# Patient Record
Sex: FEMALE | Race: BLACK OR AFRICAN AMERICAN | Employment: UNEMPLOYED
[De-identification: names, ages, dates, MRNs, and addresses within clinical notes are randomized per-mention and may not be internally consistent; named-entity substitution may affect disease eponyms.]

---

## 2017-02-03 ENCOUNTER — OFFICE VISIT (OUTPATIENT)
Dept: OBGYN | Facility: CLINIC | Age: 32
End: 2017-02-03

## 2017-02-03 VITALS
HEART RATE: 85 BPM | HEIGHT: 71 IN | BODY MASS INDEX: 41.02 KG/M2 | RESPIRATION RATE: 20 BRPM | SYSTOLIC BLOOD PRESSURE: 159 MMHG | DIASTOLIC BLOOD PRESSURE: 88 MMHG | WEIGHT: 293 LBS | TEMPERATURE: 98 F

## 2017-02-03 DIAGNOSIS — R10.9 ABDOMINAL PAIN, UNSPECIFIED LOCATION: ICD-10-CM

## 2017-02-03 DIAGNOSIS — E66.01 MORBID OBESITY WITH BMI OF 70 AND OVER, ADULT (HCC): ICD-10-CM

## 2017-02-03 DIAGNOSIS — N89.8 VAGINAL DISCHARGE: ICD-10-CM

## 2017-02-03 DIAGNOSIS — Z86.19 HISTORY OF TRICHOMONAL VAGINITIS: ICD-10-CM

## 2017-02-03 DIAGNOSIS — Z01.419 WELL WOMAN EXAM WITH ROUTINE GYNECOLOGICAL EXAM: Primary | ICD-10-CM

## 2017-02-03 DIAGNOSIS — Z87.42 H/O ABNORMAL CERVICAL PAPANICOLAOU SMEAR: ICD-10-CM

## 2017-02-03 PROCEDURE — 99395 PREV VISIT EST AGE 18-39: CPT | Performed by: STUDENT IN AN ORGANIZED HEALTH CARE EDUCATION/TRAINING PROGRAM

## 2017-02-06 DIAGNOSIS — N76.0 BV (BACTERIAL VAGINOSIS): Primary | ICD-10-CM

## 2017-02-06 DIAGNOSIS — B96.89 BV (BACTERIAL VAGINOSIS): Primary | ICD-10-CM

## 2017-02-06 RX ORDER — METRONIDAZOLE 500 MG/1
500 TABLET ORAL 2 TIMES DAILY
Qty: 14 TABLET | Refills: 0 | Status: SHIPPED | OUTPATIENT
Start: 2017-02-06 | End: 2017-02-13

## 2017-02-14 PROBLEM — R87.610 ASCUS WITH POSITIVE HIGH RISK HPV CERVICAL: Status: ACTIVE | Noted: 2017-02-14

## 2017-02-14 PROBLEM — R87.810 ASCUS WITH POSITIVE HIGH RISK HPV CERVICAL: Status: ACTIVE | Noted: 2017-02-14

## 2017-02-15 DIAGNOSIS — R10.32 LEFT LOWER QUADRANT PAIN: Primary | ICD-10-CM

## 2017-02-22 ENCOUNTER — HOSPITAL ENCOUNTER (EMERGENCY)
Age: 32
Discharge: HOME | End: 2017-02-22
Attending: EMERGENCY MEDICINE
Payer: MEDICARE

## 2017-02-22 VITALS
WEIGHT: 293 LBS | HEART RATE: 69 BPM | RESPIRATION RATE: 18 BRPM | TEMPERATURE: 97.9 F | BODY MASS INDEX: 41.02 KG/M2 | SYSTOLIC BLOOD PRESSURE: 148 MMHG | HEIGHT: 71 IN | DIASTOLIC BLOOD PRESSURE: 86 MMHG | OXYGEN SATURATION: 99 %

## 2017-02-22 DIAGNOSIS — E66.01 MORBID OBESITY WITH BMI OF 70 AND OVER, ADULT (HCC): ICD-10-CM

## 2017-02-22 DIAGNOSIS — I15.9 SECONDARY HYPERTENSION: ICD-10-CM

## 2017-02-22 DIAGNOSIS — M54.50 ACUTE LEFT-SIDED LOW BACK PAIN WITHOUT SCIATICA: Primary | ICD-10-CM

## 2017-02-22 PROCEDURE — 99283 EMERGENCY DEPT VISIT LOW MDM: CPT

## 2017-02-22 RX ORDER — CYCLOBENZAPRINE HCL 10 MG
10 TABLET ORAL 2 TIMES DAILY PRN
Qty: 10 TABLET | Refills: 0 | Status: ON HOLD | OUTPATIENT
Start: 2017-02-22 | End: 2018-11-21 | Stop reason: HOSPADM

## 2017-02-22 RX ORDER — CYCLOBENZAPRINE HCL 10 MG
10 TABLET ORAL ONCE
Status: DISCONTINUED | OUTPATIENT
Start: 2017-02-22 | End: 2017-02-22 | Stop reason: HOSPADM

## 2017-02-22 ASSESSMENT — ENCOUNTER SYMPTOMS
VOMITING: 0
SHORTNESS OF BREATH: 0
RHINORRHEA: 0
DIARRHEA: 0
EYE DISCHARGE: 0
SORE THROAT: 0
CONSTIPATION: 0
NAUSEA: 0
BACK PAIN: 1
COUGH: 0
ABDOMINAL PAIN: 1

## 2017-02-22 ASSESSMENT — PAIN SCALES - GENERAL: PAINLEVEL_OUTOF10: 10

## 2017-03-07 ENCOUNTER — HOSPITAL ENCOUNTER (OUTPATIENT)
Age: 32
Setting detail: SPECIMEN
Discharge: HOME | End: 2017-03-07
Payer: MEDICARE

## 2017-03-07 ENCOUNTER — PROCEDURE VISIT (OUTPATIENT)
Dept: OBGYN | Facility: CLINIC | Age: 32
End: 2017-03-07

## 2017-03-07 VITALS
SYSTOLIC BLOOD PRESSURE: 139 MMHG | BODY MASS INDEX: 44.41 KG/M2 | HEIGHT: 68 IN | HEART RATE: 88 BPM | TEMPERATURE: 97.9 F | WEIGHT: 293 LBS | DIASTOLIC BLOOD PRESSURE: 87 MMHG

## 2017-03-07 DIAGNOSIS — Z87.42 H/O ABNORMAL CERVICAL PAPANICOLAOU SMEAR: ICD-10-CM

## 2017-03-07 DIAGNOSIS — R87.810 ASCUS WITH POSITIVE HIGH RISK HPV CERVICAL: Primary | ICD-10-CM

## 2017-03-07 DIAGNOSIS — R87.610 ASCUS WITH POSITIVE HIGH RISK HPV CERVICAL: Primary | ICD-10-CM

## 2017-03-07 LAB
CONTROL: NORMAL
PREGNANCY TEST URINE, POC: NEGATIVE

## 2017-03-07 PROCEDURE — 57454 BX/CURETT OF CERVIX W/SCOPE: CPT | Performed by: OBSTETRICS & GYNECOLOGY

## 2017-03-07 PROCEDURE — 81025 URINE PREGNANCY TEST: CPT | Performed by: OBSTETRICS & GYNECOLOGY

## 2017-03-09 LAB — SURGICAL PATHOLOGY REPORT: NORMAL

## 2017-03-30 ENCOUNTER — OFFICE VISIT (OUTPATIENT)
Dept: OBGYN | Age: 32
End: 2017-03-30
Payer: MEDICARE

## 2017-03-30 VITALS
DIASTOLIC BLOOD PRESSURE: 93 MMHG | WEIGHT: 293 LBS | SYSTOLIC BLOOD PRESSURE: 147 MMHG | HEART RATE: 73 BPM | HEIGHT: 71 IN | BODY MASS INDEX: 41.02 KG/M2

## 2017-03-30 DIAGNOSIS — Z09 FOLLOW UP: Primary | ICD-10-CM

## 2017-03-30 DIAGNOSIS — N87.0 DYSPLASIA OF CERVIX, LOW GRADE (CIN 1): ICD-10-CM

## 2017-03-30 PROCEDURE — 99213 OFFICE O/P EST LOW 20 MIN: CPT | Performed by: OBSTETRICS & GYNECOLOGY

## 2017-05-17 ENCOUNTER — HOSPITAL ENCOUNTER (EMERGENCY)
Age: 32
Discharge: HOME | End: 2017-05-17
Attending: EMERGENCY MEDICINE
Payer: MEDICARE

## 2017-05-17 ENCOUNTER — APPOINTMENT (OUTPATIENT)
Dept: GENERAL RADIOLOGY | Age: 32
End: 2017-05-17
Payer: MEDICARE

## 2017-05-17 VITALS
SYSTOLIC BLOOD PRESSURE: 149 MMHG | TEMPERATURE: 98.2 F | BODY MASS INDEX: 41.02 KG/M2 | WEIGHT: 293 LBS | OXYGEN SATURATION: 99 % | DIASTOLIC BLOOD PRESSURE: 89 MMHG | HEART RATE: 70 BPM | RESPIRATION RATE: 16 BRPM | HEIGHT: 71 IN

## 2017-05-17 DIAGNOSIS — M79.671 RIGHT FOOT PAIN: Primary | ICD-10-CM

## 2017-05-17 PROCEDURE — 73610 X-RAY EXAM OF ANKLE: CPT

## 2017-05-17 PROCEDURE — 99283 EMERGENCY DEPT VISIT LOW MDM: CPT

## 2017-05-17 PROCEDURE — 73630 X-RAY EXAM OF FOOT: CPT

## 2017-05-17 RX ORDER — IBUPROFEN 800 MG/1
800 TABLET ORAL EVERY 8 HOURS PRN
Qty: 30 TABLET | Refills: 0 | Status: SHIPPED | OUTPATIENT
Start: 2017-05-17 | End: 2018-10-25

## 2017-05-17 ASSESSMENT — PAIN DESCRIPTION - DESCRIPTORS: DESCRIPTORS: ACHING

## 2017-05-17 ASSESSMENT — PAIN DESCRIPTION - LOCATION: LOCATION: FOOT;ANKLE

## 2017-05-17 ASSESSMENT — PAIN SCALES - GENERAL: PAINLEVEL_OUTOF10: 6

## 2017-05-17 ASSESSMENT — PAIN DESCRIPTION - PAIN TYPE: TYPE: CHRONIC PAIN

## 2017-05-17 ASSESSMENT — PAIN DESCRIPTION - FREQUENCY: FREQUENCY: CONTINUOUS

## 2017-05-17 ASSESSMENT — PAIN DESCRIPTION - ORIENTATION: ORIENTATION: RIGHT

## 2017-07-13 ENCOUNTER — OFFICE VISIT (OUTPATIENT)
Dept: OBGYN | Age: 32
End: 2017-07-13
Payer: MEDICARE

## 2017-07-13 ENCOUNTER — HOSPITAL ENCOUNTER (OUTPATIENT)
Age: 32
Setting detail: SPECIMEN
Discharge: HOME | End: 2017-07-13
Payer: MEDICARE

## 2017-07-13 VITALS
SYSTOLIC BLOOD PRESSURE: 141 MMHG | RESPIRATION RATE: 20 BRPM | BODY MASS INDEX: 41.02 KG/M2 | HEART RATE: 83 BPM | TEMPERATURE: 97.6 F | DIASTOLIC BLOOD PRESSURE: 98 MMHG | WEIGHT: 293 LBS | HEIGHT: 71 IN

## 2017-07-13 DIAGNOSIS — T74.21XS SEXUAL ASSAULT OF ADULT, SEQUELA: ICD-10-CM

## 2017-07-13 DIAGNOSIS — N89.8 VAGINAL DISCHARGE: Primary | ICD-10-CM

## 2017-07-13 DIAGNOSIS — Z20.2 EXPOSURE TO STD: ICD-10-CM

## 2017-07-13 DIAGNOSIS — A59.9 TRICHOMONAS INFECTION: ICD-10-CM

## 2017-07-13 LAB
-: ABNORMAL
AMORPHOUS: ABNORMAL
BACTERIA: ABNORMAL
BILIRUBIN URINE: NEGATIVE
CASTS UA: ABNORMAL /LPF (ref 0–8)
COLOR: YELLOW
COMMENT UA: ABNORMAL
CONTROL: NORMAL
CRYSTALS, UA: ABNORMAL /HPF
DIRECT EXAM: ABNORMAL
EPITHELIAL CELLS UA: ABNORMAL /HPF (ref 0–5)
GLUCOSE URINE: NEGATIVE
HAV IGM SER IA-ACNC: NONREACTIVE
HEPATITIS B CORE IGM ANTIBODY: NONREACTIVE
HEPATITIS B SURFACE ANTIGEN: NONREACTIVE
HEPATITIS C ANTIBODY: NONREACTIVE
HIV AG/AB: NONREACTIVE
KETONES, URINE: NEGATIVE
LEUKOCYTE ESTERASE, URINE: ABNORMAL
Lab: ABNORMAL
MUCUS: ABNORMAL
NITRITE, URINE: NEGATIVE
OTHER OBSERVATIONS UA: ABNORMAL
PH UA: 7 (ref 5–8)
PREGNANCY TEST URINE, POC: NEGATIVE
PROTEIN UA: ABNORMAL
RBC UA: ABNORMAL /HPF (ref 0–4)
RENAL EPITHELIAL, UA: ABNORMAL /HPF
SPECIFIC GRAVITY UA: 1.02 (ref 1–1.03)
SPECIMEN DESCRIPTION: ABNORMAL
STATUS: ABNORMAL
T. PALLIDUM, IGG: NONREACTIVE
TRICHOMONAS: ABNORMAL
TURBIDITY: ABNORMAL
URINE HGB: ABNORMAL
UROBILINOGEN, URINE: NORMAL
WBC UA: ABNORMAL /HPF (ref 0–5)
YEAST: ABNORMAL

## 2017-07-13 PROCEDURE — 80074 ACUTE HEPATITIS PANEL: CPT

## 2017-07-13 PROCEDURE — 81003 URINALYSIS AUTO W/O SCOPE: CPT | Performed by: OBSTETRICS & GYNECOLOGY

## 2017-07-13 PROCEDURE — 87210 SMEAR WET MOUNT SALINE/INK: CPT | Performed by: OBSTETRICS & GYNECOLOGY

## 2017-07-13 PROCEDURE — 99214 OFFICE O/P EST MOD 30 MIN: CPT | Performed by: OBSTETRICS & GYNECOLOGY

## 2017-07-13 PROCEDURE — 87389 HIV-1 AG W/HIV-1&-2 AB AG IA: CPT

## 2017-07-13 PROCEDURE — 81025 URINE PREGNANCY TEST: CPT | Performed by: OBSTETRICS & GYNECOLOGY

## 2017-07-13 PROCEDURE — 36415 COLL VENOUS BLD VENIPUNCTURE: CPT

## 2017-07-13 PROCEDURE — 86780 TREPONEMA PALLIDUM: CPT

## 2017-07-13 RX ORDER — AZITHROMYCIN 500 MG/1
1000 TABLET, FILM COATED ORAL ONCE
Qty: 1 PACKET | Refills: 0 | Status: SHIPPED | OUTPATIENT
Start: 2017-07-13 | End: 2017-07-13

## 2017-07-13 RX ORDER — CEFTRIAXONE SODIUM 250 MG/1
250 INJECTION, POWDER, FOR SOLUTION INTRAMUSCULAR; INTRAVENOUS ONCE
Status: COMPLETED | OUTPATIENT
Start: 2017-07-13 | End: 2017-07-13

## 2017-07-13 RX ORDER — METRONIDAZOLE 500 MG/1
500 TABLET ORAL 2 TIMES DAILY
Qty: 14 TABLET | Refills: 0 | Status: SHIPPED | OUTPATIENT
Start: 2017-07-13 | End: 2017-07-20

## 2017-07-13 RX ADMIN — CEFTRIAXONE SODIUM 250 MG: 250 INJECTION, POWDER, FOR SOLUTION INTRAMUSCULAR; INTRAVENOUS at 11:31

## 2017-07-13 ASSESSMENT — ENCOUNTER SYMPTOMS
BACK PAIN: 1
ABDOMINAL PAIN: 1
RESPIRATORY NEGATIVE: 1

## 2017-07-14 LAB
C TRACH DNA GENITAL QL NAA+PROBE: NEGATIVE
CULTURE: ABNORMAL
CULTURE: ABNORMAL
Lab: ABNORMAL
N. GONORRHOEAE DNA: NEGATIVE
SPECIMEN DESCRIPTION: ABNORMAL
STATUS: ABNORMAL

## 2017-07-24 ENCOUNTER — HOSPITAL ENCOUNTER (EMERGENCY)
Age: 32
Discharge: HOME | End: 2017-07-24
Attending: EMERGENCY MEDICINE
Payer: MEDICARE

## 2017-07-24 VITALS
RESPIRATION RATE: 20 BRPM | BODY MASS INDEX: 41.02 KG/M2 | SYSTOLIC BLOOD PRESSURE: 142 MMHG | WEIGHT: 293 LBS | DIASTOLIC BLOOD PRESSURE: 76 MMHG | HEART RATE: 97 BPM | TEMPERATURE: 98.1 F | HEIGHT: 71 IN | OXYGEN SATURATION: 100 %

## 2017-07-24 DIAGNOSIS — R51.9 FACIAL PAIN: Primary | ICD-10-CM
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
## 2017-07-24 PROCEDURE — 99283 EMERGENCY DEPT VISIT LOW MDM: CPT

## 2017-07-24 PROCEDURE — 6370000000 HC RX 637 (ALT 250 FOR IP): Performed by: PHYSICIAN ASSISTANT

## 2017-07-24 RX ORDER — SOFT LENS RINSE,STORE SOLUTION
1 SOLUTION, NON-ORAL MISCELLANEOUS PRN
Status: DISCONTINUED | OUTPATIENT
Start: 2017-07-24 | End: 2017-07-24 | Stop reason: HOSPADM

## 2017-07-24 RX ADMIN — Medication 1 DROP: at 21:17

## 2017-07-24 ASSESSMENT — PAIN DESCRIPTION - PAIN TYPE: TYPE: ACUTE PAIN

## 2017-07-24 ASSESSMENT — PAIN DESCRIPTION - LOCATION: LOCATION: HEAD

## 2017-07-24 ASSESSMENT — PAIN SCALES - GENERAL: PAINLEVEL_OUTOF10: 10

## 2017-07-25 ASSESSMENT — ENCOUNTER SYMPTOMS
SHORTNESS OF BREATH: 0
COUGH: 0
BACK PAIN: 0
FACIAL SWELLING: 1
EYE ITCHING: 0
EYE PAIN: 1
ABDOMINAL PAIN: 0
EYE REDNESS: 0
EYE DISCHARGE: 0
NAUSEA: 0
PHOTOPHOBIA: 0

## 2018-06-06 ENCOUNTER — HOSPITAL ENCOUNTER (EMERGENCY)
Age: 33
Discharge: HOME | End: 2018-06-06
Attending: EMERGENCY MEDICINE
Payer: MEDICARE

## 2018-06-06 VITALS
OXYGEN SATURATION: 98 % | TEMPERATURE: 98.8 F | HEART RATE: 87 BPM | SYSTOLIC BLOOD PRESSURE: 135 MMHG | DIASTOLIC BLOOD PRESSURE: 78 MMHG | RESPIRATION RATE: 19 BRPM

## 2018-06-06 DIAGNOSIS — J02.9 ACUTE PHARYNGITIS, UNSPECIFIED ETIOLOGY: ICD-10-CM

## 2018-06-06 DIAGNOSIS — H65.93 BILATERAL NON-SUPPURATIVE OTITIS MEDIA: Primary | ICD-10-CM

## 2018-06-06 PROCEDURE — 99283 EMERGENCY DEPT VISIT LOW MDM: CPT

## 2018-06-06 PROCEDURE — 6370000000 HC RX 637 (ALT 250 FOR IP): Performed by: EMERGENCY MEDICINE

## 2018-06-06 RX ORDER — GUAIFENESIN, PSEUDOEPHEDRINE HYDROCHLORIDE 600; 60 MG/1; MG/1
1 TABLET, EXTENDED RELEASE ORAL EVERY 12 HOURS
Qty: 14 TABLET | Refills: 0 | Status: SHIPPED | OUTPATIENT
Start: 2018-06-06 | End: 2018-06-13

## 2018-06-06 RX ORDER — AMOXICILLIN 250 MG/1
500 CAPSULE ORAL ONCE
Status: COMPLETED | OUTPATIENT
Start: 2018-06-06 | End: 2018-06-06

## 2018-06-06 RX ORDER — AMOXICILLIN 500 MG/1
500 CAPSULE ORAL 3 TIMES DAILY
Qty: 30 CAPSULE | Refills: 0 | Status: SHIPPED | OUTPATIENT
Start: 2018-06-06 | End: 2018-06-16

## 2018-06-06 RX ORDER — AMOXICILLIN 500 MG/1
500 CAPSULE ORAL 3 TIMES DAILY
Qty: 30 CAPSULE | Refills: 0 | Status: SHIPPED | OUTPATIENT
Start: 2018-06-06 | End: 2018-06-06

## 2018-06-06 RX ADMIN — AMOXICILLIN 500 MG: 250 CAPSULE ORAL at 19:45

## 2018-06-06 ASSESSMENT — PAIN DESCRIPTION - PAIN TYPE: TYPE: ACUTE PAIN

## 2018-06-06 ASSESSMENT — PAIN DESCRIPTION - DESCRIPTORS: DESCRIPTORS: SORE

## 2018-06-06 ASSESSMENT — PAIN SCALES - GENERAL: PAINLEVEL_OUTOF10: 10

## 2018-06-06 ASSESSMENT — PAIN DESCRIPTION - ORIENTATION: ORIENTATION: MID

## 2018-06-06 ASSESSMENT — PAIN DESCRIPTION - LOCATION: LOCATION: THROAT

## 2018-10-25 ENCOUNTER — HOSPITAL ENCOUNTER (EMERGENCY)
Age: 33
Discharge: HOME | End: 2018-10-25
Attending: EMERGENCY MEDICINE
Payer: MEDICARE

## 2018-10-25 VITALS
HEART RATE: 90 BPM | TEMPERATURE: 98 F | DIASTOLIC BLOOD PRESSURE: 92 MMHG | WEIGHT: 293 LBS | SYSTOLIC BLOOD PRESSURE: 182 MMHG | HEIGHT: 71 IN | OXYGEN SATURATION: 100 % | RESPIRATION RATE: 20 BRPM | BODY MASS INDEX: 41.02 KG/M2

## 2018-10-25 DIAGNOSIS — J20.9 ACUTE BRONCHITIS, UNSPECIFIED ORGANISM: Primary | ICD-10-CM

## 2018-10-25 PROCEDURE — 84703 CHORIONIC GONADOTROPIN ASSAY: CPT

## 2018-10-25 PROCEDURE — 81003 URINALYSIS AUTO W/O SCOPE: CPT

## 2018-10-25 PROCEDURE — 99283 EMERGENCY DEPT VISIT LOW MDM: CPT

## 2018-10-25 RX ORDER — IBUPROFEN 800 MG/1
800 TABLET ORAL EVERY 8 HOURS PRN
Qty: 20 TABLET | Refills: 0 | Status: ON HOLD | OUTPATIENT
Start: 2018-10-25 | End: 2018-11-06 | Stop reason: HOSPADM

## 2018-10-25 RX ORDER — BENZONATATE 100 MG/1
100 CAPSULE ORAL EVERY 8 HOURS PRN
Qty: 20 CAPSULE | Refills: 0 | Status: SHIPPED | OUTPATIENT
Start: 2018-10-25

## 2018-10-25 RX ORDER — MELOXICAM 7.5 MG/1
7.5 TABLET ORAL DAILY
Status: ON HOLD | COMMUNITY
End: 2018-11-06 | Stop reason: HOSPADM

## 2018-10-25 RX ORDER — AZITHROMYCIN 250 MG/1
TABLET, FILM COATED ORAL
Qty: 1 PACKET | Refills: 0 | Status: ON HOLD | OUTPATIENT
Start: 2018-10-25 | End: 2018-11-06 | Stop reason: HOSPADM

## 2018-10-25 ASSESSMENT — ENCOUNTER SYMPTOMS
COLOR CHANGE: 0
SHORTNESS OF BREATH: 0
ABDOMINAL PAIN: 1
EYE REDNESS: 0
EYE DISCHARGE: 0
CONSTIPATION: 0
DIARRHEA: 0
FACIAL SWELLING: 0
COUGH: 1
VOMITING: 0

## 2018-10-25 ASSESSMENT — PAIN DESCRIPTION - DESCRIPTORS: DESCRIPTORS: PRESSURE;POUNDING

## 2018-10-25 ASSESSMENT — PAIN SCALES - GENERAL: PAINLEVEL_OUTOF10: 10

## 2018-10-25 ASSESSMENT — PAIN DESCRIPTION - ORIENTATION: ORIENTATION: LOWER;RIGHT;LEFT

## 2018-10-25 ASSESSMENT — PAIN DESCRIPTION - LOCATION: LOCATION: ABDOMEN;EAR;HEAD

## 2018-10-25 NOTE — ED PROVIDER NOTES
[address] [place] [address] ED  eMERGENCY dEPARTMENT eNCOUnter      Pt Name: Noel Dillon  MRN: [id number]  [place] 1985  Date of evaluation: 10/25/2018  Provider: Marcella Phillips MD    CHIEF COMPLAINT       Chief Complaint   Patient presents with    Abdominal Pain     low abd pain with urinary frequency past 3 days    Headache     with bilateral ear pain past 3 days         HISTORY OF PRESENT ILLNESS  (Location/Symptom, Timing/Onset, Context/Setting, Quality, Duration, Modifying Factors, Severity.)   Jean Claude Morales is a 29 y.o. female who presents to the emergency department For cough and chest and abdominal pain. She is a smoker but hasn't smoked recently because she can't because she is short of breath. No hemoptysis or fever. She felt a pulling sensation in her abdomen. No injury. She rated the pain as a 10. She states that she is not constipated. Nursing Notes were reviewed. ALLERGIES     Patient has no known allergies. CURRENT MEDICATIONS       Previous Medications    CYCLOBENZAPRINE (FLEXERIL) 10 MG TABLET    Take 1 tablet by mouth 2 times daily as needed for Muscle spasms    HYDROCHLOROTHIAZIDE (HYDRODIURIL) 25 MG TABLET    Take 25 mg by mouth daily    LORATADINE (CLARITIN) 10 MG TABLET    Take 10 mg by mouth daily    MELOXICAM (MOBIC) 7.5 MG TABLET    Take 7.5 mg by mouth daily    METOPROLOL (LOPRESSOR) 50 MG TABLET    Take 50 mg by mouth 2 times daily    NAPROXEN (NAPROSYN) 250 MG TABLET    Take 250 mg by mouth 2 times daily (with meals)    SUMATRIPTAN SUCCINATE (IMITREX PO)    Take by mouth    TOPIRAMATE (TOPAMAX) 50 MG TABLET    Take 100 mg by mouth 2 times daily     VITAMIN D (ERGOCALCIFEROL) 43844 UNITS CAPS CAPSULE    Take 50,000 Units by mouth once a week       PAST MEDICAL HISTORY         Diagnosis Date    Asthma     Breast discharge 4/9/2015    Chlamydia ?     Reported hx    History of trichomonal vaginitis 1/20106    tx'd    Hypertension     Morbid obesity with BMI of 70 Michelle Clarke  [address] [facility]    In 1 week      [facility] ED  [address] [facility]  [phone]    If symptoms worsen      DISCHARGE MEDICATIONS:     New Prescriptions    AZITHROMYCIN (ZITHROMAX) 250 MG TABLET    Take 2 tablets (500 mg) on Day 1, followed by 1 tablet (250 mg) once daily on Days 2 through 5.     BENZONATATE (TESSALON PERLES) 100 MG CAPSULE    Take 1 capsule by mouth every 8 hours as needed for Cough    IBUPROFEN (ADVIL;MOTRIN) 800 MG TABLET    Take 1 tablet by mouth every 8 hours as needed for Pain         (Please note that portions of this note were completed with a voice recognition program.  Efforts were made to edit the dictations but occasionally words are mis-transcribed.)    Lloyd Cadet MD  Attending Emergency Physician             Lloyd Cadet MD  10/25/18 3291

## 2018-10-28 ENCOUNTER — APPOINTMENT (OUTPATIENT)
Dept: GENERAL RADIOLOGY | Age: 33
End: 2018-10-28
Payer: MEDICARE

## 2018-10-28 ENCOUNTER — HOSPITAL ENCOUNTER (EMERGENCY)
Age: 33
Discharge: HOME | End: 2018-10-29
Attending: EMERGENCY MEDICINE
Payer: MEDICARE

## 2018-10-28 VITALS
SYSTOLIC BLOOD PRESSURE: 134 MMHG | HEIGHT: 72 IN | HEART RATE: 89 BPM | TEMPERATURE: 98.3 F | OXYGEN SATURATION: 96 % | BODY MASS INDEX: 39.68 KG/M2 | WEIGHT: 293 LBS | RESPIRATION RATE: 25 BRPM | DIASTOLIC BLOOD PRESSURE: 85 MMHG

## 2018-10-28 DIAGNOSIS — E66.01 MORBID OBESITY WITH BODY MASS INDEX OF 70 AND OVER IN ADULT (HCC): ICD-10-CM

## 2018-10-28 DIAGNOSIS — R07.9 CHEST PAIN, UNSPECIFIED TYPE: Primary | ICD-10-CM

## 2018-10-28 LAB
ABSOLUTE EOS #: 0.09 K/UL (ref 0–0.4)
ABSOLUTE IMMATURE GRANULOCYTE: ABNORMAL K/UL (ref 0–0.3)
ABSOLUTE LYMPH #: 2.35 K/UL (ref 1–4.8)
ABSOLUTE MONO #: 0.66 K/UL (ref 0.1–1.3)
ANION GAP SERPL CALCULATED.3IONS-SCNC: 10 MMOL/L (ref 9–17)
BASOPHILS # BLD: 1 % (ref 0–2)
BASOPHILS ABSOLUTE: 0.09 K/UL (ref 0–0.2)
BNP INTERPRETATION: ABNORMAL
BUN BLDV-MCNC: 13 MG/DL (ref 6–20)
BUN/CREAT BLD: ABNORMAL (ref 9–20)
CALCIUM SERPL-MCNC: 8.8 MG/DL (ref 8.6–10.4)
CHLORIDE BLD-SCNC: 100 MMOL/L (ref 98–107)
CO2: 27 MMOL/L (ref 20–31)
CREAT SERPL-MCNC: 0.81 MG/DL (ref 0.5–0.9)
DIFFERENTIAL TYPE: ABNORMAL
EKG ATRIAL RATE: 89 BPM
EKG P AXIS: 51 DEGREES
EKG P-R INTERVAL: 152 MS
EKG Q-T INTERVAL: 398 MS
EKG QRS DURATION: 106 MS
EKG QTC CALCULATION (BAZETT): 484 MS
EKG R AXIS: 53 DEGREES
EKG T AXIS: -4 DEGREES
EKG VENTRICULAR RATE: 89 BPM
EOSINOPHILS RELATIVE PERCENT: 1 % (ref 0–4)
GFR AFRICAN AMERICAN: >60 ML/MIN
GFR NON-AFRICAN AMERICAN: >60 ML/MIN
GFR SERPL CREATININE-BSD FRML MDRD: ABNORMAL ML/MIN/{1.73_M2}
GFR SERPL CREATININE-BSD FRML MDRD: ABNORMAL ML/MIN/{1.73_M2}
GLUCOSE BLD-MCNC: 201 MG/DL (ref 70–99)
HCT VFR BLD CALC: 41.6 % (ref 36–46)
HEMOGLOBIN: 12.6 G/DL (ref 12–16)
IMMATURE GRANULOCYTES: ABNORMAL %
LIPASE: 24 U/L (ref 13–60)
LYMPHOCYTES # BLD: 25 % (ref 24–44)
MCH RBC QN AUTO: 21.5 PG (ref 26–34)
MCHC RBC AUTO-ENTMCNC: 30.4 G/DL (ref 31–37)
MCV RBC AUTO: 70.7 FL (ref 80–100)
MONOCYTES # BLD: 7 % (ref 1–7)
MORPHOLOGY: ABNORMAL
NRBC AUTOMATED: ABNORMAL PER 100 WBC
PDW BLD-RTO: 18.2 % (ref 11.5–14.9)
PLATELET # BLD: 294 K/UL (ref 150–450)
PLATELET ESTIMATE: ABNORMAL
PMV BLD AUTO: 8.6 FL (ref 6–12)
POTASSIUM SERPL-SCNC: 3.9 MMOL/L (ref 3.7–5.3)
PRO-BNP: 2317 PG/ML
RBC # BLD: 5.88 M/UL (ref 4–5.2)
RBC # BLD: ABNORMAL 10*6/UL
SEG NEUTROPHILS: 66 % (ref 36–66)
SEGMENTED NEUTROPHILS ABSOLUTE COUNT: 6.21 K/UL (ref 1.3–9.1)
SODIUM BLD-SCNC: 137 MMOL/L (ref 135–144)
TROPONIN INTERP: NORMAL
TROPONIN INTERP: NORMAL
TROPONIN T: <0.03 NG/ML
TROPONIN T: <0.03 NG/ML
WBC # BLD: 9.4 K/UL (ref 3.5–11)
WBC # BLD: ABNORMAL 10*3/UL

## 2018-10-28 PROCEDURE — 80048 BASIC METABOLIC PNL TOTAL CA: CPT

## 2018-10-28 PROCEDURE — 99285 EMERGENCY DEPT VISIT HI MDM: CPT

## 2018-10-28 PROCEDURE — 6370000000 HC RX 637 (ALT 250 FOR IP): Performed by: STUDENT IN AN ORGANIZED HEALTH CARE EDUCATION/TRAINING PROGRAM

## 2018-10-28 PROCEDURE — 83880 ASSAY OF NATRIURETIC PEPTIDE: CPT

## 2018-10-28 PROCEDURE — 96374 THER/PROPH/DIAG INJ IV PUSH: CPT

## 2018-10-28 PROCEDURE — 71045 X-RAY EXAM CHEST 1 VIEW: CPT

## 2018-10-28 PROCEDURE — 2500000003 HC RX 250 WO HCPCS: Performed by: STUDENT IN AN ORGANIZED HEALTH CARE EDUCATION/TRAINING PROGRAM

## 2018-10-28 PROCEDURE — 85025 COMPLETE CBC W/AUTO DIFF WBC: CPT

## 2018-10-28 PROCEDURE — 93005 ELECTROCARDIOGRAM TRACING: CPT

## 2018-10-28 PROCEDURE — 84484 ASSAY OF TROPONIN QUANT: CPT

## 2018-10-28 PROCEDURE — 36415 COLL VENOUS BLD VENIPUNCTURE: CPT

## 2018-10-28 PROCEDURE — 83690 ASSAY OF LIPASE: CPT

## 2018-10-28 PROCEDURE — S0028 INJECTION, FAMOTIDINE, 20 MG: HCPCS | Performed by: STUDENT IN AN ORGANIZED HEALTH CARE EDUCATION/TRAINING PROGRAM

## 2018-10-28 RX ORDER — FAMOTIDINE 20 MG/1
20 TABLET, FILM COATED ORAL 2 TIMES DAILY
Qty: 60 TABLET | Refills: 0 | Status: SHIPPED | OUTPATIENT
Start: 2018-10-28 | End: 2020-12-14

## 2018-10-28 RX ORDER — MAGNESIUM HYDROXIDE/ALUMINUM HYDROXICE/SIMETHICONE 120; 1200; 1200 MG/30ML; MG/30ML; MG/30ML
30 SUSPENSION ORAL
Status: COMPLETED | OUTPATIENT
Start: 2018-10-28 | End: 2018-10-28

## 2018-10-28 RX ORDER — ALUMINA, MAGNESIA, AND SIMETHICONE 2400; 2400; 240 MG/30ML; MG/30ML; MG/30ML
15 SUSPENSION ORAL EVERY 6 HOURS PRN
Qty: 1 BOTTLE | Refills: 0 | Status: SHIPPED | OUTPATIENT
Start: 2018-10-28 | End: 2020-01-10 | Stop reason: ALTCHOICE

## 2018-10-28 RX ORDER — ASPIRIN 81 MG/1
324 TABLET, CHEWABLE ORAL ONCE
Status: COMPLETED | OUTPATIENT
Start: 2018-10-28 | End: 2018-10-28

## 2018-10-28 RX ADMIN — ALUMINUM HYDROXIDE, MAGNESIUM HYDROXIDE, AND SIMETHICONE 30 ML: 200; 200; 20 SUSPENSION ORAL at 22:06

## 2018-10-28 RX ADMIN — FAMOTIDINE 20 MG: 10 INJECTION, SOLUTION INTRAVENOUS at 22:07

## 2018-10-28 RX ADMIN — ASPIRIN 324 MG: 81 TABLET, CHEWABLE ORAL at 21:39

## 2018-10-28 ASSESSMENT — ENCOUNTER SYMPTOMS
VOMITING: 0
BACK PAIN: 0
ABDOMINAL PAIN: 0
PHOTOPHOBIA: 0
TROUBLE SWALLOWING: 0
SHORTNESS OF BREATH: 1
COUGH: 1
SORE THROAT: 0
CHEST TIGHTNESS: 0
WHEEZING: 0
NAUSEA: 0

## 2018-10-28 ASSESSMENT — PAIN DESCRIPTION - DURATION: DURATION_2: CONTINUOUS

## 2018-10-28 ASSESSMENT — PAIN DESCRIPTION - INTENSITY: RATING_2: 6

## 2018-10-28 ASSESSMENT — PAIN DESCRIPTION - DESCRIPTORS
DESCRIPTORS_2: STABBING
DESCRIPTORS: SHARP

## 2018-10-28 ASSESSMENT — PAIN DESCRIPTION - ORIENTATION
ORIENTATION: MID
ORIENTATION_2: LEFT;RIGHT

## 2018-10-28 ASSESSMENT — PAIN DESCRIPTION - LOCATION
LOCATION: CHEST;ABDOMEN
LOCATION_2: EAR

## 2018-10-28 ASSESSMENT — PAIN SCALES - GENERAL: PAINLEVEL_OUTOF10: 7

## 2018-10-28 ASSESSMENT — PAIN DESCRIPTION - FREQUENCY: FREQUENCY: CONTINUOUS

## 2018-10-28 ASSESSMENT — PAIN DESCRIPTION - PAIN TYPE: TYPE: ACUTE PAIN

## 2018-10-29 LAB — HCG, PREGNANCY URINE (POC): NEGATIVE

## 2018-10-29 NOTE — ED PROVIDER NOTES
the patient in conjunction with the APC and agree with the assessment, treatment plan, and disposition of the patient as recorded by the APC. Additional findings are as noted.     Salvador Mckinley MD  Attending Emergency  Physician                Khushi Joseph MD  10/28/18 8507
sounds, JVD, tender chest wall, wheezing      DIAGNOSTIC RESULTS / EMERGENCY DEPARTMENT COURSE / MDM     LABS:  Results for orders placed or performed during the hospital encounter of 33/84/04   Basic Metabolic Panel   Result Value Ref Range    Glucose 201 (H) 70 - 99 mg/dL    BUN 13 6 - 20 mg/dL    CREATININE 0.81 0.50 - 0.90 mg/dL    Bun/Cre Ratio NOT REPORTED 9 - 20    Calcium 8.8 8.6 - 10.4 mg/dL    Sodium 137 135 - 144 mmol/L    Potassium 3.9 3.7 - 5.3 mmol/L    Chloride 100 98 - 107 mmol/L    CO2 27 20 - 31 mmol/L    Anion Gap 10 9 - 17 mmol/L    GFR Non-African American >60 >60 mL/min    GFR African American >60 >60 mL/min    GFR Comment          GFR Staging NOT REPORTED    Brain Natriuretic Peptide   Result Value Ref Range    Pro-BNP 2,317 (H) <300 pg/mL    BNP Interpretation         CBC Auto Differential   Result Value Ref Range    WBC 9.4 3.5 - 11.0 k/uL    RBC 5.88 (H) 4.0 - 5.2 m/uL    Hemoglobin 12.6 12.0 - 16.0 g/dL    Hematocrit 41.6 36 - 46 %    MCV 70.7 (L) 80 - 100 fL    MCH 21.5 (L) 26 - 34 pg    MCHC 30.4 (L) 31 - 37 g/dL    RDW 18.2 (H) 11.5 - 14.9 %    Platelets 430 634 - 126 k/uL    MPV 8.6 6.0 - 12.0 fL    NRBC Automated NOT REPORTED per 100 WBC    Differential Type NOT REPORTED     Immature Granulocytes NOT REPORTED 0 %    Absolute Immature Granulocyte NOT REPORTED 0.00 - 0.30 k/uL    WBC Morphology NOT REPORTED     RBC Morphology NOT REPORTED     Platelet Estimate NOT REPORTED     Seg Neutrophils 66 36 - 66 %    Lymphocytes 25 24 - 44 %    Monocytes 7 1 - 7 %    Eosinophils % 1 0 - 4 %    Basophils 1 0 - 2 %    Segs Absolute 6.21 1.3 - 9.1 k/uL    Absolute Lymph # 2.35 1.0 - 4.8 k/uL    Absolute Mono # 0.66 0.1 - 1.3 k/uL    Absolute Eos # 0.09 0.0 - 0.4 k/uL    Basophils # 0.09 0.0 - 0.2 k/uL    Morphology ANISOCYTOSIS PRESENT    Troponin   Result Value Ref Range    Troponin T <0.03 <0.03 ng/mL    Troponin Interp         Lipase   Result Value Ref Range    Lipase 24 13 - 60 U/L   EKG 12

## 2018-11-03 ENCOUNTER — HOSPITAL ENCOUNTER (INPATIENT)
Age: 33
LOS: 3 days | Discharge: HOME | DRG: 203 | End: 2018-11-06
Attending: EMERGENCY MEDICINE | Admitting: FAMILY MEDICINE
Payer: MEDICARE

## 2018-11-03 ENCOUNTER — APPOINTMENT (OUTPATIENT)
Dept: GENERAL RADIOLOGY | Age: 33
DRG: 203 | End: 2018-11-03
Payer: MEDICARE

## 2018-11-03 DIAGNOSIS — E66.2 OBESITY HYPOVENTILATION SYNDROME (HCC): ICD-10-CM

## 2018-11-03 DIAGNOSIS — E66.01 MORBID OBESITY WITH BMI OF 70 AND OVER, ADULT (HCC): ICD-10-CM

## 2018-11-03 DIAGNOSIS — R03.0 ELEVATED BLOOD PRESSURE READING: Primary | ICD-10-CM

## 2018-11-03 DIAGNOSIS — R79.89 ELEVATED BRAIN NATRIURETIC PEPTIDE (BNP) LEVEL: ICD-10-CM

## 2018-11-03 DIAGNOSIS — R06.02 SHORTNESS OF BREATH: ICD-10-CM

## 2018-11-03 DIAGNOSIS — I51.7 CARDIOMEGALY: ICD-10-CM

## 2018-11-03 DIAGNOSIS — G47.33 OSA (OBSTRUCTIVE SLEEP APNEA): ICD-10-CM

## 2018-11-03 DIAGNOSIS — R06.01 ORTHOPNEA: ICD-10-CM

## 2018-11-03 PROBLEM — R06.00 DYSPNEA: Status: ACTIVE | Noted: 2018-11-03

## 2018-11-03 LAB
ABSOLUTE EOS #: 0 K/UL (ref 0–0.4)
ABSOLUTE IMMATURE GRANULOCYTE: 0 K/UL (ref 0–0.3)
ABSOLUTE LYMPH #: 2.72 K/UL (ref 1–4.8)
ABSOLUTE MONO #: 0.49 K/UL (ref 0.1–0.8)
ALBUMIN SERPL-MCNC: 3 G/DL (ref 3.5–5.2)
ALBUMIN/GLOBULIN RATIO: 0.8 (ref 1–2.5)
ALP BLD-CCNC: 57 U/L (ref 35–104)
ALT SERPL-CCNC: 47 U/L (ref 5–33)
ANION GAP SERPL CALCULATED.3IONS-SCNC: 8 MMOL/L (ref 9–17)
AST SERPL-CCNC: 28 U/L
ATYPICAL LYMPHOCYTE ABSOLUTE COUNT: 0.29 K/UL
ATYPICAL LYMPHOCYTES: 3 %
BASOPHILS # BLD: 0 % (ref 0–2)
BASOPHILS ABSOLUTE: 0 K/UL (ref 0–0.2)
BILIRUB SERPL-MCNC: 0.57 MG/DL (ref 0.3–1.2)
BILIRUBIN DIRECT: 0.19 MG/DL
BILIRUBIN, INDIRECT: 0.38 MG/DL (ref 0–1)
BNP INTERPRETATION: ABNORMAL
BUN BLDV-MCNC: 11 MG/DL (ref 6–20)
BUN/CREAT BLD: ABNORMAL (ref 9–20)
CALCIUM SERPL-MCNC: 8.6 MG/DL (ref 8.6–10.4)
CHLORIDE BLD-SCNC: 104 MMOL/L (ref 98–107)
CO2: 28 MMOL/L (ref 20–31)
CREAT SERPL-MCNC: 0.66 MG/DL (ref 0.5–0.9)
D-DIMER QUANTITATIVE: 0.75 MG/L FEU
DIFFERENTIAL TYPE: ABNORMAL
EOSINOPHILS RELATIVE PERCENT: 0 % (ref 1–4)
GFR AFRICAN AMERICAN: >60 ML/MIN
GFR NON-AFRICAN AMERICAN: >60 ML/MIN
GFR SERPL CREATININE-BSD FRML MDRD: ABNORMAL ML/MIN/{1.73_M2}
GFR SERPL CREATININE-BSD FRML MDRD: ABNORMAL ML/MIN/{1.73_M2}
GLOBULIN: ABNORMAL G/DL (ref 1.5–3.8)
GLUCOSE BLD-MCNC: 136 MG/DL (ref 70–99)
HCG QUALITATIVE: NEGATIVE
HCT VFR BLD CALC: 44.7 % (ref 36.3–47.1)
HEMOGLOBIN: 12.1 G/DL (ref 11.9–15.1)
IMMATURE GRANULOCYTES: 0 %
INR BLD: 1
LIPASE: 17 U/L (ref 13–60)
LYMPHOCYTES # BLD: 28 % (ref 24–44)
MCH RBC QN AUTO: 20.3 PG (ref 25.2–33.5)
MCHC RBC AUTO-ENTMCNC: 27.1 G/DL (ref 28.4–34.8)
MCV RBC AUTO: 75.1 FL (ref 82.6–102.9)
MONOCYTES # BLD: 5 % (ref 1–7)
MORPHOLOGY: ABNORMAL
NRBC AUTOMATED: 0.4 PER 100 WBC
PARTIAL THROMBOPLASTIN TIME: 20.2 SEC (ref 20.5–30.5)
PDW BLD-RTO: 18.8 % (ref 11.8–14.4)
PLATELET # BLD: 294 K/UL (ref 138–453)
PLATELET ESTIMATE: ABNORMAL
PMV BLD AUTO: 10.3 FL (ref 8.1–13.5)
POC TROPONIN I: 0.02 NG/ML (ref 0–0.1)
POC TROPONIN I: 0.09 NG/ML (ref 0–0.1)
POC TROPONIN INTERP: NORMAL
POC TROPONIN INTERP: NORMAL
POTASSIUM SERPL-SCNC: 3.8 MMOL/L (ref 3.7–5.3)
PRO-BNP: 1954 PG/ML
PROTHROMBIN TIME: 10.9 SEC (ref 9–12)
RBC # BLD: 5.95 M/UL (ref 3.95–5.11)
RBC # BLD: ABNORMAL 10*6/UL
SEG NEUTROPHILS: 64 % (ref 36–66)
SEGMENTED NEUTROPHILS ABSOLUTE COUNT: 6.2 K/UL (ref 1.8–7.7)
SODIUM BLD-SCNC: 140 MMOL/L (ref 135–144)
T4 TOTAL: 7.1 UG/DL (ref 4.5–12)
TOTAL PROTEIN: 6.7 G/DL (ref 6.4–8.3)
TROPONIN INTERP: NORMAL
TROPONIN T: <0.03 NG/ML
TSH SERPL DL<=0.05 MIU/L-ACNC: 3.7 MIU/L (ref 0.3–5)
WBC # BLD: 9.7 K/UL (ref 3.5–11.3)
WBC # BLD: ABNORMAL 10*3/UL

## 2018-11-03 PROCEDURE — 83690 ASSAY OF LIPASE: CPT

## 2018-11-03 PROCEDURE — 85730 THROMBOPLASTIN TIME PARTIAL: CPT

## 2018-11-03 PROCEDURE — 84436 ASSAY OF TOTAL THYROXINE: CPT

## 2018-11-03 PROCEDURE — 84484 ASSAY OF TROPONIN QUANT: CPT

## 2018-11-03 PROCEDURE — 85610 PROTHROMBIN TIME: CPT

## 2018-11-03 PROCEDURE — 6370000000 HC RX 637 (ALT 250 FOR IP): Performed by: PHYSICIAN ASSISTANT

## 2018-11-03 PROCEDURE — 80048 BASIC METABOLIC PNL TOTAL CA: CPT

## 2018-11-03 PROCEDURE — 6360000002 HC RX W HCPCS: Performed by: PHYSICIAN ASSISTANT

## 2018-11-03 PROCEDURE — 83036 HEMOGLOBIN GLYCOSYLATED A1C: CPT

## 2018-11-03 PROCEDURE — 85379 FIBRIN DEGRADATION QUANT: CPT

## 2018-11-03 PROCEDURE — 71045 X-RAY EXAM CHEST 1 VIEW: CPT

## 2018-11-03 PROCEDURE — 83880 ASSAY OF NATRIURETIC PEPTIDE: CPT

## 2018-11-03 PROCEDURE — 84443 ASSAY THYROID STIM HORMONE: CPT

## 2018-11-03 PROCEDURE — 80076 HEPATIC FUNCTION PANEL: CPT

## 2018-11-03 PROCEDURE — 99285 EMERGENCY DEPT VISIT HI MDM: CPT

## 2018-11-03 PROCEDURE — 84703 CHORIONIC GONADOTROPIN ASSAY: CPT

## 2018-11-03 PROCEDURE — 93005 ELECTROCARDIOGRAM TRACING: CPT

## 2018-11-03 PROCEDURE — 2060000000 HC ICU INTERMEDIATE R&B

## 2018-11-03 PROCEDURE — 85025 COMPLETE CBC W/AUTO DIFF WBC: CPT

## 2018-11-03 RX ORDER — HEPARIN SODIUM 1000 [USP'U]/ML
10000 INJECTION, SOLUTION INTRAVENOUS; SUBCUTANEOUS ONCE
Status: COMPLETED | OUTPATIENT
Start: 2018-11-03 | End: 2018-11-03

## 2018-11-03 RX ORDER — NICOTINE 21 MG/24HR
1 PATCH, TRANSDERMAL 24 HOURS TRANSDERMAL DAILY PRN
Status: DISCONTINUED | OUTPATIENT
Start: 2018-11-03 | End: 2018-11-06 | Stop reason: HOSPADM

## 2018-11-03 RX ORDER — ALBUTEROL SULFATE 2.5 MG/3ML
2.5 SOLUTION RESPIRATORY (INHALATION)
Status: DISCONTINUED | OUTPATIENT
Start: 2018-11-03 | End: 2018-11-04

## 2018-11-03 RX ORDER — DOCUSATE SODIUM 100 MG/1
100 CAPSULE, LIQUID FILLED ORAL 2 TIMES DAILY
Status: DISCONTINUED | OUTPATIENT
Start: 2018-11-03 | End: 2018-11-06 | Stop reason: HOSPADM

## 2018-11-03 RX ORDER — LISINOPRIL 5 MG/1
5 TABLET ORAL DAILY
Status: DISCONTINUED | OUTPATIENT
Start: 2018-11-04 | End: 2018-11-04

## 2018-11-03 RX ORDER — HYDRALAZINE HYDROCHLORIDE 10 MG/1
10 TABLET, FILM COATED ORAL ONCE
Status: COMPLETED | OUTPATIENT
Start: 2018-11-03 | End: 2018-11-03

## 2018-11-03 RX ORDER — METOPROLOL TARTRATE 50 MG/1
50 TABLET, FILM COATED ORAL 2 TIMES DAILY
Status: DISCONTINUED | OUTPATIENT
Start: 2018-11-03 | End: 2018-11-06 | Stop reason: HOSPADM

## 2018-11-03 RX ORDER — BISACODYL 10 MG
10 SUPPOSITORY, RECTAL RECTAL DAILY PRN
Status: DISCONTINUED | OUTPATIENT
Start: 2018-11-03 | End: 2018-11-06 | Stop reason: HOSPADM

## 2018-11-03 RX ORDER — BENZONATATE 100 MG/1
100 CAPSULE ORAL EVERY 8 HOURS PRN
Status: DISCONTINUED | OUTPATIENT
Start: 2018-11-03 | End: 2018-11-06 | Stop reason: HOSPADM

## 2018-11-03 RX ORDER — HEPARIN SODIUM 1000 [USP'U]/ML
10000 INJECTION, SOLUTION INTRAVENOUS; SUBCUTANEOUS PRN
Status: DISCONTINUED | OUTPATIENT
Start: 2018-11-03 | End: 2018-11-06

## 2018-11-03 RX ORDER — DEXTROSE MONOHYDRATE 50 MG/ML
100 INJECTION, SOLUTION INTRAVENOUS PRN
Status: DISCONTINUED | OUTPATIENT
Start: 2018-11-03 | End: 2018-11-06 | Stop reason: HOSPADM

## 2018-11-03 RX ORDER — ASPIRIN 81 MG/1
324 TABLET, CHEWABLE ORAL ONCE
Status: COMPLETED | OUTPATIENT
Start: 2018-11-03 | End: 2018-11-03

## 2018-11-03 RX ORDER — ONDANSETRON 2 MG/ML
4 INJECTION INTRAMUSCULAR; INTRAVENOUS EVERY 6 HOURS PRN
Status: DISCONTINUED | OUTPATIENT
Start: 2018-11-03 | End: 2018-11-06 | Stop reason: HOSPADM

## 2018-11-03 RX ORDER — HEPARIN SODIUM 1000 [USP'U]/ML
5000 INJECTION, SOLUTION INTRAVENOUS; SUBCUTANEOUS PRN
Status: DISCONTINUED | OUTPATIENT
Start: 2018-11-03 | End: 2018-11-05 | Stop reason: CLARIF

## 2018-11-03 RX ORDER — ACETAMINOPHEN 325 MG/1
650 TABLET ORAL EVERY 4 HOURS PRN
Status: DISCONTINUED | OUTPATIENT
Start: 2018-11-03 | End: 2018-11-06 | Stop reason: HOSPADM

## 2018-11-03 RX ORDER — HYDROCHLOROTHIAZIDE 25 MG/1
25 TABLET ORAL DAILY
Status: DISCONTINUED | OUTPATIENT
Start: 2018-11-04 | End: 2018-11-06 | Stop reason: HOSPADM

## 2018-11-03 RX ORDER — FAMOTIDINE 20 MG/1
20 TABLET, FILM COATED ORAL 2 TIMES DAILY
Status: DISCONTINUED | OUTPATIENT
Start: 2018-11-03 | End: 2018-11-06 | Stop reason: HOSPADM

## 2018-11-03 RX ORDER — SODIUM CHLORIDE 0.9 % (FLUSH) 0.9 %
10 SYRINGE (ML) INJECTION PRN
Status: DISCONTINUED | OUTPATIENT
Start: 2018-11-03 | End: 2018-11-06 | Stop reason: HOSPADM

## 2018-11-03 RX ORDER — SODIUM CHLORIDE 0.9 % (FLUSH) 0.9 %
10 SYRINGE (ML) INJECTION EVERY 12 HOURS SCHEDULED
Status: DISCONTINUED | OUTPATIENT
Start: 2018-11-03 | End: 2018-11-06 | Stop reason: HOSPADM

## 2018-11-03 RX ORDER — HEPARIN SODIUM 10000 [USP'U]/100ML
2100 INJECTION, SOLUTION INTRAVENOUS CONTINUOUS
Status: DISCONTINUED | OUTPATIENT
Start: 2018-11-03 | End: 2018-11-05 | Stop reason: CLARIF

## 2018-11-03 RX ORDER — FUROSEMIDE 10 MG/ML
40 INJECTION INTRAMUSCULAR; INTRAVENOUS 2 TIMES DAILY
Status: DISCONTINUED | OUTPATIENT
Start: 2018-11-03 | End: 2018-11-04

## 2018-11-03 RX ORDER — DEXTROSE MONOHYDRATE 25 G/50ML
12.5 INJECTION, SOLUTION INTRAVENOUS PRN
Status: DISCONTINUED | OUTPATIENT
Start: 2018-11-03 | End: 2018-11-06 | Stop reason: HOSPADM

## 2018-11-03 RX ORDER — NICOTINE POLACRILEX 4 MG
15 LOZENGE BUCCAL PRN
Status: DISCONTINUED | OUTPATIENT
Start: 2018-11-03 | End: 2018-11-06 | Stop reason: HOSPADM

## 2018-11-03 RX ADMIN — HYDRALAZINE HYDROCHLORIDE 10 MG: 10 TABLET, FILM COATED ORAL at 21:15

## 2018-11-03 RX ADMIN — HEPARIN SODIUM 10000 UNITS: 1000 INJECTION, SOLUTION INTRAVENOUS; SUBCUTANEOUS at 19:50

## 2018-11-03 RX ADMIN — HEPARIN SODIUM AND DEXTROSE 2100 UNITS/HR: 10000; 5 INJECTION INTRAVENOUS at 19:51

## 2018-11-03 RX ADMIN — ASPIRIN 324 MG: 81 TABLET, CHEWABLE ORAL at 21:14

## 2018-11-03 ASSESSMENT — PAIN SCALES - GENERAL
PAINLEVEL_OUTOF10: 4
PAINLEVEL_OUTOF10: 9

## 2018-11-03 ASSESSMENT — PAIN DESCRIPTION - ORIENTATION
ORIENTATION: RIGHT;LEFT
ORIENTATION: RIGHT;LEFT

## 2018-11-03 ASSESSMENT — ENCOUNTER SYMPTOMS
CHEST TIGHTNESS: 1
BACK PAIN: 0
EYE DISCHARGE: 1
WHEEZING: 0
SHORTNESS OF BREATH: 1
RHINORRHEA: 1
NAUSEA: 1
EYE PAIN: 0
COLOR CHANGE: 0
COUGH: 1
SORE THROAT: 0
ABDOMINAL PAIN: 1
VOMITING: 0
EYE ITCHING: 0

## 2018-11-03 ASSESSMENT — PAIN DESCRIPTION - ONSET
ONSET: ON-GOING
ONSET: ON-GOING

## 2018-11-03 ASSESSMENT — PAIN DESCRIPTION - DESCRIPTORS
DESCRIPTORS: BURNING
DESCRIPTORS: BURNING;ACHING;CONSTANT

## 2018-11-03 ASSESSMENT — PAIN DESCRIPTION - PAIN TYPE: TYPE: ACUTE PAIN

## 2018-11-03 ASSESSMENT — PAIN DESCRIPTION - PROGRESSION: CLINICAL_PROGRESSION: GRADUALLY IMPROVING

## 2018-11-03 ASSESSMENT — PAIN DESCRIPTION - FREQUENCY
FREQUENCY: CONTINUOUS
FREQUENCY: CONTINUOUS

## 2018-11-03 ASSESSMENT — PAIN DESCRIPTION - LOCATION
LOCATION: EAR;EYE
LOCATION: EYE;EAR

## 2018-11-03 NOTE — ED PROVIDER NOTES
[facility]     Emergency Department     Faculty Attestation    I performed a history and physical examination of the patient and discussed management with the resident. I reviewed the residents note and agree with the documented findings and plan of care. Any areas of disagreement are noted on the chart. I was personally present for the key portions of any procedures. I have documented in the chart those procedures where I was not present during the key portions. I have reviewed the emergency nurses triage note. I agree with the chief complaint, past medical history, past surgical history, allergies, medications, social and family history as documented unless otherwise noted below. For Physician Assistant/ Nurse Practitioner cases/documentation I have personally evaluated this patient and have completed at least one if not all key elements of the E/M (history, physical exam, and MDM). Additional findings are as noted. I have personally seen and evaluated the patient. I find the patient's history and physical exam are consistent with the NP/PA documentation. I agree with the care provided, treatment rendered, disposition and follow-up plan. Patient is extremely morbidly obese describing 2 weeks of progressive dyspnea without a history of same physician never been like this before      EKG Interpretation    Interpreted by emergency department physician    Rhythm: normal sinus   Rate: normal  Axis: normal  Conduction: normal  ST Segments: no acute change  T Waves: no acute change  Q Waves: no acute change    Clinical Impression:  nonspecific EKG.       Currently the patient is minimally symptomatic lungs are diminished difficult to assess due to body habitus  Although the patient not tachycardic or hypoxic and does have a positive d-dimer we will do our best to evaluate however the patient cannot obtain a CT scan she's actually doesn't          Critical Care     Catherne Comes,
of Nov 03 2054   Sat Nov 03, 2018 1820 6:20 PM  Patient with an elevated d-dimer. I spoke to CT technologist.  Our CT scanner has a weight limit 500 pounds. She believes that the bariatric scanner at [facility]. [facility] has a 650 pound limit. She is unsure if it can be run at all times or if it would have to be a planned CT scan  [KEITH]   1847 6:47 PM  Nurse spoke to [employer]. Weight limit is 400lbs unless patient can stand for 45-60 minutes. Patient is unable to stand for this length of time  [KEITH]   1903 7:03 PM  Medicine is capped, intermed paged  [KEITH]   1956 7:56 PM  Patient was weighed and is actually 694lbs (vs patient reports 602 lbs)  [KEITH]   2036 8:36 PM  Patient with a delta troponin. Second troponin was 0.09, first troponin was 0.02. We'll send a formal troponin, get repeat EKG  [KEITH]      ED Course User Index  [KEITH] Rody Partida PA-C     EKG Interpretation    Interpreted by me    Rhythm: normal sinus   Rate: normal  Axis: normal  Ectopy: none  Conduction: normal  ST Segments: no acute change  T Waves: Flipped T waves  Q Waves: none    Clinical Impression: no acute changes     8:55 PM  EKG Interpretation    Interpreted by me    Rhythm: normal sinus   Rate: normal  Axis: normal  Ectopy: none  Conduction: normal  ST Segments: no acute change  T Waves: flipped T-wave   Q Waves: none    Clinical Impression: no acute changes and similar in appearance to EKG from 1725    RADIOLOGY:   I directly visualized (with the attending physician) the following  imagesand reviewed the radiologist interpretations:  Xr Chest Portable    Result Date: 10/28/2018  EXAMINATION: SINGLE XRAY VIEW OF THE CHEST 10/28/2018 10:05 pm COMPARISON: 09/28/2012 HISTORY: ORDERING SYSTEM PROVIDED HISTORY: chest pain TECHNOLOGIST PROVIDED HISTORY: chest pain Ordering Physician Provided Reason for Exam: PT CO CP and abdominal pain X 3 days.  Best films per pt body habitus Acuity: Acute Type of Exam: Initial FINDINGS: The cardiac silhouette is

## 2018-11-04 PROBLEM — R07.9 CHEST PAIN: Status: ACTIVE | Noted: 2018-11-04

## 2018-11-04 PROBLEM — E66.2 OBESITY HYPOVENTILATION SYNDROME (HCC): Status: ACTIVE | Noted: 2018-11-04

## 2018-11-04 PROBLEM — E11.9 TYPE 2 DIABETES MELLITUS WITHOUT COMPLICATION, WITHOUT LONG-TERM CURRENT USE OF INSULIN (HCC): Status: ACTIVE | Noted: 2018-11-04

## 2018-11-04 PROBLEM — Z72.0 TOBACCO ABUSE: Status: ACTIVE | Noted: 2018-11-04

## 2018-11-04 LAB
AMPHETAMINE SCREEN URINE: NEGATIVE
ANION GAP SERPL CALCULATED.3IONS-SCNC: 7 MMOL/L (ref 9–17)
BARBITURATE SCREEN URINE: NEGATIVE
BENZODIAZEPINE SCREEN, URINE: NEGATIVE
BNP INTERPRETATION: ABNORMAL
BUN BLDV-MCNC: 9 MG/DL (ref 6–20)
BUN/CREAT BLD: ABNORMAL (ref 9–20)
BUPRENORPHINE URINE: NORMAL
CALCIUM SERPL-MCNC: 8.5 MG/DL (ref 8.6–10.4)
CANNABINOID SCREEN URINE: NEGATIVE
CHLORIDE BLD-SCNC: 103 MMOL/L (ref 98–107)
CHOLESTEROL/HDL RATIO: 3.4
CHOLESTEROL: 78 MG/DL
CO2: 30 MMOL/L (ref 20–31)
COCAINE METABOLITE, URINE: NEGATIVE
CREAT SERPL-MCNC: 0.69 MG/DL (ref 0.5–0.9)
ESTIMATED AVERAGE GLUCOSE: 146 MG/DL
GFR AFRICAN AMERICAN: >60 ML/MIN
GFR NON-AFRICAN AMERICAN: >60 ML/MIN
GFR SERPL CREATININE-BSD FRML MDRD: ABNORMAL ML/MIN/{1.73_M2}
GFR SERPL CREATININE-BSD FRML MDRD: ABNORMAL ML/MIN/{1.73_M2}
GLUCOSE BLD-MCNC: 100 MG/DL (ref 65–105)
GLUCOSE BLD-MCNC: 108 MG/DL (ref 65–105)
GLUCOSE BLD-MCNC: 133 MG/DL (ref 70–99)
GLUCOSE BLD-MCNC: 162 MG/DL (ref 65–105)
HBA1C MFR BLD: 6.7 % (ref 4–6)
HCT VFR BLD CALC: 43.7 % (ref 36.3–47.1)
HDLC SERPL-MCNC: 23 MG/DL
HEMOGLOBIN: 11.9 G/DL (ref 11.9–15.1)
LDL CHOLESTEROL: 30 MG/DL (ref 0–130)
MAGNESIUM: 2 MG/DL (ref 1.6–2.6)
MCH RBC QN AUTO: 20.4 PG (ref 25.2–33.5)
MCHC RBC AUTO-ENTMCNC: 27.2 G/DL (ref 28.4–34.8)
MCV RBC AUTO: 74.8 FL (ref 82.6–102.9)
MDMA URINE: NORMAL
METHADONE SCREEN, URINE: NEGATIVE
METHAMPHETAMINE, URINE: NORMAL
NRBC AUTOMATED: 0.2 PER 100 WBC
OPIATES, URINE: NEGATIVE
OXYCODONE SCREEN URINE: NEGATIVE
PARTIAL THROMBOPLASTIN TIME: 22.9 SEC (ref 20.5–30.5)
PARTIAL THROMBOPLASTIN TIME: 26.5 SEC (ref 20.5–30.5)
PARTIAL THROMBOPLASTIN TIME: 31.1 SEC (ref 20.5–30.5)
PARTIAL THROMBOPLASTIN TIME: 31.5 SEC (ref 20.5–30.5)
PDW BLD-RTO: 18.9 % (ref 11.8–14.4)
PHENCYCLIDINE, URINE: NEGATIVE
PLATELET # BLD: 278 K/UL (ref 138–453)
PMV BLD AUTO: 9.9 FL (ref 8.1–13.5)
POTASSIUM SERPL-SCNC: 3.8 MMOL/L (ref 3.7–5.3)
PRO-BNP: 1235 PG/ML
PROPOXYPHENE, URINE: NORMAL
RBC # BLD: 5.84 M/UL (ref 3.95–5.11)
SODIUM BLD-SCNC: 140 MMOL/L (ref 135–144)
TEST INFORMATION: NORMAL
TRICYCLIC ANTIDEPRESSANTS, UR: NORMAL
TRIGL SERPL-MCNC: 123 MG/DL
TROPONIN INTERP: NORMAL
TROPONIN T: <0.03 NG/ML
TSH SERPL DL<=0.05 MIU/L-ACNC: 4.01 MIU/L (ref 0.3–5)
VLDLC SERPL CALC-MCNC: ABNORMAL MG/DL (ref 1–30)
WBC # BLD: 12 K/UL (ref 3.5–11.3)

## 2018-11-04 PROCEDURE — 2060000000 HC ICU INTERMEDIATE R&B

## 2018-11-04 PROCEDURE — 85027 COMPLETE CBC AUTOMATED: CPT

## 2018-11-04 PROCEDURE — 99223 1ST HOSP IP/OBS HIGH 75: CPT | Performed by: FAMILY MEDICINE

## 2018-11-04 PROCEDURE — 6370000000 HC RX 637 (ALT 250 FOR IP): Performed by: NURSE PRACTITIONER

## 2018-11-04 PROCEDURE — 94762 N-INVAS EAR/PLS OXIMTRY CONT: CPT

## 2018-11-04 PROCEDURE — 82947 ASSAY GLUCOSE BLOOD QUANT: CPT

## 2018-11-04 PROCEDURE — 2580000003 HC RX 258: Performed by: NURSE PRACTITIONER

## 2018-11-04 PROCEDURE — 94660 CPAP INITIATION&MGMT: CPT

## 2018-11-04 PROCEDURE — 80048 BASIC METABOLIC PNL TOTAL CA: CPT

## 2018-11-04 PROCEDURE — 6360000002 HC RX W HCPCS: Performed by: NURSE PRACTITIONER

## 2018-11-04 PROCEDURE — 84484 ASSAY OF TROPONIN QUANT: CPT

## 2018-11-04 PROCEDURE — 83880 ASSAY OF NATRIURETIC PEPTIDE: CPT

## 2018-11-04 PROCEDURE — 6370000000 HC RX 637 (ALT 250 FOR IP): Performed by: FAMILY MEDICINE

## 2018-11-04 PROCEDURE — 6360000002 HC RX W HCPCS: Performed by: PHYSICIAN ASSISTANT

## 2018-11-04 PROCEDURE — 84443 ASSAY THYROID STIM HORMONE: CPT

## 2018-11-04 PROCEDURE — 36415 COLL VENOUS BLD VENIPUNCTURE: CPT

## 2018-11-04 PROCEDURE — 85730 THROMBOPLASTIN TIME PARTIAL: CPT

## 2018-11-04 PROCEDURE — 80061 LIPID PANEL: CPT

## 2018-11-04 PROCEDURE — 83735 ASSAY OF MAGNESIUM: CPT

## 2018-11-04 PROCEDURE — 80307 DRUG TEST PRSMV CHEM ANLYZR: CPT

## 2018-11-04 RX ORDER — ALBUTEROL SULFATE 2.5 MG/3ML
2.5 SOLUTION RESPIRATORY (INHALATION) EVERY 6 HOURS PRN
Status: DISCONTINUED | OUTPATIENT
Start: 2018-11-04 | End: 2018-11-06 | Stop reason: HOSPADM

## 2018-11-04 RX ORDER — LISINOPRIL 20 MG/1
20 TABLET ORAL DAILY
Status: DISCONTINUED | OUTPATIENT
Start: 2018-11-04 | End: 2018-11-04

## 2018-11-04 RX ORDER — LISINOPRIL 20 MG/1
20 TABLET ORAL DAILY
Status: DISCONTINUED | OUTPATIENT
Start: 2018-11-04 | End: 2018-11-06 | Stop reason: HOSPADM

## 2018-11-04 RX ADMIN — METOPROLOL TARTRATE 50 MG: 50 TABLET, FILM COATED ORAL at 08:44

## 2018-11-04 RX ADMIN — DOCUSATE SODIUM 100 MG: 100 CAPSULE, LIQUID FILLED ORAL at 00:04

## 2018-11-04 RX ADMIN — DOCUSATE SODIUM 100 MG: 100 CAPSULE, LIQUID FILLED ORAL at 08:45

## 2018-11-04 RX ADMIN — FUROSEMIDE 40 MG: 10 INJECTION, SOLUTION INTRAVENOUS at 00:00

## 2018-11-04 RX ADMIN — METOPROLOL TARTRATE 50 MG: 50 TABLET, FILM COATED ORAL at 00:00

## 2018-11-04 RX ADMIN — Medication 10 ML: at 08:45

## 2018-11-04 RX ADMIN — FAMOTIDINE 20 MG: 20 TABLET, FILM COATED ORAL at 00:07

## 2018-11-04 RX ADMIN — LISINOPRIL 20 MG: 20 TABLET ORAL at 15:07

## 2018-11-04 RX ADMIN — METOPROLOL TARTRATE 50 MG: 50 TABLET, FILM COATED ORAL at 20:11

## 2018-11-04 RX ADMIN — DOCUSATE SODIUM 100 MG: 100 CAPSULE, LIQUID FILLED ORAL at 20:11

## 2018-11-04 RX ADMIN — FAMOTIDINE 20 MG: 20 TABLET, FILM COATED ORAL at 20:11

## 2018-11-04 RX ADMIN — HEPARIN SODIUM 10000 UNITS: 1000 INJECTION, SOLUTION INTRAVENOUS; SUBCUTANEOUS at 02:35

## 2018-11-04 RX ADMIN — HEPARIN SODIUM AND DEXTROSE 2700 UNITS/HR: 10000; 5 INJECTION INTRAVENOUS at 06:51

## 2018-11-04 RX ADMIN — FAMOTIDINE 20 MG: 20 TABLET, FILM COATED ORAL at 08:45

## 2018-11-04 RX ADMIN — HEPARIN SODIUM 5000 UNITS: 1000 INJECTION, SOLUTION INTRAVENOUS; SUBCUTANEOUS at 06:51

## 2018-11-04 RX ADMIN — HYDROCHLOROTHIAZIDE 25 MG: 25 TABLET ORAL at 08:45

## 2018-11-04 ASSESSMENT — PAIN SCALES - GENERAL: PAINLEVEL_OUTOF10: 0

## 2018-11-04 NOTE — H&P
rhonchi, normal effort  Cardiovascular: normal rate, regular rhythm, no murmur, gallop, rub. Abdomen: Soft, nontender, nondistended, normal bowel sounds, no hepatomegaly or splenomegaly  Neurologic: There are no new focal motor or sensory deficits, normal muscle tone and bulk, no abnormal sensation, normal speech, cranial nerves II through XII grossly intact  Skin: No gross lesions, rashes, bruising or bleeding on exposed skin area  Extremities:  peripheral pulses palpable, trace edema bilateral.  Positive calf pain with palpation on left lower extremity   Psych: normal affect    Investigations:      Laboratory Testing:  Recent Results (from the past 24 hour(s))   POCT troponin    Collection Time: 11/03/18  5:35 PM   Result Value Ref Range    POC Troponin I 0.02 0.00 - 0.10 ng/mL    POC Troponin Interp       The Troponin-I (POC) results cannot be compared to the Troponin-T results.    CBC Auto Differential    Collection Time: 11/03/18  5:43 PM   Result Value Ref Range    WBC 9.7 3.5 - 11.3 k/uL    RBC 5.95 (H) 3.95 - 5.11 m/uL    Hemoglobin 12.1 11.9 - 15.1 g/dL    Hematocrit 44.7 36.3 - 47.1 %    MCV 75.1 (L) 82.6 - 102.9 fL    MCH 20.3 (L) 25.2 - 33.5 pg    MCHC 27.1 (L) 28.4 - 34.8 g/dL    RDW 18.8 (H) 11.8 - 14.4 %    Platelets 296 202 - 531 k/uL    MPV 10.3 8.1 - 13.5 fL    NRBC Automated 0.4 (H) 0.0 per 100 WBC    Differential Type NOT REPORTED     WBC Morphology NOT REPORTED     RBC Morphology NOT REPORTED     Platelet Estimate NOT REPORTED     Immature Granulocytes 0 0 %    Seg Neutrophils 64 36 - 66 %    Lymphocytes 28 24 - 44 %    Atypical Lymphocytes 3 %    Monocytes 5 1 - 7 %    Eosinophils % 0 (L) 1 - 4 %    Basophils 0 0 - 2 %    Absolute Immature Granulocyte 0.00 0.00 - 0.30 k/uL    Segs Absolute 6.20 1.8 - 7.7 k/uL    Absolute Lymph # 2.72 1.0 - 4.8 k/uL    Atypical Lymphocytes Absolute 0.29 k/uL    Absolute Mono # 0.49 0.1 - 0.8 k/uL    Absolute Eos # 0.00 0.0 - 0.4 k/uL    Basophils # 0.00 0.0 -

## 2018-11-04 NOTE — PROGRESS NOTES
10 mg by mouth daily    Historical Provider, MD   .  Recent Surgical History: None = 0     Assessment URI with cough. No wheezing    RR 17  Breath Sounds: very diminished.  Morbidly obese      · Bronchodilator assessment at level  1  · Hyperinflation assessment at level   · Secretion Management assessment at level    ·   · []    Bronchodilator Assessment  BRONCHODILATOR ASSESSMENT SCORE  Score 0 1 2 3 4 5   Breath Sounds   []  Patient Baseline [x]  No Wheeze good aeration []  Faint, scattered wheezing, good aeration []  Expiratory Wheezing and or moderately diminished []  Insp/Exp wheeze and/or very diminished []  Insp/Exp and/ or marked distress   Respiratory Rate   []  Patient Baseline [x]  Less than 20 []  Less than 20 []  20-25 []  Greater than 25 []  Greater than 25   Peak flow % of Pred or PB [x]  NA   []  Greater than 90%  []  81-90% []  71-80% []  Less than or equal to 70%  or unable to perform []  Unable due to Respiratory Distress   Dyspnea re [x]  Patient Baseline []  No SOB []  No SOB []  SOB on exertion []  SOB min activity []  At rest/acute   e FEV% Predicted       [x]  NA []  Above 69%  []  Unable []  Above 60-69%  []  Unable []  Above 50-59%  []  Unable []  Above 35-49%  []  Unable []  Less than 35%  []  Unable                 []  Hyperinflation Assessment  Score 1 2 3   CXR and Breath Sounds   []  Clear []  No atelectasis  Basilar aeration []  Atelectasis or absent basilar breath sounds   Incentive Spirometry Volume  (Per IBW)   []  Greater than or equal to 15ml/Kg []  less than 15ml/Kg []  less than 15ml/Kg   Surgery within last 2 weeks []  None or general   []  Abdominal or thoracic surgery  []  Abdominal or thoracic   Chronic Pulmonary Historyre []  No []  Yes []  Yes     []  Secretion Management Assessment  Score 1 2 3   Bilateral Breath Sounds   []  Occasional Rhonchi []  Scattered Rhonchi []  Course Rhonchi and/or poor aeration   Sputum    []  Small amount of thin secretions []  Moderate amount

## 2018-11-04 NOTE — CONSULTS
Attestation signed by      Attending Physician Statement:    I have discussed the care of  Yadi Morales , including pertinent history and exam findings, with the Cardiology fellow/resident. I have seen and examined the patient and the key elements of all parts of the encounter have been performed by me. I agree with the assessment, plan and orders as documented by the fellow/resident, after I modified exam findings and plan of treatments, and the final version is my approved version of the assessment. Additional Comments: ATYPICAL CHEST PAIN (CONSTANT, POSITIONAL), NO SIGNIFICANT ECG CHANGES. WILL REVIEW ECHO, FURTHER RECOMMENDATIONS TO FOLLOW. Aron Aviles MD               [facility]    Consult / H&P               Today's Date: 11/4/2018  Patient Name: Pam Gold  Date of admission: 11/3/2018  5:01 PM  Patient's age: 29 y.o., 1985  Admission Dx: Dyspnea [R06.00]    Reason for Admission / Consult:  Chest pain     Requesting Physician: Tank Silver MD    CHIEF COMPLAINT:  Chest pain and SOB     History Obtained From:  patient, electronic medical record    HISTORY OF PRESENT ILLNESS:      The patient is a 29 y.o.  female past medical history of morbid obesity, essential hypertension came to the hospital for chest pain and shortness of breath for last 2 weeks. Per patient, she was having substernal chest pain worse on walking and relieved by rest or sleep, intermittent in nature, non-radiating. Patient denies any worsening of shortness of breath on laying flat. Patient has a history of asthma and shortness of breath was getting better with inhalers initially. Patient denies any bilateral lower extremity swelling. In the emergency room, Blood pressure 189/103 on admission with HR in 100. POC troponin 0.09. Patient was started on heparin drip due to elevated d-dimer 0.75. CT chest with contrast could not be done due to weight.   Patient was started 5 % solution, 100 mL/hr, Intravenous, PRN  albuterol (PROVENTIL) nebulizer solution 2.5 mg, 2.5 mg, Nebulization, As Directed RT PRN  lisinopril (PRINIVIL;ZESTRIL) tablet 5 mg, 5 mg, Oral, Daily  furosemide (LASIX) injection 40 mg, 40 mg, Intravenous, BID  insulin lispro (HUMALOG) injection vial 0-12 Units, 0-12 Units, Subcutaneous, TID WC  insulin lispro (HUMALOG) injection vial 0-6 Units, 0-6 Units, Subcutaneous, Nightly    Allergies:  Patient has no known allergies. Social History:   reports that she quit smoking about 2 years ago. Her smoking use included Cigarettes. She started smoking about 4 years ago. She smoked 0.10 packs per day. She has never used smokeless tobacco. She reports that she drinks alcohol. She reports that she uses drugs, including  and Marijuana. Family History: family history includes Breast Cancer in her maternal aunt; Cancer in her father, maternal aunt, and paternal aunt; Diabetes in her maternal aunt; Heart Disease in her mother; High Blood Pressure in her mother; Lung Cancer in her father and paternal aunt; Thyroid Disease in her sister. No h/o sudden cardiac death. Yes for premature CAD    REVIEW OF SYSTEMS:    · Constitutional: there has been no unanticipated weight loss. There's been No change in energy level, No change in activity level. · Eyes: No visual changes or diplopia. No scleral icterus. · ENT: No Headaches  · Cardiovascular: Intermittent typical chest pain. · Respiratory: Shortness of breath  · Gastrointestinal: No abdominal pain. No change in bowel or bladder habits. · Genitourinary: No dysuria, trouble voiding, or hematuria. · Musculoskeletal:  No gait disturbance, No weakness or joint complaints. · Integumentary: No rash or pruritis. · Neurological: No headache, diplopia, change in muscle strength, numbness or tingling. No change in gait, balance, coordination, mood, affect, memory, mentation, behavior.   · Psychiatric: No anxiety, or

## 2018-11-05 LAB
D-DIMER QUANTITATIVE: 0.44 MG/L FEU
GLUCOSE BLD-MCNC: 102 MG/DL (ref 65–105)
GLUCOSE BLD-MCNC: 111 MG/DL (ref 65–105)
GLUCOSE BLD-MCNC: 135 MG/DL (ref 65–105)
GLUCOSE BLD-MCNC: 138 MG/DL (ref 65–105)
HCT VFR BLD CALC: 43.1 % (ref 36.3–47.1)
HEMOGLOBIN: 11.4 G/DL (ref 11.9–15.1)
LV EF: 53 %
LVEF MODALITY: NORMAL
MCH RBC QN AUTO: 20.2 PG (ref 25.2–33.5)
MCHC RBC AUTO-ENTMCNC: 26.5 G/DL (ref 28.4–34.8)
MCV RBC AUTO: 76.6 FL (ref 82.6–102.9)
NRBC AUTOMATED: 0 PER 100 WBC
PARTIAL THROMBOPLASTIN TIME: 26.9 SEC (ref 20.5–30.5)
PARTIAL THROMBOPLASTIN TIME: 30.1 SEC (ref 20.5–30.5)
PARTIAL THROMBOPLASTIN TIME: 37.6 SEC (ref 20.5–30.5)
PARTIAL THROMBOPLASTIN TIME: >120 SEC (ref 20.5–30.5)
PDW BLD-RTO: 18.6 % (ref 11.8–14.4)
PLATELET # BLD: 279 K/UL (ref 138–453)
PMV BLD AUTO: 10.3 FL (ref 8.1–13.5)
RBC # BLD: 5.63 M/UL (ref 3.95–5.11)
WBC # BLD: 10.1 K/UL (ref 3.5–11.3)

## 2018-11-05 PROCEDURE — 93970 EXTREMITY STUDY: CPT

## 2018-11-05 PROCEDURE — 6360000002 HC RX W HCPCS: Performed by: PHYSICIAN ASSISTANT

## 2018-11-05 PROCEDURE — C8929 TTE W OR WO FOL WCON,DOPPLER: HCPCS

## 2018-11-05 PROCEDURE — 82947 ASSAY GLUCOSE BLOOD QUANT: CPT

## 2018-11-05 PROCEDURE — 97535 SELF CARE MNGMENT TRAINING: CPT

## 2018-11-05 PROCEDURE — G8978 MOBILITY CURRENT STATUS: HCPCS

## 2018-11-05 PROCEDURE — 99254 IP/OBS CNSLTJ NEW/EST MOD 60: CPT | Performed by: INTERNAL MEDICINE

## 2018-11-05 PROCEDURE — 85027 COMPLETE CBC AUTOMATED: CPT

## 2018-11-05 PROCEDURE — 97162 PT EVAL MOD COMPLEX 30 MIN: CPT

## 2018-11-05 PROCEDURE — 85730 THROMBOPLASTIN TIME PARTIAL: CPT

## 2018-11-05 PROCEDURE — 6370000000 HC RX 637 (ALT 250 FOR IP): Performed by: FAMILY MEDICINE

## 2018-11-05 PROCEDURE — 97530 THERAPEUTIC ACTIVITIES: CPT

## 2018-11-05 PROCEDURE — 94660 CPAP INITIATION&MGMT: CPT

## 2018-11-05 PROCEDURE — 97166 OT EVAL MOD COMPLEX 45 MIN: CPT

## 2018-11-05 PROCEDURE — 85379 FIBRIN DEGRADATION QUANT: CPT

## 2018-11-05 PROCEDURE — G8988 SELF CARE GOAL STATUS: HCPCS

## 2018-11-05 PROCEDURE — G8987 SELF CARE CURRENT STATUS: HCPCS

## 2018-11-05 PROCEDURE — 36415 COLL VENOUS BLD VENIPUNCTURE: CPT

## 2018-11-05 PROCEDURE — 6360000002 HC RX W HCPCS: Performed by: INTERNAL MEDICINE

## 2018-11-05 PROCEDURE — 6370000000 HC RX 637 (ALT 250 FOR IP): Performed by: NURSE PRACTITIONER

## 2018-11-05 PROCEDURE — G8979 MOBILITY GOAL STATUS: HCPCS

## 2018-11-05 PROCEDURE — 2060000000 HC ICU INTERMEDIATE R&B

## 2018-11-05 PROCEDURE — 2580000003 HC RX 258: Performed by: NURSE PRACTITIONER

## 2018-11-05 PROCEDURE — 99232 SBSQ HOSP IP/OBS MODERATE 35: CPT | Performed by: INTERNAL MEDICINE

## 2018-11-05 RX ORDER — HEPARIN SODIUM 1000 [USP'U]/ML
5000 INJECTION, SOLUTION INTRAVENOUS; SUBCUTANEOUS PRN
Status: DISCONTINUED | OUTPATIENT
Start: 2018-11-05 | End: 2018-11-06

## 2018-11-05 RX ORDER — HEPARIN SODIUM 10000 [USP'U]/100ML
11.2 INJECTION, SOLUTION INTRAVENOUS CONTINUOUS
Status: DISCONTINUED | OUTPATIENT
Start: 2018-11-05 | End: 2018-11-05

## 2018-11-05 RX ADMIN — METOPROLOL TARTRATE 50 MG: 50 TABLET, FILM COATED ORAL at 20:31

## 2018-11-05 RX ADMIN — HEPARIN SODIUM 10000 UNITS: 1000 INJECTION, SOLUTION INTRAVENOUS; SUBCUTANEOUS at 01:17

## 2018-11-05 RX ADMIN — HEPARIN SODIUM AND DEXTROSE 11.2 UNITS/KG/HR: 10000; 5 INJECTION INTRAVENOUS at 13:49

## 2018-11-05 RX ADMIN — Medication 10 ML: at 20:31

## 2018-11-05 RX ADMIN — ENOXAPARIN SODIUM 30 MG: 30 INJECTION SUBCUTANEOUS at 20:31

## 2018-11-05 RX ADMIN — LISINOPRIL 20 MG: 20 TABLET ORAL at 10:21

## 2018-11-05 RX ADMIN — HEPARIN SODIUM AND DEXTROSE 11.2 UNITS/KG/HR: 10000; 5 INJECTION INTRAVENOUS at 15:13

## 2018-11-05 RX ADMIN — HEPARIN SODIUM 5000 UNITS: 1000 INJECTION, SOLUTION INTRAVENOUS; SUBCUTANEOUS at 10:34

## 2018-11-05 RX ADMIN — FAMOTIDINE 20 MG: 20 TABLET, FILM COATED ORAL at 20:31

## 2018-11-05 RX ADMIN — DOCUSATE SODIUM 100 MG: 100 CAPSULE, LIQUID FILLED ORAL at 10:21

## 2018-11-05 RX ADMIN — HYDROCHLOROTHIAZIDE 25 MG: 25 TABLET ORAL at 10:21

## 2018-11-05 RX ADMIN — FAMOTIDINE 20 MG: 20 TABLET, FILM COATED ORAL at 10:21

## 2018-11-05 RX ADMIN — METOPROLOL TARTRATE 50 MG: 50 TABLET, FILM COATED ORAL at 10:21

## 2018-11-05 RX ADMIN — DOCUSATE SODIUM 100 MG: 100 CAPSULE, LIQUID FILLED ORAL at 20:31

## 2018-11-05 RX ADMIN — HEPARIN SODIUM AND DEXTROSE 15.2 UNITS/KG/HR: 10000; 5 INJECTION INTRAVENOUS at 10:34

## 2018-11-05 RX ADMIN — HEPARIN SODIUM AND DEXTROSE 13.2 UNITS/KG/HR: 10000; 5 INJECTION INTRAVENOUS at 03:18

## 2018-11-05 ASSESSMENT — ENCOUNTER SYMPTOMS
NAUSEA: 0
VOMITING: 0
SHORTNESS OF BREATH: 1
ABDOMINAL PAIN: 0
CHEST TIGHTNESS: 0
WHEEZING: 0
COUGH: 1

## 2018-11-05 ASSESSMENT — PAIN DESCRIPTION - PROGRESSION: CLINICAL_PROGRESSION: GRADUALLY IMPROVING

## 2018-11-05 NOTE — PROGRESS NOTES
ptt 26.9, 10,000 unit bolus given, gtt increased to 13.2unit/kg/hr, next ptt at 0900 am 11/5,continue to monitor

## 2018-11-05 NOTE — PROGRESS NOTES
Neurological:   Somnolent  Moving extremities ×4   Skin: Skin is warm and dry. She is not diaphoretic. Data:     I/O (24Hr): Intake/Output Summary (Last 24 hours) at 11/05/18 1317  Last data filed at 11/05/18 0850   Gross per 24 hour   Intake              808 ml   Output              200 ml   Net              608 ml       Labs:    Hematology:  Recent Labs      11/03/18 1743 11/04/18   0553   WBC  9.7  12.0*   HGB  12.1  11.9   HCT  44.7  43.7   PLT  294  278   INR  1.0   --      Chemistry:  Recent Labs      11/03/18 1743 11/04/18   0553   NA  140  140   K  3.8  3.8   CL  104  103   CO2  28  30   GLUCOSE  136*  133*   BUN  11  9   CREATININE  0.66  0.69   MG   --   2.0   CALCIUM  8.6  8.5*     Recent Labs      11/03/18 1735 11/03/18 1743 11/03/18 2009 11/04/18   0553   PROT   --    --   6.7   --    --    LABALBU   --    --   3.0*   --    --    LABA1C   --    --   6.7*   --    --    F3GSXDH   --    --   7.1   --    --    TSH   --    < >  3.70   --   4.01   AST   --    --   28   --    --    ALT   --    --   47*   --    --    ALKPHOS   --    --   57   --    --    BILITOT   --    --   0.57   --    --    BILIDIR   --    --   0.19   --    --    LIPASE   --    --   17   --    --    CHOL   --    --    --    --   78   TRIG   --    --    --    --   123   HDL   --    --    --    --   23*   TROPONINI  0.02   --    --   0.09   --     < > = values in this interval not displayed.        Lab Results   Component Value Date/Time    SPECIAL NOT REPORTED 07/13/2017 08:46 PM     Lab Results   Component Value Date/Time    CULTURE (A) 07/13/2017 08:46 PM     STREPTOCOCCI, BETA HEMOLYTIC GROUP B 50 to 100,000 CFU/ML    CULTURE  07/13/2017 08:46 PM     [address] [place] [address] [phone]       No results found for: POCPH, PHART, PH, POCPCO2, ODB3EHE, PCO2, POCPO2, PO2ART, PO2, POCHCO3, DIT4QWO, HCO3, NBEA, PBEA, BEART, BE, THGBART, THB, XHP7YAU, UDCK4ZZW, Q4ISYAZK, O2SAT,

## 2018-11-05 NOTE — PROGRESS NOTES
[facility]  Progress Note                   Date:   11/5/2018  Patient name: William Morales  Date of admission:  11/3/2018  5:01 PM  MRN:   [id number]  YOB: 1985  PCP: Tiana Colbert    Reason for Admission: Dyspnea [R06.00]    Subjective:       Clinical Changes /Abnormalities: Patient seen & examined sitting at side of bed with RN at bedside. Denies CP or SOB currently but states when she takes a deep breath \"it still hurts in my chest.\" Tele reviewed - SR. Preparing to go get venous dopplers completed now. Echo still pending. Review of Systems    Medications:   Scheduled Meds:   influenza virus vaccine  0.5 mL Intramuscular Once    lisinopril  20 mg Oral Daily    famotidine  20 mg Oral BID    hydrochlorothiazide  25 mg Oral Daily    metoprolol tartrate  50 mg Oral BID    sodium chloride flush  10 mL Intravenous 2 times per day    docusate sodium  100 mg Oral BID    insulin lispro  0-12 Units Subcutaneous TID WC    insulin lispro  0-6 Units Subcutaneous Nightly     Continuous Infusions:   heparin (porcine) 13.2 Units/kg/hr (11/05/18 0318)    dextrose       CBC:   Recent Labs      11/03/18 1743 11/04/18 0553   WBC  9.7  12.0*   HGB  12.1  11.9   PLT  294  278     BMP:    Recent Labs      11/03/18 1743 11/04/18 0553   NA  140  140   K  3.8  3.8   CL  104  103   CO2  28  30   BUN  11  9   CREATININE  0.66  0.69   GLUCOSE  136*  133*     Hepatic:  Recent Labs      11/03/18 1743   AST  28   ALT  47*   BILITOT  0.57   ALKPHOS  57     Troponin:   Recent Labs      11/03/18 1735 11/03/18 2009   TROPONINI  0.02  0.09     BNP: No results for input(s): BNP in the last 72 hours.   Lipids:   Recent Labs      11/04/18 0553   CHOL  78   HDL  23*     INR:   Recent Labs      11/03/18 1743   INR  1.0       Objective:   Vitals: /85   Pulse 98   Temp 98 °F (36.7 °C) (Oral)   Resp 26   Ht 5' 11\" (1.803 m)   Wt (!) 694 lb 14.2 oz (315.2 kg)   LMP 2018   SpO2 98%   BMI 96.92 kg/m²   General appearance: alert and cooperative with exam  HEENT: Head: Normocephalic, no lesions, without obvious abnormality. Neck:no JVD, trachea midline, no adenopathy  Lungs: Clear to auscultation, diminshed throughout d/t body habitus  Heart: Regular rate and rhythm, s1/s2 auscultated, no murmurs Tele SR  Abdomen: soft, non-tender, bowel sounds active. Morbidly obese  Extremities: no edema  Neurologic: not done        Assessment / Acute Cardiac Problems:   1. Morbid obesity  2. Atypical chest pain reproducible  3. Mild vascular congestion on CXR with elevated BNP - echo pending  4. Hypertension  5. Asthma  6. Tobacco abuse    Patient Active Problem List:     Ectopic pregnancy - Right     Morbid obesity with BMI of 70 and over, adult ([facility] [address]     Hypertension     Asthma     History of trichomonal vaginitis     H/O abnormal cervical Papanicolaou smear     ASCUS with positive high risk HPV cervical     Dysplasia of cervix, low grade ([facility])     Dyspnea     Chest pain     Obesity hypoventilation syndrome (HCC)     Tobacco abuse     Type 2 diabetes mellitus without complication, without long-term current use of insulin ([facility] [address]      Plan of Treatment:   1. Await echo. If no significant valvular disease or WMA with normal LVEF will sign off. Discussed importance of med compliance for hypertension and weight loss. Questions and concerns addressed. States her father  of lung cancer so she is very worried about her risks for this. Support provided  2. Unable to complete CT d/t size. Venous dopplers pending. IV Heparin gtt. Tx per attending.      Electronically signed by JUANY Jimenes CNP on 2018 at 10:29 AM  [address]  [phone]

## 2018-11-05 NOTE — PROGRESS NOTES
Occupational Therapy   Occupational Therapy Initial Assessment  Date: 2018   Patient Name: Veena Redman  MRN: [id number]     : 1985    Date of Service: 2018    Discharge Recommendations:  Home with assist PRN, Home with Home health OT     OT Equipment Recommendations  Equipment Needed: Yes  Mobility Devices: ADL Assistive Devices  ADL Assistive Devices: Transfer Tub Bench;Grab Bars - tub;Grab Bars - shower      Patient Diagnosis(es): The primary encounter diagnosis was Elevated blood pressure reading. Diagnoses of Elevated brain natriuretic peptide (BNP) level, Orthopnea, Cardiomegaly, and Shortness of breath were also pertinent to this visit. has a past medical history of ASCUS with positive high risk HPV cervical; Asthma; Breast discharge; Chlamydia; History of trichomonal vaginitis; Hypertension; Morbid obesity with BMI of 70 and over, [facility]); MRSA (methicillin resistant Staphylococcus aureus); Obesity; Ovarian ectopic pregnancy; Pain; Pelvic adhesive disease; Type 2 diabetes mellitus without complication, without long-term current use of insulin ([facility] [address] and Unspecified sleep apnea. has a past surgical history that includes  section (); Cholecystectomy, laparoscopic; Salpingo-oophorectomy (15); Leg Surgery; and Tonsillectomy. Restrictions  Restrictions/Precautions  Restrictions/Precautions: Fall Risk  Required Braces or Orthoses?: No  Position Activity Restriction  Other position/activity restrictions: Up with assistance. Pt with 2L O2 in place via nasal canula. Subjective   General  Chart Reviewed: No  Patient assessed for rehabilitation services?: Yes  Family / Caregiver Present: No  General Comment  Comments: RN ok'd for therapy this PM. Pt agreeable to participate in session and pleasant/cooperative throughout.   Pain Assessment  Patient Currently in Pain: Denies  Pain Level: 0    Oxygen Therapy  SpO2: 100 %  Pulse Oximeter Device Mode: Continuous  Pulse techniques during ADLs and functional transfers/functional mobility       Therapy Time   Individual Concurrent Group Co-treatment   Time In 1305         Time Out 1329         Minutes 24            Discharge recommendations discussed with patient during initial evaluation.     Paul Singer OTR/L

## 2018-11-06 VITALS
RESPIRATION RATE: 21 BRPM | OXYGEN SATURATION: 100 % | HEART RATE: 90 BPM | SYSTOLIC BLOOD PRESSURE: 136 MMHG | WEIGHT: 293 LBS | HEIGHT: 71 IN | TEMPERATURE: 98.1 F | BODY MASS INDEX: 41.02 KG/M2 | DIASTOLIC BLOOD PRESSURE: 75 MMHG

## 2018-11-06 PROBLEM — E88.09 HYPOALBUMINEMIA DUE TO PROTEIN-CALORIE MALNUTRITION (HCC): Status: ACTIVE | Noted: 2018-11-06

## 2018-11-06 PROBLEM — D64.9 NORMOCYTIC NORMOCHROMIC ANEMIA: Status: ACTIVE | Noted: 2018-11-06

## 2018-11-06 PROBLEM — E46 HYPOALBUMINEMIA DUE TO PROTEIN-CALORIE MALNUTRITION (HCC): Status: ACTIVE | Noted: 2018-11-06

## 2018-11-06 PROBLEM — J96.21 ACUTE AND CHRONIC RESPIRATORY FAILURE WITH HYPOXIA (HCC): Status: ACTIVE | Noted: 2018-11-06

## 2018-11-06 LAB
BNP INTERPRETATION: ABNORMAL
GLUCOSE BLD-MCNC: 112 MG/DL (ref 65–105)
GLUCOSE BLD-MCNC: 121 MG/DL (ref 65–105)
GLUCOSE BLD-MCNC: 124 MG/DL (ref 65–105)
PRO-BNP: 956 PG/ML

## 2018-11-06 PROCEDURE — 99239 HOSP IP/OBS DSCHRG MGMT >30: CPT | Performed by: INTERNAL MEDICINE

## 2018-11-06 PROCEDURE — 6370000000 HC RX 637 (ALT 250 FOR IP): Performed by: NURSE PRACTITIONER

## 2018-11-06 PROCEDURE — 82947 ASSAY GLUCOSE BLOOD QUANT: CPT

## 2018-11-06 PROCEDURE — 94761 N-INVAS EAR/PLS OXIMETRY MLT: CPT

## 2018-11-06 PROCEDURE — 83880 ASSAY OF NATRIURETIC PEPTIDE: CPT

## 2018-11-06 PROCEDURE — 36415 COLL VENOUS BLD VENIPUNCTURE: CPT

## 2018-11-06 PROCEDURE — 6370000000 HC RX 637 (ALT 250 FOR IP): Performed by: FAMILY MEDICINE

## 2018-11-06 PROCEDURE — G0108 DIAB MANAGE TRN  PER INDIV: HCPCS

## 2018-11-06 PROCEDURE — 94660 CPAP INITIATION&MGMT: CPT

## 2018-11-06 PROCEDURE — 6360000002 HC RX W HCPCS: Performed by: INTERNAL MEDICINE

## 2018-11-06 PROCEDURE — 2700000000 HC OXYGEN THERAPY PER DAY

## 2018-11-06 PROCEDURE — 2580000003 HC RX 258: Performed by: NURSE PRACTITIONER

## 2018-11-06 RX ORDER — CETIRIZINE HYDROCHLORIDE 10 MG/1
10 TABLET ORAL DAILY
Status: CANCELLED | OUTPATIENT
Start: 2018-11-06

## 2018-11-06 RX ORDER — TOPIRAMATE 50 MG/1
100 TABLET, FILM COATED ORAL 2 TIMES DAILY
Status: CANCELLED | OUTPATIENT
Start: 2018-11-06

## 2018-11-06 RX ORDER — ERGOCALCIFEROL 1.25 MG/1
50000 CAPSULE ORAL WEEKLY
Status: CANCELLED | OUTPATIENT
Start: 2018-11-06

## 2018-11-06 RX ORDER — LISINOPRIL 20 MG/1
20 TABLET ORAL DAILY
Qty: 30 TABLET | Refills: 3 | Status: SHIPPED | OUTPATIENT
Start: 2018-11-07

## 2018-11-06 RX ORDER — CYCLOBENZAPRINE HCL 10 MG
10 TABLET ORAL 2 TIMES DAILY PRN
Status: CANCELLED | OUTPATIENT
Start: 2018-11-06

## 2018-11-06 RX ADMIN — Medication 10 ML: at 09:16

## 2018-11-06 RX ADMIN — ENOXAPARIN SODIUM 30 MG: 30 INJECTION SUBCUTANEOUS at 09:15

## 2018-11-06 RX ADMIN — DOCUSATE SODIUM 100 MG: 100 CAPSULE, LIQUID FILLED ORAL at 09:15

## 2018-11-06 RX ADMIN — LISINOPRIL 20 MG: 20 TABLET ORAL at 09:15

## 2018-11-06 RX ADMIN — HYDROCHLOROTHIAZIDE 25 MG: 25 TABLET ORAL at 09:15

## 2018-11-06 RX ADMIN — FAMOTIDINE 20 MG: 20 TABLET, FILM COATED ORAL at 09:15

## 2018-11-06 RX ADMIN — METOPROLOL TARTRATE 50 MG: 50 TABLET, FILM COATED ORAL at 09:15

## 2018-11-06 ASSESSMENT — ENCOUNTER SYMPTOMS
SHORTNESS OF BREATH: 1
NAUSEA: 0
COUGH: 1
ABDOMINAL PAIN: 0
WHEEZING: 0
CHEST TIGHTNESS: 0
VOMITING: 0
SINUS PAIN: 0

## 2018-11-06 NOTE — PROGRESS NOTES
use of insulin (HCC)    Elevated blood pressure reading    Pulmonary hypertension (HCC)    Elevated brain natriuretic peptide (BNP) level    BRYSON (obstructive sleep apnea)    Normocytic normochromic anemia    Hypoalbuminemia due to protein-calorie malnutrition (HCC)       Assessment:    Atypical Cheat pain   BRYSON  [facility]  Marijuana Use  HTN       Plan:    Patients history and physical examination correlates with BRYSON, she wakes up in the middle oif the night , SOB , snores, has sweating at night, excessive daytime sleepiness and headaches  Use BIPAP at night and when sleeping during daytime   ECHO seen and reviewed - signs of [place]   Home oxygen eval  Outpatient follow up    Willem Berry, PGY-3, Internal Medicine Residency Resident.   [address]

## 2018-11-06 NOTE — PLAN OF CARE
Face to face encounter today indicate the requirement of DME order of  Oxygen for the diagnosis of the  Hypoxia     The patient has been set up for a home shower chair due to:   Morbid obesity  Impaired mobility    The patient has been provided with a glucometer due to:  New onset diabetes type 2  Initiation of treatment     Indication and side effects of treatment had been addressed with pt , pt agreeable to the current plan of using the DME

## 2018-11-06 NOTE — PROGRESS NOTES
Pt discharged with DME orders oxygen and order for home sleep study. Pt IV removed. Discharge instructions given and questions answered.  Pt discharged with belongings

## 2018-11-07 LAB
EKG ATRIAL RATE: 95 BPM
EKG ATRIAL RATE: 98 BPM
EKG P AXIS: 60 DEGREES
EKG P AXIS: 65 DEGREES
EKG P-R INTERVAL: 174 MS
EKG P-R INTERVAL: 184 MS
EKG Q-T INTERVAL: 348 MS
EKG Q-T INTERVAL: 384 MS
EKG QRS DURATION: 100 MS
EKG QRS DURATION: 102 MS
EKG QTC CALCULATION (BAZETT): 444 MS
EKG QTC CALCULATION (BAZETT): 482 MS
EKG R AXIS: 41 DEGREES
EKG R AXIS: 47 DEGREES
EKG T AXIS: -4 DEGREES
EKG T AXIS: 20 DEGREES
EKG VENTRICULAR RATE: 95 BPM
EKG VENTRICULAR RATE: 98 BPM

## 2018-11-07 NOTE — DISCHARGE SUMMARY
[facility]    Discharge Summary     Patient ID: Guilherme Kramer  :  1985   MRN: [id number]     ACCOUNT:  [de-identified]   Patient's PCP: Nery Harris Date: 11/3/2018   Discharge Date: 2018    Discharge Physician: Antolin Mobley DO     The patient was seen and examined on day of discharge and this discharge summary is in conjunction with any daily progress note from day of discharge. Active Discharge Diagnoses:     Primary Problem  Obesity hypoventilation syndrome [facility])      [place] Problems    Diagnosis Date Noted    Morbid obesity with BMI of 70 and over, adult ([facility] [address] [E66.01, Z68.45]      Priority: High    Hypertension [I10]      Priority: Medium    Normocytic normochromic anemia [D64.9] 2018    Hypoalbuminemia due to protein-calorie malnutrition ([address] [E46] 2018    Acute and chronic respiratory failure with hypoxia ([facility] [address] [J96.21] 2018    Pulmonary hypertension (HCC) [I27.20]     Elevated brain natriuretic peptide (BNP) level [R79.89]     BRYSON (obstructive sleep apnea) [G47.33]     Elevated blood pressure reading [R03.0]     Chest pain [R07.9] 2018    Obesity hypoventilation syndrome ([address] [E66.2] 2018    Tobacco abuse [Z72.0] 2018    Type 2 diabetes mellitus without complication, without long-term current use of insulin ([facility] [address] [E11.9] 2018    Dyspnea [R06.00] 2018         Hospital Course:     Brief History:  As documented in the medical record: \"The patient is a 29 y. o. female who presents with URI (eyes itching and burning for 2 weeks, ears hurting x2 week, sinus drainage and cough x2 weeks ) and Chest Pain (hurts to take deep breath fr 2 weeks)   and she is admitted to the hospital for the management of  Chest pain or shortness of breath  Patient with severe morbid obesity presented to the ER with progressively worsening chest pain and shortness of breath that has been ongoing over the last 3 weeks, then discharged with URI symptoms [dry cough and ear pain and sore throat] and was taking Z-Monty for but her symptoms were getting  worse, yesterday she began to have dyspnea at rest with significant chest pain which prompted her to come to the ER  In ER she was found to have elevated blood pressure [189/103], elevated BNP and EKG and troponin were unremarkable, also d-dimer was elevated and given the patient doesn't fit for CT chest she was empirically started on heparin drip and admitted for further management  Patient has known history of smoking, severe morbid obesity, hypertension, hyperlipidemia obstructive sleep apnea noncompliant with CPAP recently, patient also is diabetic with A1c of 6.7, patient is not aware that she is diabetic\"      The patient was admitted to the hospital  Heparin drip continued  Blood pressure was monitored and controlled  VQ scan pending  Dopplers pending  EKG and enzymes ordered  Cardiology consulted  Blood sugar was monitored and controlled  She was found to have new onset DM 2  Diabetic Ed provided      Cardiology was consulted  Dopplers revealed:   Right:   No evidence of deep or superficial venous thrombosis.    Left:   No evidence of deep or superficial venous thrombosis.     Echo revealed:  Summary  Technically difficult study with poor visualization. Left ventricle is probably moderate dilated. Increased left ventricular wall  thickness suggesting mild to moderate LVH. Overall systolic function appears preserved, all wall segments are not well  visualized due to poor acoustic windows. Estimated EF 50-55%. No evidence of aortic insufficiency or stenosis. No evidence of mitral regurgitation. Trace to mild tricuspid regurgitation.   Estimated right ventricular systolic pressure is 38 mmHg.     Pulmonary was consulted  Outpatient sleep study was ordered   The patient was transitioned to oral therapy  She !None      ! +------------------------------------+----------+---------------+----------+ ! Mid Femoral                         !No        !               !          ! +------------------------------------+----------+---------------+----------+ ! Dist Femoral                        !No        !               !          ! +------------------------------------+----------+---------------+----------+ ! Popliteal                           !Yes       ! Yes            ! None      ! +------------------------------------+----------+---------------+----------+ ! Sapheno Femoral Junction            ! Yes       ! Yes            ! None      ! +------------------------------------+----------+---------------+----------+ ! PTV                                 ! Partial   !Yes            ! None      ! +------------------------------------+----------+---------------+----------+ ! Peroneal                            !No        !               !          ! +------------------------------------+----------+---------------+----------+ ! Gastroc                             ! Yes       ! Yes            ! None      ! +------------------------------------+----------+---------------+----------+ ! GSV Thigh                           ! Yes       ! Yes            ! None      ! +------------------------------------+----------+---------------+----------+ ! GSV Knee                            ! Yes       ! Yes            ! None      ! +------------------------------------+----------+---------------+----------+ ! GSV Ankle                           ! Yes       ! Yes            ! None      ! +------------------------------------+----------+---------------+----------+ ! SSV                                 ! Partial   !Yes            ! None      ! +------------------------------------+----------+---------------+----------+ Right Doppler Measurements +---------------------------+------+------+--------------------------------+ ! Location                   ! Signal!Reflux! Reflux [address]  [phone]    Schedule an appointment as soon as possible for a visit in 1 week      Melinda Coy, [address] [facility]  [address] [place]  [address]  [phone]    Go on 11/16/2018  Post hospital follow up.   APPOINTMENT TIME: 10:00am    [address] [address] [phone]  Schedule an appointment as soon as possible for a visit       Diet:   ADA    Activity:   As tolerated    Discharge Medications:      Medication List      START taking these medications    lisinopril 20 MG tablet  Commonly known as:  PRINIVIL;ZESTRIL  Take 1 tablet by mouth daily  Start taking on:  11/7/2018     metFORMIN 500 MG tablet  Commonly known as:  GLUCOPHAGE  Take 1 tablet by mouth 2 times daily (with meals)        CONTINUE taking these medications    aluminum & magnesium hydroxide-simethicone 400-400-40 MG/5ML Susp  Commonly known as:  MAALOX MAX  Take 15 mLs by mouth every 6 hours as needed (indigestion)     benzonatate 100 MG capsule  Commonly known as:  TESSALON PERLES  Take 1 capsule by mouth every 8 hours as needed for Cough     cyclobenzaprine 10 MG tablet  Commonly known as:  FLEXERIL  Take 1 tablet by mouth 2 times daily as needed for Muscle spasms     famotidine 20 MG tablet  Commonly known as:  PEPCID  Take 1 tablet by mouth 2 times daily     hydrochlorothiazide 25 MG tablet  Commonly known as:  HYDRODIURIL     IMITREX PO     loratadine 10 MG tablet  Commonly known as:  CLARITIN     metoprolol tartrate 50 MG tablet  Commonly known as:  LOPRESSOR     POTASSIUM CHLORIDE PO     topiramate 50 MG tablet  Commonly known as:  TOPAMAX     vitamin D 56390 units Caps capsule  Commonly known as:  ERGOCALCIFEROL        STOP taking these medications    azithromycin 250 MG tablet  Commonly known as:  ZITHROMAX     ibuprofen 800 MG tablet  Commonly known as:  ADVIL;MOTRIN     meloxicam 7.5 MG tablet  Commonly known as:  MOBIC     naproxen 250 MG tablet  Commonly known as:

## 2018-11-16 ENCOUNTER — OFFICE VISIT (OUTPATIENT)
Dept: PULMONOLOGY | Age: 33
End: 2018-11-16
Payer: MEDICARE

## 2018-11-16 VITALS
OXYGEN SATURATION: 90 % | RESPIRATION RATE: 18 BRPM | SYSTOLIC BLOOD PRESSURE: 113 MMHG | WEIGHT: 293 LBS | BODY MASS INDEX: 39.68 KG/M2 | HEIGHT: 72 IN | HEART RATE: 90 BPM | DIASTOLIC BLOOD PRESSURE: 76 MMHG

## 2018-11-16 VITALS — OXYGEN SATURATION: 90 % | HEART RATE: 90 BPM | BODY MASS INDEX: 41.02 KG/M2 | HEIGHT: 71 IN | WEIGHT: 293 LBS

## 2018-11-16 DIAGNOSIS — J96.10 CHRONIC RESPIRATORY FAILURE, UNSPECIFIED WHETHER WITH HYPOXIA OR HYPERCAPNIA (HCC): ICD-10-CM

## 2018-11-16 DIAGNOSIS — E66.2 OBESITY HYPOVENTILATION SYNDROME (HCC): ICD-10-CM

## 2018-11-16 DIAGNOSIS — G47.33 OBSTRUCTIVE SLEEP APNEA: Primary | ICD-10-CM

## 2018-11-16 DIAGNOSIS — G47.33 OSA (OBSTRUCTIVE SLEEP APNEA): Primary | ICD-10-CM

## 2018-11-16 DIAGNOSIS — E11.9 TYPE 2 DIABETES MELLITUS WITHOUT COMPLICATION, WITHOUT LONG-TERM CURRENT USE OF INSULIN (HCC): ICD-10-CM

## 2018-11-16 DIAGNOSIS — E88.09 HYPOALBUMINEMIA DUE TO PROTEIN-CALORIE MALNUTRITION (HCC): ICD-10-CM

## 2018-11-16 DIAGNOSIS — E88.09 HYPOALBUMINEMIA DUE TO PROTEIN-CALORIE MALNUTRITION (HCC): Primary | ICD-10-CM

## 2018-11-16 DIAGNOSIS — G47.33 OSA (OBSTRUCTIVE SLEEP APNEA): ICD-10-CM

## 2018-11-16 DIAGNOSIS — I10 ESSENTIAL HYPERTENSION: ICD-10-CM

## 2018-11-16 DIAGNOSIS — E46 HYPOALBUMINEMIA DUE TO PROTEIN-CALORIE MALNUTRITION (HCC): Primary | ICD-10-CM

## 2018-11-16 DIAGNOSIS — I27.81 CHRONIC COR PULMONALE (HCC): ICD-10-CM

## 2018-11-16 DIAGNOSIS — J96.21 ACUTE AND CHRONIC RESPIRATORY FAILURE WITH HYPOXIA (HCC): ICD-10-CM

## 2018-11-16 DIAGNOSIS — D64.9 NORMOCYTIC NORMOCHROMIC ANEMIA: ICD-10-CM

## 2018-11-16 DIAGNOSIS — E66.01 MORBID OBESITY WITH BMI OF 70 AND OVER, ADULT (HCC): ICD-10-CM

## 2018-11-16 DIAGNOSIS — E46 HYPOALBUMINEMIA DUE TO PROTEIN-CALORIE MALNUTRITION (HCC): ICD-10-CM

## 2018-11-16 PROCEDURE — 94375 RESPIRATORY FLOW VOLUME LOOP: CPT | Performed by: INTERNAL MEDICINE

## 2018-11-16 PROCEDURE — G8484 FLU IMMUNIZE NO ADMIN: HCPCS | Performed by: INTERNAL MEDICINE

## 2018-11-16 PROCEDURE — 94729 DIFFUSING CAPACITY: CPT | Performed by: INTERNAL MEDICINE

## 2018-11-16 PROCEDURE — G8427 DOCREV CUR MEDS BY ELIG CLIN: HCPCS | Performed by: INTERNAL MEDICINE

## 2018-11-16 PROCEDURE — 94726 PLETHYSMOGRAPHY LUNG VOLUMES: CPT | Performed by: INTERNAL MEDICINE

## 2018-11-16 PROCEDURE — 1111F DSCHRG MED/CURRENT MED MERGE: CPT | Performed by: INTERNAL MEDICINE

## 2018-11-16 PROCEDURE — 3044F HG A1C LEVEL LT 7.0%: CPT | Performed by: INTERNAL MEDICINE

## 2018-11-16 PROCEDURE — G8419 CALC BMI OUT NRM PARAM NOF/U: HCPCS | Performed by: INTERNAL MEDICINE

## 2018-11-16 PROCEDURE — 99214 OFFICE O/P EST MOD 30 MIN: CPT | Performed by: INTERNAL MEDICINE

## 2018-11-16 PROCEDURE — 1036F TOBACCO NON-USER: CPT | Performed by: INTERNAL MEDICINE

## 2018-11-16 PROCEDURE — 2022F DILAT RTA XM EVC RTNOPTHY: CPT | Performed by: INTERNAL MEDICINE

## 2018-11-18 ENCOUNTER — APPOINTMENT (OUTPATIENT)
Dept: GENERAL RADIOLOGY | Age: 33
DRG: 143 | End: 2018-11-18
Payer: MEDICARE

## 2018-11-18 ENCOUNTER — HOSPITAL ENCOUNTER (INPATIENT)
Age: 33
LOS: 3 days | Discharge: HOME | DRG: 143 | End: 2018-11-21
Attending: EMERGENCY MEDICINE | Admitting: INTERNAL MEDICINE
Payer: MEDICARE

## 2018-11-18 DIAGNOSIS — R07.9 CHEST PAIN, UNSPECIFIED TYPE: ICD-10-CM

## 2018-11-18 DIAGNOSIS — R79.89 ELEVATED D-DIMER: Primary | ICD-10-CM

## 2018-11-18 LAB
ABSOLUTE EOS #: 0.13 K/UL (ref 0–0.4)
ABSOLUTE IMMATURE GRANULOCYTE: 0 K/UL (ref 0–0.3)
ABSOLUTE LYMPH #: 3.33 K/UL (ref 1–4.8)
ABSOLUTE MONO #: 0.93 K/UL (ref 0.1–0.8)
ALBUMIN SERPL-MCNC: 3.6 G/DL (ref 3.5–5.2)
ALBUMIN/GLOBULIN RATIO: 1 (ref 1–2.5)
ALP BLD-CCNC: 56 U/L (ref 35–104)
ALT SERPL-CCNC: 19 U/L (ref 5–33)
ANION GAP SERPL CALCULATED.3IONS-SCNC: 8 MMOL/L (ref 9–17)
AST SERPL-CCNC: 16 U/L
BASOPHILS # BLD: 1 % (ref 0–2)
BASOPHILS ABSOLUTE: 0.13 K/UL (ref 0–0.2)
BILIRUB SERPL-MCNC: 0.57 MG/DL (ref 0.3–1.2)
BNP INTERPRETATION: ABNORMAL
BUN BLDV-MCNC: 13 MG/DL (ref 6–20)
BUN/CREAT BLD: ABNORMAL (ref 9–20)
CALCIUM SERPL-MCNC: 9.1 MG/DL (ref 8.6–10.4)
CHLORIDE BLD-SCNC: 98 MMOL/L (ref 98–107)
CO2: 30 MMOL/L (ref 20–31)
CREAT SERPL-MCNC: 0.66 MG/DL (ref 0.5–0.9)
D-DIMER QUANTITATIVE: 0.81 MG/L FEU
DIFFERENTIAL TYPE: ABNORMAL
EOSINOPHILS RELATIVE PERCENT: 1 % (ref 1–4)
GFR AFRICAN AMERICAN: >60 ML/MIN
GFR NON-AFRICAN AMERICAN: >60 ML/MIN
GFR SERPL CREATININE-BSD FRML MDRD: ABNORMAL ML/MIN/{1.73_M2}
GFR SERPL CREATININE-BSD FRML MDRD: ABNORMAL ML/MIN/{1.73_M2}
GLUCOSE BLD-MCNC: 111 MG/DL (ref 65–105)
GLUCOSE BLD-MCNC: 118 MG/DL (ref 70–99)
HCT VFR BLD CALC: 49.4 % (ref 36.3–47.1)
HCT VFR BLD CALC: 49.5 % (ref 36.3–47.1)
HEMOGLOBIN: 13 G/DL (ref 11.9–15.1)
HEMOGLOBIN: 13.2 G/DL (ref 11.9–15.1)
IMMATURE GRANULOCYTES: 0 %
INR BLD: 0.9
LYMPHOCYTES # BLD: 25 % (ref 24–44)
MCH RBC QN AUTO: 20.1 PG (ref 25.2–33.5)
MCH RBC QN AUTO: 20.1 PG (ref 25.2–33.5)
MCHC RBC AUTO-ENTMCNC: 26.3 G/DL (ref 28.4–34.8)
MCHC RBC AUTO-ENTMCNC: 26.7 G/DL (ref 28.4–34.8)
MCV RBC AUTO: 75.2 FL (ref 82.6–102.9)
MCV RBC AUTO: 76.4 FL (ref 82.6–102.9)
MONOCYTES # BLD: 7 % (ref 1–7)
MORPHOLOGY: ABNORMAL
NRBC AUTOMATED: 0 PER 100 WBC
NRBC AUTOMATED: 0 PER 100 WBC
PARTIAL THROMBOPLASTIN TIME: 21.6 SEC (ref 20.5–30.5)
PARTIAL THROMBOPLASTIN TIME: 42 SEC (ref 20.5–30.5)
PDW BLD-RTO: 19.6 % (ref 11.8–14.4)
PDW BLD-RTO: 19.6 % (ref 11.8–14.4)
PLATELET # BLD: 378 K/UL (ref 138–453)
PLATELET # BLD: 412 K/UL (ref 138–453)
PLATELET ESTIMATE: ABNORMAL
PMV BLD AUTO: 9.7 FL (ref 8.1–13.5)
PMV BLD AUTO: 9.8 FL (ref 8.1–13.5)
POC TROPONIN I: 0.01 NG/ML (ref 0–0.1)
POC TROPONIN I: 0.01 NG/ML (ref 0–0.1)
POC TROPONIN INTERP: NORMAL
POC TROPONIN INTERP: NORMAL
POTASSIUM SERPL-SCNC: 4.5 MMOL/L (ref 3.7–5.3)
PRO-BNP: 798 PG/ML
PROTHROMBIN TIME: 9.7 SEC (ref 9–12)
RBC # BLD: 6.47 M/UL (ref 3.95–5.11)
RBC # BLD: 6.58 M/UL (ref 3.95–5.11)
RBC # BLD: ABNORMAL 10*6/UL
SEG NEUTROPHILS: 66 % (ref 36–66)
SEGMENTED NEUTROPHILS ABSOLUTE COUNT: 8.78 K/UL (ref 1.8–7.7)
SODIUM BLD-SCNC: 136 MMOL/L (ref 135–144)
TOTAL PROTEIN: 7.3 G/DL (ref 6.4–8.3)
WBC # BLD: 12.6 K/UL (ref 3.5–11.3)
WBC # BLD: 13.3 K/UL (ref 3.5–11.3)
WBC # BLD: ABNORMAL 10*3/UL

## 2018-11-18 PROCEDURE — 6360000002 HC RX W HCPCS: Performed by: INTERNAL MEDICINE

## 2018-11-18 PROCEDURE — 85025 COMPLETE CBC W/AUTO DIFF WBC: CPT

## 2018-11-18 PROCEDURE — 82947 ASSAY GLUCOSE BLOOD QUANT: CPT

## 2018-11-18 PROCEDURE — 2700000000 HC OXYGEN THERAPY PER DAY

## 2018-11-18 PROCEDURE — 93005 ELECTROCARDIOGRAM TRACING: CPT

## 2018-11-18 PROCEDURE — 85379 FIBRIN DEGRADATION QUANT: CPT

## 2018-11-18 PROCEDURE — 85610 PROTHROMBIN TIME: CPT

## 2018-11-18 PROCEDURE — 6360000002 HC RX W HCPCS: Performed by: STUDENT IN AN ORGANIZED HEALTH CARE EDUCATION/TRAINING PROGRAM

## 2018-11-18 PROCEDURE — 85730 THROMBOPLASTIN TIME PARTIAL: CPT

## 2018-11-18 PROCEDURE — 83880 ASSAY OF NATRIURETIC PEPTIDE: CPT

## 2018-11-18 PROCEDURE — 94761 N-INVAS EAR/PLS OXIMETRY MLT: CPT

## 2018-11-18 PROCEDURE — 6370000000 HC RX 637 (ALT 250 FOR IP): Performed by: STUDENT IN AN ORGANIZED HEALTH CARE EDUCATION/TRAINING PROGRAM

## 2018-11-18 PROCEDURE — 99223 1ST HOSP IP/OBS HIGH 75: CPT | Performed by: INTERNAL MEDICINE

## 2018-11-18 PROCEDURE — 71045 X-RAY EXAM CHEST 1 VIEW: CPT

## 2018-11-18 PROCEDURE — 80053 COMPREHEN METABOLIC PANEL: CPT

## 2018-11-18 PROCEDURE — 96365 THER/PROPH/DIAG IV INF INIT: CPT

## 2018-11-18 PROCEDURE — 2060000000 HC ICU INTERMEDIATE R&B

## 2018-11-18 PROCEDURE — 85027 COMPLETE CBC AUTOMATED: CPT

## 2018-11-18 PROCEDURE — 84484 ASSAY OF TROPONIN QUANT: CPT

## 2018-11-18 PROCEDURE — 36415 COLL VENOUS BLD VENIPUNCTURE: CPT

## 2018-11-18 PROCEDURE — 94660 CPAP INITIATION&MGMT: CPT

## 2018-11-18 PROCEDURE — G0383 LEV 4 HOSP TYPE B ED VISIT: HCPCS

## 2018-11-18 PROCEDURE — 96375 TX/PRO/DX INJ NEW DRUG ADDON: CPT

## 2018-11-18 RX ORDER — ASPIRIN 81 MG/1
81 TABLET, CHEWABLE ORAL DAILY
Status: DISCONTINUED | OUTPATIENT
Start: 2018-11-19 | End: 2018-11-21 | Stop reason: HOSPADM

## 2018-11-18 RX ORDER — SODIUM CHLORIDE 0.9 % (FLUSH) 0.9 %
10 SYRINGE (ML) INJECTION EVERY 12 HOURS SCHEDULED
Status: DISCONTINUED | OUTPATIENT
Start: 2018-11-18 | End: 2018-11-21 | Stop reason: HOSPADM

## 2018-11-18 RX ORDER — POTASSIUM CHLORIDE 1.5 G/1.77G
20 POWDER, FOR SOLUTION ORAL DAILY
Status: DISCONTINUED | OUTPATIENT
Start: 2018-11-19 | End: 2018-11-21 | Stop reason: HOSPADM

## 2018-11-18 RX ORDER — TOPIRAMATE 50 MG/1
100 TABLET, FILM COATED ORAL 2 TIMES DAILY
Status: DISCONTINUED | OUTPATIENT
Start: 2018-11-18 | End: 2018-11-21 | Stop reason: HOSPADM

## 2018-11-18 RX ORDER — HEPARIN SODIUM 1000 [USP'U]/ML
60 INJECTION, SOLUTION INTRAVENOUS; SUBCUTANEOUS PRN
Status: DISCONTINUED | OUTPATIENT
Start: 2018-11-18 | End: 2018-11-18

## 2018-11-18 RX ORDER — POTASSIUM CHLORIDE 20 MEQ/1
40 TABLET, EXTENDED RELEASE ORAL PRN
Status: DISCONTINUED | OUTPATIENT
Start: 2018-11-18 | End: 2018-11-21 | Stop reason: HOSPADM

## 2018-11-18 RX ORDER — HYDROCHLOROTHIAZIDE 25 MG/1
25 TABLET ORAL DAILY
Status: DISCONTINUED | OUTPATIENT
Start: 2018-11-19 | End: 2018-11-21 | Stop reason: HOSPADM

## 2018-11-18 RX ORDER — BENZONATATE 100 MG/1
100 CAPSULE ORAL EVERY 8 HOURS PRN
Status: DISCONTINUED | OUTPATIENT
Start: 2018-11-18 | End: 2018-11-21 | Stop reason: HOSPADM

## 2018-11-18 RX ORDER — HEPARIN SODIUM 1000 [USP'U]/ML
10000 INJECTION, SOLUTION INTRAVENOUS; SUBCUTANEOUS ONCE
Status: COMPLETED | OUTPATIENT
Start: 2018-11-18 | End: 2018-11-18

## 2018-11-18 RX ORDER — HEPARIN SODIUM 1000 [USP'U]/ML
5000 INJECTION, SOLUTION INTRAVENOUS; SUBCUTANEOUS PRN
Status: DISCONTINUED | OUTPATIENT
Start: 2018-11-18 | End: 2018-11-21

## 2018-11-18 RX ORDER — POTASSIUM CHLORIDE 20MEQ/15ML
40 LIQUID (ML) ORAL PRN
Status: DISCONTINUED | OUTPATIENT
Start: 2018-11-18 | End: 2018-11-21 | Stop reason: HOSPADM

## 2018-11-18 RX ORDER — SIMETHICONE 80 MG
80 TABLET,CHEWABLE ORAL EVERY 6 HOURS PRN
Status: DISCONTINUED | OUTPATIENT
Start: 2018-11-18 | End: 2018-11-21 | Stop reason: HOSPADM

## 2018-11-18 RX ORDER — MORPHINE SULFATE 2 MG/ML
2 INJECTION, SOLUTION INTRAMUSCULAR; INTRAVENOUS
Status: DISCONTINUED | OUTPATIENT
Start: 2018-11-18 | End: 2018-11-20

## 2018-11-18 RX ORDER — ACETAMINOPHEN 325 MG/1
650 TABLET ORAL EVERY 4 HOURS PRN
Status: DISCONTINUED | OUTPATIENT
Start: 2018-11-18 | End: 2018-11-21 | Stop reason: HOSPADM

## 2018-11-18 RX ORDER — MAGNESIUM SULFATE 1 G/100ML
1 INJECTION INTRAVENOUS PRN
Status: DISCONTINUED | OUTPATIENT
Start: 2018-11-18 | End: 2018-11-21 | Stop reason: HOSPADM

## 2018-11-18 RX ORDER — POTASSIUM CHLORIDE 7.45 MG/ML
10 INJECTION INTRAVENOUS PRN
Status: DISCONTINUED | OUTPATIENT
Start: 2018-11-18 | End: 2018-11-21 | Stop reason: HOSPADM

## 2018-11-18 RX ORDER — LISINOPRIL 20 MG/1
20 TABLET ORAL DAILY
Status: DISCONTINUED | OUTPATIENT
Start: 2018-11-19 | End: 2018-11-21 | Stop reason: HOSPADM

## 2018-11-18 RX ORDER — FAMOTIDINE 20 MG/1
20 TABLET, FILM COATED ORAL 2 TIMES DAILY
Status: DISCONTINUED | OUTPATIENT
Start: 2018-11-18 | End: 2018-11-21 | Stop reason: HOSPADM

## 2018-11-18 RX ORDER — MORPHINE SULFATE 2 MG/ML
4 INJECTION, SOLUTION INTRAMUSCULAR; INTRAVENOUS
Status: DISCONTINUED | OUTPATIENT
Start: 2018-11-18 | End: 2018-11-20

## 2018-11-18 RX ORDER — METOPROLOL TARTRATE 50 MG/1
50 TABLET, FILM COATED ORAL 2 TIMES DAILY
Status: DISCONTINUED | OUTPATIENT
Start: 2018-11-18 | End: 2018-11-21 | Stop reason: HOSPADM

## 2018-11-18 RX ORDER — FAMOTIDINE 20 MG/1
20 TABLET, FILM COATED ORAL 2 TIMES DAILY
Status: DISCONTINUED | OUTPATIENT
Start: 2018-11-18 | End: 2018-11-18 | Stop reason: SDUPTHER

## 2018-11-18 RX ORDER — NITROGLYCERIN 0.4 MG/1
0.4 TABLET SUBLINGUAL EVERY 5 MIN PRN
Status: DISCONTINUED | OUTPATIENT
Start: 2018-11-18 | End: 2018-11-21 | Stop reason: HOSPADM

## 2018-11-18 RX ORDER — ATORVASTATIN CALCIUM 40 MG/1
40 TABLET, FILM COATED ORAL NIGHTLY
Status: DISCONTINUED | OUTPATIENT
Start: 2018-11-18 | End: 2018-11-21 | Stop reason: HOSPADM

## 2018-11-18 RX ORDER — CETIRIZINE HYDROCHLORIDE 10 MG/1
10 TABLET ORAL DAILY
Status: DISCONTINUED | OUTPATIENT
Start: 2018-11-19 | End: 2018-11-21 | Stop reason: HOSPADM

## 2018-11-18 RX ORDER — HEPARIN SODIUM 1000 [USP'U]/ML
10000 INJECTION, SOLUTION INTRAVENOUS; SUBCUTANEOUS PRN
Status: DISCONTINUED | OUTPATIENT
Start: 2018-11-18 | End: 2018-11-21

## 2018-11-18 RX ORDER — ERGOCALCIFEROL 1.25 MG/1
50000 CAPSULE ORAL WEEKLY
Status: DISCONTINUED | OUTPATIENT
Start: 2018-11-18 | End: 2018-11-21 | Stop reason: HOSPADM

## 2018-11-18 RX ORDER — MORPHINE SULFATE 4 MG/ML
4 INJECTION, SOLUTION INTRAMUSCULAR; INTRAVENOUS ONCE
Status: COMPLETED | OUTPATIENT
Start: 2018-11-18 | End: 2018-11-18

## 2018-11-18 RX ORDER — SODIUM CHLORIDE 0.9 % (FLUSH) 0.9 %
10 SYRINGE (ML) INJECTION PRN
Status: DISCONTINUED | OUTPATIENT
Start: 2018-11-18 | End: 2018-11-21 | Stop reason: HOSPADM

## 2018-11-18 RX ORDER — ONDANSETRON 2 MG/ML
4 INJECTION INTRAMUSCULAR; INTRAVENOUS EVERY 6 HOURS PRN
Status: DISCONTINUED | OUTPATIENT
Start: 2018-11-18 | End: 2018-11-21 | Stop reason: HOSPADM

## 2018-11-18 RX ORDER — CYCLOBENZAPRINE HCL 10 MG
10 TABLET ORAL 2 TIMES DAILY PRN
Status: DISCONTINUED | OUTPATIENT
Start: 2018-11-18 | End: 2018-11-20

## 2018-11-18 RX ORDER — HEPARIN SODIUM 10000 [USP'U]/100ML
12 INJECTION, SOLUTION INTRAVENOUS CONTINUOUS
Status: DISCONTINUED | OUTPATIENT
Start: 2018-11-18 | End: 2018-11-18

## 2018-11-18 RX ORDER — DOCUSATE SODIUM 100 MG/1
100 CAPSULE, LIQUID FILLED ORAL 2 TIMES DAILY
Status: DISCONTINUED | OUTPATIENT
Start: 2018-11-18 | End: 2018-11-21 | Stop reason: HOSPADM

## 2018-11-18 RX ORDER — ONDANSETRON 2 MG/ML
4 INJECTION INTRAMUSCULAR; INTRAVENOUS EVERY 8 HOURS PRN
Status: DISCONTINUED | OUTPATIENT
Start: 2018-11-18 | End: 2018-11-18 | Stop reason: SDUPTHER

## 2018-11-18 RX ORDER — ONDANSETRON 2 MG/ML
4 INJECTION INTRAMUSCULAR; INTRAVENOUS ONCE
Status: COMPLETED | OUTPATIENT
Start: 2018-11-18 | End: 2018-11-18

## 2018-11-18 RX ORDER — HEPARIN SODIUM 1000 [USP'U]/ML
60 INJECTION, SOLUTION INTRAVENOUS; SUBCUTANEOUS ONCE
Status: DISCONTINUED | OUTPATIENT
Start: 2018-11-18 | End: 2018-11-18

## 2018-11-18 RX ORDER — HEPARIN SODIUM 1000 [USP'U]/ML
30 INJECTION, SOLUTION INTRAVENOUS; SUBCUTANEOUS PRN
Status: DISCONTINUED | OUTPATIENT
Start: 2018-11-18 | End: 2018-11-18

## 2018-11-18 RX ORDER — BISACODYL 10 MG
10 SUPPOSITORY, RECTAL RECTAL DAILY PRN
Status: DISCONTINUED | OUTPATIENT
Start: 2018-11-18 | End: 2018-11-21 | Stop reason: HOSPADM

## 2018-11-18 RX ORDER — HEPARIN SODIUM 10000 [USP'U]/100ML
2100 INJECTION, SOLUTION INTRAVENOUS CONTINUOUS
Status: DISCONTINUED | OUTPATIENT
Start: 2018-11-18 | End: 2018-11-19

## 2018-11-18 RX ORDER — MAGNESIUM HYDROXIDE/ALUMINUM HYDROXICE/SIMETHICONE 120; 1200; 1200 MG/30ML; MG/30ML; MG/30ML
15 SUSPENSION ORAL EVERY 6 HOURS PRN
Status: DISCONTINUED | OUTPATIENT
Start: 2018-11-18 | End: 2018-11-21 | Stop reason: HOSPADM

## 2018-11-18 RX ADMIN — MORPHINE SULFATE 4 MG: 2 INJECTION, SOLUTION INTRAMUSCULAR; INTRAVENOUS at 22:40

## 2018-11-18 RX ADMIN — Medication 2100 UNITS/HR: at 14:30

## 2018-11-18 RX ADMIN — HEPARIN SODIUM 10000 UNITS: 1000 INJECTION, SOLUTION INTRAVENOUS; SUBCUTANEOUS at 21:26

## 2018-11-18 RX ADMIN — ONDANSETRON 4 MG: 2 INJECTION INTRAMUSCULAR; INTRAVENOUS at 22:08

## 2018-11-18 RX ADMIN — SIMETHICONE 80 MG: 80 TABLET, CHEWABLE ORAL at 18:35

## 2018-11-18 RX ADMIN — HEPARIN SODIUM 10000 UNITS: 1000 INJECTION, SOLUTION INTRAVENOUS; SUBCUTANEOUS at 14:29

## 2018-11-18 RX ADMIN — ONDANSETRON 4 MG: 2 INJECTION INTRAMUSCULAR; INTRAVENOUS at 14:36

## 2018-11-18 RX ADMIN — MORPHINE SULFATE 4 MG: 4 INJECTION INTRAVENOUS at 14:56

## 2018-11-18 RX ADMIN — Medication 2300 UNITS/HR: at 21:26

## 2018-11-18 ASSESSMENT — PAIN DESCRIPTION - PAIN TYPE: TYPE: ACUTE PAIN

## 2018-11-18 ASSESSMENT — PAIN SCALES - GENERAL
PAINLEVEL_OUTOF10: 8
PAINLEVEL_OUTOF10: 5
PAINLEVEL_OUTOF10: 8
PAINLEVEL_OUTOF10: 9

## 2018-11-18 ASSESSMENT — HEART SCORE: ECG: 0

## 2018-11-18 NOTE — H&P
[facility]    HISTORY AND PHYSICAL EXAMINATION            Date:   11/18/2018  Patient name:  Manuel Morales  Date of admission:  11/18/2018 12:45 PM  MRN:   [id number]  Account:  [de-identified]  YOB: 1985  PCP:    Bernarda Darling  Room:   34/34  Code Status:    Prior    Chief Complaint:     Chief Complaint   Patient presents with    Chest Pain     0srtarted last night    Shortness of Breath    Abdominal Pain       History Obtained From:     patient, electronic medical record    History of Present Illness:     Admitted through ER with following history    Lc Patterson is a 29 y.o. female who presented to the emergency department with Recurrent midsternal chest pain described as pressure since last night. The patient reports that she also has some shortness of breath associated with it. The patient reports that she was seen 2 weeks ago for similar complaints and at that time there was concern that she may have had a PE.  D-dimer at that time was negative and she was admitted for further evaluation. The patient reports that she was unable to complete the VQ scan at that time she was unable to stand. Her body habitus his made it impossible to get a CT scan. While in ER patient is desaturating when trying to sleep for which BiPAP is being started for her  Patient states she was recently told to be having obstructive sleep apnea but she is yet to do sleep study  She has quit smoking 1 month back  Past Medical History:     Past Medical History:   Diagnosis Date    Acute and chronic respiratory failure with hypoxia ([address] 11/6/2018    ASCUS with positive high risk HPV cervical     Asthma     Breast discharge 4/9/2015    Chest pain 11/4/2018    Chlamydia ? Reported hx    Ectopic pregnancy - Right 2/9/2014    Pt was initiallly dx with a left ovarian ectopic.   After Laproscopic eval she was noted to have a Right Tubal 1.0 - 4.8 k/uL    Absolute Mono # 0.93 (H) 0.1 - 0.8 k/uL    Absolute Eos # 0.13 0.0 - 0.4 k/uL    Basophils # 0.13 0.0 - 0.2 k/uL    Morphology MICROCYTOSIS PRESENT    Protime-INR    Collection Time: 11/18/18  1:12 PM   Result Value Ref Range    Protime 9.7 9.0 - 12.0 sec    INR 0.9    APTT    Collection Time: 11/18/18  1:12 PM   Result Value Ref Range    PTT 21.6 20.5 - 30.5 sec   Comprehensive Metabolic Panel    Collection Time: 11/18/18  1:12 PM   Result Value Ref Range    Glucose 118 (H) 70 - 99 mg/dL    BUN 13 6 - 20 mg/dL    CREATININE 0.66 0.50 - 0.90 mg/dL    Bun/Cre Ratio NOT REPORTED 9 - 20    Calcium 9.1 8.6 - 10.4 mg/dL    Sodium 136 135 - 144 mmol/L    Potassium 4.5 3.7 - 5.3 mmol/L    Chloride 98 98 - 107 mmol/L    CO2 30 20 - 31 mmol/L    Anion Gap 8 (L) 9 - 17 mmol/L    Alkaline Phosphatase 56 35 - 104 U/L    ALT 19 5 - 33 U/L    AST 16 <32 U/L    Total Bilirubin 0.57 0.3 - 1.2 mg/dL    Total Protein 7.3 6.4 - 8.3 g/dL    Alb 3.6 3.5 - 5.2 g/dL    Albumin/Globulin Ratio 1.0 1.0 - 2.5    GFR Non-African American >60 >60 mL/min    GFR African American >60 >60 mL/min    GFR Comment          GFR Staging NOT REPORTED    D-Dimer, Quantitative    Collection Time: 11/18/18  1:12 PM   Result Value Ref Range    D-Dimer, Quant 0.81 mg/L FEU   POCT troponin    Collection Time: 11/18/18  3:39 PM   Result Value Ref Range    POC Troponin I 0.01 0.00 - 0.10 ng/mL    POC Troponin Interp       The Troponin-I (POC) results cannot be compared to the Troponin-T results. Imaging/Diagnostics:  CXR  Cardiomegaly without definite acute pulmonary process.  The left lung base is   not well evaluated.         V/Q scan:   Yet to be done      Assessment :      Primary Problem  Chest pain  Possible pulmonary embolism  Active Hospital Problems    Diagnosis Date Noted    D-dimer, elevated [R79.89] 11/18/2018     Priority: High    Acute and chronic respiratory failure with hypoxia [facility]) [J96.21] 11/06/2018     Priority: High

## 2018-11-18 NOTE — ED PROVIDER NOTES
[facility]     Emergency Department     Faculty Attestation    I performed a history and physical examination of the patient and discussed management with the resident. I reviewed the residents note and agree with the documented findings and plan of care. Any areas of disagreement are noted on the chart. I was personally present for the key portions of any procedures. I have documented in the chart those procedures where I was not present during the key portions. I have reviewed the emergency nurses triage note. I agree with the chief complaint, past medical history, past surgical history, allergies, medications, social and family history as documented unless otherwise noted below. Documentation of the HPI, Physical Exam and Medical Decision Making performed by medical students or scribes is based on my personal performance of the HPI, PE and MDM. For Physician Assistant/ Nurse Practitioner cases/documentation I have personally evaluated this patient and have completed at least one if not all key elements of the E/M (history, physical exam, and MDM). Additional findings are as noted.     Vital signs:   Vitals:    11/18/18 1255   BP: 134/77   Pulse: 86   Resp: 21   Temp:    SpO2:       EKG Interpretation    Interpreted by emergency department physician    Rhythm: normal sinus   Rate: normal  Axis: normal  Ectopy: none  Conduction: normal  ST Segments: nonspecific changes  T Waves: non specific changes  Q Waves: none    Clinical Impression: non-specific EKG    Charito Quan M.D,  Attending Emergency  Physician            Joanna Munroe MD  11/18/18 3423

## 2018-11-18 NOTE — ED PROVIDER NOTES
[facility]  ED  Emergency Department Encounter  Emergency Medicine Resident     Pt Name: Jared Dudley  MRN: [id number]  Lynettegfashlee 1985  Date of evaluation: 11/18/18  PCP:  Сергей Sanders    Chief Complaint     Chief Complaint   Patient presents with    Chest Pain     0srtarted last night    Shortness of Breath    Abdominal Pain     History of present illness (HPI)  (Location/Symptom, Timing/Onset, Context/Setting, Quality, Duration, Modifying Factors, Severity.)      Wilfredo Morales is a 29 y.o. female who presented to the emergency department with Recurrent midsternal chest pain described as pressure since last night. The patient reports that she also has some shortness of breath associated with it. The patient reports that she was seen 2 weeks ago for similar complaints and at that time there was concern that she may have had a PE.  D-dimer at that time was negative and she was admitted for further evaluation. The patient reports that she was unable to complete the VQ scan at that time she was unable to stand. Her body habitus his made it impossible to get a CT scan. Past medical / surgical/ social/ family history      Past Medical Hx:    has a past medical history of Acute and chronic respiratory failure with hypoxia ([address] ASCUS with positive high risk HPV cervical; Asthma; Breast discharge; Chest pain; Chlamydia; Ectopic pregnancy - Right; Elevated blood pressure reading; Elevated brain natriuretic peptide (BNP) level; History of trichomonal vaginitis; Hypertension; Hypoalbuminemia due to protein-calorie malnutrition ([address] Morbid obesity with BMI of 70 and over, adult [facility]); MRSA (methicillin resistant Staphylococcus aureus); Normocytic normochromic anemia; Obesity; Obesity hypoventilation syndrome ([address] BRYSON (obstructive sleep apnea); Ovarian ectopic pregnancy; Pain; Pelvic adhesive disease; Pulmonary hypertension ([address]  Tobacco abuse; Type 2 diabetes mellitus without hydroxide-simethicone (MAALOX MAX) 813-829-53 MG/5ML SUSP Take 15 mLs by mouth every 6 hours as needed (indigestion) 10/28/18  Yes Starla Ramos MD   famotidine (PEPCID) 20 MG tablet Take 1 tablet by mouth 2 times daily 10/28/18  Yes Starla Ramos MD   SUMAtriptan Succinate (IMITREX PO) Take by mouth   Yes Historical Provider, MD   benzonatate (TESSALON PERLES) 100 MG capsule Take 1 capsule by mouth every 8 hours as needed for Cough 10/25/18  Yes Zamzam Dotson MD   cyclobenzaprine (FLEXERIL) 10 MG tablet Take 1 tablet by mouth 2 times daily as needed for Muscle spasms 2/22/17  Yes Eva Estes MD   hydrochlorothiazide (HYDRODIURIL) 25 MG tablet Take 25 mg by mouth daily   Yes Historical Provider, MD   vitamin D (ERGOCALCIFEROL) 20847 UNITS CAPS capsule Take 50,000 Units by mouth once a week   Yes Historical Provider, MD   topiramate (TOPAMAX) 50 MG tablet Take 100 mg by mouth 2 times daily  1/29/16  Yes Historical Provider, MD   metoprolol (LOPRESSOR) 50 MG tablet Take 50 mg by mouth 2 times daily 8/10/15  Yes Historical Provider, MD   loratadine (CLARITIN) 10 MG tablet Take 10 mg by mouth daily   Yes Historical Provider, MD     Review of systems  (2-9 systems for level 4, 10 or more for level 5)      CONSTITUTIONAL: Denies recent fever, chills  EYES: No visual changes. NECK: No midline neck pain  RESPIRATORY: + shortness of breath. Denies dyspnea. CARDIAC: +  chest pain. The pain does not radiate. GI: Denies abdominal pain Denies  nausea, Denies  vomiting. Denies Blood in the stool or black tarry stools. : Denies dysuria  MUSCULOSKELETAL: Denies focal weakness. NEUROLOGICAL: denies headache or focal weakness. SKIN:  Denies any rash.       Physical exam  (up to 7 for level 4, 8 or more for level 5)      /77   Pulse 86   Temp 98.9 °F (37.2 °C)   Resp 21   Ht 5' 11\" (1.803 m)   Wt (!) 650 lb (294.8 kg)   LMP 09/27/2018   SpO2 97%   BMI 90.66 kg/m²     GENERAL APPEARANCE:

## 2018-11-19 ENCOUNTER — APPOINTMENT (OUTPATIENT)
Dept: NUCLEAR MEDICINE | Age: 33
DRG: 143 | End: 2018-11-19
Payer: MEDICARE

## 2018-11-19 LAB
ALBUMIN SERPL-MCNC: 3.7 G/DL (ref 3.5–5.2)
ALBUMIN/GLOBULIN RATIO: 1 (ref 1–2.5)
ALP BLD-CCNC: 56 U/L (ref 35–104)
ALT SERPL-CCNC: 18 U/L (ref 5–33)
ANION GAP SERPL CALCULATED.3IONS-SCNC: 10 MMOL/L (ref 9–17)
AST SERPL-CCNC: 16 U/L
BILIRUB SERPL-MCNC: 0.43 MG/DL (ref 0.3–1.2)
BUN BLDV-MCNC: 16 MG/DL (ref 6–20)
BUN/CREAT BLD: ABNORMAL (ref 9–20)
CALCIUM SERPL-MCNC: 9 MG/DL (ref 8.6–10.4)
CHLORIDE BLD-SCNC: 97 MMOL/L (ref 98–107)
CHOLESTEROL/HDL RATIO: 3.8
CHOLESTEROL: 125 MG/DL
CO2: 28 MMOL/L (ref 20–31)
CREAT SERPL-MCNC: 0.78 MG/DL (ref 0.5–0.9)
GFR AFRICAN AMERICAN: >60 ML/MIN
GFR NON-AFRICAN AMERICAN: >60 ML/MIN
GFR SERPL CREATININE-BSD FRML MDRD: ABNORMAL ML/MIN/{1.73_M2}
GFR SERPL CREATININE-BSD FRML MDRD: ABNORMAL ML/MIN/{1.73_M2}
GLUCOSE BLD-MCNC: 169 MG/DL (ref 70–99)
HCT VFR BLD CALC: 47.7 % (ref 36.3–47.1)
HDLC SERPL-MCNC: 33 MG/DL
HEMOGLOBIN: 12.9 G/DL (ref 11.9–15.1)
LDL CHOLESTEROL: 71 MG/DL (ref 0–130)
MAGNESIUM: 2.2 MG/DL (ref 1.6–2.6)
MCH RBC QN AUTO: 20.7 PG (ref 25.2–33.5)
MCHC RBC AUTO-ENTMCNC: 27 G/DL (ref 28.4–34.8)
MCV RBC AUTO: 76.7 FL (ref 82.6–102.9)
NRBC AUTOMATED: 0 PER 100 WBC
PARTIAL THROMBOPLASTIN TIME: 29.1 SEC (ref 20.5–30.5)
PARTIAL THROMBOPLASTIN TIME: 44.4 SEC (ref 20.5–30.5)
PDW BLD-RTO: 19.2 % (ref 11.8–14.4)
PLATELET # BLD: 449 K/UL (ref 138–453)
PMV BLD AUTO: 9.8 FL (ref 8.1–13.5)
POTASSIUM SERPL-SCNC: 4.7 MMOL/L (ref 3.7–5.3)
RBC # BLD: 6.22 M/UL (ref 3.95–5.11)
SODIUM BLD-SCNC: 135 MMOL/L (ref 135–144)
TOTAL PROTEIN: 7.3 G/DL (ref 6.4–8.3)
TRIGL SERPL-MCNC: 103 MG/DL
TROPONIN INTERP: NORMAL
TROPONIN INTERP: NORMAL
TROPONIN T: <0.03 NG/ML
TROPONIN T: <0.03 NG/ML
VLDLC SERPL CALC-MCNC: ABNORMAL MG/DL (ref 1–30)
WBC # BLD: 17.2 K/UL (ref 3.5–11.3)

## 2018-11-19 PROCEDURE — 2580000003 HC RX 258: Performed by: STUDENT IN AN ORGANIZED HEALTH CARE EDUCATION/TRAINING PROGRAM

## 2018-11-19 PROCEDURE — 6360000002 HC RX W HCPCS: Performed by: STUDENT IN AN ORGANIZED HEALTH CARE EDUCATION/TRAINING PROGRAM

## 2018-11-19 PROCEDURE — 78582 LUNG VENTILAT&PERFUS IMAGING: CPT

## 2018-11-19 PROCEDURE — 6360000002 HC RX W HCPCS: Performed by: INTERNAL MEDICINE

## 2018-11-19 PROCEDURE — 83735 ASSAY OF MAGNESIUM: CPT

## 2018-11-19 PROCEDURE — 94660 CPAP INITIATION&MGMT: CPT

## 2018-11-19 PROCEDURE — A9538 TC99M PYROPHOSPHATE: HCPCS | Performed by: STUDENT IN AN ORGANIZED HEALTH CARE EDUCATION/TRAINING PROGRAM

## 2018-11-19 PROCEDURE — 2580000003 HC RX 258: Performed by: INTERNAL MEDICINE

## 2018-11-19 PROCEDURE — 2060000000 HC ICU INTERMEDIATE R&B

## 2018-11-19 PROCEDURE — A9540 TC99M MAA: HCPCS | Performed by: STUDENT IN AN ORGANIZED HEALTH CARE EDUCATION/TRAINING PROGRAM

## 2018-11-19 PROCEDURE — 84484 ASSAY OF TROPONIN QUANT: CPT

## 2018-11-19 PROCEDURE — 3430000000 HC RX DIAGNOSTIC RADIOPHARMACEUTICAL: Performed by: STUDENT IN AN ORGANIZED HEALTH CARE EDUCATION/TRAINING PROGRAM

## 2018-11-19 PROCEDURE — 80053 COMPREHEN METABOLIC PANEL: CPT

## 2018-11-19 PROCEDURE — 80061 LIPID PANEL: CPT

## 2018-11-19 PROCEDURE — 6370000000 HC RX 637 (ALT 250 FOR IP): Performed by: INTERNAL MEDICINE

## 2018-11-19 PROCEDURE — 99232 SBSQ HOSP IP/OBS MODERATE 35: CPT | Performed by: INTERNAL MEDICINE

## 2018-11-19 PROCEDURE — 76937 US GUIDE VASCULAR ACCESS: CPT

## 2018-11-19 PROCEDURE — 85027 COMPLETE CBC AUTOMATED: CPT

## 2018-11-19 PROCEDURE — 36415 COLL VENOUS BLD VENIPUNCTURE: CPT

## 2018-11-19 PROCEDURE — 85730 THROMBOPLASTIN TIME PARTIAL: CPT

## 2018-11-19 RX ORDER — SODIUM CHLORIDE 0.9 % (FLUSH) 0.9 %
10 SYRINGE (ML) INJECTION 2 TIMES DAILY
Status: DISCONTINUED | OUTPATIENT
Start: 2018-11-19 | End: 2018-11-21 | Stop reason: HOSPADM

## 2018-11-19 RX ORDER — HEPARIN SODIUM 10000 [USP'U]/100ML
INJECTION, SOLUTION INTRAVENOUS
Status: DISPENSED
Start: 2018-11-19 | End: 2018-11-20

## 2018-11-19 RX ORDER — DEXTROSE MONOHYDRATE 50 MG/ML
100 INJECTION, SOLUTION INTRAVENOUS PRN
Status: DISCONTINUED | OUTPATIENT
Start: 2018-11-19 | End: 2018-11-21 | Stop reason: HOSPADM

## 2018-11-19 RX ORDER — NICOTINE POLACRILEX 4 MG
15 LOZENGE BUCCAL PRN
Status: DISCONTINUED | OUTPATIENT
Start: 2018-11-19 | End: 2018-11-21 | Stop reason: HOSPADM

## 2018-11-19 RX ORDER — DEXTROSE MONOHYDRATE 25 G/50ML
12.5 INJECTION, SOLUTION INTRAVENOUS PRN
Status: DISCONTINUED | OUTPATIENT
Start: 2018-11-19 | End: 2018-11-21 | Stop reason: HOSPADM

## 2018-11-19 RX ADMIN — Medication 10 ML: at 21:36

## 2018-11-19 RX ADMIN — HEPARIN SODIUM 5000 UNITS: 1000 INJECTION, SOLUTION INTRAVENOUS; SUBCUTANEOUS at 13:17

## 2018-11-19 RX ADMIN — ENOXAPARIN SODIUM 40 MG: 40 INJECTION SUBCUTANEOUS at 21:36

## 2018-11-19 RX ADMIN — Medication 10 ML: at 08:54

## 2018-11-19 RX ADMIN — Medication 2300 UNITS/HR: at 02:33

## 2018-11-19 RX ADMIN — TOPIRAMATE 100 MG: 50 TABLET, FILM COATED ORAL at 21:36

## 2018-11-19 RX ADMIN — Medication 10 ML: at 12:15

## 2018-11-19 RX ADMIN — Medication 3 MILLICURIE: at 12:15

## 2018-11-19 RX ADMIN — TOPIRAMATE 100 MG: 50 TABLET, FILM COATED ORAL at 00:31

## 2018-11-19 RX ADMIN — ONDANSETRON 4 MG: 2 INJECTION INTRAMUSCULAR; INTRAVENOUS at 08:54

## 2018-11-19 RX ADMIN — Medication 29.8 ML/HR: at 11:00

## 2018-11-19 RX ADMIN — FAMOTIDINE 20 MG: 20 TABLET, FILM COATED ORAL at 21:36

## 2018-11-19 RX ADMIN — FAMOTIDINE 20 MG: 20 TABLET, FILM COATED ORAL at 00:31

## 2018-11-19 RX ADMIN — METOPROLOL TARTRATE 50 MG: 50 TABLET, FILM COATED ORAL at 00:31

## 2018-11-19 RX ADMIN — Medication 39 MILLICURIE: at 11:30

## 2018-11-19 RX ADMIN — ONDANSETRON 4 MG: 2 INJECTION INTRAMUSCULAR; INTRAVENOUS at 21:45

## 2018-11-19 RX ADMIN — DESMOPRESSIN ACETATE 40 MG: 0.2 TABLET ORAL at 00:31

## 2018-11-19 RX ADMIN — ERGOCALCIFEROL 50000 UNITS: 1.25 CAPSULE ORAL at 00:31

## 2018-11-19 RX ADMIN — DOCUSATE SODIUM 100 MG: 100 CAPSULE, LIQUID FILLED ORAL at 21:36

## 2018-11-19 RX ADMIN — HEPARIN SODIUM 5000 UNITS: 1000 INJECTION, SOLUTION INTRAVENOUS; SUBCUTANEOUS at 04:37

## 2018-11-19 RX ADMIN — MORPHINE SULFATE 4 MG: 2 INJECTION, SOLUTION INTRAMUSCULAR; INTRAVENOUS at 13:17

## 2018-11-19 RX ADMIN — DESMOPRESSIN ACETATE 40 MG: 0.2 TABLET ORAL at 21:36

## 2018-11-19 RX ADMIN — DOCUSATE SODIUM 100 MG: 100 CAPSULE, LIQUID FILLED ORAL at 00:31

## 2018-11-19 RX ADMIN — METOPROLOL TARTRATE 50 MG: 50 TABLET, FILM COATED ORAL at 21:36

## 2018-11-19 RX ADMIN — MORPHINE SULFATE 4 MG: 2 INJECTION, SOLUTION INTRAMUSCULAR; INTRAVENOUS at 11:00

## 2018-11-19 RX ADMIN — MORPHINE SULFATE 4 MG: 2 INJECTION, SOLUTION INTRAMUSCULAR; INTRAVENOUS at 08:54

## 2018-11-19 ASSESSMENT — PAIN DESCRIPTION - PAIN TYPE: TYPE: ACUTE PAIN

## 2018-11-19 ASSESSMENT — PAIN SCALES - GENERAL
PAINLEVEL_OUTOF10: 8

## 2018-11-19 ASSESSMENT — ENCOUNTER SYMPTOMS
SHORTNESS OF BREATH: 1
WHEEZING: 0
VOMITING: 0
COUGH: 0
ABDOMINAL PAIN: 0
APNEA: 1
NAUSEA: 0

## 2018-11-19 ASSESSMENT — PAIN DESCRIPTION - PROGRESSION: CLINICAL_PROGRESSION: NOT CHANGED

## 2018-11-19 ASSESSMENT — PAIN DESCRIPTION - LOCATION: LOCATION: CHEST

## 2018-11-19 ASSESSMENT — PAIN DESCRIPTION - DESCRIPTORS: DESCRIPTORS: CONSTANT;DISCOMFORT

## 2018-11-19 ASSESSMENT — PAIN DESCRIPTION - FREQUENCY: FREQUENCY: CONTINUOUS

## 2018-11-19 ASSESSMENT — PAIN DESCRIPTION - ONSET: ONSET: ON-GOING

## 2018-11-19 NOTE — ED PROVIDER NOTES
Gender                  Female   Age           29 year(s)  Race                    Black   Room Number   0404        Height:                 71 inch, 180.34 cm   Corporate ID  [id number]  Weight:                 694 pounds, 314.8 kg  #   Patient Acct  [de-identified]   BSA:        3.58 m^2    BMI:       96.79 kg/m^2  #   MR #          [id number]     Timo Win   Accession #   [id number]   Interpreting Physician  [id number]   Referring                 Referring Physician     Ryan Murillo MD  Nurse  Practitioner  Procedure Type of Study:   Veins: Lower Extremities DVT Study, Venous Scan Lower Bilateral.  Indications for Study:Shortness of breath. Patient Status: In Patient. Technical Quality:Limited visualization. Limitation reason:Patient body habitus. Conclusions   Summary   Simultaneous real time imaging utilizing B-Mode, color doppler and  spectral waveform analysis was performed on the bilateral lower  extremities for venous examination of the deep and superficial systems. Right:  No evidence of deep or superficial venous thrombosis. Left:  No evidence of deep or superficial venous thrombosis. Signature   ----------------------------------------------------------------  Electronically signed by Abhishek Saldaña) on  11/05/2018 11:44 AM  ----------------------------------------------------------------   ----------------------------------------------------------------  Electronically signed by Joao Pena(Interpreting physician)  on 11/05/2018 12:04 PM  ----------------------------------------------------------------  Findings:   Right Impression:                    Left Impression:  The common femoral, prox femoral and The common femoral, prox femoral and  popliteal veins demonstrate normal   popliteal veins demonstrate normal  compressibility and augmentation. compressibility and augmentation.    Normal compressibility of the great  Normal compressibility of the great saphenous vein. saphenous vein. Normal compressibility of the small  Normal compressibility of the small  saphenous vein. saphenous vein. Velocities are measured in cm/s ; Diameters are measured in cm Right Lower Extremities DVT Study Measurements Right 2D Measurements +------------------------------------+----------+---------------+----------+ ! Location                            ! Visualized! Compressibility! Thrombosis! +------------------------------------+----------+---------------+----------+ ! Common Femoral                      !Yes       ! Yes            ! None      ! +------------------------------------+----------+---------------+----------+ ! Prox Femoral                        !Yes       ! Yes            ! None      ! +------------------------------------+----------+---------------+----------+ ! Mid Femoral                         !No        !               !          ! +------------------------------------+----------+---------------+----------+ ! Dist Femoral                        !No        !               !          ! +------------------------------------+----------+---------------+----------+ ! Popliteal                           !Yes       ! Yes            ! None      ! +------------------------------------+----------+---------------+----------+ ! Sapheno Femoral Junction            ! Yes       ! Yes            ! None      ! +------------------------------------+----------+---------------+----------+ ! PTV                                 ! Partial   !Yes            ! None      ! +------------------------------------+----------+---------------+----------+ ! Peroneal                            !No        !               !          ! +------------------------------------+----------+---------------+----------+ ! Gastroc                             ! Yes       ! Yes            ! None      ! +------------------------------------+----------+---------------+----------+ ! GSV Thigh !Yes       !Yes            ! None      ! +------------------------------------+----------+---------------+----------+ ! GSV Knee                            ! Yes       ! Yes            ! None      ! +------------------------------------+----------+---------------+----------+ ! GSV Ankle                           ! Yes       ! Yes            ! None      ! +------------------------------------+----------+---------------+----------+ ! SSV                                 ! Partial   !Yes            ! None      ! +------------------------------------+----------+---------------+----------+ Right Doppler Measurements +---------------------------+------+------+--------------------------------+ ! Location                   ! Signal!Reflux! Reflux (msec)                   ! +---------------------------+------+------+--------------------------------+ ! Common Femoral             !Phasic!      !                                ! +---------------------------+------+------+--------------------------------+ ! Prox Femoral               !Phasic!      !                                ! +---------------------------+------+------+--------------------------------+ ! Popliteal                  !Phasic!      !                                ! +---------------------------+------+------+--------------------------------+ Left Lower Extremities DVT Study Measurements Left 2D Measurements +------------------------------------+----------+---------------+----------+ ! Location                            ! Visualized! Compressibility! Thrombosis! +------------------------------------+----------+---------------+----------+ ! Common Femoral                      !Yes       ! Yes            ! None      ! +------------------------------------+----------+---------------+----------+ ! Prox Femoral                        !Yes       ! Yes            ! None      ! +------------------------------------+----------+---------------+----------+ ! Mid Femoral !No        !               !          ! +------------------------------------+----------+---------------+----------+ ! Dist Femoral                        !No        !               !          ! +------------------------------------+----------+---------------+----------+ ! Popliteal                           !Yes       ! Yes            ! None      ! +------------------------------------+----------+---------------+----------+ ! Sapheno Femoral Junction            ! Yes       ! Yes            ! None      ! +------------------------------------+----------+---------------+----------+ ! PTV                                 ! No        !               !          ! +------------------------------------+----------+---------------+----------+ ! Peroneal                            !No        !               !          ! +------------------------------------+----------+---------------+----------+ ! Gastroc                             ! Yes       ! Yes            ! None      ! +------------------------------------+----------+---------------+----------+ ! GSV Thigh                           ! Yes       ! Yes            ! None      ! +------------------------------------+----------+---------------+----------+ ! GSV Knee                            ! Yes       ! Yes            ! None      ! +------------------------------------+----------+---------------+----------+ ! GSV Ankle                           ! Yes       ! Yes            ! None      ! +------------------------------------+----------+---------------+----------+ ! SSV                                 ! Partial   !Yes            ! None      ! +------------------------------------+----------+---------------+----------+ Left Doppler Measurements +---------------------------+------+------+--------------------------------+ ! Location                   ! Signal!Reflux! Reflux (msec)                   ! +---------------------------+------+------+--------------------------------+ ! Common Femoral

## 2018-11-19 NOTE — PROGRESS NOTES
BIPAP set up at bedside, pt states she does not want to wear until later tonight when her family leaves. English

## 2018-11-19 NOTE — ED NOTES
Waiting on APTT results. Patient updated that room is in cleaning.       Stephen Atkins RN  11/18/18 [id number]

## 2018-11-19 NOTE — PROGRESS NOTES
[facility]    Progress Note    11/19/2018    5:51 PM    Name:   Kenneth Hunt  MRN:     [id number]     Acct:      [de-identified]   Room:   [facility] Day:  1  Admit Date:  11/18/2018 12:45 PM    PCP:   Polo Cottrell  Code Status:  Full Code    Subjective:     C/C:   Chief Complaint   Patient presents with    Chest Pain     0srtarted last night    Shortness of Breath    Abdominal Pain     Interval History Status:    Comfortable  Somnolent  No distress  Remains on CPAP    Brief History:     As documented in the medical record:  \"Admitted through ER with following Elio Morales is a 29 y. o. female who presented to the emergency department with Recurrent midsternal chest pain described as pressure since last night.  The patient reports that she also has some shortness of breath associated with it.  The patient reports that she was seen 2 weeks ago for similar complaints and at that time there was concern that she may have had a PE.  D-dimer at that time was negative and she was admitted for further evaluation.  The patient reports that she was unable to complete the VQ scan at that time she was unable to stand.  Her body habitus his made it impossible to get a CT scan.   While in ER patient is desaturating when trying to sleep for which BiPAP is being started for her  Patient states she was recently told to be having obstructive sleep apnea but she is yet to do sleep study  She has quit smoking 1 month back\"     81-year-old female readmitted through the emergency room with chest pain and shortness of breath  The patient had been admitted on 11/3 and discharged on 11/6 with URI symptoms  It was felt that she also had obesity hypoventilation syndrome secondary to her morbid obesity with BMI greater than 70  The patient was seen by Pulmonary Medicine and consultation  Outpatient follow-up was scheduled  Sleep study was being arranged

## 2018-11-19 NOTE — ED NOTES
[address], adjusted gtt per protocol. Next aptt is at 0330 hours.       Santiago Crockett RN  11/18/18 [id number]

## 2018-11-20 LAB
ALLEN TEST: POSITIVE
ANION GAP: 0 MMOL/L (ref 7–16)
FIO2: 35
GFR NON-AFRICAN AMERICAN: >60 ML/MIN
GFR SERPL CREATININE-BSD FRML MDRD: >60 ML/MIN
GFR SERPL CREATININE-BSD FRML MDRD: NORMAL ML/MIN/{1.73_M2}
GLUCOSE BLD-MCNC: 118 MG/DL (ref 65–105)
GLUCOSE BLD-MCNC: 147 MG/DL (ref 74–100)
MODE: ABNORMAL
NEGATIVE BASE EXCESS, ART: ABNORMAL (ref 0–2)
O2 DEVICE/FLOW/%: ABNORMAL
PATIENT TEMP: ABNORMAL
PLATELET # BLD: 331 K/UL (ref 138–453)
POC CHLORIDE: 99 MMOL/L (ref 98–107)
POC CREATININE: 0.84 MG/DL (ref 0.51–1.19)
POC HCO3: 34.1 MMOL/L (ref 21–28)
POC HEMATOCRIT: 47 % (ref 36–46)
POC HEMOGLOBIN: 15.9 G/DL (ref 12–16)
POC IONIZED CALCIUM: 1.14 MMOL/L (ref 1.15–1.33)
POC LACTIC ACID: 1.54 MMOL/L (ref 0.56–1.39)
POC O2 SATURATION: 99 % (ref 94–98)
POC PCO2 TEMP: ABNORMAL MM HG
POC PCO2: 53.7 MM HG (ref 35–48)
POC PH TEMP: ABNORMAL
POC PH: 7.41 (ref 7.35–7.45)
POC PO2 TEMP: ABNORMAL MM HG
POC PO2: 155.8 MM HG (ref 83–108)
POC POTASSIUM: 4.9 MMOL/L (ref 3.5–4.5)
POC SODIUM: 133 MMOL/L (ref 138–146)
POSITIVE BASE EXCESS, ART: 7 (ref 0–3)
SAMPLE SITE: ABNORMAL
TCO2 (CALC), ART: 36 MMOL/L (ref 22–29)

## 2018-11-20 PROCEDURE — 85014 HEMATOCRIT: CPT

## 2018-11-20 PROCEDURE — 2060000000 HC ICU INTERMEDIATE R&B

## 2018-11-20 PROCEDURE — 6360000002 HC RX W HCPCS: Performed by: INTERNAL MEDICINE

## 2018-11-20 PROCEDURE — 2580000003 HC RX 258: Performed by: INTERNAL MEDICINE

## 2018-11-20 PROCEDURE — 94660 CPAP INITIATION&MGMT: CPT

## 2018-11-20 PROCEDURE — 6360000002 HC RX W HCPCS: Performed by: STUDENT IN AN ORGANIZED HEALTH CARE EDUCATION/TRAINING PROGRAM

## 2018-11-20 PROCEDURE — 82435 ASSAY OF BLOOD CHLORIDE: CPT

## 2018-11-20 PROCEDURE — 2700000000 HC OXYGEN THERAPY PER DAY

## 2018-11-20 PROCEDURE — 36415 COLL VENOUS BLD VENIPUNCTURE: CPT

## 2018-11-20 PROCEDURE — 82330 ASSAY OF CALCIUM: CPT

## 2018-11-20 PROCEDURE — 84295 ASSAY OF SERUM SODIUM: CPT

## 2018-11-20 PROCEDURE — 99253 IP/OBS CNSLTJ NEW/EST LOW 45: CPT | Performed by: INTERNAL MEDICINE

## 2018-11-20 PROCEDURE — 6370000000 HC RX 637 (ALT 250 FOR IP): Performed by: INTERNAL MEDICINE

## 2018-11-20 PROCEDURE — 84132 ASSAY OF SERUM POTASSIUM: CPT

## 2018-11-20 PROCEDURE — 82947 ASSAY GLUCOSE BLOOD QUANT: CPT

## 2018-11-20 PROCEDURE — 99232 SBSQ HOSP IP/OBS MODERATE 35: CPT | Performed by: INTERNAL MEDICINE

## 2018-11-20 PROCEDURE — 83605 ASSAY OF LACTIC ACID: CPT

## 2018-11-20 PROCEDURE — 85049 AUTOMATED PLATELET COUNT: CPT

## 2018-11-20 PROCEDURE — 82803 BLOOD GASES ANY COMBINATION: CPT

## 2018-11-20 PROCEDURE — 82565 ASSAY OF CREATININE: CPT

## 2018-11-20 PROCEDURE — 6370000000 HC RX 637 (ALT 250 FOR IP): Performed by: STUDENT IN AN ORGANIZED HEALTH CARE EDUCATION/TRAINING PROGRAM

## 2018-11-20 RX ORDER — BUTALBITAL, ACETAMINOPHEN AND CAFFEINE 50; 325; 40 MG/1; MG/1; MG/1
1 TABLET ORAL EVERY 4 HOURS PRN
Status: DISCONTINUED | OUTPATIENT
Start: 2018-11-20 | End: 2018-11-21 | Stop reason: HOSPADM

## 2018-11-20 RX ORDER — PROMETHAZINE HYDROCHLORIDE 25 MG/ML
25 INJECTION, SOLUTION INTRAMUSCULAR; INTRAVENOUS EVERY 6 HOURS PRN
Status: DISCONTINUED | OUTPATIENT
Start: 2018-11-20 | End: 2018-11-21 | Stop reason: HOSPADM

## 2018-11-20 RX ORDER — LIDOCAINE 4 G/G
1 PATCH TOPICAL DAILY
Status: DISCONTINUED | OUTPATIENT
Start: 2018-11-20 | End: 2018-11-21 | Stop reason: HOSPADM

## 2018-11-20 RX ADMIN — LISINOPRIL 20 MG: 20 TABLET ORAL at 12:22

## 2018-11-20 RX ADMIN — METFORMIN HYDROCHLORIDE 500 MG: 500 TABLET ORAL at 12:22

## 2018-11-20 RX ADMIN — TOPIRAMATE 100 MG: 50 TABLET, FILM COATED ORAL at 12:21

## 2018-11-20 RX ADMIN — HYDROCHLOROTHIAZIDE 25 MG: 25 TABLET ORAL at 12:22

## 2018-11-20 RX ADMIN — Medication 10 ML: at 21:01

## 2018-11-20 RX ADMIN — FAMOTIDINE 20 MG: 20 TABLET, FILM COATED ORAL at 12:23

## 2018-11-20 RX ADMIN — ENOXAPARIN SODIUM 40 MG: 40 INJECTION SUBCUTANEOUS at 12:23

## 2018-11-20 RX ADMIN — MORPHINE SULFATE 4 MG: 2 INJECTION, SOLUTION INTRAMUSCULAR; INTRAVENOUS at 01:39

## 2018-11-20 RX ADMIN — CETIRIZINE HYDROCHLORIDE 10 MG: 10 TABLET ORAL at 12:22

## 2018-11-20 RX ADMIN — BUTALBITAL, ACETAMINOPHEN, AND CAFFEINE 1 TABLET: 50; 325; 40 TABLET ORAL at 21:01

## 2018-11-20 RX ADMIN — TOPIRAMATE 100 MG: 50 TABLET, FILM COATED ORAL at 21:01

## 2018-11-20 RX ADMIN — FAMOTIDINE 20 MG: 20 TABLET, FILM COATED ORAL at 21:01

## 2018-11-20 RX ADMIN — METOPROLOL TARTRATE 50 MG: 50 TABLET, FILM COATED ORAL at 12:22

## 2018-11-20 RX ADMIN — ONDANSETRON 4 MG: 2 INJECTION INTRAMUSCULAR; INTRAVENOUS at 12:23

## 2018-11-20 RX ADMIN — DESMOPRESSIN ACETATE 40 MG: 0.2 TABLET ORAL at 21:01

## 2018-11-20 RX ADMIN — DOCUSATE SODIUM 100 MG: 100 CAPSULE, LIQUID FILLED ORAL at 12:22

## 2018-11-20 RX ADMIN — ACETAMINOPHEN 650 MG: 325 TABLET ORAL at 22:48

## 2018-11-20 RX ADMIN — MORPHINE SULFATE 2 MG: 2 INJECTION, SOLUTION INTRAMUSCULAR; INTRAVENOUS at 10:43

## 2018-11-20 RX ADMIN — PROMETHAZINE HYDROCHLORIDE 25 MG: 25 INJECTION INTRAMUSCULAR; INTRAVENOUS at 20:05

## 2018-11-20 RX ADMIN — METOPROLOL TARTRATE 50 MG: 50 TABLET, FILM COATED ORAL at 21:01

## 2018-11-20 RX ADMIN — ENOXAPARIN SODIUM 40 MG: 40 INJECTION SUBCUTANEOUS at 21:01

## 2018-11-20 RX ADMIN — ASPIRIN 81 MG: 81 TABLET, CHEWABLE ORAL at 12:22

## 2018-11-20 RX ADMIN — MAGNESIUM HYDROXIDE 30 ML: 400 SUSPENSION ORAL at 22:48

## 2018-11-20 RX ADMIN — ONDANSETRON 4 MG: 2 INJECTION INTRAMUSCULAR; INTRAVENOUS at 05:23

## 2018-11-20 RX ADMIN — Medication 10 ML: at 10:43

## 2018-11-20 RX ADMIN — MORPHINE SULFATE 4 MG: 2 INJECTION, SOLUTION INTRAMUSCULAR; INTRAVENOUS at 16:52

## 2018-11-20 RX ADMIN — MORPHINE SULFATE 2 MG: 2 INJECTION, SOLUTION INTRAMUSCULAR; INTRAVENOUS at 06:17

## 2018-11-20 RX ADMIN — DOCUSATE SODIUM 100 MG: 100 CAPSULE, LIQUID FILLED ORAL at 21:01

## 2018-11-20 ASSESSMENT — ENCOUNTER SYMPTOMS
NAUSEA: 1
WHEEZING: 0
SHORTNESS OF BREATH: 1
VOMITING: 0
APNEA: 1
COUGH: 0
CONSTIPATION: 1
ABDOMINAL PAIN: 0

## 2018-11-20 ASSESSMENT — PAIN SCALES - GENERAL
PAINLEVEL_OUTOF10: 8
PAINLEVEL_OUTOF10: 10
PAINLEVEL_OUTOF10: 8
PAINLEVEL_OUTOF10: 10
PAINLEVEL_OUTOF10: 8
PAINLEVEL_OUTOF10: 7

## 2018-11-21 VITALS
OXYGEN SATURATION: 96 % | HEART RATE: 79 BPM | HEIGHT: 71 IN | TEMPERATURE: 98.2 F | BODY MASS INDEX: 41.02 KG/M2 | SYSTOLIC BLOOD PRESSURE: 131 MMHG | DIASTOLIC BLOOD PRESSURE: 74 MMHG | WEIGHT: 293 LBS | RESPIRATION RATE: 17 BRPM

## 2018-11-21 PROBLEM — E83.51 HYPOCALCEMIA: Status: ACTIVE | Noted: 2018-11-21

## 2018-11-21 PROBLEM — J96.22 ACUTE ON CHRONIC RESPIRATORY FAILURE WITH HYPOXIA AND HYPERCAPNIA (HCC): Status: ACTIVE | Noted: 2018-11-06

## 2018-11-21 LAB
ANION GAP SERPL CALCULATED.3IONS-SCNC: 9 MMOL/L (ref 9–17)
BUN BLDV-MCNC: 11 MG/DL (ref 6–20)
BUN/CREAT BLD: ABNORMAL (ref 9–20)
CALCIUM SERPL-MCNC: 9.4 MG/DL (ref 8.6–10.4)
CHLORIDE BLD-SCNC: 95 MMOL/L (ref 98–107)
CO2: 33 MMOL/L (ref 20–31)
CREAT SERPL-MCNC: 0.56 MG/DL (ref 0.5–0.9)
GFR AFRICAN AMERICAN: >60 ML/MIN
GFR NON-AFRICAN AMERICAN: >60 ML/MIN
GFR SERPL CREATININE-BSD FRML MDRD: ABNORMAL ML/MIN/{1.73_M2}
GFR SERPL CREATININE-BSD FRML MDRD: ABNORMAL ML/MIN/{1.73_M2}
GLUCOSE BLD-MCNC: 116 MG/DL (ref 70–99)
GLUCOSE BLD-MCNC: 128 MG/DL (ref 65–105)
GLUCOSE BLD-MCNC: 129 MG/DL (ref 65–105)
HCT VFR BLD CALC: 47.1 % (ref 36.3–47.1)
HEMOGLOBIN: 12.4 G/DL (ref 11.9–15.1)
MCH RBC QN AUTO: 20.1 PG (ref 25.2–33.5)
MCHC RBC AUTO-ENTMCNC: 26.3 G/DL (ref 28.4–34.8)
MCV RBC AUTO: 76.2 FL (ref 82.6–102.9)
NRBC AUTOMATED: 0 PER 100 WBC
PDW BLD-RTO: 19.2 % (ref 11.8–14.4)
PLATELET # BLD: 297 K/UL (ref 138–453)
PMV BLD AUTO: 10.2 FL (ref 8.1–13.5)
POTASSIUM SERPL-SCNC: 4.3 MMOL/L (ref 3.7–5.3)
RBC # BLD: 6.18 M/UL (ref 3.95–5.11)
SODIUM BLD-SCNC: 137 MMOL/L (ref 135–144)
WBC # BLD: 10 K/UL (ref 3.5–11.3)

## 2018-11-21 PROCEDURE — 6370000000 HC RX 637 (ALT 250 FOR IP): Performed by: NURSE PRACTITIONER

## 2018-11-21 PROCEDURE — G8979 MOBILITY GOAL STATUS: HCPCS

## 2018-11-21 PROCEDURE — 36415 COLL VENOUS BLD VENIPUNCTURE: CPT

## 2018-11-21 PROCEDURE — 6360000002 HC RX W HCPCS: Performed by: INTERNAL MEDICINE

## 2018-11-21 PROCEDURE — 82947 ASSAY GLUCOSE BLOOD QUANT: CPT

## 2018-11-21 PROCEDURE — 94660 CPAP INITIATION&MGMT: CPT

## 2018-11-21 PROCEDURE — 6370000000 HC RX 637 (ALT 250 FOR IP): Performed by: INTERNAL MEDICINE

## 2018-11-21 PROCEDURE — 76937 US GUIDE VASCULAR ACCESS: CPT

## 2018-11-21 PROCEDURE — G8987 SELF CARE CURRENT STATUS: HCPCS

## 2018-11-21 PROCEDURE — G8978 MOBILITY CURRENT STATUS: HCPCS

## 2018-11-21 PROCEDURE — 85027 COMPLETE CBC AUTOMATED: CPT

## 2018-11-21 PROCEDURE — 99232 SBSQ HOSP IP/OBS MODERATE 35: CPT | Performed by: INTERNAL MEDICINE

## 2018-11-21 PROCEDURE — G8980 MOBILITY D/C STATUS: HCPCS

## 2018-11-21 PROCEDURE — 2580000003 HC RX 258: Performed by: INTERNAL MEDICINE

## 2018-11-21 PROCEDURE — 97166 OT EVAL MOD COMPLEX 45 MIN: CPT

## 2018-11-21 PROCEDURE — 97535 SELF CARE MNGMENT TRAINING: CPT

## 2018-11-21 PROCEDURE — 80048 BASIC METABOLIC PNL TOTAL CA: CPT

## 2018-11-21 PROCEDURE — G8988 SELF CARE GOAL STATUS: HCPCS

## 2018-11-21 PROCEDURE — 2580000003 HC RX 258: Performed by: STUDENT IN AN ORGANIZED HEALTH CARE EDUCATION/TRAINING PROGRAM

## 2018-11-21 RX ORDER — TRAMADOL HYDROCHLORIDE 50 MG/1
50 TABLET ORAL EVERY 6 HOURS PRN
Status: DISCONTINUED | OUTPATIENT
Start: 2018-11-21 | End: 2018-11-21 | Stop reason: HOSPADM

## 2018-11-21 RX ORDER — LIDOCAINE 4 G/G
1 PATCH TOPICAL DAILY
Qty: 10 PATCH | Refills: 0 | Status: SHIPPED | OUTPATIENT
Start: 2018-11-22 | End: 2020-03-06

## 2018-11-21 RX ORDER — ALBUTEROL SULFATE 90 UG/1
2 AEROSOL, METERED RESPIRATORY (INHALATION) 4 TIMES DAILY
Qty: 1 INHALER | Refills: 3 | Status: SHIPPED | OUTPATIENT
Start: 2018-11-21

## 2018-11-21 RX ORDER — ASPIRIN 81 MG/1
81 TABLET, CHEWABLE ORAL DAILY
Qty: 30 TABLET | Refills: 3 | Status: SHIPPED | OUTPATIENT
Start: 2018-11-22

## 2018-11-21 RX ORDER — BUTALBITAL, ACETAMINOPHEN AND CAFFEINE 50; 325; 40 MG/1; MG/1; MG/1
1 TABLET ORAL EVERY 4 HOURS PRN
Qty: 20 TABLET | Refills: 0 | Status: SHIPPED | OUTPATIENT
Start: 2018-11-21

## 2018-11-21 RX ORDER — TRAMADOL HYDROCHLORIDE 50 MG/1
100 TABLET ORAL EVERY 6 HOURS PRN
Status: DISCONTINUED | OUTPATIENT
Start: 2018-11-21 | End: 2018-11-21 | Stop reason: HOSPADM

## 2018-11-21 RX ORDER — ALBUTEROL SULFATE 90 UG/1
2 AEROSOL, METERED RESPIRATORY (INHALATION) 4 TIMES DAILY
Status: DISCONTINUED | OUTPATIENT
Start: 2018-11-21 | End: 2018-11-21 | Stop reason: HOSPADM

## 2018-11-21 RX ORDER — CALCIUM CARBONATE 200(500)MG
1000 TABLET,CHEWABLE ORAL ONCE
Status: COMPLETED | OUTPATIENT
Start: 2018-11-21 | End: 2018-11-21

## 2018-11-21 RX ADMIN — TOPIRAMATE 100 MG: 50 TABLET, FILM COATED ORAL at 08:23

## 2018-11-21 RX ADMIN — ASPIRIN 81 MG: 81 TABLET, CHEWABLE ORAL at 08:22

## 2018-11-21 RX ADMIN — ENOXAPARIN SODIUM 40 MG: 40 INJECTION SUBCUTANEOUS at 08:19

## 2018-11-21 RX ADMIN — METFORMIN HYDROCHLORIDE 500 MG: 500 TABLET ORAL at 17:09

## 2018-11-21 RX ADMIN — ANTACID TABLETS 1000 MG: 500 TABLET, CHEWABLE ORAL at 17:12

## 2018-11-21 RX ADMIN — Medication 10 ML: at 08:24

## 2018-11-21 RX ADMIN — METFORMIN HYDROCHLORIDE 500 MG: 500 TABLET ORAL at 08:20

## 2018-11-21 RX ADMIN — FAMOTIDINE 20 MG: 20 TABLET, FILM COATED ORAL at 08:20

## 2018-11-21 RX ADMIN — TRAMADOL HYDROCHLORIDE 50 MG: 50 TABLET, FILM COATED ORAL at 06:17

## 2018-11-21 RX ADMIN — BUTALBITAL, ACETAMINOPHEN, AND CAFFEINE 1 TABLET: 50; 325; 40 TABLET ORAL at 15:00

## 2018-11-21 RX ADMIN — DOCUSATE SODIUM 100 MG: 100 CAPSULE, LIQUID FILLED ORAL at 08:23

## 2018-11-21 RX ADMIN — METOPROLOL TARTRATE 50 MG: 50 TABLET, FILM COATED ORAL at 08:23

## 2018-11-21 RX ADMIN — PROMETHAZINE HYDROCHLORIDE 25 MG: 25 INJECTION INTRAMUSCULAR; INTRAVENOUS at 05:46

## 2018-11-21 RX ADMIN — POTASSIUM CHLORIDE 20 MEQ: 1.5 POWDER, FOR SOLUTION ORAL at 08:20

## 2018-11-21 RX ADMIN — HYDROCHLOROTHIAZIDE 25 MG: 25 TABLET ORAL at 08:21

## 2018-11-21 RX ADMIN — LISINOPRIL 20 MG: 20 TABLET ORAL at 08:21

## 2018-11-21 RX ADMIN — CETIRIZINE HYDROCHLORIDE 10 MG: 10 TABLET ORAL at 08:19

## 2018-11-21 RX ADMIN — Medication 10 ML: at 10:08

## 2018-11-21 RX ADMIN — BUTALBITAL, ACETAMINOPHEN, AND CAFFEINE 1 TABLET: 50; 325; 40 TABLET ORAL at 08:20

## 2018-11-21 ASSESSMENT — PAIN DESCRIPTION - PAIN TYPE
TYPE: ACUTE PAIN

## 2018-11-21 ASSESSMENT — PAIN DESCRIPTION - DESCRIPTORS
DESCRIPTORS: HEADACHE
DESCRIPTORS: ACHING
DESCRIPTORS: DISCOMFORT

## 2018-11-21 ASSESSMENT — PAIN DESCRIPTION - FREQUENCY
FREQUENCY: CONTINUOUS
FREQUENCY: CONTINUOUS

## 2018-11-21 ASSESSMENT — ENCOUNTER SYMPTOMS
VOMITING: 0
ABDOMINAL PAIN: 0
CONSTIPATION: 1
APNEA: 1
COUGH: 0
STRIDOR: 0
WHEEZING: 0
SHORTNESS OF BREATH: 1

## 2018-11-21 ASSESSMENT — PAIN SCALES - GENERAL
PAINLEVEL_OUTOF10: 10

## 2018-11-21 ASSESSMENT — PAIN DESCRIPTION - LOCATION
LOCATION: GENERALIZED
LOCATION: HEAD
LOCATION: HEAD

## 2018-11-21 ASSESSMENT — PAIN DESCRIPTION - ONSET: ONSET: ON-GOING

## 2018-11-21 ASSESSMENT — PAIN DESCRIPTION - PROGRESSION: CLINICAL_PROGRESSION: NOT CHANGED

## 2018-11-21 NOTE — PROGRESS NOTES
Occupational Therapy   Occupational Therapy Initial Assessment  Date: 2018   Patient Name: Modesto Bueno  MRN: [id number]     : 1985    Date of Service: 2018    Discharge Recommendations:  Home with assist PRN (NEEDS BARIATRIC SHOWER CHAIR AND TOILET RISER)  OT Equipment Recommendations  Equipment Needed: Yes  Mobility Devices: ADL Assistive Devices  ADL Assistive Devices: Toileting - Raised Toilet Seat with arms;Transfer Tub Bench (Bariatric versions as pt nearly 600 lbs)    Patient Diagnosis(es): The primary encounter diagnosis was Elevated d-dimer. A diagnosis of Chest pain, unspecified type was also pertinent to this visit. has a past medical history of Acute and chronic respiratory failure with hypoxia ([address] ASCUS with positive high risk HPV cervical; Asthma; Breast discharge; Chest pain; Chlamydia; Ectopic pregnancy - Right; Elevated blood pressure reading; Elevated brain natriuretic peptide (BNP) level; History of trichomonal vaginitis; Hypertension; Hypoalbuminemia due to protein-calorie malnutrition ([address] Morbid obesity with BMI of 70 and over, adult [facility]); MRSA (methicillin resistant Staphylococcus aureus); Normocytic normochromic anemia; Obesity; Obesity hypoventilation syndrome ([address] BRYSON (obstructive sleep apnea); Ovarian ectopic pregnancy; Pain; Pelvic adhesive disease; Pulmonary hypertension ([address] Tobacco abuse; Type 2 diabetes mellitus without complication, without long-term current use of insulin ([address] and Unspecified sleep apnea. has a past surgical history that includes  section (); Cholecystectomy, laparoscopic; Salpingo-oophorectomy (15); Leg Surgery; and Tonsillectomy.        Restrictions  Restrictions/Precautions  Restrictions/Precautions: Up as Tolerated, Contact Precautions, General Precautions  Required Braces or Orthoses?: No  Position Activity Restriction  Other position/activity restrictions: High BMI    Subjective   General  Patient assessed BMI, pt would have difficulty bending down for objects  Toilet Transfers  Toilet - Technique: Ambulating  Equipment Used: Grab bars  Toilet Transfer: Modified independent  ADL  Feeding: Modified independent ;Setup; Increased time to complete  Grooming: Modified independent ; Increased time to complete  UE Bathing: Stand by assistance; Increased time to complete  LE Bathing: Contact guard assistance; Increased time to complete  UE Dressing: Contact guard assistance; Increased time to complete  LE Dressing: Minimal assistance; Increased time to complete  Toileting: Modified independent ; Increased time to complete  Tone RUE  RUE Tone: Normotonic  Tone LUE  LUE Tone: Normotonic  Coordination  Movements Are Fluid And Coordinated: Yes     Bed mobility  Supine to Sit: Modified independent  Sit to Supine: Modified independent  Scooting: Modified independent  Transfers  Sit to stand: Modified independent  Stand to sit: Modified independent     Cognition  Overall Cognitive Status: [facility]  Cognition Comment: Agitated d/t headache     Sensation  Overall Sensation Status: WFL      LUE AROM (degrees)  LUE AROM : WFL  RUE AROM (degrees)  RUE AROM : WFL  LUE Strength  Gross LUE Strength: WFL  L Hand Grasp: 5/5  L Hand Release: 5/5  RUE Strength  Gross RUE Strength: WFL  R Hand Grasp: 5/5  R Hand Release: 5/5   Assessment   Performance deficits / Impairments: Decreased functional mobility ; Decreased ADL status; Decreased high-level IADLs;Decreased balance;Decreased endurance;Decreased safe awareness  Prognosis: Good  Decision Making: Medium Complexity  Patient Education: Safety awareness, OTPOC, discharge rec-good return  REQUIRES OT FOLLOW UP: Yes  Activity Tolerance  Activity Tolerance: Patient Tolerated treatment well;Patient limited by fatigue;Patient limited by pain  Safety Devices  Safety Devices in place: Yes  Type of devices: All fall risk precautions in place; Left in bed;Call light within reach;Nurse notified  Restraints  Initially

## 2018-11-21 NOTE — PROGRESS NOTES
WFL  Strength LLE  Strength LLE: WFL  Strength RUE  Comment: See OT assessment-PT/OT coeval  Strength LUE  Comment: See OT assessment- PT/ OT coeval   Strength Other  Other: Did not formally assess strength- active movement against gravity noted in BLE, no LOB noted during functional mobility      Sensation  Overall Sensation Status: WFL  Bed mobility  Rolling to Right: Independent  Supine to Sit: Independent  Sit to Supine: Independent  Transfers  Sit to Stand: Independent  Stand to sit: Independent  Ambulation  Ambulation?: Yes  Ambulation 1  Surface: level tile  Device: No Device  Other Apparatus: O2 (3L)  Assistance: Independent  Quality of Gait: Waddling gait, short step length, BLE ER  Distance: 40ft  Stairs/Curb  Stairs?: No     Balance  Posture: Good  Sitting - Static: Good  Sitting - Dynamic: Good  Standing - Static: Good  Standing - Dynamic: Good        Assessment   Assessment: Pt amb 40ft w/ no AD independently. Pt verbalizes no concerns regarding functional mobility at this time, D/C PT at this time; Recommeding pt return home w/ home health upon discharge.   Prognosis: Good  Decision Making: Medium Complexity  Patient Education: purpose of PT eval; discharge recommendations   REQUIRES PT FOLLOW UP: Yes  Activity Tolerance  Activity Tolerance: Patient Tolerated treatment well;Patient limited by pain         Plan   Safety Devices  Type of devices: Left in bed, Nurse notified, Call light within reach, Gait belt  Restraints  Initially in place: No    G-Code  PT G-Codes  Functional Limitation: Mobility: Walking and moving around  Mobility: Walking and Moving Around Current Status (): 0 percent impaired, limited or restricted  Mobility: Walking and Moving Around Goal Status (): 0 percent impaired, limited or restricted  Mobility: Walking and Moving Around Discharge Status (): 0 percent impaired, limited or restricted  OutComes Score                                           AM-PAC Score

## 2018-11-21 NOTE — PROGRESS NOTES
had obesity hypoventilation syndrome secondary to her morbid obesity with BMI greater than 70  The patient was seen by Pulmonary Medicine and consultation  Outpatient follow-up was scheduled  Sleep study was being arranged - been canceled and rescheduled for 11/29  She had been seen by cardiology - her chest pain was thought to be atypical, no further w/u planned   The patient was set up for home oxygen  New onset diabetes type 2 was diagnosed last admission  Diabetic education was initiated  She was provided with a glucose meter    Database has included:    VQ scan:  Impression:   Low probability for pulmonary embolus. Echo 11/5/18:  Summary  Technically difficult study with poor visualization. Left ventricle is probably moderate dilated. Increased left ventricular wall  thickness suggesting mild to moderate LVH. Overall systolic function appears preserved, all wall segments are not well  visualized due to poor acoustic windows. Estimated EF 50-55%. No evidence of aortic insufficiency or stenosis. No evidence of mitral regurgitation. Trace to mild tricuspid regurgitation. Estimated right ventricular systolic pressure is 38 mmHg. Venous scan 11/5/18:   Simultaneous real time imaging utilizing B-Mode, color doppler and    spectral waveform analysis was performed on the bilateral lower    extremities for venous examination of the deep and superficial systems. Pro- (H)    D-Dimer, Quant 0.81    ABGs 11/20:  POC pH 7.410   POC pCO2 53.7 (H) mm Hg   POC PO2 155.8 (H)  FiO2 35%    Past Medical History:   has a past medical history of Acute and chronic respiratory failure with hypoxia ([address] ASCUS with positive high risk HPV cervical; Asthma; Breast discharge; Chest pain; Chlamydia; Ectopic pregnancy - Right; Elevated blood pressure reading; Elevated brain natriuretic peptide (BNP) level; History of trichomonal vaginitis; Hypertension;  Hypoalbuminemia due to protein-calorie malnutrition ([address] Morbid obesity with BMI of 70 and over, adult [facility]); MRSA (methicillin resistant Staphylococcus aureus); Normocytic normochromic anemia; Obesity; Obesity hypoventilation syndrome ([address] BRYSON (obstructive sleep apnea); Ovarian ectopic pregnancy; Pain; Pelvic adhesive disease; Pulmonary hypertension [address] Tobacco abuse; Type 2 diabetes mellitus without complication, without long-term current use of insulin ([facility] and Unspecified sleep apnea. Social History:   reports that she quit smoking about 2 years ago. Her smoking use included Cigarettes. She started smoking about 4 years ago. She smoked 0.10 packs per day. She has never used smokeless tobacco. She reports that she drinks alcohol. She reports that she uses drugs, including  and Marijuana. Family History:   Family History   Problem Relation Age of Onset    High Blood Pressure Mother     Heart Disease Mother     Cancer Father         lung    Lung Cancer Father     Cancer Maternal Aunt         breast    Diabetes Maternal Aunt     Breast Cancer Maternal Aunt     Cancer Paternal Aunt         lung    Lung Cancer Paternal Aunt     Thyroid Disease Sister     Colon Cancer Neg Hx     Eclampsia Neg Hx     Hypertension Neg Hx     Ovarian Cancer Neg Hx      Labor Neg Hx     Spont Abortions Neg Hx     Stroke Neg Hx        Medications:      Allergies:  No Known Allergies    Current Meds:   Scheduled Meds:   calcium carbonate  1,000 mg Oral Once    albuterol sulfate HFA  2 puff Inhalation 4x daily    And    ipratropium  2 puff Inhalation 4x daily    enoxaparin  60 mg Subcutaneous BID    lidocaine  1 patch Transdermal Daily    insulin lispro  0-6 Units Subcutaneous TID WC    insulin lispro  0-3 Units Subcutaneous Nightly    sodium chloride flush  10 mL Intravenous BID    sodium chloride flush  10 mL Intravenous 2 times per day    cetirizine  10 mg Oral Daily    metoprolol tartrate  50 mg Oral BID    topiramate  100 mg Oral BID   

## 2018-11-22 NOTE — DISCHARGE SUMMARY
!Yes       !Yes            ! None      ! +------------------------------------+----------+---------------+----------+ ! Sapheno Femoral Junction            ! Yes       ! Yes            ! None      ! +------------------------------------+----------+---------------+----------+ ! PTV                                 ! No        !               !          ! +------------------------------------+----------+---------------+----------+ ! Peroneal                            !No        !               !          ! +------------------------------------+----------+---------------+----------+ ! Gastroc                             ! Yes       ! Yes            ! None      ! +------------------------------------+----------+---------------+----------+ ! GSV Thigh                           ! Yes       ! Yes            ! None      ! +------------------------------------+----------+---------------+----------+ ! GSV Knee                            ! Yes       ! Yes            ! None      ! +------------------------------------+----------+---------------+----------+ ! GSV Ankle                           ! Yes       ! Yes            ! None      ! +------------------------------------+----------+---------------+----------+ ! SSV                                 ! Partial   !Yes            ! None      ! +------------------------------------+----------+---------------+----------+ Left Doppler Measurements +---------------------------+------+------+--------------------------------+ ! Location                   ! Signal!Reflux! Reflux (msec)                   ! +---------------------------+------+------+--------------------------------+ ! Common Femoral             !Phasic!      !                                ! +---------------------------+------+------+--------------------------------+ ! Prox Femoral               !Phasic!      !                                ! +---------------------------+------+------+--------------------------------+ ! Popliteal

## 2018-11-26 LAB
EKG ATRIAL RATE: 90 BPM
EKG P AXIS: 58 DEGREES
EKG P-R INTERVAL: 180 MS
EKG Q-T INTERVAL: 380 MS
EKG QRS DURATION: 106 MS
EKG QTC CALCULATION (BAZETT): 464 MS
EKG R AXIS: 25 DEGREES
EKG T AXIS: 19 DEGREES
EKG VENTRICULAR RATE: 90 BPM

## 2018-11-29 ENCOUNTER — HOSPITAL ENCOUNTER (OUTPATIENT)
Dept: SLEEP CENTER | Age: 33
Discharge: HOME | End: 2018-12-01
Payer: MEDICARE

## 2018-11-29 DIAGNOSIS — E66.2 OBESITY HYPOVENTILATION SYNDROME (HCC): ICD-10-CM

## 2018-11-29 DIAGNOSIS — G47.33 OSA (OBSTRUCTIVE SLEEP APNEA): ICD-10-CM

## 2018-11-29 DIAGNOSIS — E66.01 MORBID OBESITY WITH BMI OF 70 AND OVER, ADULT (HCC): ICD-10-CM

## 2018-11-29 PROCEDURE — 95810 POLYSOM 6/> YRS 4/> PARAM: CPT

## 2018-11-30 VITALS — WEIGHT: 293 LBS | HEART RATE: 84 BPM | BODY MASS INDEX: 41.02 KG/M2 | RESPIRATION RATE: 26 BRPM | HEIGHT: 71 IN

## 2018-12-02 ENCOUNTER — HOSPITAL ENCOUNTER (OUTPATIENT)
Dept: SLEEP CENTER | Facility: CLINIC | Age: 33
Discharge: HOME | End: 2018-12-04
Payer: MEDICARE

## 2018-12-02 DIAGNOSIS — E66.01 MORBID OBESITY WITH BMI OF 70 AND OVER, ADULT (HCC): ICD-10-CM

## 2018-12-02 DIAGNOSIS — G47.33 OSA (OBSTRUCTIVE SLEEP APNEA): ICD-10-CM

## 2018-12-02 DIAGNOSIS — E66.2 OBESITY HYPOVENTILATION SYNDROME (HCC): ICD-10-CM

## 2018-12-02 DIAGNOSIS — I10 ESSENTIAL HYPERTENSION: ICD-10-CM

## 2018-12-02 PROCEDURE — 95811 POLYSOM 6/>YRS CPAP 4/> PARM: CPT

## 2018-12-03 VITALS — HEART RATE: 89 BPM | HEIGHT: 72 IN | BODY MASS INDEX: 39.68 KG/M2 | RESPIRATION RATE: 20 BRPM | WEIGHT: 293 LBS

## 2018-12-14 LAB — STATUS: NORMAL

## 2018-12-17 ENCOUNTER — OFFICE VISIT (OUTPATIENT)
Dept: PULMONOLOGY | Age: 33
End: 2018-12-17
Payer: MEDICARE

## 2018-12-17 VITALS
HEIGHT: 71 IN | OXYGEN SATURATION: 96 % | BODY MASS INDEX: 82.71 KG/M2 | DIASTOLIC BLOOD PRESSURE: 101 MMHG | SYSTOLIC BLOOD PRESSURE: 144 MMHG | TEMPERATURE: 96.9 F | HEART RATE: 79 BPM | RESPIRATION RATE: 15 BRPM

## 2018-12-17 DIAGNOSIS — J96.21 ACUTE ON CHRONIC RESPIRATORY FAILURE WITH HYPOXIA AND HYPERCAPNIA (HCC): ICD-10-CM

## 2018-12-17 DIAGNOSIS — Z99.89 OSA ON CPAP: Primary | ICD-10-CM

## 2018-12-17 DIAGNOSIS — I10 ESSENTIAL HYPERTENSION: ICD-10-CM

## 2018-12-17 DIAGNOSIS — D64.9 NORMOCYTIC NORMOCHROMIC ANEMIA: ICD-10-CM

## 2018-12-17 DIAGNOSIS — J96.22 ACUTE ON CHRONIC RESPIRATORY FAILURE WITH HYPOXIA AND HYPERCAPNIA (HCC): ICD-10-CM

## 2018-12-17 DIAGNOSIS — E11.9 TYPE 2 DIABETES MELLITUS WITHOUT COMPLICATION, WITHOUT LONG-TERM CURRENT USE OF INSULIN (HCC): ICD-10-CM

## 2018-12-17 DIAGNOSIS — E66.01 MORBID OBESITY WITH BMI OF 70 AND OVER, ADULT (HCC): ICD-10-CM

## 2018-12-17 DIAGNOSIS — I27.81 CHRONIC COR PULMONALE (HCC): ICD-10-CM

## 2018-12-17 DIAGNOSIS — E66.2 OBESITY HYPOVENTILATION SYNDROME (HCC): ICD-10-CM

## 2018-12-17 DIAGNOSIS — Z72.0 TOBACCO ABUSE: ICD-10-CM

## 2018-12-17 DIAGNOSIS — G47.33 OSA ON CPAP: Primary | ICD-10-CM

## 2018-12-17 PROCEDURE — 3044F HG A1C LEVEL LT 7.0%: CPT | Performed by: INTERNAL MEDICINE

## 2018-12-17 PROCEDURE — 1036F TOBACCO NON-USER: CPT | Performed by: INTERNAL MEDICINE

## 2018-12-17 PROCEDURE — G8417 CALC BMI ABV UP PARAM F/U: HCPCS | Performed by: INTERNAL MEDICINE

## 2018-12-17 PROCEDURE — G8427 DOCREV CUR MEDS BY ELIG CLIN: HCPCS | Performed by: INTERNAL MEDICINE

## 2018-12-17 PROCEDURE — 2022F DILAT RTA XM EVC RTNOPTHY: CPT | Performed by: INTERNAL MEDICINE

## 2018-12-17 PROCEDURE — G8484 FLU IMMUNIZE NO ADMIN: HCPCS | Performed by: INTERNAL MEDICINE

## 2018-12-17 PROCEDURE — 99214 OFFICE O/P EST MOD 30 MIN: CPT | Performed by: INTERNAL MEDICINE

## 2018-12-17 PROCEDURE — 1111F DSCHRG MED/CURRENT MED MERGE: CPT | Performed by: INTERNAL MEDICINE

## 2018-12-17 ASSESSMENT — SLEEP AND FATIGUE QUESTIONNAIRES
HOW LIKELY ARE YOU TO NOD OFF OR FALL ASLEEP WHILE LYING DOWN TO REST IN THE AFTERNOON WHEN CIRCUMSTANCES PERMIT: 3
HOW LIKELY ARE YOU TO NOD OFF OR FALL ASLEEP WHILE SITTING AND READING: 3
HOW LIKELY ARE YOU TO NOD OFF OR FALL ASLEEP WHILE SITTING INACTIVE IN A PUBLIC PLACE: 0
HOW LIKELY ARE YOU TO NOD OFF OR FALL ASLEEP WHILE SITTING AND TALKING TO SOMEONE: 0
HOW LIKELY ARE YOU TO NOD OFF OR FALL ASLEEP WHILE WATCHING TV: 3
ESS TOTAL SCORE: 12
HOW LIKELY ARE YOU TO NOD OFF OR FALL ASLEEP WHILE SITTING QUIETLY AFTER LUNCH WITHOUT ALCOHOL: 0
HOW LIKELY ARE YOU TO NOD OFF OR FALL ASLEEP IN A CAR, WHILE STOPPED FOR A FEW MINUTES IN TRAFFIC: 0
HOW LIKELY ARE YOU TO NOD OFF OR FALL ASLEEP WHEN YOU ARE A PASSENGER IN A CAR FOR AN HOUR WITHOUT A BREAK: 3

## 2018-12-17 NOTE — PROGRESS NOTES
Medication Sig Start Date End Date Taking?  Authorizing Provider   aspirin 81 MG chewable tablet Take 1 tablet by mouth daily 11/22/18  Yes Estelle Pryor DO   butalbital-acetaminophen-caffeine (FIORICET, ESGIC) -21 MG per tablet Take 1 tablet by mouth every 4 hours as needed for Headaches 11/21/18  Yes Estelle Pryor DO   albuterol sulfate  (90 Base) MCG/ACT inhaler Inhale 2 puffs into the lungs 4 times daily 11/21/18  Yes Estelle Pryor DO   ipratropium (ATROVENT HFA) 17 MCG/ACT inhaler Inhale 2 puffs into the lungs 4 times daily 11/21/18  Yes Estelle Pryor DO   [facility] 1 patch onto the skin daily 11/22/18  Yes Estelle Pryor DO   lisinopril (PRINIVIL;ZESTRIL) 20 MG tablet Take 1 tablet by mouth daily 11/7/18  Yes Estelle Pryor DO   metFORMIN (GLUCOPHAGE) 500 MG tablet Take 1 tablet by mouth 2 times daily (with meals) 11/6/18  Yes Estelle Pryor DO   POTASSIUM CHLORIDE PO Take 25 mg by mouth daily   Yes Historical Provider, MD   aluminum & magnesium hydroxide-simethicone (MAALOX MAX) 400-400-40 MG/5ML SUSP Take 15 mLs by mouth every 6 hours as needed (indigestion) 10/28/18  Yes Vladislav Parekh MD   famotidine (PEPCID) 20 MG tablet Take 1 tablet by mouth 2 times daily 10/28/18  Yes Vladislav Parekh MD   SUMAtriptan Succinate (IMITREX PO) Take by mouth   Yes Historical Provider, MD   benzonatate (TESSALON PERLES) 100 MG capsule Take 1 capsule by mouth every 8 hours as needed for Cough 10/25/18  Yes Gloria Boss MD   hydrochlorothiazide (HYDRODIURIL) 25 MG tablet Take 25 mg by mouth daily   Yes Historical Provider, MD   vitamin D (ERGOCALCIFEROL) 40734 UNITS CAPS capsule Take 50,000 Units by mouth once a week   Yes Historical Provider, MD   topiramate (TOPAMAX) 50 MG tablet Take 100 mg by mouth 2 times daily  1/29/16  Yes Historical Provider, MD   metoprolol (LOPRESSOR) 50 MG tablet Take 50 mg by mouth 2 times daily 8/10/15  Yes Historical Provider, MD

## 2019-01-09 LAB — STATUS: NORMAL

## 2019-04-19 PROBLEM — M54.12 CERVICAL RADICULOPATHY: Status: ACTIVE | Noted: 2019-04-19

## 2019-04-19 PROBLEM — M54.2 NECK PAIN: Status: ACTIVE | Noted: 2019-04-19

## 2019-04-19 PROBLEM — N93.9 ABNORMAL VAGINAL BLEEDING: Status: ACTIVE | Noted: 2019-04-19

## 2019-04-19 PROBLEM — E55.9 VITAMIN D DEFICIENCY: Status: ACTIVE | Noted: 2019-04-19

## 2019-04-19 PROBLEM — G43.909 MIGRAINE: Status: ACTIVE | Noted: 2019-04-19

## 2019-04-19 PROBLEM — L03.90 CELLULITIS: Status: ACTIVE | Noted: 2019-04-19

## 2019-04-19 PROBLEM — M17.9 OSTEOARTHRITIS OF KNEE: Status: ACTIVE | Noted: 2019-04-19

## 2019-04-19 PROBLEM — S93.409A SPRAIN OF ANKLE: Status: ACTIVE | Noted: 2019-04-19

## 2019-04-19 PROBLEM — M20.20 ACQUIRED HALLUX RIGIDUS: Status: ACTIVE | Noted: 2019-04-19

## 2019-04-19 PROBLEM — K21.9 GASTROESOPHAGEAL REFLUX DISEASE: Status: ACTIVE | Noted: 2019-04-19

## 2019-04-19 PROBLEM — F31.9 BIPOLAR DISORDER (HCC): Status: ACTIVE | Noted: 2019-04-19

## 2019-04-26 ENCOUNTER — OFFICE VISIT (OUTPATIENT)
Dept: PULMONOLOGY | Age: 34
End: 2019-04-26
Payer: MEDICARE

## 2019-04-26 VITALS
DIASTOLIC BLOOD PRESSURE: 89 MMHG | HEART RATE: 83 BPM | HEIGHT: 71 IN | RESPIRATION RATE: 18 BRPM | WEIGHT: 293 LBS | OXYGEN SATURATION: 95 % | BODY MASS INDEX: 41.02 KG/M2 | SYSTOLIC BLOOD PRESSURE: 133 MMHG

## 2019-04-26 DIAGNOSIS — R06.02 SHORTNESS OF BREATH: ICD-10-CM

## 2019-04-26 DIAGNOSIS — I27.20 PULMONARY HYPERTENSION (HCC): ICD-10-CM

## 2019-04-26 DIAGNOSIS — G47.33 OSA ON CPAP: ICD-10-CM

## 2019-04-26 DIAGNOSIS — E66.01 CLASS 3 SEVERE OBESITY DUE TO EXCESS CALORIES WITHOUT SERIOUS COMORBIDITY IN ADULT, UNSPECIFIED BMI (HCC): Primary | ICD-10-CM

## 2019-04-26 DIAGNOSIS — J96.10 CHRONIC RESPIRATORY FAILURE, UNSPECIFIED WHETHER WITH HYPOXIA OR HYPERCAPNIA (HCC): ICD-10-CM

## 2019-04-26 DIAGNOSIS — E66.2 OBESITY HYPOVENTILATION SYNDROME (HCC): ICD-10-CM

## 2019-04-26 DIAGNOSIS — Z99.89 OSA ON CPAP: ICD-10-CM

## 2019-04-26 DIAGNOSIS — Z72.0 TOBACCO ABUSE: ICD-10-CM

## 2019-04-26 DIAGNOSIS — F31.0 BIPOLAR AFFECTIVE DISORDER, CURRENT EPISODE HYPOMANIC (HCC): ICD-10-CM

## 2019-04-26 PROCEDURE — G8417 CALC BMI ABV UP PARAM F/U: HCPCS | Performed by: INTERNAL MEDICINE

## 2019-04-26 PROCEDURE — 99214 OFFICE O/P EST MOD 30 MIN: CPT | Performed by: INTERNAL MEDICINE

## 2019-04-26 PROCEDURE — 1036F TOBACCO NON-USER: CPT | Performed by: INTERNAL MEDICINE

## 2019-04-26 PROCEDURE — G8427 DOCREV CUR MEDS BY ELIG CLIN: HCPCS | Performed by: INTERNAL MEDICINE

## 2019-04-26 ASSESSMENT — SLEEP AND FATIGUE QUESTIONNAIRES
ESS TOTAL SCORE: 9
HOW LIKELY ARE YOU TO NOD OFF OR FALL ASLEEP WHILE SITTING AND READING: 2
HOW LIKELY ARE YOU TO NOD OFF OR FALL ASLEEP WHILE SITTING AND TALKING TO SOMEONE: 0
HOW LIKELY ARE YOU TO NOD OFF OR FALL ASLEEP WHILE WATCHING TV: 2
HOW LIKELY ARE YOU TO NOD OFF OR FALL ASLEEP WHILE SITTING QUIETLY AFTER LUNCH WITHOUT ALCOHOL: 0
HOW LIKELY ARE YOU TO NOD OFF OR FALL ASLEEP WHILE SITTING INACTIVE IN A PUBLIC PLACE: 2
HOW LIKELY ARE YOU TO NOD OFF OR FALL ASLEEP WHEN YOU ARE A PASSENGER IN A CAR FOR AN HOUR WITHOUT A BREAK: 0
HOW LIKELY ARE YOU TO NOD OFF OR FALL ASLEEP IN A CAR, WHILE STOPPED FOR A FEW MINUTES IN TRAFFIC: 0
HOW LIKELY ARE YOU TO NOD OFF OR FALL ASLEEP WHILE LYING DOWN TO REST IN THE AFTERNOON WHEN CIRCUMSTANCES PERMIT: 3

## 2019-04-26 NOTE — PROGRESS NOTES
Mandy AUGUST Hair  4/26/2019    Chief Complaint   Patient presents with    Follow-up    BRYSON. Obesity hypoventilation syndrome. Chronic respiratory failure. Home oxygen  [de-identified] Saundra Maldonado accompanied for follow-up of obstructive sleep apnea syndrome, which is being treated with CPAP. She using the CPAP regularly every night. She is morbidly obese. However, she lost 5 pounds by her own afterwards by Coumadin and controlling her eating habits. Patient does have some difficulty using the CPAP machine because she used to sleep on her abdomen. That makes it uncomfortable to use the machine. I have been convincing her to sleep on her side and on the back. He also asked her to raise the head and of the bed by about 20°. This will make the use of the machine more comfortable and more effective. She has a history of CO2 retainer. She is also on home oxygen. She uses a home oxygen every night. .  She used to use tobacco but she has given up that. She feels out of breath. Primarily because of obesity. She had pulmonary hypertension causing chronic cor pulmonale due to obesity hypoventilation syndrome and hypoxemia and CO2 retention. He also history of bipolar disorder, which is better and better controlled now. An [place] Sleepiness Scale given to the patient in the office revealed a score of 9, indicating lack of daytime sleepiness. Sleep Medicine 4/26/2019 12/17/2018 11/29/2018   Sitting and reading 2 3 -   Watching TV 2 3 -   Sitting, inactive in a public place (e.g. a theatre or a meeting) 2 0 -   As a passenger in a car for an hour without a break 0 3 -   Lying down to rest in the afternoon when circumstances permit 3 3 -   Sitting and talking to someone 0 0 -   Sitting quietly after a lunch without alcohol 0 0 -   In a car, while stopped for a few minutes in traffic 0 0 -   Total score 9 12 -   Neck circumference - - 53   . He has not noted any. Edema no thromboembolic process.   Denies any chest pain. Denies any wheezing. Review of Systems -stable conductor for all other system including upper and lower extremities. She is a morbidly obese. No other abnormality was detected. General ROS: negative for - chills, fatigue, fever or weight loss  ENT ROS: negative for - headaches, oral lesions or sore throat  Cardiovascular ROS: no chest pain , orthopnea or pnd   Gastrointestinal ROS: no abdominal pain, change in bowel habits, or black or bloody stools  Skin - no rash   Neuro - no blurry vision , no loc . No focal weakness   msk - no jt tenderness or swelling    Vascular - no claudication , rest completed and negative   Lymphatic - complete and negative   Hematology - oncology - complete and negative   Allergy immunology - complete and negative    no burning or hematuria       LUNG CANCER SCREENING     1. CRITERIA MET    []     CT ORDERED  []      2. CRITERIA NOT MET   [x]      3. REFUSED                    []        REASON CRITERIA NOT MET     1. SMOKING LESS THAN 30 PY  []      2. AGE LESS THAN 55 or GREATER 77 YEARS  [x]      3. QUIT SMOKING 15 YEARS OR GREATER   []      4. RECENT CT WITH IN 11 MONTHS    []      5. LIFE EXPECTANCY < 5 YEARS   []      6. SIGNS  AND SYMPTOMS OF LUNG CANCER   []           Immunization   There is no immunization history for the selected administration types on file for this patient.      Pneumococcal Vaccine     [] Up to date    [] Indicated   [x] Refused  [] Contraindicated       Influenza Vaccine   [] Up to date    [] Indicated   [x] Refused  [] Contraindicated       PAST MEDICAL HISTORY:         Diagnosis Date    Abnormal vaginal bleeding 4/19/2019    Acquired hallux rigidus 4/19/2019    Acute and chronic respiratory failure with hypoxia ([facility] 11/6/2018    Acute on chronic respiratory failure with hypoxia and hypercapnia (HCC) 11/6/2018    ASCUS with positive high risk HPV cervical     Asthma     Bipolar disorder ([facility] 4/19/2019    Breast discharge 4/9/2015    Cellulitis 4/19/2019    Cervical radiculopathy 4/19/2019    Chest pain 11/4/2018    Chlamydia ? Reported hx    Chronic cor pulmonale (HCC)     D-dimer, elevated 11/18/2018    Dysfunctional uterine bleeding 1/27/2014    Dysplasia of cervix, low grade (ISAIAS 1) 3/30/2017    Noted on colposcopy 3/7/17    Dyspnea 11/3/2018    Ectopic pregnancy - Right 2/9/2014    Pt was initiallly dx with a left ovarian ectopic. After Laproscopic eval she was noted to have a Right Tubal ectopic. She underwent a RSO on 2/9/14.  Elevated blood pressure reading     Elevated brain natriuretic peptide (BNP) level     Gastroesophageal reflux disease 4/19/2019    H/O abnormal cervical Papanicolaou smear 2/3/2017    LSIL - 2/2015 Pap collected 2/3/2017. Patient is very difficult SSE as she is morbidly obese. Required ringed forceps and snowman speculum.       History of trichomonal vaginitis 1/20106    tx'd    Hypertension     Hypoalbuminemia due to protein-calorie malnutrition ([address] 11/6/2018    Hypocalcemia 11/21/2018    Migraine 4/19/2019    Morbid obesity with BMI of 70 and over, adult ([address]     BMI 74    MRSA (methicillin resistant Staphylococcus aureus) 7/2010    Left leg    Neck pain 4/19/2019    Normocytic normochromic anemia 11/6/2018    Obesity     Obesity hypoventilation syndrome ([address] 11/4/2018    BRYSON (obstructive sleep apnea)     BRYSON on CPAP    Ovarian ectopic pregnancy 2/9/2014    Right tubal ectopic tx'd with MTX AND LS RSO(initially dx as Left ovarian By USN 2/8/14)    Pain     Pelvic adhesive disease 2/9/14    C/S to bladder, as well as reactive to ectopic; also hx PID    Pulmonary hypertension ([address]     Shoulder joint pain 8/19/2013    Sprain of ankle 4/19/2019    Tobacco abuse 11/4/2018    Type 2 diabetes mellitus without complication, without long-term current use of insulin ([address] 11/4/2018    Unspecified sleep apnea     Vitamin D deficiency 4/19/2019 Family History:       Problem Relation Age of Onset    High Blood Pressure Mother     Heart Disease Mother     Cancer Father         lung    Lung Cancer Father     Cancer Maternal Aunt         breast    Diabetes Maternal Aunt     Breast Cancer Maternal Aunt     Cancer Paternal Aunt         lung    Lung Cancer Paternal Aunt     Thyroid Disease Sister     Colon Cancer Neg Hx     Eclampsia Neg Hx     Hypertension Neg Hx     Ovarian Cancer Neg Hx      Labor Neg Hx     Spont Abortions Neg Hx     Stroke Neg Hx        SURGICAL HISTORY:   Past Surgical History:   Procedure Laterality Date     SECTION      CHOLECYSTECTOMY, LAPAROSCOPIC      LEG SURGERY      right leg age 7 for growth plate    SALPINGO-OOPHORECTOMY  15    Right; ectopic pregnancy    TONSILLECTOMY      age 12              Not in a hospital admission. No Known Allergies  Social History     Tobacco Use   Smoking Status Former Smoker    Packs/day: 0.10    Types: Cigarettes    Start date: 9/3/2014   [facility] Last attempt to quit: 2016    Years since quitting: 3.2   Smokeless Tobacco Never Used     Prior to Admission medications    Medication Sig Start Date End Date Taking?  Authorizing Provider   aspirin 81 MG chewable tablet Take 1 tablet by mouth daily 18  Yes Tameka Rojas,    butalbital-acetaminophen-caffeine (FIORICET, ESGIC) -90 MG per tablet Take 1 tablet by mouth every 4 hours as needed for Headaches 18  Yes Tameka Rojas, DO   albuterol sulfate  (90 Base) MCG/ACT inhaler Inhale 2 puffs into the lungs 4 times daily 18  Yes Tameka Rojas DO   ipratropium (ATROVENT HFA) 17 MCG/ACT inhaler Inhale 2 puffs into the lungs 4 times daily 18  Yes Tameka Rojas DO   lidocaine [address] [facility] 1 patch onto the skin daily 18  Yes Tameka Rojas DO   lisinopril (PRINIVIL;ZESTRIL) 20 MG tablet Take 1 tablet by mouth daily 18  Yes Tameka Rojas, DO metFORMIN (GLUCOPHAGE) 500 MG tablet Take 1 tablet by mouth 2 times daily (with meals)  Patient taking differently: Take 1,000 mg by mouth 2 times daily (with meals)  11/6/18  Yes Ritesh Ochoa,    POTASSIUM CHLORIDE PO Take 25 mg by mouth daily   Yes Historical Provider, MD   aluminum & magnesium hydroxide-simethicone (MAALOX MAX) 400-400-40 MG/5ML SUSP Take 15 mLs by mouth every 6 hours as needed (indigestion) 10/28/18  Yes Cherrie Sheriff MD   famotidine (PEPCID) 20 MG tablet Take 1 tablet by mouth 2 times daily 10/28/18  Yes Cherrie Sheriff MD   SUMAtriptan Succinate (IMITREX PO) Take by mouth   Yes Historical Provider, MD   benzonatate (TESSALON PERLES) 100 MG capsule Take 1 capsule by mouth every 8 hours as needed for Cough 10/25/18  Yes Rebecca Darling MD   hydrochlorothiazide (HYDRODIURIL) 25 MG tablet Take 25 mg by mouth daily   Yes Historical Provider, MD   vitamin D (ERGOCALCIFEROL) 77286 UNITS CAPS capsule Take 50,000 Units by mouth once a week   Yes Historical Provider, MD   topiramate (TOPAMAX) 50 MG tablet Take 100 mg by mouth 2 times daily  1/29/16  Yes Historical Provider, MD   metoprolol (LOPRESSOR) 50 MG tablet Take 50 mg by mouth 2 times daily 8/10/15  Yes Historical Provider, MD   loratadine (CLARITIN) 10 MG tablet Take 10 mg by mouth daily   Yes Historical Provider, MD         Physical Exam  General Appearance:    Alert, cooperative, no distress, appears stated age, morbid obesity, no distress, mild pedal edema. Not using any accessory muscles    Head:    Normocephalic, without obvious abnormality, atraumatic   Eye examination reveal no jaundice. No Claudia syndrome. Neck is short and fat. Nose revealed no polyps. No sinus tenderness noted. Throat examination is unremarkable, oropharyngeal orifice is compromised. Nose examination revealed no polyps. No sinus tenderness noted.     Ear examination normal.            No horners        :    Neck:   Supple, symmetrical, trachea midline, no adenopathy;     thyroid:  no enlargement/tenderness/nodules; no carotid    bruit or JVD. Neck is short and fat    Back:     Symmetric, no curvature, ROM normal, no CVA tenderness   Lungs:       Poor air entry on both sides due to obesity. Percussion note is impaired because of obesity and all sites. Breathing vesicular. No rales, rhonchi are audible. No pleural friction rub is audible. Chest Wall:    No tenderness or deformity      Heart:    Regular rate and rhythm, S1 and S2 normal, no murmur, rub        or gallop no rvh                           Abdomen:                                                 Pulses:                                            Lymph nodes:                    Neurologic:                  Soft, non-tender, bowel sounds active all four quadrants,     no masses, no organomegaly         2+ and symmetric all extremities            Cervical, supraclavicular not enlarged or matted or tender      CNII-XII intact, normal strength 5/5 . Sensation grossly normal  and reflexes normal 2+  throughout     Clubbing No  Lower ext edema Yes1+   [x] , 2 +  [] , 3+   []  Upper ext edema No       Musculoskeletal - no joint swelling or tenderness or synovitis               /89   Pulse 83   Resp 18   Ht 5' 11\" (1.803 m)   Wt (!) 597 lb (270.8 kg)   SpO2 95% Comment: room air at rest  BMI 83.26 kg/m²     CXR  No recent chest x-ray      CT Scans  No recent CT scan    Echo  No recent echo        Assessment   Diagnosis Orders   1. Class 3 severe obesity due to excess calories without serious comorbidity in adult, unspecified BMI ([address]     2. BRYSON on CPAP     3. Chronic respiratory failure, unspecified whether with hypoxia or hypercapnia ([address]     4. Tobacco abuse     5. Shortness of breath     6. Pulmonary hypertension ([address]     7. Obesity hypoventilation syndrome ([address]     8.  Bipolar affective disorder, current episode hypomanic [facility])         Plan:  Patient has a

## 2019-10-04 ENCOUNTER — OFFICE VISIT (OUTPATIENT)
Dept: PULMONOLOGY | Age: 34
End: 2019-10-04
Payer: MEDICARE

## 2019-10-04 VITALS
RESPIRATION RATE: 12 BRPM | SYSTOLIC BLOOD PRESSURE: 155 MMHG | HEIGHT: 72 IN | BODY MASS INDEX: 82.1 KG/M2 | DIASTOLIC BLOOD PRESSURE: 105 MMHG | HEART RATE: 77 BPM | OXYGEN SATURATION: 98 %

## 2019-10-04 DIAGNOSIS — J96.12 CHRONIC RESPIRATORY FAILURE WITH HYPOXIA AND HYPERCAPNIA (HCC): ICD-10-CM

## 2019-10-04 DIAGNOSIS — I50.30 DIASTOLIC HEART FAILURE, UNSPECIFIED HF CHRONICITY (HCC): ICD-10-CM

## 2019-10-04 DIAGNOSIS — G47.33 OBSTRUCTIVE SLEEP APNEA: ICD-10-CM

## 2019-10-04 DIAGNOSIS — Z99.89 OSA ON CPAP: ICD-10-CM

## 2019-10-04 DIAGNOSIS — I10 ESSENTIAL HYPERTENSION: Primary | ICD-10-CM

## 2019-10-04 DIAGNOSIS — E66.01 MORBID OBESITY WITH BMI OF 70 AND OVER, ADULT (HCC): ICD-10-CM

## 2019-10-04 DIAGNOSIS — E66.2 OBESITY HYPOVENTILATION SYNDROME (HCC): ICD-10-CM

## 2019-10-04 DIAGNOSIS — J41.0 SIMPLE CHRONIC BRONCHITIS (HCC): ICD-10-CM

## 2019-10-04 DIAGNOSIS — G47.33 OSA ON CPAP: ICD-10-CM

## 2019-10-04 DIAGNOSIS — J96.11 CHRONIC RESPIRATORY FAILURE WITH HYPOXIA AND HYPERCAPNIA (HCC): ICD-10-CM

## 2019-10-04 PROCEDURE — 99214 OFFICE O/P EST MOD 30 MIN: CPT | Performed by: INTERNAL MEDICINE

## 2019-10-04 PROCEDURE — G8484 FLU IMMUNIZE NO ADMIN: HCPCS | Performed by: INTERNAL MEDICINE

## 2019-10-04 PROCEDURE — G8427 DOCREV CUR MEDS BY ELIG CLIN: HCPCS | Performed by: INTERNAL MEDICINE

## 2019-10-04 PROCEDURE — 1036F TOBACCO NON-USER: CPT | Performed by: INTERNAL MEDICINE

## 2019-10-04 PROCEDURE — 3023F SPIROM DOC REV: CPT | Performed by: INTERNAL MEDICINE

## 2019-10-04 PROCEDURE — G8417 CALC BMI ABV UP PARAM F/U: HCPCS | Performed by: INTERNAL MEDICINE

## 2019-10-04 PROCEDURE — G8926 SPIRO NO PERF OR DOC: HCPCS | Performed by: INTERNAL MEDICINE

## 2019-10-04 RX ORDER — DEXTROMETHORPHAN HYDROBROMIDE AND PROMETHAZINE HYDROCHLORIDE 15; 6.25 MG/5ML; MG/5ML
5 SOLUTION ORAL 4 TIMES DAILY
Refills: 1 | COMMUNITY
Start: 2019-09-12 | End: 2022-02-07 | Stop reason: ALTCHOICE

## 2019-10-04 ASSESSMENT — SLEEP AND FATIGUE QUESTIONNAIRES
HOW LIKELY ARE YOU TO NOD OFF OR FALL ASLEEP WHEN YOU ARE A PASSENGER IN A CAR FOR AN HOUR WITHOUT A BREAK: 0
HOW LIKELY ARE YOU TO NOD OFF OR FALL ASLEEP WHILE SITTING INACTIVE IN A PUBLIC PLACE: 3
HOW LIKELY ARE YOU TO NOD OFF OR FALL ASLEEP WHILE SITTING AND READING: 0
HOW LIKELY ARE YOU TO NOD OFF OR FALL ASLEEP IN A CAR, WHILE STOPPED FOR A FEW MINUTES IN TRAFFIC: 0
HOW LIKELY ARE YOU TO NOD OFF OR FALL ASLEEP WHILE SITTING AND TALKING TO SOMEONE: 1
HOW LIKELY ARE YOU TO NOD OFF OR FALL ASLEEP WHILE SITTING QUIETLY AFTER LUNCH WITHOUT ALCOHOL: 0
HOW LIKELY ARE YOU TO NOD OFF OR FALL ASLEEP WHILE WATCHING TV: 1
ESS TOTAL SCORE: 8
HOW LIKELY ARE YOU TO NOD OFF OR FALL ASLEEP WHILE LYING DOWN TO REST IN THE AFTERNOON WHEN CIRCUMSTANCES PERMIT: 3

## 2019-11-14 ENCOUNTER — HOSPITAL ENCOUNTER (EMERGENCY)
Age: 34
Discharge: HOME | End: 2019-11-14
Attending: EMERGENCY MEDICINE
Payer: MEDICARE

## 2019-11-14 VITALS
HEART RATE: 88 BPM | WEIGHT: 293 LBS | DIASTOLIC BLOOD PRESSURE: 101 MMHG | TEMPERATURE: 97.3 F | OXYGEN SATURATION: 94 % | SYSTOLIC BLOOD PRESSURE: 166 MMHG | BODY MASS INDEX: 41.02 KG/M2 | HEIGHT: 71 IN | RESPIRATION RATE: 18 BRPM

## 2019-11-14 DIAGNOSIS — J02.9 ACUTE PHARYNGITIS, UNSPECIFIED ETIOLOGY: Primary | ICD-10-CM

## 2019-11-14 DIAGNOSIS — R11.2 NAUSEA AND VOMITING, INTRACTABILITY OF VOMITING NOT SPECIFIED, UNSPECIFIED VOMITING TYPE: ICD-10-CM

## 2019-11-14 LAB — HCG(URINE) PREGNANCY TEST: NEGATIVE

## 2019-11-14 PROCEDURE — 2580000003 HC RX 258: Performed by: STUDENT IN AN ORGANIZED HEALTH CARE EDUCATION/TRAINING PROGRAM

## 2019-11-14 PROCEDURE — 6370000000 HC RX 637 (ALT 250 FOR IP): Performed by: STUDENT IN AN ORGANIZED HEALTH CARE EDUCATION/TRAINING PROGRAM

## 2019-11-14 PROCEDURE — 81025 URINE PREGNANCY TEST: CPT

## 2019-11-14 PROCEDURE — 99283 EMERGENCY DEPT VISIT LOW MDM: CPT

## 2019-11-14 RX ORDER — IBUPROFEN 400 MG/1
400 TABLET ORAL ONCE
Status: COMPLETED | OUTPATIENT
Start: 2019-11-14 | End: 2019-11-14

## 2019-11-14 RX ORDER — ONDANSETRON 4 MG/1
4 TABLET, ORALLY DISINTEGRATING ORAL EVERY 8 HOURS PRN
Qty: 5 TABLET | Refills: 0 | Status: SHIPPED | OUTPATIENT
Start: 2019-11-14 | End: 2022-02-07 | Stop reason: ALTCHOICE

## 2019-11-14 RX ORDER — ACETAMINOPHEN 325 MG/1
650 TABLET ORAL ONCE
Status: COMPLETED | OUTPATIENT
Start: 2019-11-14 | End: 2019-11-14

## 2019-11-14 RX ORDER — 0.9 % SODIUM CHLORIDE 0.9 %
1000 INTRAVENOUS SOLUTION INTRAVENOUS ONCE
Status: COMPLETED | OUTPATIENT
Start: 2019-11-14 | End: 2019-11-14

## 2019-11-14 RX ORDER — ONDANSETRON 4 MG/1
4 TABLET, ORALLY DISINTEGRATING ORAL ONCE
Status: COMPLETED | OUTPATIENT
Start: 2019-11-14 | End: 2019-11-14

## 2019-11-14 RX ADMIN — BENZOCAINE AND MENTHOL 1 LOZENGE: 15; 3.6 LOZENGE ORAL at 09:07

## 2019-11-14 RX ADMIN — ACETAMINOPHEN 650 MG: 325 TABLET ORAL at 09:07

## 2019-11-14 RX ADMIN — IBUPROFEN 400 MG: 400 TABLET, FILM COATED ORAL at 09:07

## 2019-11-14 RX ADMIN — ONDANSETRON 4 MG: 4 TABLET, ORALLY DISINTEGRATING ORAL at 09:03

## 2019-11-14 RX ADMIN — SODIUM CHLORIDE 1000 ML: 9 INJECTION, SOLUTION INTRAVENOUS at 09:08

## 2019-11-14 ASSESSMENT — PAIN DESCRIPTION - LOCATION: LOCATION: THROAT;HEAD

## 2019-11-14 ASSESSMENT — PAIN DESCRIPTION - PROGRESSION: CLINICAL_PROGRESSION: GRADUALLY WORSENING

## 2019-11-14 ASSESSMENT — PAIN DESCRIPTION - DESCRIPTORS: DESCRIPTORS: THROBBING

## 2019-11-14 ASSESSMENT — PAIN DESCRIPTION - FREQUENCY: FREQUENCY: CONTINUOUS

## 2019-11-14 ASSESSMENT — PAIN DESCRIPTION - PAIN TYPE: TYPE: ACUTE PAIN

## 2019-11-14 ASSESSMENT — PAIN DESCRIPTION - ONSET: ONSET: PROGRESSIVE

## 2019-11-14 ASSESSMENT — PAIN - FUNCTIONAL ASSESSMENT: PAIN_FUNCTIONAL_ASSESSMENT: ACTIVITIES ARE NOT PREVENTED

## 2019-11-14 ASSESSMENT — PAIN SCALES - GENERAL: PAINLEVEL_OUTOF10: 10

## 2020-01-10 ENCOUNTER — OFFICE VISIT (OUTPATIENT)
Dept: PULMONOLOGY | Age: 35
End: 2020-01-10
Payer: COMMERCIAL

## 2020-01-10 VITALS
DIASTOLIC BLOOD PRESSURE: 85 MMHG | WEIGHT: 293 LBS | HEART RATE: 77 BPM | RESPIRATION RATE: 20 BRPM | HEIGHT: 71 IN | SYSTOLIC BLOOD PRESSURE: 121 MMHG | BODY MASS INDEX: 41.02 KG/M2 | OXYGEN SATURATION: 99 %

## 2020-01-10 PROCEDURE — G8484 FLU IMMUNIZE NO ADMIN: HCPCS | Performed by: INTERNAL MEDICINE

## 2020-01-10 PROCEDURE — G8427 DOCREV CUR MEDS BY ELIG CLIN: HCPCS | Performed by: INTERNAL MEDICINE

## 2020-01-10 PROCEDURE — 1036F TOBACCO NON-USER: CPT | Performed by: INTERNAL MEDICINE

## 2020-01-10 PROCEDURE — G8417 CALC BMI ABV UP PARAM F/U: HCPCS | Performed by: INTERNAL MEDICINE

## 2020-01-10 PROCEDURE — 2022F DILAT RTA XM EVC RTNOPTHY: CPT | Performed by: INTERNAL MEDICINE

## 2020-01-10 PROCEDURE — 3046F HEMOGLOBIN A1C LEVEL >9.0%: CPT | Performed by: INTERNAL MEDICINE

## 2020-01-10 PROCEDURE — 99214 OFFICE O/P EST MOD 30 MIN: CPT | Performed by: INTERNAL MEDICINE

## 2020-01-10 ASSESSMENT — SLEEP AND FATIGUE QUESTIONNAIRES
HOW LIKELY ARE YOU TO NOD OFF OR FALL ASLEEP WHILE SITTING INACTIVE IN A PUBLIC PLACE: 0
HOW LIKELY ARE YOU TO NOD OFF OR FALL ASLEEP WHILE SITTING QUIETLY AFTER LUNCH WITHOUT ALCOHOL: 0
ESS TOTAL SCORE: 2
HOW LIKELY ARE YOU TO NOD OFF OR FALL ASLEEP IN A CAR, WHILE STOPPED FOR A FEW MINUTES IN TRAFFIC: 0
HOW LIKELY ARE YOU TO NOD OFF OR FALL ASLEEP WHILE SITTING AND TALKING TO SOMEONE: 0
HOW LIKELY ARE YOU TO NOD OFF OR FALL ASLEEP WHILE LYING DOWN TO REST IN THE AFTERNOON WHEN CIRCUMSTANCES PERMIT: 0
HOW LIKELY ARE YOU TO NOD OFF OR FALL ASLEEP WHILE SITTING AND READING: 1
HOW LIKELY ARE YOU TO NOD OFF OR FALL ASLEEP WHEN YOU ARE A PASSENGER IN A CAR FOR AN HOUR WITHOUT A BREAK: 0
HOW LIKELY ARE YOU TO NOD OFF OR FALL ASLEEP WHILE WATCHING TV: 1

## 2020-01-10 NOTE — PROGRESS NOTES
Acquired hallux rigidus 4/19/2019    Acute and chronic respiratory failure with hypoxia ([address] 11/6/2018    Acute on chronic respiratory failure with hypoxia and hypercapnia ([address] 11/6/2018    ASCUS with positive high risk HPV cervical     Asthma     Bipolar disorder ([address] 4/19/2019    Breast discharge 4/9/2015    Cellulitis 4/19/2019    Cervical radiculopathy 4/19/2019    Chest pain 11/4/2018    Chlamydia ? Reported hx    Chronic cor pulmonale (HCC)     D-dimer, elevated 11/18/2018    Dysfunctional uterine bleeding 1/27/2014    Dysplasia of cervix, low grade (ISAIAS 1) 3/30/2017    Noted on colposcopy 3/7/17    Dyspnea 11/3/2018    Ectopic pregnancy - Right 2/9/2014    Pt was initiallly dx with a left ovarian ectopic. After Laproscopic eval she was noted to have a Right Tubal ectopic. She underwent a RSO on 2/9/14.  Elevated blood pressure reading     Elevated brain natriuretic peptide (BNP) level     Gastroesophageal reflux disease 4/19/2019    H/O abnormal cervical Papanicolaou smear 2/3/2017    LSIL - 2/2015 Pap collected 2/3/2017. Patient is very difficult SSE as she is morbidly obese. Required ringed forceps and snowman speculum.       History of trichomonal vaginitis 1/20106    tx'd    Hypertension     Hypoalbuminemia due to protein-calorie malnutrition ([address] 11/6/2018    Hypocalcemia 11/21/2018    Migraine 4/19/2019    Morbid obesity with BMI of 70 and over, adult ([address]     BMI 74    MRSA (methicillin resistant Staphylococcus aureus) 7/2010    Left leg    Neck pain 4/19/2019    Normocytic normochromic anemia 11/6/2018    Obesity     Obesity hypoventilation syndrome ([address] 11/4/2018    BRYSON (obstructive sleep apnea)     BRYSON on CPAP    Ovarian ectopic pregnancy 2/9/2014    Right tubal ectopic tx'd with MTX AND LS RSO(initially dx as Left ovarian By USN 2/8/14)    Pain     Pelvic adhesive disease 2/9/14    C/S to bladder, as well as reactive to ectopic; also hx PID    ESGIC) -40 MG per tablet Take 1 tablet by mouth every 4 hours as needed for Headaches 11/21/18  Yes Norah Yoo DO   albuterol sulfate  (90 Base) MCG/ACT inhaler Inhale 2 puffs into the lungs 4 times daily 11/21/18  Yes Norah Yoo, DO   ipratropium (ATROVENT HFA) 17 MCG/ACT inhaler Inhale 2 puffs into the lungs 4 times daily 11/21/18  Yes Norah Yoo DO   lidocaine [facility] 1 patch onto the skin daily 11/22/18  Yes Norah Yoo DO   lisinopril (PRINIVIL;ZESTRIL) 20 MG tablet Take 1 tablet by mouth daily 11/7/18  Yes Norah Yoo DO   metFORMIN (GLUCOPHAGE) 500 MG tablet Take 1 tablet by mouth 2 times daily (with meals)  Patient taking differently: Take 1,000 mg by mouth daily (with breakfast)  11/6/18  Yes Norah Yoo DO   POTASSIUM CHLORIDE PO Take 25 mg by mouth daily   Yes Historical Provider, MD   famotidine (PEPCID) 20 MG tablet Take 1 tablet by mouth 2 times daily 10/28/18  Yes Ronnie Sharpe MD   SUMAtriptan Succinate (IMITREX PO) Take by mouth   Yes Historical Provider, MD   benzonatate (TESSALON PERLES) 100 MG capsule Take 1 capsule by mouth every 8 hours as needed for Cough 10/25/18  Yes Danny Khan MD   hydrochlorothiazide (HYDRODIURIL) 25 MG tablet Take 25 mg by mouth daily   Yes Historical Provider, MD   vitamin D (ERGOCALCIFEROL) 46067 UNITS CAPS capsule Take 50,000 Units by mouth once a week   Yes Historical Provider, MD   topiramate (TOPAMAX) 50 MG tablet Take 100 mg by mouth 2 times daily  1/29/16  Yes Historical Provider, MD   metoprolol (LOPRESSOR) 50 MG tablet Take 50 mg by mouth 2 times daily 8/10/15  Yes Historical Provider, MD   loratadine (CLARITIN) 10 MG tablet Take 10 mg by mouth daily   Yes Historical Provider, MD         Physical Exam  General Appearance:    Alert, cooperative, no distress, appears stated age, morbid obesity, no distress, mild pedal edema.   Not using any accessory muscles no respiratory distress   Head: Normocephalic, without obvious abnormality, atraumatic   Eye examination reveal no jaundice. No Claudia syndrome. Neck is short and fat. Nose revealed no polyps. No sinus tenderness noted. Throat examination is unremarkable, oropharyngeal orifice is compromised. Nose examination revealed no polyps. No sinus tenderness noted. Ear examination normal.                   :    Neck:   Supple, symmetrical, trachea midline, no adenopathy;     thyroid:  no enlargement/tenderness/nodules; no carotid    bruit or JVD. Neck is short and fat    Back:     Symmetric, no curvature, ROM normal, no CVA tenderness   Lungs:       Poor air entry on both sides due to obesity. Percussion note is impaired because of obesity and all sites. Breathing vesicular. No rales, rhonchi are audible. No pleural friction rub is audible. Chest Wall:    No tenderness or deformity      Heart:    Regular rate and rhythm, S1 and S2 normal, no murmur, rub        or gallop no rvh                           Abdomen:                                                 Pulses:                                            Lymph nodes:                    Neurologic:                  Soft, non-tender, bowel sounds active all four quadrants,     no masses, no organomegaly         2+ and symmetric all extremities            Cervical, supraclavicular not enlarged or matted or tender      CNII-XII intact, normal strength 5/5 .   Sensation grossly normal  and reflexes normal 2+  throughout     Clubbing No  Lower ext edema Yes1+   [x] , 2 +  [] , 3+   []  Upper ext edema No       Musculoskeletal - no joint swelling or tenderness or synovitis               /85 (Site: Right Lower Arm, Position: Sitting, Cuff Size: Large Adult)   Pulse 77   Resp 20   Ht 5' 11\" (1.803 m)   Wt (!) 599 lb (271.7 kg)   SpO2 99% Comment: Room air at rest  BMI 83.54 kg/m²     CXR  No recent chest x-ray      CT Scans  No recent CT scan    Echo  No recent echo        Assessment   Diagnosis Orders   1. Class 2 severe obesity due to excess calories with serious comorbidity in adult, unspecified BMI ([address]     2. BRYSON on CPAP     3. Essential hypertension     4. Dyspnea, unspecified type     5. Obesity hypoventilation syndrome ([address]     6. Type 2 diabetes mellitus without complication, without long-term current use of insulin (HCC)     7. Chronic respiratory failure, unspecified whether with hypoxia or hypercapnia ([address]     8. Mild persistent asthma without complication     9. Smoking         Plan:  Patient has morbid obesity. She has lost some weight however still she has tremendous degree of obesity which is causing a lot of her symptoms. The dyspnea is very likely related to obesity. We will try to do a 6-minute walk test in the office but she could not walk more than a minute and oxygen saturation remained around 97% she started feeling short of breath even walking a minute. She is new when she does have home oxygen which is use at night. Advised her to continue the CPAP at night while sleeping. Encourage her to continue her efforts to lose weight    I advised her to give up smoking altogether    Advised her not to smoke marijuana or or consume in any shape or form. Blood sugars are stable we should check her A1c levels. She has respiratory failure that is stable. Blood pressure is also stable. She is not a candidate for lung cancer screening. She will continue bronchodilators for the dyspnea. She does have hypoxia and obesity hypoventilation syndrome with hypercapnia and that is stable and will be treated with the continuous use of CPAP.     This was clearly defined for the patient and she was counseled regarding the importance of

## 2020-01-10 NOTE — PROGRESS NOTES
Attempted walk test with patient per Dr. Tee Del Rio. Patient could only walk 1 min 58 sec and stated she could not go any further. Patients SpO2 did not fall below 95% during that time.

## 2020-01-30 ENCOUNTER — OFFICE VISIT (OUTPATIENT)
Dept: BARIATRICS/WEIGHT MGMT | Age: 35
End: 2020-01-30
Payer: COMMERCIAL

## 2020-01-30 VITALS
HEART RATE: 88 BPM | BODY MASS INDEX: 41.02 KG/M2 | HEIGHT: 71 IN | WEIGHT: 293 LBS | SYSTOLIC BLOOD PRESSURE: 128 MMHG | RESPIRATION RATE: 20 BRPM | DIASTOLIC BLOOD PRESSURE: 84 MMHG

## 2020-01-30 PROCEDURE — 3046F HEMOGLOBIN A1C LEVEL >9.0%: CPT | Performed by: SURGERY

## 2020-01-30 PROCEDURE — 2022F DILAT RTA XM EVC RTNOPTHY: CPT | Performed by: SURGERY

## 2020-01-30 PROCEDURE — G8417 CALC BMI ABV UP PARAM F/U: HCPCS | Performed by: SURGERY

## 2020-01-30 PROCEDURE — 1036F TOBACCO NON-USER: CPT | Performed by: SURGERY

## 2020-01-30 PROCEDURE — 99204 OFFICE O/P NEW MOD 45 MIN: CPT | Performed by: SURGERY

## 2020-01-30 PROCEDURE — G8484 FLU IMMUNIZE NO ADMIN: HCPCS | Performed by: SURGERY

## 2020-01-30 PROCEDURE — G8427 DOCREV CUR MEDS BY ELIG CLIN: HCPCS | Performed by: SURGERY

## 2020-01-30 NOTE — PATIENT INSTRUCTIONS
Smoking before and after Bariatric Surgery    What we need to know. .. As you are aware smoking can increase everyone's risk of : heart attack, stroke, lung cancer, infections, increase blood pressure, COPD and other breathing problems. But did you know when it is combined with bariatric surgery , cigarette smoking complications increase 7-1HG more than those who do not smoke. Cigarette use can cause ulcer formation and increase the risk of ulcer complications such as bleeding, stomach obstruction ( blockage) and perforation ( holes in the pouch) and can require life threatening emergency surgery to fix it. Cigarette smoking is also a leading cause of ulcer medication treatment not working. Cigarette smoking can also cause strictures ( narrowing ) of the entrance of your new stomach (pouch ) causing sever pain, vomiting and nausea. Here is why. .. Tobacco smoke causes the blood vessels to constrict ( shrink) which decreases blood flow to your new stomach (pouch) . Carbon monoxide ( a substance produced when smoking) reduces the ability of your blood red cells to carry oxygen. Both of the factors can cause slow healing after bariatric surgery and can continue to be a concern years after surgery. Smoking also has been shown to increase the risks of blood clots in people having any type of surgery. Blood clots can be deadly causing strokes and heart attacks. We have seen patients years after surgery start smoking again and they have some of the complications listed above and have resulted in further surgery and even death. Prior and after surgery we stress that you can not smoke. We ask that you abstain from all nicotine use including, vaping, e-cigarette, nicotine patches and nicotine gum. Please be aware of all the risks.

## 2020-02-07 ENCOUNTER — NURSE ONLY (OUTPATIENT)
Dept: BARIATRICS/WEIGHT MGMT | Age: 35
End: 2020-02-07

## 2020-02-07 VITALS — BODY MASS INDEX: 83.3 KG/M2 | WEIGHT: 293 LBS

## 2020-02-07 NOTE — PROGRESS NOTES
low carb  Patient  participated in weight loss medications. Nutrition History  Have you ever been diagnosed with an eating disorder? Have you ever had problems tolerating a multivitamin or mineral supplement?no  Have you ever been diagnosed with a vitamin or mineral deficiency? Yes, iron   P Patient  have Hoahaoism/cultural food concerns. Patient dines out to a sit down restaurant 4 times per month. Patient dines out to a fast food restaurant 0 times per . Patient does not have grazing. Patient does have night eating. Patient does have a history of emotional eating. Patient does not have a history of  eating out of boredom. Drinks throughout the day: water pop muscle milk   Do you have regular meal times      24 hour recall/food frequency: has been scanned into chart unless completed below. Surgery  Patient's greatest concern about having surgery is: Kirt Pang How will you know when you've been successful? Patient has attended an information session where the long term changes of MBS were presented. Rating on a scale of 0-10 with 0 being none and 10 being the highest you have ever felt rate each of the three areas:    ability How confident are you that you can change now?    willingness How important is it for you to change at this time? Readiness How ready are you to change at this time? Assessment:  Nutritional Needs:  Men: 1500kcal daily minimum     Women 1200kcal daily minimum. 60-80gm of protein daily  PES Statement:  Obesity related to a complex combination of decreased energy needs, disordered eating patterns, physical inactivity, and increased psychological/life stress as evidenced by BMI Body mass index is 83.3 kg/m². and inability to maintain a significant amount of weight loss through conventional weight loss interventions. Start weight and date:             Goals    All goals were planned with and agreed on by the patient.     appt # NA G What is your

## 2020-02-26 ENCOUNTER — NURSE ONLY (OUTPATIENT)
Dept: BARIATRICS/WEIGHT MGMT | Age: 35
End: 2020-02-26

## 2020-02-26 NOTE — PROGRESS NOTES
Patient attends Introductory Support Group. Support Group schedule reviewed and attendance encouraged. Teaching done about bariatric multivitamins, calcium and protein supplements. [facility] product order form reviewed. Encouraged patient to formulate a medication plan prior to surgery. Diabetic guidelines offered to the entire group and asked that people abide by them if applicable. Offered registation to the fitness facility. Allowed patients the opportunity to ask questions.

## 2020-03-06 ENCOUNTER — OFFICE VISIT (OUTPATIENT)
Dept: BARIATRICS/WEIGHT MGMT | Age: 35
End: 2020-03-06
Payer: COMMERCIAL

## 2020-03-06 VITALS
WEIGHT: 293 LBS | HEART RATE: 72 BPM | RESPIRATION RATE: 20 BRPM | HEIGHT: 70 IN | DIASTOLIC BLOOD PRESSURE: 84 MMHG | SYSTOLIC BLOOD PRESSURE: 128 MMHG | BODY MASS INDEX: 41.95 KG/M2

## 2020-03-06 PROBLEM — J44.9 COPD (CHRONIC OBSTRUCTIVE PULMONARY DISEASE) (HCC): Status: ACTIVE | Noted: 2020-03-06

## 2020-03-06 PROBLEM — R79.89 D-DIMER, ELEVATED: Status: RESOLVED | Noted: 2018-11-18 | Resolved: 2020-03-06

## 2020-03-06 PROBLEM — F32.A ANXIETY AND DEPRESSION: Status: ACTIVE | Noted: 2020-03-06

## 2020-03-06 PROBLEM — F41.9 ANXIETY AND DEPRESSION: Status: ACTIVE | Noted: 2020-03-06

## 2020-03-06 PROBLEM — L03.90 CELLULITIS: Status: RESOLVED | Noted: 2019-04-19 | Resolved: 2020-03-06

## 2020-03-06 PROBLEM — S93.409A SPRAIN OF ANKLE: Status: RESOLVED | Noted: 2019-04-19 | Resolved: 2020-03-06

## 2020-03-06 PROBLEM — Z72.0 TOBACCO ABUSE: Status: RESOLVED | Noted: 2018-11-04 | Resolved: 2020-03-06

## 2020-03-06 PROBLEM — J96.22 ACUTE ON CHRONIC RESPIRATORY FAILURE WITH HYPOXIA AND HYPERCAPNIA (HCC): Status: RESOLVED | Noted: 2018-11-06 | Resolved: 2020-03-06

## 2020-03-06 PROBLEM — E83.51 HYPOCALCEMIA: Status: RESOLVED | Noted: 2018-11-21 | Resolved: 2020-03-06

## 2020-03-06 PROBLEM — E03.9 HYPOTHYROIDISM: Status: ACTIVE | Noted: 2020-03-06

## 2020-03-06 PROBLEM — F12.91 HISTORY OF MARIJUANA USE: Status: ACTIVE | Noted: 2020-03-06

## 2020-03-06 PROBLEM — E88.09 HYPOALBUMINEMIA DUE TO PROTEIN-CALORIE MALNUTRITION (HCC): Status: RESOLVED | Noted: 2018-11-06 | Resolved: 2020-03-06

## 2020-03-06 PROBLEM — J96.21 ACUTE ON CHRONIC RESPIRATORY FAILURE WITH HYPOXIA AND HYPERCAPNIA (HCC): Status: RESOLVED | Noted: 2018-11-06 | Resolved: 2020-03-06

## 2020-03-06 PROBLEM — E46 HYPOALBUMINEMIA DUE TO PROTEIN-CALORIE MALNUTRITION (HCC): Status: RESOLVED | Noted: 2018-11-06 | Resolved: 2020-03-06

## 2020-03-06 PROBLEM — R06.00 DYSPNEA: Status: RESOLVED | Noted: 2018-11-03 | Resolved: 2020-03-06

## 2020-03-06 PROBLEM — R07.9 CHEST PAIN: Status: RESOLVED | Noted: 2018-11-04 | Resolved: 2020-03-06

## 2020-03-06 PROBLEM — Z86.73 HISTORY OF TIA (TRANSIENT ISCHEMIC ATTACK): Status: ACTIVE | Noted: 2020-03-06

## 2020-03-06 PROBLEM — E78.5 HLD (HYPERLIPIDEMIA): Status: ACTIVE | Noted: 2020-03-06

## 2020-03-06 PROCEDURE — G8427 DOCREV CUR MEDS BY ELIG CLIN: HCPCS | Performed by: NURSE PRACTITIONER

## 2020-03-06 PROCEDURE — G8926 SPIRO NO PERF OR DOC: HCPCS | Performed by: NURSE PRACTITIONER

## 2020-03-06 PROCEDURE — 99213 OFFICE O/P EST LOW 20 MIN: CPT | Performed by: NURSE PRACTITIONER

## 2020-03-06 PROCEDURE — G8417 CALC BMI ABV UP PARAM F/U: HCPCS | Performed by: NURSE PRACTITIONER

## 2020-03-06 PROCEDURE — 2022F DILAT RTA XM EVC RTNOPTHY: CPT | Performed by: NURSE PRACTITIONER

## 2020-03-06 PROCEDURE — G8484 FLU IMMUNIZE NO ADMIN: HCPCS | Performed by: NURSE PRACTITIONER

## 2020-03-06 PROCEDURE — 3023F SPIROM DOC REV: CPT | Performed by: NURSE PRACTITIONER

## 2020-03-06 PROCEDURE — 3046F HEMOGLOBIN A1C LEVEL >9.0%: CPT | Performed by: NURSE PRACTITIONER

## 2020-03-06 PROCEDURE — 1036F TOBACCO NON-USER: CPT | Performed by: NURSE PRACTITIONER

## 2020-03-06 NOTE — PROGRESS NOTES
smear 2/3/2017    LSIL - 2015 Pap collected 2/3/2017. Patient is very difficult SSE as she is morbidly obese. Required ringed forceps and snowman speculum.  Heartburn     History of trichomonal vaginitis     tx'd    Hypertension     Hypoalbuminemia due to protein-calorie malnutrition ([address] 2018    Hypocalcemia 2018    Migraine 2019    Morbid obesity with BMI of 70 and over, adult ([address]     BMI 74    MRSA (methicillin resistant Staphylococcus aureus) 2010    Left leg    Muscle weakness     Neck pain 2019    Normocytic normochromic anemia 2018    Obesity     Obesity hypoventilation syndrome ([address] 2018    BRYSON (obstructive sleep apnea)     BRYSON on CPAP    Ovarian ectopic pregnancy 2014    Right tubal ectopic tx'd with MTX AND LS RSO(initially dx as Left ovarian By USN 14)    Pain     Pelvic adhesive disease 14    C/S to bladder, as well as reactive to ectopic; also hx PID    Pulmonary hypertension ([address]     Shoulder joint pain 2013    Sprain of ankle 2019    Tobacco abuse 2018    Type 2 diabetes mellitus without complication, without long-term current use of insulin ([address] 2018    Unspecified sleep apnea     Vitamin D deficiency 2019   .     Past Surgical History:  Past Surgical History:   Procedure Laterality Date     SECTION      CHOLECYSTECTOMY, LAPAROSCOPIC      LEG SURGERY      right leg age 7 for growth plate    SALPINGO-OOPHORECTOMY  15    Right; ectopic pregnancy    TONSILLECTOMY      age 12       Family History:  Family History   Problem Relation Age of Onset    High Blood Pressure Mother     Heart Disease Mother     Cancer Father         lung    Lung Cancer Father     Cancer Maternal Aunt         breast    Diabetes Maternal Aunt     Breast Cancer Maternal Aunt     Cancer Paternal Aunt         lung    Lung Cancer Paternal Aunt     Thyroid Disease Sister     Colon Cancer Neg Hx hours as needed for Nausea 5 tablet 0    Promethazine-DM (PROMETHAZINE-DEXTROMETHORPHAN) 6.25-15 MG/5ML SOLN solution Take 5 mLs by mouth 4 times daily  1    aspirin 81 MG chewable tablet Take 1 tablet by mouth daily 30 tablet 3    butalbital-acetaminophen-caffeine (FIORICET, ESGIC) -40 MG per tablet Take 1 tablet by mouth every 4 hours as needed for Headaches 20 tablet 0    albuterol sulfate  (90 Base) MCG/ACT inhaler Inhale 2 puffs into the lungs 4 times daily 1 Inhaler 3    ipratropium (ATROVENT HFA) 17 MCG/ACT inhaler Inhale 2 puffs into the lungs 4 times daily 1 Inhaler 3    lisinopril (PRINIVIL;ZESTRIL) 20 MG tablet Take 1 tablet by mouth daily 30 tablet 3    metFORMIN (GLUCOPHAGE) 500 MG tablet Take 1 tablet by mouth 2 times daily (with meals) (Patient taking differently: Take 1,000 mg by mouth daily (with breakfast) ) 60 tablet 3    POTASSIUM CHLORIDE PO Take 25 mg by mouth daily      famotidine (PEPCID) 20 MG tablet Take 1 tablet by mouth 2 times daily 60 tablet 0    SUMAtriptan Succinate (IMITREX PO) Take by mouth      benzonatate (TESSALON PERLES) 100 MG capsule Take 1 capsule by mouth every 8 hours as needed for Cough 20 capsule 0    hydrochlorothiazide (HYDRODIURIL) 25 MG tablet Take 25 mg by mouth daily      vitamin D (ERGOCALCIFEROL) 39406 UNITS CAPS capsule Take 50,000 Units by mouth once a week      topiramate (TOPAMAX) 50 MG tablet Take 100 mg by mouth 2 times daily       metoprolol (LOPRESSOR) 50 MG tablet Take 50 mg by mouth 2 times daily      loratadine (CLARITIN) 10 MG tablet Take 10 mg by mouth daily       No current facility-administered medications for this visit. Vital Signs:  /84 (Site: Right Lower Arm, Position: Sitting, Cuff Size: Large Adult)   Pulse 72   Resp 20   Ht 5' 10\" (1.778 m)   Wt (!) 598 lb (271.3 kg)   LMP 03/05/2020 (Exact Date)   BMI 85.80 kg/m²     BMI/Height/Weight:  Body mass index is 85.8 kg/m².       Review of Systems - A review of systems was performed. All was negative unless otherwise documented in HPI. Constitutional: Negative for fever, chills and diaphoresis. HENT: Negative for hearing loss and trouble swallowing. Eyes: Negative for photophobia and visual disturbance. Respiratory: Negative for cough, shortness of breath and wheezing. Cardiovascular: Negative for chest pain and palpitations. Gastrointestinal: Negative for nausea, vomiting, abdominal pain, diarrhea, constipation, blood in stool and abdominal distention. Endocrine: Negative for polydipsia, polyphagia and polyuria. Genitourinary: Negative for dysuria, frequency, hematuria and difficulty urinating. Musculoskeletal: Negative for myalgias, joint swelling. Skin: Negative for pallor and rash. Neurological: Negative for dizziness, tremors, light-headedness and headaches. Psychiatric/Behavioral: Negative for sleep disturbance and dysphoric mood. Objective:      Physical Exam   Vital signs reviewed. General: Well-developed and well-nourished. No acute distress. Skin: Warm, dry and intact. HEENT: Normocephalic. EOMs intact. Conjunctivae normal. Neck supple. Cardiovascular: Normal rate, regular rhythm. Pulmonary/Chest: Normal effort. Lungs clear to auscultation. No rales, rhonchi or wheezing. Abdominal: Positive bowel sounds. Soft, nontender. Nondistended. Musculoskeletal: Movement x4. Bilateral lower extremity edema. Neurological: Gait normal. Alert and oriented to person, place, and time. Psychiatric: Flat affect. Speech and behavior normal. Judgment and thought content normal. Cognition and memory intact. Assessment:       Diagnosis Orders   1. Type 2 diabetes mellitus without complication, without long-term current use of insulin ([facility]     2. Essential hypertension     3. Morbid obesity with BMI of 70 and over, adult ([facility]     4. Uncomplicated asthma, unspecified asthma severity, unspecified whether persistent     5. Obesity hypoventilation syndrome ([address]     6. Pulmonary hypertension ([address]     7. BRYSON (obstructive sleep apnea)     8. Normocytic normochromic anemia     9. Gastroesophageal reflux disease, esophagitis presence not specified     10. Anxiety and depression     11. Hyperlipidemia, unspecified hyperlipidemia type     12. Osteoarthritis of right knee, unspecified osteoarthritis type     13. Chronic migraine     14. Chronic obstructive pulmonary disease, unspecified COPD type ([address]     15. Chronic bipolar disorder ([facility]     16. History of marijuana use         Plan:    Dietitian visit today. Patient was encouraged to journal all food intake. Keep calorie level at approximately 5525-5063. Protein intake is to be a minimum of 60-80 grams per day. Water drinking was encouraged with a goal of 64oz-128oz daily. Beverages to be calorie free except for milk. Every other beverage should be water. Avoid soda. Continue to increase level of physical activity. Encouraged use of exercise log. Follow-up  Return in about 1 month (around 4/6/2020). Orders this encounter:  No orders of the defined types were placed in this encounter. Prescriptions this encounter:  No orders of the defined types were placed in this encounter.       Electronically signed by:  Brooke De La Cruz CNP

## 2020-03-19 ENCOUNTER — TELEPHONE (OUTPATIENT)
Dept: BARIATRICS/WEIGHT MGMT | Age: 35
End: 2020-03-19

## 2020-03-19 LAB
6-ACETYLMORPHINE, UR: NORMAL
ALBUMIN SERPL-MCNC: 3.8 G/DL
ALP BLD-CCNC: 45 U/L
ALT SERPL-CCNC: 28 U/L
AMPHETAMINE SCREEN, URINE: NORMAL
ANION GAP SERPL CALCULATED.3IONS-SCNC: NORMAL MMOL/L
AST SERPL-CCNC: 16 U/L
AVERAGE GLUCOSE: 120
BARBITURATE SCREEN, URINE: NORMAL
BASOPHILS ABSOLUTE: 0 /ΜL
BASOPHILS RELATIVE PERCENT: 0.6 %
BENZODIAZEPINE SCREEN, URINE: NORMAL
BILIRUB SERPL-MCNC: 0.5 MG/DL (ref 0.1–1.4)
BUN BLDV-MCNC: 16 MG/DL
CALCIUM SERPL-MCNC: 9.5 MG/DL
CANNABINOID SCREEN URINE: NORMAL
CHLORIDE BLD-SCNC: 104 MMOL/L
CHOLESTEROL, TOTAL: 134 MG/DL
CHOLESTEROL/HDL RATIO: 4.1
CO2: 28 MMOL/L
COCAINE METABOLITE, URINE: NORMAL
CREAT SERPL-MCNC: 0.8 MG/DL
CREATININE URINE: NORMAL
EDDP, URINE: NORMAL
EOSINOPHILS ABSOLUTE: 0.2 /ΜL
EOSINOPHILS RELATIVE PERCENT: 2.3 %
ETHANOL URINE: NORMAL
FERRITIN: 14 NG/ML (ref 9–150)
FOLATE: 21.75
GFR CALCULATED: NORMAL
GLUCOSE BLD-MCNC: 99 MG/DL
HBA1C MFR BLD: 5.8 %
HCT VFR BLD CALC: 47.1 % (ref 36–46)
HDLC SERPL-MCNC: 33 MG/DL (ref 35–70)
HEMOGLOBIN: 14.5 G/DL (ref 12–16)
IRON: 54
LDL CHOLESTEROL CALCULATED: 75 MG/DL (ref 0–160)
LYMPHOCYTES ABSOLUTE: 2.3 /ΜL
LYMPHOCYTES RELATIVE PERCENT: 32.1 %
MAGNESIUM: 2 MG/DL
MCH RBC QN AUTO: 26.8 PG
MCHC RBC AUTO-ENTMCNC: 30.8 G/DL
MCV RBC AUTO: 86.9 FL
MDMA URINE: NORMAL
METHADONE SCREEN, URINE: NORMAL
METHAMPHETAMINE, URINE: NORMAL
MONOCYTES ABSOLUTE: 0.4 /ΜL
MONOCYTES RELATIVE PERCENT: 5.8 %
NEUTROPHILS ABSOLUTE: 4.1 /ΜL
NEUTROPHILS RELATIVE PERCENT: 58.8 %
OPIATES, URINE: NORMAL
OXYCODONE: NORMAL
PCP: NORMAL
PDW BLD-RTO: 15.4 %
PH, URINE: NORMAL
PHENCYCLIDINE, URINE: NORMAL
PLATELET # BLD: 275 K/ΜL
PMV BLD AUTO: ABNORMAL FL
POTASSIUM SERPL-SCNC: 3.8 MMOL/L
PROPOXYPHENE, URINE: NORMAL
PTH INTACT: 55
RBC # BLD: 5.42 10^6/ΜL
SODIUM BLD-SCNC: 138 MMOL/L
T4 FREE: 0.96
TOTAL IRON BINDING CAPACITY: 372
TOTAL PROTEIN: 7.3
TRICYCLIC ANTIDEPRESSANTS, UR: NORMAL
TRIGL SERPL-MCNC: 132 MG/DL
TSH SERPL DL<=0.05 MIU/L-ACNC: 1.39 UIU/ML
VITAMIN B-12: 653
VITAMIN D 25-HYDROXY: 43.6
VITAMIN D2, 25 HYDROXY: NORMAL
VITAMIN D3,25 HYDROXY: NORMAL
VLDLC SERPL CALC-MCNC: 26 MG/DL
WBC # BLD: 7.03 10^3/ML

## 2020-04-08 VITALS — BODY MASS INDEX: 41.95 KG/M2 | WEIGHT: 293 LBS | HEIGHT: 70 IN

## 2020-04-09 ENCOUNTER — TELEMEDICINE (OUTPATIENT)
Dept: BARIATRICS/WEIGHT MGMT | Age: 35
End: 2020-04-09
Payer: COMMERCIAL

## 2020-04-09 PROCEDURE — G8427 DOCREV CUR MEDS BY ELIG CLIN: HCPCS | Performed by: NURSE PRACTITIONER

## 2020-04-09 PROCEDURE — 2022F DILAT RTA XM EVC RTNOPTHY: CPT | Performed by: NURSE PRACTITIONER

## 2020-04-09 PROCEDURE — 99213 OFFICE O/P EST LOW 20 MIN: CPT | Performed by: NURSE PRACTITIONER

## 2020-04-09 PROCEDURE — 3044F HG A1C LEVEL LT 7.0%: CPT | Performed by: NURSE PRACTITIONER

## 2020-04-09 NOTE — PROGRESS NOTES
Medical Nutrition Therapy   Metabolic and Bariatric Surgery         Supervised diet and exercise preparation  Visit 2 out of 6  Pt reports: nutrition goals to remain the same d/t COVID-19. Pt currently following structured meal plan 8pro/3veg/2fr/6 starch/3fat from education binder diet for weight management. Reviewed with pt. Vitals: Wt Readings from Last 3 Encounters:   04/08/20 (!) 598 lb (271.3 kg)   03/06/20 (!) 598 lb (271.3 kg)   02/07/20 (!) 597 lb (270.8 kg)     stable / unchanged      Nutrition Assessment:   PES: Knowledge deficit related to healthy behaviors that support weight management post weight loss surgery as evidenced by Body mass index is 85.8 kg/m². Nutrition Assessment of Goal Attainment:  TREATMENT GOALS:    1. Pt  Completed 3 out of 3 goals. 2.TREATMENT GOALS FOR UPCOMING WEEK: continue all previous goals and add: # 0    All goals were planned with and agreed on by the patient. Goals    All goals were planned with and agreed on by the patient. appt # NA G What is your next step? C 1 2 3 4 5 6 7 8 9     0  one I will read the education binder provided to me x 100             0  2 I will make my pschological evaluation appoinment. x 100             2  3 I will bring this goal card to every appointment. 100 100             x 4 I will eliminate all tobacco/nicotine. x 5 I will limit alcoholic beverages to 9-8BT per week. x 6 I will limit dining out to 3 times per week or less. x 7 I will eliminate sugary beverages. x 8 I will eliminate carbonated beverages. x 9 I will eliminate drinking with a straw. x 10 I will limit caffeinated beverages to 16oz daily. 11 I will limit cold cereals prepared with milk. 12 I will do a 5 minute reflection. 13 I will food journal daily. 2  14 I will log my exercise daily.   100 100

## 2020-04-09 NOTE — PROGRESS NOTES
breast    Diabetes Maternal Aunt     Breast Cancer Maternal Aunt     Cancer Paternal Aunt         lung    Lung Cancer Paternal Aunt     Thyroid Disease Sister     Colon Cancer Neg Hx     Eclampsia Neg Hx     Hypertension Neg Hx     Ovarian Cancer Neg Hx      Labor Neg Hx     Spont Abortions Neg Hx     Stroke Neg Hx        Social History:  Social History     Socioeconomic History    Marital status: Single     Spouse name: Not on file    Number of children: Not on file    Years of education: Not on file    Highest education level: Not on file   Occupational History    Not on file   Social Needs    Financial resource strain: Not on file    Food insecurity     Worry: Not on file     Inability: Not on file    Transportation needs     Medical: Not on file     Non-medical: Not on file   Tobacco Use    Smoking status: Former Smoker     Packs/day: 0.10     Types: Cigarettes     Start date: 9/3/2014     Last attempt to quit: 2016     Years since quittin.1    Smokeless tobacco: Never Used   Substance and Sexual Activity    Alcohol use:  Yes     Alcohol/week: 0.0 standard drinks     Comment: occassionally     Drug use: Not Currently     Types: Marijuana     Comment: quit 2020    Sexual activity: Not Currently   Lifestyle    Physical activity     Days per week: Not on file     Minutes per session: Not on file    Stress: Not on file   Relationships    Social connections     Talks on phone: Not on file     Gets together: Not on file     Attends Tenriism service: Not on file     Active member of club or organization: Not on file     Attends meetings of clubs or organizations: Not on file     Relationship status: Not on file    Intimate partner violence     Fear of current or ex partner: Not on file     Emotionally abused: Not on file     Physically abused: Not on file     Forced sexual activity: Not on file   Other Topics Concern    Not on file   Social History Narrative    Not on file       Current Medications:  Current Outpatient Medications   Medication Sig Dispense Refill    ondansetron (ZOFRAN ODT) 4 MG disintegrating tablet Take 1 tablet by mouth every 8 hours as needed for Nausea 5 tablet 0    Promethazine-DM (PROMETHAZINE-DEXTROMETHORPHAN) 6.25-15 MG/5ML SOLN solution Take 5 mLs by mouth 4 times daily  1    aspirin 81 MG chewable tablet Take 1 tablet by mouth daily 30 tablet 3    butalbital-acetaminophen-caffeine (FIORICET, ESGIC) -40 MG per tablet Take 1 tablet by mouth every 4 hours as needed for Headaches 20 tablet 0    albuterol sulfate  (90 Base) MCG/ACT inhaler Inhale 2 puffs into the lungs 4 times daily 1 Inhaler 3    ipratropium (ATROVENT HFA) 17 MCG/ACT inhaler Inhale 2 puffs into the lungs 4 times daily 1 Inhaler 3    lisinopril (PRINIVIL;ZESTRIL) 20 MG tablet Take 1 tablet by mouth daily 30 tablet 3    metFORMIN (GLUCOPHAGE) 500 MG tablet Take 1 tablet by mouth 2 times daily (with meals) (Patient taking differently: Take 1,000 mg by mouth daily (with breakfast) ) 60 tablet 3    POTASSIUM CHLORIDE PO Take 25 mg by mouth daily      famotidine (PEPCID) 20 MG tablet Take 1 tablet by mouth 2 times daily 60 tablet 0    SUMAtriptan Succinate (IMITREX PO) Take by mouth      benzonatate (TESSALON PERLES) 100 MG capsule Take 1 capsule by mouth every 8 hours as needed for Cough 20 capsule 0    hydrochlorothiazide (HYDRODIURIL) 25 MG tablet Take 25 mg by mouth daily      topiramate (TOPAMAX) 50 MG tablet Take 100 mg by mouth 2 times daily       metoprolol (LOPRESSOR) 50 MG tablet Take 50 mg by mouth 2 times daily      loratadine (CLARITIN) 10 MG tablet Take 10 mg by mouth daily      vitamin D (ERGOCALCIFEROL) 89726 UNITS CAPS capsule Take 50,000 Units by mouth once a week       No current facility-administered medications for this visit.         Vital Signs:  Ht 5' 10\" (1.778 m)   Wt (!) 598 lb (271.3 kg) Comment: per pt  BMI 85.80 kg/m²       Review

## 2020-05-11 VITALS — WEIGHT: 293 LBS | HEIGHT: 70 IN | BODY MASS INDEX: 41.95 KG/M2

## 2020-05-12 ENCOUNTER — TELEMEDICINE (OUTPATIENT)
Dept: BARIATRICS/WEIGHT MGMT | Age: 35
End: 2020-05-12
Payer: COMMERCIAL

## 2020-05-12 PROCEDURE — 3044F HG A1C LEVEL LT 7.0%: CPT | Performed by: NURSE PRACTITIONER

## 2020-05-12 PROCEDURE — G8427 DOCREV CUR MEDS BY ELIG CLIN: HCPCS | Performed by: NURSE PRACTITIONER

## 2020-05-12 PROCEDURE — 2022F DILAT RTA XM EVC RTNOPTHY: CPT | Performed by: NURSE PRACTITIONER

## 2020-05-12 PROCEDURE — 99213 OFFICE O/P EST LOW 20 MIN: CPT | Performed by: NURSE PRACTITIONER

## 2020-05-12 NOTE — PROGRESS NOTES
Medical Weight Management Progress Note    TELEHEALTH EVALUATION -- Audio/Visual (During [facility] public health emergency)    Subjective     Patient being seen for medically supervised weight loss for the chronic conditions of DM Type 2, Asthma, Pulmonary HTN, BRYSON, Obesity Hypoventilation Syndrome, Anemia, GERD, Anxiety/Depression, Bipolar, HLD, Chronic Migraine, Arthritis, COPD. She is working on the behavior changes discussed at the initial appointment. Patient continues on diet plan. Physical activity includes stretching. Weight loss since last visit is 0 lbs. Denies nicotine use. Currently using marijuana \"to cope\". Plans to quit after her [birth year] on [birth year]. Psych eval completed and has recommendations. Needs pulmonary and cardiac clearances. Needs to schedule EGD. No current issues. Working toward bariatric surgery:    [x] Sleeve Gastrectomy                                                           [] Ragini-en-Y Gastric Bypass    Allergies:  No Known Allergies    Past Medical History:     Past Medical History:   Diagnosis Date    Abnormal vaginal bleeding 4/19/2019    Acquired hallux rigidus 4/19/2019    Acute and chronic respiratory failure with hypoxia ([address] 11/6/2018    Acute on chronic respiratory failure with hypoxia and hypercapnia ([address] 11/6/2018    ASCUS with positive high risk HPV cervical     Asthma     Bipolar disorder ([address] 4/19/2019    Breast discharge 4/9/2015    Cellulitis 4/19/2019    Cervical radiculopathy 4/19/2019    Chest pain 11/4/2018    Chlamydia ? Reported hx    Chronic cor pulmonale (HCC)     COPD (chronic obstructive pulmonary disease) (HCC)     D-dimer, elevated 11/18/2018    Dysfunctional uterine bleeding 1/27/2014    Dysplasia of cervix, low grade ([facility]) 3/30/2017    Noted on colposcopy 3/7/17    Dyspnea 11/3/2018    Ectopic pregnancy - Right 2/9/2014    Pt was initiallly dx with a left ovarian ectopic.   After Laproscopic eval she was disease, unspecified COPD type ([facility]     11. History of marijuana use     12. Pulmonary hypertension (HCC)     13. Cervical radiculopathy     14. Morbid obesity with BMI of 70 and over, adult [facility])         Plan:    Marijuana cessation discussed. She is aware that she must be marijuana free in order to pursue bariatric surgery and must be marijuana free for life. She is agreeable to stop. Advised that she contact the psychologist for assistance with alternative coping mechanisms. Repeat nicotine and UDS ordered to be completed after 2 months of marijuana cessation. Dietitian visit follow up call. Follow-up  Return in about 1 month (around 6/12/2020). Orders this encounter:  No orders of the defined types were placed in this encounter. Prescriptions this encounter:  No orders of the defined types were placed in this encounter. Electronically signed by:  JOE Boland    Pursuant to the emergency declaration under the [facility] waiver authority and the Coronavirus Preparedness and [employer] Act, this Virtual  Visit was conducted, with patient's consent, to reduce the patient's risk of exposure to COVID-19 and provide continuity of care for an established patient. Services were provided through a video synchronous discussion virtually to substitute for in-person clinic visit. Patient was in her home and provider was in the weight management clinic.

## 2020-06-08 ENCOUNTER — OFFICE VISIT (OUTPATIENT)
Dept: BARIATRICS/WEIGHT MGMT | Age: 35
End: 2020-06-08
Payer: COMMERCIAL

## 2020-06-08 VITALS
SYSTOLIC BLOOD PRESSURE: 122 MMHG | HEIGHT: 70 IN | HEART RATE: 74 BPM | RESPIRATION RATE: 18 BRPM | DIASTOLIC BLOOD PRESSURE: 72 MMHG | BODY MASS INDEX: 41.95 KG/M2 | WEIGHT: 293 LBS

## 2020-06-08 PROCEDURE — 3023F SPIROM DOC REV: CPT | Performed by: NURSE PRACTITIONER

## 2020-06-08 PROCEDURE — 99213 OFFICE O/P EST LOW 20 MIN: CPT | Performed by: NURSE PRACTITIONER

## 2020-06-08 PROCEDURE — 3044F HG A1C LEVEL LT 7.0%: CPT | Performed by: NURSE PRACTITIONER

## 2020-06-08 PROCEDURE — G8417 CALC BMI ABV UP PARAM F/U: HCPCS | Performed by: NURSE PRACTITIONER

## 2020-06-08 PROCEDURE — 1036F TOBACCO NON-USER: CPT | Performed by: NURSE PRACTITIONER

## 2020-06-08 PROCEDURE — 2022F DILAT RTA XM EVC RTNOPTHY: CPT | Performed by: NURSE PRACTITIONER

## 2020-06-08 PROCEDURE — G8926 SPIRO NO PERF OR DOC: HCPCS | Performed by: NURSE PRACTITIONER

## 2020-06-08 PROCEDURE — G8427 DOCREV CUR MEDS BY ELIG CLIN: HCPCS | Performed by: NURSE PRACTITIONER

## 2020-06-08 NOTE — PROGRESS NOTES
Medical Nutrition Therapy   Metabolic and Bariatric Surgery         Supervised diet and exercise preparation  Visit 4 out of 6  Pt reports: does not have materials with her today. Barriers this past month including stressful life events and using food for comfort. Pt currently following structured meal plan 8pro/3veg/2fr/6 starch/3fat from education binder diet for weight management. Reviewed with pt. Vitals: Wt Readings from Last 3 Encounters:   06/08/20 (!) 602 lb (273.1 kg)   05/11/20 (!) 598 lb (271.3 kg)   05/07/20 (!) 598 lb (271.3 kg)     gained 4 lbs over 1 month. Nutrition Assessment:   PES: Knowledge deficit related to healthy behaviors that support weight management post weight loss surgery as evidenced by Body mass index is 86.38 kg/m². Nutrition Assessment of Goal Attainment:  TREATMENT GOALS:    1. Pt  Completed 0 out of 3 goals. 2.TREATMENT GOALS FOR UPCOMING WEEK: continue all previous goals and add: # 0    All goals were planned with and agreed on by the patient. Goals    All goals were planned with and agreed on by the patient. appt # NA G What is your next step? C 1 2 3 4 5 6 7 8 9     0  one I will read the education binder provided to me x 100             0  2 I will make my pschological evaluation appoinment. x 100             4  3 I will bring this goal card to every appointment. 100 100  0           x 4 I will eliminate all tobacco/nicotine. x 5 I will limit alcoholic beverages to 1-4UX per week. x 6 I will limit dining out to 3 times per week or less. x 7 I will eliminate sugary beverages. x 8 I will eliminate carbonated beverages. x 9 I will eliminate drinking with a straw. x 10 I will limit caffeinated beverages to 16oz daily. 11 I will limit cold cereals prepared with milk. 12 I will do a 5 minute reflection.                  13 I

## 2020-06-08 NOTE — PROGRESS NOTES
lung    Lung Cancer Paternal Aunt     Thyroid Disease Sister     Colon Cancer Neg Hx     Eclampsia Neg Hx     Hypertension Neg Hx     Ovarian Cancer Neg Hx      Labor Neg Hx     Spont Abortions Neg Hx     Stroke Neg Hx        Social History:  Social History     Socioeconomic History    Marital status: Single     Spouse name: Not on file    Number of children: Not on file    Years of education: Not on file    Highest education level: Not on file   Occupational History    Not on file   Social Needs    Financial resource strain: Not on file    Food insecurity     Worry: Not on file     Inability: Not on file    Transportation needs     Medical: Not on file     Non-medical: Not on file   Tobacco Use    Smoking status: Former Smoker     Packs/day: 0.10     Types: Cigarettes     Start date: 9/3/2014     Last attempt to quit: 2016     Years since quittin.3    Smokeless tobacco: Never Used   Substance and Sexual Activity    Alcohol use:  Yes     Alcohol/week: 0.0 standard drinks     Comment: occassionally     Drug use: Not Currently     Types: Marijuana     Comment: quit 2020    Sexual activity: Not Currently   Lifestyle    Physical activity     Days per week: Not on file     Minutes per session: Not on file    Stress: Not on file   Relationships    Social connections     Talks on phone: Not on file     Gets together: Not on file     Attends Yarsanism service: Not on file     Active member of club or organization: Not on file     Attends meetings of clubs or organizations: Not on file     Relationship status: Not on file    Intimate partner violence     Fear of current or ex partner: Not on file     Emotionally abused: Not on file     Physically abused: Not on file     Forced sexual activity: Not on file   Other Topics Concern    Not on file   Social History Narrative    Not on file       Current Medications:  Current Outpatient Medications   Medication Sig Dispense Refill Upper Arm, Position: Sitting, Cuff Size: Large Adult)   Pulse 74   Resp 18   Ht 5' 10\" (1.778 m)   Wt (!) 602 lb (273.1 kg)   BMI 86.38 kg/m²     BMI/Height/Weight:  Body mass index is 86.38 kg/m². Review of Systems - A review of systems was performed. All was negative unless otherwise documented in HPI. Constitutional: Negative for fever, chills and diaphoresis. HENT: Negative for hearing loss and trouble swallowing. Eyes: Negative for photophobia and visual disturbance. Respiratory: Negative for cough, shortness of breath and wheezing. Cardiovascular: Negative for chest pain and palpitations. Gastrointestinal: Negative for nausea, vomiting, abdominal pain, diarrhea, constipation, blood in stool and abdominal distention. Endocrine: Negative for polydipsia, polyphagia and polyuria. Genitourinary: Negative for dysuria, frequency, hematuria and difficulty urinating. Musculoskeletal: Negative for myalgias, joint swelling. Skin: Negative for pallor and rash. Neurological: Negative for dizziness, tremors, light-headedness and headaches. Psychiatric/Behavioral: Negative for sleep disturbance and dysphoric mood. Objective:      Physical Exam   Vital signs reviewed. General: Well-developed and well-nourished. No acute distress. Skin: Warm, dry and intact. HEENT: Normocephalic. EOMs intact. Conjunctivae normal. Neck supple. Cardiovascular: Normal rate, regular rhythm. Pulmonary/Chest: Normal effort. Lungs clear to auscultation. No rales, rhonchi or wheezing. Abdominal: Positive bowel sounds. Soft, nontender. Nondistended. Musculoskeletal: Movement x4. Bilateral lower extremity edema. Neurological: Gait normal. Alert and oriented to person, place, and time. Psychiatric: Flat affect. Speech and behavior normal. Judgment and thought content normal. Cognition and memory intact. Assessment:       Diagnosis Orders   1.  Type 2 diabetes mellitus without complication, without long-term current use of insulin ([facility]     2. Obesity hypoventilation syndrome ([facility]     3. Essential hypertension     4. Chronic bipolar disorder ([facility]     5. BRYSON (obstructive sleep apnea)     6. Morbid obesity with BMI of 70 and over, adult ([facility]     7. Gastroesophageal reflux disease, esophagitis presence not specified     8. Anxiety and depression     9. Hyperlipidemia, unspecified hyperlipidemia type     10. Pulmonary hypertension ([facility]     11. Cervical radiculopathy     12. Chronic migraine     13. Chronic obstructive pulmonary disease, unspecified COPD type ([facility]         Plan:    Dietitian visit today. Patient was encouraged to journal all food intake. Keep calorie level at approximately 4385-0343. Protein intake is to be a minimum of 60-80 grams per day. Water drinking was encouraged with a goal of 64oz-128oz daily. Beverages to be calorie free except for milk. Every other beverage should be water. Avoid soda. Continue to increase level of physical activity. Encouraged use of exercise log. Follow-up  Return in about 1 month (around 7/8/2020). Orders this encounter:  No orders of the defined types were placed in this encounter. Prescriptions this encounter:  No orders of the defined types were placed in this encounter.       Electronically signed by:  Bárbara Andrew CNP

## 2020-06-12 ENCOUNTER — TELEPHONE (OUTPATIENT)
Dept: PULMONOLOGY | Age: 35
End: 2020-06-12

## 2020-06-12 ENCOUNTER — TELEMEDICINE (OUTPATIENT)
Dept: PULMONOLOGY | Age: 35
End: 2020-06-12
Payer: COMMERCIAL

## 2020-06-12 PROCEDURE — G8427 DOCREV CUR MEDS BY ELIG CLIN: HCPCS | Performed by: INTERNAL MEDICINE

## 2020-06-12 PROCEDURE — G8417 CALC BMI ABV UP PARAM F/U: HCPCS | Performed by: INTERNAL MEDICINE

## 2020-06-12 PROCEDURE — 99214 OFFICE O/P EST MOD 30 MIN: CPT | Performed by: INTERNAL MEDICINE

## 2020-06-12 PROCEDURE — 1036F TOBACCO NON-USER: CPT | Performed by: INTERNAL MEDICINE

## 2020-06-12 PROCEDURE — 2022F DILAT RTA XM EVC RTNOPTHY: CPT | Performed by: INTERNAL MEDICINE

## 2020-06-12 PROCEDURE — 3044F HG A1C LEVEL LT 7.0%: CPT | Performed by: INTERNAL MEDICINE

## 2020-06-12 NOTE — PROGRESS NOTES
2020    TELEHEALTH EVALUATION -- Audio/Visual (During LRMAK-86 public health emergency)    Patient and physician are located in their individual locations. This is visit is completed via [employer] application [x]/ Doxy. me[] / Telephone []     HPI:    Bertrand Suarez (:  1985) has requested an audio/video evaluation for the following concern(s):  She is known to have chronic obstructive lung disease due to chronic bronchitis and emphysema she does have history of smoking. Has given up smoking however. She is responding well to the bronchodilator therapy. She is also on home oxygen that she uses along with her CPAP machine. Patient is morbidly obese and also has sleep apnea syndrome which is being treated with CPAP he uses CPAP fairly regularly and has regular bedtime now. She used hardly her bedtime used to be rather irregular causing problems. She does have no pedal edema. She does complain of arthritis of the knees. Patient is morbidly obese and has not been able to lose weight. She is thinking seriously of getting a bariatric surgery. Patient also has evidence of obesity hypoventilation syndrome with CO2 retention. She has diabetes which is under good control as is her hypertension. She does complain of effort dyspnea which is primarily related to lung disease and morbid obesity            Review of Systems    Prior to Visit Medications    Medication Sig Taking? Authorizing Provider   tiotropium (SPIRIVA HANDIHALER) 18 MCG inhalation capsule Spiriva Respimat 2.5 mcg/actuation solution for inhalation   Inhale 2 puffs every day by inhalation route.  Yes Historical Provider, MD   ondansetron (ZOFRAN ODT) 4 MG disintegrating tablet Take 1 tablet by mouth every 8 hours as needed for Nausea Yes Shena Patel MD   Promethazine-DM (PROMETHAZINE-DEXTROMETHORPHAN) 6.25-15 MG/5ML SOLN solution Take 5 mLs by mouth 4 times daily Yes Historical Provider, MD   aspirin 81 MG chewable tablet Take 1 tablet by mouth daily Yes Everet Cost, DO   butalbital-acetaminophen-caffeine (FIORICET, ESGIC) -40 MG per tablet Take 1 tablet by mouth every 4 hours as needed for Headaches Yes Everet Cost, DO   albuterol sulfate  (90 Base) MCG/ACT inhaler Inhale 2 puffs into the lungs 4 times daily Yes Everet Cost, DO   ipratropium (ATROVENT HFA) 17 MCG/ACT inhaler Inhale 2 puffs into the lungs 4 times daily Yes Everet Cost, DO   lisinopril (PRINIVIL;ZESTRIL) 20 MG tablet Take 1 tablet by mouth daily Yes Everet Cost, DO   metFORMIN (GLUCOPHAGE) 500 MG tablet Take 1 tablet by mouth 2 times daily (with meals)  Patient taking differently: Take 1,000 mg by mouth daily (with breakfast)  Yes Everet Cost, DO   POTASSIUM CHLORIDE PO Take 25 mg by mouth daily Yes Historical Provider, MD   famotidine (PEPCID) 20 MG tablet Take 1 tablet by mouth 2 times daily Yes Marcy Suazo MD   SUMAtriptan Succinate (IMITREX PO) Take by mouth Yes Historical Provider, MD   benzonatate (TESSALON PERLES) 100 MG capsule Take 1 capsule by mouth every 8 hours as needed for Cough Yes Rose Goldman MD   hydrochlorothiazide (HYDRODIURIL) 25 MG tablet Take 25 mg by mouth daily Yes Historical Provider, MD   vitamin D (ERGOCALCIFEROL) 15566 UNITS CAPS capsule Take 50,000 Units by mouth once a week Yes Historical Provider, MD   topiramate (TOPAMAX) 50 MG tablet Take 100 mg by mouth 2 times daily  Yes Historical Provider, MD   metoprolol (LOPRESSOR) 50 MG tablet Take 50 mg by mouth 2 times daily Yes Historical Provider, MD   loratadine (CLARITIN) 10 MG tablet Take 10 mg by mouth daily Yes Historical Provider, MD       Social History     Tobacco Use    Smoking status: Former Smoker     Packs/day: 0.10     Types: Cigarettes     Start date: 9/3/2014     Last attempt to quit: 2020     Years since quittin.0    Smokeless tobacco: Never Used    Tobacco comment: quit in 2016 started back up    Substance Use Topics    Alcohol use: Yes     Alcohol/week: 0.0 standard drinks     Comment: occassionally     Drug use: Not Currently     Types: Marijuana     Comment: quit 1-1-2020            RECORD REVIEW: Previous medical records were reviewed at today's visit. Wt Readings from Last 3 Encounters:   06/08/20 (!) 602 lb (273.1 kg)   05/11/20 (!) 598 lb (271.3 kg)   05/07/20 (!) 598 lb (271.3 kg)       Results for orders placed or performed in visit on 03/23/20   Hemoglobin A1C   Result Value Ref Range    Hemoglobin A1C 5.8 %    AVERAGE GLUCOSE 120        No results found. PHYSICAL EXAMINATION:  Due to this being a TeleHealth encounter, evaluation of the following organ systems is limited: Vitals/Constitutional/EENT/Resp/CV/GI//MS/Neuro/Skin/Heme-Lymph-Imm. Constitutional: [x] Appears well-developed and well-nourished. [x] Abnormal morbid obesity no respiratory distress mental status  [x] Alert and awake  [x] Oriented to person/place/time [x]Able to follow commands    [x] No apparent distress      Eyes:  EOM    [x]  Normal  [] Abnormal-  Sclera  [x]  Normal  [] Abnormal -         Discharge [x]  None visible  [] Abnormal -    HENT:   [x] Normocephalic, atraumatic. [] Abnormal shaped head   [x] Mouth/Throat: Mucous membranes are moist.     Ears [x] Normal  [] Abnormal-    Neck: [x] Normal range of motion [x] Supple [x] No visualized mass. Pulmonary/Chest: [x] Respiratory effort normal.  [x] No visualized signs of difficulty breathing or respiratory distress        [] Abnormal      Musculoskeletal:   [x] Normal range of motion. [x] Normal gait with no signs of ataxia. [x]  No signs of cyanosis of the peripheral portions of extremities.          [] Abnormal       Neurological:        [x] Normal cranial nerve (limited exam to video visit) [x] No focal weakness observed       [] Abnormal          Speech       [x] Normal   [] Abnormal     Skin:        [x] No rash on visible skin  [x] Normal  [] Abnormal

## 2020-06-15 ENCOUNTER — TELEPHONE (OUTPATIENT)
Dept: BARIATRICS/WEIGHT MGMT | Age: 35
End: 2020-06-15

## 2020-06-15 NOTE — TELEPHONE ENCOUNTER
Patient called would like to schedule EGD . Advised patient she would receive a call by end of day Friday 6/19/2020 with options for scheduling.

## 2020-07-01 RX ORDER — DICLOFENAC POTASSIUM 50 MG/1
50 TABLET, FILM COATED ORAL 3 TIMES DAILY
COMMUNITY
End: 2021-03-24 | Stop reason: ALTCHOICE

## 2020-07-01 RX ORDER — GABAPENTIN 300 MG/1
600 CAPSULE ORAL 3 TIMES DAILY
COMMUNITY
End: 2021-03-24

## 2020-07-02 ENCOUNTER — ANESTHESIA EVENT (OUTPATIENT)
Dept: OPERATING ROOM | Age: 35
End: 2020-07-02
Payer: COMMERCIAL

## 2020-07-06 ENCOUNTER — OFFICE VISIT (OUTPATIENT)
Dept: BARIATRICS/WEIGHT MGMT | Age: 35
End: 2020-07-06
Payer: COMMERCIAL

## 2020-07-06 VITALS
DIASTOLIC BLOOD PRESSURE: 78 MMHG | BODY MASS INDEX: 41.95 KG/M2 | SYSTOLIC BLOOD PRESSURE: 118 MMHG | HEART RATE: 76 BPM | HEIGHT: 70 IN | TEMPERATURE: 97.1 F | RESPIRATION RATE: 22 BRPM | WEIGHT: 293 LBS

## 2020-07-06 PROCEDURE — 99213 OFFICE O/P EST LOW 20 MIN: CPT | Performed by: NURSE PRACTITIONER

## 2020-07-06 PROCEDURE — G8417 CALC BMI ABV UP PARAM F/U: HCPCS | Performed by: NURSE PRACTITIONER

## 2020-07-06 PROCEDURE — 3044F HG A1C LEVEL LT 7.0%: CPT | Performed by: NURSE PRACTITIONER

## 2020-07-06 PROCEDURE — 3023F SPIROM DOC REV: CPT | Performed by: NURSE PRACTITIONER

## 2020-07-06 PROCEDURE — 1036F TOBACCO NON-USER: CPT | Performed by: NURSE PRACTITIONER

## 2020-07-06 PROCEDURE — G8427 DOCREV CUR MEDS BY ELIG CLIN: HCPCS | Performed by: NURSE PRACTITIONER

## 2020-07-06 PROCEDURE — 2022F DILAT RTA XM EVC RTNOPTHY: CPT | Performed by: NURSE PRACTITIONER

## 2020-07-06 PROCEDURE — G8926 SPIRO NO PERF OR DOC: HCPCS | Performed by: NURSE PRACTITIONER

## 2020-07-06 NOTE — PROGRESS NOTES
Medical Weight Management Progress Note    Subjective      Patient being seen for medically supervised weight loss for the chronic conditions of DM Type 2, Asthma, Pulmonary HTN, BRYSON, Obesity Hypoventilation Syndrome, Anemia, GERD, Anxiety/Depression, Bipolar, HLD, Chronic Migraine, Arthritis, COPD.      She is working on the behavior changes discussed at the initial appointment. Patient continues on diet plan. Physical activity includes walking.  Weight gain of 2 lbs since last visit.  Denies nicotine use.   Psych eval completed and has recommendations. Needs pulmonary and cardiac clearances. Next pulmonary appt is in September. EGD scheduled 7/10/20. Needs to do UDS and Nicotine level.   No current issues.     Working toward bariatric surgery:    [x]? Sleeve Gastrectomy                                                           []? Ragini-en-Y Gastric Bypass    Allergies:  No Known Allergies    Past Medical History:     Past Medical History:   Diagnosis Date    Abnormal vaginal bleeding 4/19/2019    Acquired hallux rigidus 4/19/2019    Acute and chronic respiratory failure with hypoxia ([address] 11/6/2018    Acute on chronic respiratory failure with hypoxia and hypercapnia ([address] 11/6/2018    ASCUS with positive high risk HPV cervical     Asthma     Bipolar disorder ([address] 4/19/2019    Breast discharge 4/9/2015    Cellulitis 4/19/2019    Cervical radiculopathy 4/19/2019    Chest pain 11/4/2018    Chlamydia ? Reported hx    Chronic cor pulmonale (HCC)     COPD (chronic obstructive pulmonary disease) (HCC)     D-dimer, elevated 11/18/2018    Dysfunctional uterine bleeding 1/27/2014    Dysplasia of cervix, low grade ([facility]) 3/30/2017    Noted on colposcopy 3/7/17    Dyspnea 11/3/2018    Ectopic pregnancy - Right 2/9/2014    Pt was initiallly dx with a left ovarian ectopic. After Laproscopic eval she was noted to have a Right Tubal ectopic. She underwent a RSO on 2/9/14.        Elevated blood pressure reading     Elevated brain natriuretic peptide (BNP) level     Gastroesophageal reflux disease 2019    H/O abnormal cervical Papanicolaou smear 2/3/2017    LSIL - 2015 Pap collected 2/3/2017. Patient is very difficult SSE as she is morbidly obese. Required ringed forceps and snowman speculum.  Heartburn     History of trichomonal vaginitis 20106    tx'd    Hypertension     Hypoalbuminemia due to protein-calorie malnutrition ([address] 2018    Hypocalcemia 2018    Migraine 2019    Morbid obesity with BMI of 70 and over, adult ([address]     BMI 74    MRSA (methicillin resistant Staphylococcus aureus) 2010    Left leg    Muscle weakness     Neck pain 2019    Normocytic normochromic anemia 2018    Obesity     Obesity hypoventilation syndrome ([address] 2018    BRYSON (obstructive sleep apnea)     BRYSON on CPAP    Ovarian ectopic pregnancy 2014    Right tubal ectopic tx'd with MTX AND LS RSO(initially dx as Left ovarian By USN 14)    Pain     Pelvic adhesive disease 14    C/S to bladder, as well as reactive to ectopic; also hx PID    Pulmonary hypertension ([address]     Shoulder joint pain 2013    Sprain of ankle 2019    Tobacco abuse 2018    Type 2 diabetes mellitus without complication, without long-term current use of insulin ([address] 2018    Unspecified sleep apnea     Vitamin D deficiency 2019   .     Past Surgical History:  Past Surgical History:   Procedure Laterality Date     SECTION      CHOLECYSTECTOMY, LAPAROSCOPIC      LEG SURGERY      right leg age 7 for growth plate    SALPINGO-OOPHORECTOMY  15    Right; ectopic pregnancy    TONSILLECTOMY      age 12       Family History:  Family History   Problem Relation Age of Onset    High Blood Pressure Mother     Heart Disease Mother     Cancer Father         lung    Lung Cancer Father     Cancer Maternal Aunt         breast    Diabetes Maternal Aunt  Breast Cancer Maternal Aunt     Cancer Paternal Aunt         lung    Lung Cancer Paternal Aunt     Thyroid Disease Sister     Colon Cancer Neg Hx     Eclampsia Neg Hx     Hypertension Neg Hx     Ovarian Cancer Neg Hx      Labor Neg Hx     Spont Abortions Neg Hx     Stroke Neg Hx        Social History:  Social History     Socioeconomic History    Marital status: Single     Spouse name: Not on file    Number of children: Not on file    Years of education: Not on file    Highest education level: Not on file   Occupational History    Not on file   Social Needs    Financial resource strain: Not on file    Food insecurity     Worry: Not on file     Inability: Not on file    Transportation needs     Medical: Not on file     Non-medical: Not on file   Tobacco Use    Smoking status: Former Smoker     Packs/day: 0.10     Types: Cigarettes     Start date: 9/3/2014     Last attempt to quit:      Years since quittin.5    Smokeless tobacco: Never Used    Tobacco comment: quit in  started back up    Substance and Sexual Activity    Alcohol use:  Yes     Alcohol/week: 0.0 standard drinks     Comment: occassionally     Drug use: Not Currently     Types: Marijuana     Comment: 2020    Sexual activity: Not Currently   Lifestyle    Physical activity     Days per week: Not on file     Minutes per session: Not on file    Stress: Not on file   Relationships    Social connections     Talks on phone: Not on file     Gets together: Not on file     Attends Episcopalian service: Not on file     Active member of club or organization: Not on file     Attends meetings of clubs or organizations: Not on file     Relationship status: Not on file    Intimate partner violence     Fear of current or ex partner: Not on file     Emotionally abused: Not on file     Physically abused: Not on file     Forced sexual activity: Not on file   Other Topics Concern    Not on file   Social History Narrative  Not on file       Current Medications:  Current Outpatient Medications   Medication Sig Dispense Refill    diclofenac (CATAFLAM) 50 MG tablet Take 50 mg by mouth 3 times daily      gabapentin (NEURONTIN) 300 MG capsule Take 300 mg by mouth 3 times daily.  tiotropium (SPIRIVA HANDIHALER) 18 MCG inhalation capsule Spiriva Respimat 2.5 mcg/actuation solution for inhalation   Inhale 2 puffs every day by inhalation route.       ondansetron (ZOFRAN ODT) 4 MG disintegrating tablet Take 1 tablet by mouth every 8 hours as needed for Nausea 5 tablet 0    Promethazine-DM (PROMETHAZINE-DEXTROMETHORPHAN) 6.25-15 MG/5ML SOLN solution Take 5 mLs by mouth 4 times daily  1    aspirin 81 MG chewable tablet Take 1 tablet by mouth daily 30 tablet 3    butalbital-acetaminophen-caffeine (FIORICET, ESGIC) -40 MG per tablet Take 1 tablet by mouth every 4 hours as needed for Headaches 20 tablet 0    albuterol sulfate  (90 Base) MCG/ACT inhaler Inhale 2 puffs into the lungs 4 times daily 1 Inhaler 3    ipratropium (ATROVENT HFA) 17 MCG/ACT inhaler Inhale 2 puffs into the lungs 4 times daily 1 Inhaler 3    lisinopril (PRINIVIL;ZESTRIL) 20 MG tablet Take 1 tablet by mouth daily 30 tablet 3    metFORMIN (GLUCOPHAGE) 500 MG tablet Take 1 tablet by mouth 2 times daily (with meals) (Patient taking differently: Take 1,000 mg by mouth daily (with breakfast) ) 60 tablet 3    POTASSIUM CHLORIDE PO Take 25 mg by mouth daily      famotidine (PEPCID) 20 MG tablet Take 1 tablet by mouth 2 times daily 60 tablet 0    benzonatate (TESSALON PERLES) 100 MG capsule Take 1 capsule by mouth every 8 hours as needed for Cough 20 capsule 0    hydrochlorothiazide (HYDRODIURIL) 25 MG tablet Take 25 mg by mouth daily      vitamin D (ERGOCALCIFEROL) 16504 UNITS CAPS capsule Take 1,000 Units by mouth daily       topiramate (TOPAMAX) 50 MG tablet Take 100 mg by mouth 2 times daily       metoprolol (LOPRESSOR) 50 MG tablet Take 50 mg by mouth 2 times daily      loratadine (CLARITIN) 10 MG tablet Take 10 mg by mouth daily       No current facility-administered medications for this visit. Vital Signs:  /78 (Site: Right Lower Arm, Position: Sitting, Cuff Size: Large Adult)   Pulse 76   Temp 97.1 °F (36.2 °C) (Infrared)   Resp 22   Ht 5' 10\" (1.778 m)   Wt (!) 604 lb (274 kg)   LMP 05/22/2020 (Exact Date)   BMI 86.66 kg/m²     BMI/Height/Weight:  Body mass index is 86.66 kg/m². Review of Systems - A review of systems was performed. All was negative unless otherwise documented in HPI. Constitutional: Negative for fever, chills and diaphoresis. HENT: Negative for hearing loss and trouble swallowing. Eyes: Negative for photophobia and visual disturbance. Respiratory: Negative for cough, shortness of breath and wheezing. Cardiovascular: Negative for chest pain and palpitations. Gastrointestinal: Negative for nausea, vomiting, abdominal pain, diarrhea, constipation, blood in stool and abdominal distention. Endocrine: Negative for polydipsia, polyphagia and polyuria. Genitourinary: Negative for dysuria, frequency, hematuria and difficulty urinating. Musculoskeletal: Negative for myalgias, joint swelling. Skin: Negative for pallor and rash. Neurological: Negative for dizziness, tremors, light-headedness and headaches. Psychiatric/Behavioral: Negative for sleep disturbance and dysphoric mood. Objective:      Physical Exam   Vital signs reviewed. General: Well-developed and well-nourished. No acute distress. Skin: Warm, dry and intact. HEENT: Normocephalic. EOMs intact. Conjunctivae normal. Neck supple. Cardiovascular: Normal rate, regular rhythm. Pulmonary/Chest: Normal effort. Lungs clear to auscultation. No rales, rhonchi or wheezing. Abdominal: Positive bowel sounds. Soft, nontender. Nondistended. Musculoskeletal: Movement x4. Bilateral lower extremity edema.   Neurological: Gait normal. Alert and oriented to person, place, and time. Psychiatric: Flat affect. Speech and behavior normal. Judgment and thought content normal. Cognition and memory intact. Assessment:       Diagnosis Orders   1. Type 2 diabetes mellitus without complication, without long-term current use of insulin ([facility]     2. BRYSON (obstructive sleep apnea)     3. Chronic bipolar disorder ([facility]     4. Gastroesophageal reflux disease, esophagitis presence not specified     5. Anxiety and depression     6. Hyperlipidemia, unspecified hyperlipidemia type     7. Essential hypertension     8. Obesity hypoventilation syndrome ([address]     9. Morbid obesity with BMI of 70 and over, adult ([facility]     10. Uncomplicated asthma, unspecified asthma severity, unspecified whether persistent     11. Pulmonary hypertension ([facility]     12. Cervical radiculopathy     13. Chronic migraine     14. Chronic obstructive pulmonary disease, unspecified COPD type ([facility]         Plan:    Dietitian visit today. Patient was encouraged to journal all food intake. Keep calorie level at approximately 4350-6960. Protein intake is to be a minimum of 60-80 grams per day. Water drinking was encouraged with a goal of 64oz-128oz daily. Beverages to be calorie free except for milk. Every other beverage should be water. Avoid soda. Continue to increase level of physical activity. Encouraged use of exercise log. Follow-up  Return in about 1 month (around 8/6/2020). Orders this encounter:  No orders of the defined types were placed in this encounter. Prescriptions this encounter:  No orders of the defined types were placed in this encounter.       Electronically signed by:  Manda Calvillo CNP

## 2020-07-06 NOTE — PROGRESS NOTES
Medical Nutrition Therapy   Metabolic and Bariatric Surgery         Supervised diet and exercise preparation  Visit 5 out of 6  Pt reports:          Vitals: Wt Readings from Last 3 Encounters:   07/06/20 (!) 604 lb (274 kg)   06/08/20 (!) 602 lb (273.1 kg)   05/11/20 (!) 598 lb (271.3 kg)           Nutrition Assessment:   PES: Knowledge deficit related to healthy behaviors that support weight management post weight loss surgery as evidenced by Body mass index is 86.66 kg/m². Nutrition Assessment of Goal Attainment:  TREATMENT GOALS:    1. Pt  Completed 2 out of 3 goals. 2.TREATMENT GOALS FOR UPCOMING WEEK: continue all previous goals and add: # 15    All goals were planned with and agreed on by the patient. Goals    All goals were planned with and agreed on by the patient. appt # NA G What is your next step? C 1 2 3 4 5 6 7 8 9     0  one I will read the education binder provided to me x 100             0  2 I will make my pschological evaluation appoinment. x 100             4  3 I will bring this goal card to every appointment. 100 100  0 100          x 4 I will eliminate all tobacco/nicotine. x 5 I will limit alcoholic beverages to 8-9PZ per week. x 6 I will limit dining out to 3 times per week or less. x 7 I will eliminate sugary beverages. x 8 I will eliminate carbonated beverages. x 9 I will eliminate drinking with a straw. x 10 I will limit caffeinated beverages to 16oz daily. 11 I will limit cold cereals prepared with milk. 12 I will do a 5 minute reflection. 13 I will food journal daily. 5  14 I will log my exercise daily. 100 100  0 100         5  15 I will determine my  calcium and multivitamin plan. 16 I will purchase multivitamin. 17 I will start taking multivitamins following my plan.

## 2020-07-07 ENCOUNTER — HOSPITAL ENCOUNTER (OUTPATIENT)
Dept: PREADMISSION TESTING | Age: 35
Setting detail: SPECIMEN
Discharge: HOME | End: 2020-07-11
Payer: COMMERCIAL

## 2020-07-07 PROCEDURE — U0003 INFECTIOUS AGENT DETECTION BY NUCLEIC ACID (DNA OR RNA); SEVERE ACUTE RESPIRATORY SYNDROME CORONAVIRUS 2 (SARS-COV-2) (CORONAVIRUS DISEASE [COVID-19]), AMPLIFIED PROBE TECHNIQUE, MAKING USE OF HIGH THROUGHPUT TECHNOLOGIES AS DESCRIBED BY CMS-2020-01-R: HCPCS

## 2020-07-08 LAB
SARS-COV-2, PCR: NORMAL
SARS-COV-2, RAPID: NORMAL
SARS-COV-2: NOT DETECTED
SOURCE: NORMAL

## 2020-07-09 ENCOUNTER — TELEPHONE (OUTPATIENT)
Dept: PRIMARY CARE CLINIC | Age: 35
End: 2020-07-09

## 2020-07-09 RX ORDER — LIDOCAINE HYDROCHLORIDE 10 MG/ML
1 INJECTION, SOLUTION EPIDURAL; INFILTRATION; INTRACAUDAL; PERINEURAL
Status: CANCELLED | OUTPATIENT
Start: 2020-07-09 | End: 2020-07-09

## 2020-07-09 RX ORDER — SODIUM CHLORIDE 0.9 % (FLUSH) 0.9 %
10 SYRINGE (ML) INJECTION EVERY 12 HOURS SCHEDULED
Status: CANCELLED | OUTPATIENT
Start: 2020-07-09

## 2020-07-09 RX ORDER — SODIUM CHLORIDE 0.9 % (FLUSH) 0.9 %
10 SYRINGE (ML) INJECTION PRN
Status: CANCELLED | OUTPATIENT
Start: 2020-07-09

## 2020-07-09 RX ORDER — SODIUM CHLORIDE, SODIUM LACTATE, POTASSIUM CHLORIDE, CALCIUM CHLORIDE 600; 310; 30; 20 MG/100ML; MG/100ML; MG/100ML; MG/100ML
INJECTION, SOLUTION INTRAVENOUS CONTINUOUS
Status: CANCELLED | OUTPATIENT
Start: 2020-07-09

## 2020-07-10 ENCOUNTER — HOSPITAL ENCOUNTER (OUTPATIENT)
Age: 35
Setting detail: OUTPATIENT SURGERY
Discharge: HOME | End: 2020-07-10
Attending: SURGERY | Admitting: SURGERY
Payer: COMMERCIAL

## 2020-07-10 ENCOUNTER — ANESTHESIA (OUTPATIENT)
Dept: OPERATING ROOM | Age: 35
End: 2020-07-10
Payer: COMMERCIAL

## 2020-07-10 VITALS
TEMPERATURE: 97.2 F | SYSTOLIC BLOOD PRESSURE: 134 MMHG | BODY MASS INDEX: 41.02 KG/M2 | DIASTOLIC BLOOD PRESSURE: 81 MMHG | HEIGHT: 71 IN | WEIGHT: 293 LBS | HEART RATE: 61 BPM | OXYGEN SATURATION: 98 % | RESPIRATION RATE: 16 BRPM

## 2020-07-10 VITALS — OXYGEN SATURATION: 99 % | SYSTOLIC BLOOD PRESSURE: 174 MMHG | DIASTOLIC BLOOD PRESSURE: 88 MMHG

## 2020-07-10 LAB
EKG ATRIAL RATE: 68 BPM
EKG P AXIS: 67 DEGREES
EKG P-R INTERVAL: 210 MS
EKG Q-T INTERVAL: 430 MS
EKG QRS DURATION: 120 MS
EKG QTC CALCULATION (BAZETT): 457 MS
EKG R AXIS: 23 DEGREES
EKG T AXIS: 46 DEGREES
EKG VENTRICULAR RATE: 68 BPM
GLUCOSE BLD-MCNC: 113 MG/DL (ref 65–105)

## 2020-07-10 PROCEDURE — 6370000000 HC RX 637 (ALT 250 FOR IP): Performed by: NURSE ANESTHETIST, CERTIFIED REGISTERED

## 2020-07-10 PROCEDURE — 88305 TISSUE EXAM BY PATHOLOGIST: CPT

## 2020-07-10 PROCEDURE — 3609012400 HC EGD TRANSORAL BIOPSY SINGLE/MULTIPLE: Performed by: SURGERY

## 2020-07-10 PROCEDURE — 2580000003 HC RX 258: Performed by: NURSE ANESTHETIST, CERTIFIED REGISTERED

## 2020-07-10 PROCEDURE — 82947 ASSAY GLUCOSE BLOOD QUANT: CPT

## 2020-07-10 PROCEDURE — 7100000011 HC PHASE II RECOVERY - ADDTL 15 MIN: Performed by: SURGERY

## 2020-07-10 PROCEDURE — 2709999900 HC NON-CHARGEABLE SUPPLY: Performed by: SURGERY

## 2020-07-10 PROCEDURE — 7100000010 HC PHASE II RECOVERY - FIRST 15 MIN: Performed by: SURGERY

## 2020-07-10 PROCEDURE — 6360000002 HC RX W HCPCS: Performed by: NURSE ANESTHETIST, CERTIFIED REGISTERED

## 2020-07-10 PROCEDURE — 93005 ELECTROCARDIOGRAM TRACING: CPT | Performed by: ANESTHESIOLOGY

## 2020-07-10 PROCEDURE — 3700000000 HC ANESTHESIA ATTENDED CARE: Performed by: SURGERY

## 2020-07-10 PROCEDURE — 2500000003 HC RX 250 WO HCPCS: Performed by: NURSE ANESTHETIST, CERTIFIED REGISTERED

## 2020-07-10 RX ORDER — PROPOFOL 10 MG/ML
INJECTION, EMULSION INTRAVENOUS PRN
Status: DISCONTINUED | OUTPATIENT
Start: 2020-07-10 | End: 2020-07-10 | Stop reason: SDUPTHER

## 2020-07-10 RX ORDER — OXYCODONE HYDROCHLORIDE AND ACETAMINOPHEN 5; 325 MG/1; MG/1
2 TABLET ORAL PRN
Status: DISCONTINUED | OUTPATIENT
Start: 2020-07-10 | End: 2020-07-10 | Stop reason: HOSPADM

## 2020-07-10 RX ORDER — DIPHENHYDRAMINE HYDROCHLORIDE 50 MG/ML
12.5 INJECTION INTRAMUSCULAR; INTRAVENOUS
Status: DISCONTINUED | OUTPATIENT
Start: 2020-07-10 | End: 2020-07-10 | Stop reason: HOSPADM

## 2020-07-10 RX ORDER — SODIUM CHLORIDE, SODIUM LACTATE, POTASSIUM CHLORIDE, CALCIUM CHLORIDE 600; 310; 30; 20 MG/100ML; MG/100ML; MG/100ML; MG/100ML
INJECTION, SOLUTION INTRAVENOUS CONTINUOUS PRN
Status: DISCONTINUED | OUTPATIENT
Start: 2020-07-10 | End: 2020-07-10 | Stop reason: SDUPTHER

## 2020-07-10 RX ORDER — LABETALOL 20 MG/4 ML (5 MG/ML) INTRAVENOUS SYRINGE
5 EVERY 10 MIN PRN
Status: DISCONTINUED | OUTPATIENT
Start: 2020-07-10 | End: 2020-07-10 | Stop reason: HOSPADM

## 2020-07-10 RX ORDER — 0.9 % SODIUM CHLORIDE 0.9 %
500 INTRAVENOUS SOLUTION INTRAVENOUS
Status: DISCONTINUED | OUTPATIENT
Start: 2020-07-10 | End: 2020-07-10 | Stop reason: HOSPADM

## 2020-07-10 RX ORDER — LIDOCAINE HYDROCHLORIDE 10 MG/ML
INJECTION, SOLUTION INFILTRATION; PERINEURAL PRN
Status: DISCONTINUED | OUTPATIENT
Start: 2020-07-10 | End: 2020-07-10 | Stop reason: SDUPTHER

## 2020-07-10 RX ORDER — OXYCODONE HYDROCHLORIDE AND ACETAMINOPHEN 5; 325 MG/1; MG/1
1 TABLET ORAL PRN
Status: DISCONTINUED | OUTPATIENT
Start: 2020-07-10 | End: 2020-07-10 | Stop reason: HOSPADM

## 2020-07-10 RX ORDER — FENTANYL CITRATE 50 UG/ML
25 INJECTION, SOLUTION INTRAMUSCULAR; INTRAVENOUS EVERY 5 MIN PRN
Status: DISCONTINUED | OUTPATIENT
Start: 2020-07-10 | End: 2020-07-10 | Stop reason: HOSPADM

## 2020-07-10 RX ORDER — ONDANSETRON 2 MG/ML
4 INJECTION INTRAMUSCULAR; INTRAVENOUS
Status: DISCONTINUED | OUTPATIENT
Start: 2020-07-10 | End: 2020-07-10 | Stop reason: HOSPADM

## 2020-07-10 RX ORDER — PROMETHAZINE HYDROCHLORIDE 25 MG/ML
6.25 INJECTION, SOLUTION INTRAMUSCULAR; INTRAVENOUS
Status: DISCONTINUED | OUTPATIENT
Start: 2020-07-10 | End: 2020-07-10 | Stop reason: HOSPADM

## 2020-07-10 RX ADMIN — LIDOCAINE HYDROCHLORIDE 50 MG: 10 INJECTION, SOLUTION INFILTRATION; PERINEURAL at 07:54

## 2020-07-10 RX ADMIN — PROPOFOL 50 MG: 10 INJECTION, EMULSION INTRAVENOUS at 07:58

## 2020-07-10 RX ADMIN — PROPOFOL 20 MG: 10 INJECTION, EMULSION INTRAVENOUS at 07:57

## 2020-07-10 RX ADMIN — BENZOCAINE, BUTAMBEN, AND TETRACAINE HYDROCHLORIDE 1 SPRAY: .028; .004; .004 AEROSOL, SPRAY TOPICAL at 07:53

## 2020-07-10 RX ADMIN — SODIUM CHLORIDE, POTASSIUM CHLORIDE, SODIUM LACTATE AND CALCIUM CHLORIDE: 600; 310; 30; 20 INJECTION, SOLUTION INTRAVENOUS at 07:54

## 2020-07-10 RX ADMIN — PROPOFOL 20 MG: 10 INJECTION, EMULSION INTRAVENOUS at 08:01

## 2020-07-10 RX ADMIN — PROPOFOL 50 MG: 10 INJECTION, EMULSION INTRAVENOUS at 07:56

## 2020-07-10 RX ADMIN — PROPOFOL 20 MG: 10 INJECTION, EMULSION INTRAVENOUS at 08:00

## 2020-07-10 RX ADMIN — BENZOCAINE, BUTAMBEN, AND TETRACAINE HYDROCHLORIDE 1 SPRAY: .028; .004; .004 AEROSOL, SPRAY TOPICAL at 07:32

## 2020-07-10 RX ADMIN — PROPOFOL 50 MG: 10 INJECTION, EMULSION INTRAVENOUS at 07:54

## 2020-07-10 ASSESSMENT — PULMONARY FUNCTION TESTS
PIF_VALUE: 1

## 2020-07-10 ASSESSMENT — PAIN SCALES - GENERAL
PAINLEVEL_OUTOF10: 0

## 2020-07-10 ASSESSMENT — ENCOUNTER SYMPTOMS: SHORTNESS OF BREATH: 1

## 2020-07-10 NOTE — PROGRESS NOTES
0615 pt in pre op,moving slowly. Unable to give urine specimen for HCG-on menses now. Took time to remove all of earrings.

## 2020-07-10 NOTE — ANESTHESIA PRE PROCEDURE
tablet Take 25 mg by mouth daily   Yes Historical Provider, MD   vitamin D (ERGOCALCIFEROL) 98581 UNITS CAPS capsule Take 1,000 Units by mouth daily    Yes Historical Provider, MD   topiramate (TOPAMAX) 50 MG tablet Take 100 mg by mouth 2 times daily  1/29/16  Yes Historical Provider, MD   metoprolol (LOPRESSOR) 50 MG tablet Take 50 mg by mouth 2 times daily 8/10/15  Yes Historical Provider, MD   loratadine (CLARITIN) 10 MG tablet Take 10 mg by mouth daily   Yes Historical Provider, MD   Promethazine-DM (PROMETHAZINE-DEXTROMETHORPHAN) 6.25-15 MG/5ML SOLN solution Take 5 mLs by mouth 4 times daily 9/12/19   Historical Provider, MD   famotidine (PEPCID) 20 MG tablet Take 1 tablet by mouth 2 times daily 10/28/18   Royal Joana MD   benzonatate (TESSALON PERLES) 100 MG capsule Take 1 capsule by mouth every 8 hours as needed for Cough 10/25/18   Renea Davis MD       Current medications:    No current facility-administered medications for this encounter.         Allergies:  No Known Allergies    Problem List:    Patient Active Problem List   Diagnosis Code    Ectopic pregnancy - Right O00.90    Morbid obesity with BMI of 70 and over, adult ([facility] [address] E66.01, Z68.45    Hypertension I10    Asthma J45.909    History of trichomonal vaginitis Z86.19    H/O abnormal cervical Papanicolaou smear Z87.42    ASCUS with positive high risk HPV cervical R87.610, R87.810    Dysplasia of cervix, low grade (ISAIAS 1) N87.0    Obesity hypoventilation syndrome (HCC) E66.2    Type 2 diabetes mellitus without complication, without long-term current use of insulin (HCC) E11.9    Pulmonary hypertension ([facility]) I27.20    BRYSON (obstructive sleep apnea) G47.33    Normocytic normochromic anemia D64.9    Abnormal vaginal bleeding N93.9    Acquired hallux rigidus M20.20    Chronic bipolar disorder (HCC) F31.9    Cervical radiculopathy M54.12    Dysfunctional uterine bleeding N93.8    Gastroesophageal reflux disease K21.9    Chronic migraine G43.709    Neck pain M54.2    Osteoarthritis of knee M17.10    Shoulder joint pain M25.519    Vitamin D deficiency E55.9    Anxiety and depression F41.9, F32.9    HLD (hyperlipidemia) E78.5    COPD (chronic obstructive pulmonary disease) ([facility]) J44.9    History of marijuana use Z87.898       Past Medical History:        Diagnosis Date    Abnormal vaginal bleeding 4/19/2019    Acquired hallux rigidus 4/19/2019    Acute and chronic respiratory failure with hypoxia ([address] 11/6/2018    Acute on chronic respiratory failure with hypoxia and hypercapnia ([address] 11/6/2018    ASCUS with positive high risk HPV cervical     Asthma     Bipolar disorder ([address] 4/19/2019    Breast discharge 4/9/2015    Cellulitis 4/19/2019    Cervical radiculopathy 4/19/2019    Chest pain 11/4/2018    Chlamydia ? Reported hx    Chronic cor pulmonale (HCC)     COPD (chronic obstructive pulmonary disease) ([facility])     D-dimer, elevated 11/18/2018    Dysfunctional uterine bleeding 1/27/2014    Dysplasia of cervix, low grade (ISAIAS 1) 3/30/2017    Noted on colposcopy 3/7/17    Dyspnea 11/3/2018    Ectopic pregnancy - Right 2/9/2014    Pt was initiallly dx with a left ovarian ectopic. After Laproscopic eval she was noted to have a Right Tubal ectopic. She underwent a RSO on 2/9/14.  Elevated blood pressure reading     Elevated brain natriuretic peptide (BNP) level     Gastroesophageal reflux disease 4/19/2019    H/O abnormal cervical Papanicolaou smear 2/3/2017    LSIL - 2/2015 Pap collected 2/3/2017. Patient is very difficult SSE as she is morbidly obese. Required ringed forceps and snowman speculum.       Heartburn     History of trichomonal vaginitis 1/20106    tx'd    Hypertension     Hypoalbuminemia due to protein-calorie malnutrition ([address] 11/6/2018    Hypocalcemia 11/21/2018    Migraine 4/19/2019    Morbid obesity with BMI of 70 and over, adult ([address]     BMI 74    MRSA (methicillin resistant Staphylococcus aureus) 2010    Left leg    Muscle weakness     Neck pain 2019    Normocytic normochromic anemia 2018    Obesity     Obesity hypoventilation syndrome ([address] 2018    BRYSON (obstructive sleep apnea)     BRYSON on CPAP    Ovarian ectopic pregnancy 2014    Right tubal ectopic tx'd with MTX AND LS RSO(initially dx as Left ovarian By USN 14)    Pain     Pelvic adhesive disease 14    C/S to bladder, as well as reactive to ectopic; also hx PID    Pulmonary hypertension ([address]     Shoulder joint pain 2013    Sprain of ankle 2019    Tobacco abuse 2018    Type 2 diabetes mellitus without complication, without long-term current use of insulin ([address] 2018    Unspecified sleep apnea     Vitamin D deficiency 2019       Past Surgical History:        Procedure Laterality Date     SECTION  2005    CHOLECYSTECTOMY, LAPAROSCOPIC      LEG SURGERY      right leg age 7 for growth plate    SALPINGO-OOPHORECTOMY  14    Right; ectopic pregnancy    TONSILLECTOMY      age 12       Social History:    Social History     Tobacco Use    Smoking status: Former Smoker     Packs/day: 0.10     Types: Cigarettes     Start date: 9/3/2014     Last attempt to quit: 2018     Years since quittin.5    Smokeless tobacco: Never Used    Tobacco comment: quit in  started back up    Substance Use Topics    Alcohol use:  Yes     Alcohol/week: 0.0 standard drinks     Comment: occassionally                                 Counseling given: Not Answered  Comment: quit in  started back up       Vital Signs (Current):   Vitals:    20 1452 07/10/20 0614 07/10/20 0618   Temp:   96.7 °F (35.9 °C)   TempSrc:   Temporal   Weight: (!) 600 lb (272.2 kg) (!) 600 lb (272.2 kg)    Height: 5' 11\" (1.803 m) 5' 11\" (1.803 m)                                               BP Readings from Last 3 Encounters:   20 118/78   20 122/72   20 128/84 NPO Status: Time of last liquid consumption: 0640(sip of water with meds)                        Time of last solid consumption: 2000                        Date of last liquid consumption: 07/10/20                        Date of last solid food consumption: 07/09/20    BMI:   Wt Readings from Last 3 Encounters:   07/10/20 (!) 600 lb (272.2 kg)   07/06/20 (!) 604 lb (274 kg)   06/08/20 (!) 602 lb (273.1 kg)     Body mass index is 83.68 kg/m².     CBC:   Lab Results   Component Value Date    WBC 7.03 03/19/2020    RBC 5.42 03/19/2020    HGB 14.5 03/19/2020    HCT 47.1 03/19/2020    MCV 86.9 03/19/2020    RDW 15.4 03/19/2020     03/19/2020       CMP:   Lab Results   Component Value Date     03/19/2020    K 3.8 03/19/2020     03/19/2020    CO2 28 03/19/2020    BUN 16 03/19/2020    CREATININE 0.80 03/19/2020    GFRAA >60 11/21/2018    LABGLOM >60 11/21/2018    GLUCOSE 99 03/19/2020    PROT 7.3 11/19/2018    CALCIUM 9.5 03/19/2020    BILITOT 0.5 03/19/2020    ALKPHOS 45 03/19/2020    AST 16 03/19/2020    ALT 28 03/19/2020       POC Tests:   Recent Labs     07/10/20  0629   POCGLU 113*       Coags:   Lab Results   Component Value Date    PROTIME 9.7 11/18/2018    INR 0.9 11/18/2018    APTT 44.4 11/19/2018       HCG (If Applicable):   Lab Results   Component Value Date    PREGTESTUR NEGATIVE 11/14/2019    HCG NEGATIVE 10/25/2018    HCGQUANT <1 01/23/2015        ABGs: No results found for: PHART, PO2ART, TEL1ZSH, CLE3PMC, BEART, M4TFFQDD     Type & Screen (If Applicable):  No results found for: LABABO, LABRH    Drug/Infectious Status (If Applicable):  Lab Results   Component Value Date    HEPCAB NONREACTIVE 07/13/2017       COVID-19 Screening (If Applicable):   Lab Results   Component Value Date    COVID19 Not Detected 07/07/2020         Anesthesia Evaluation  Patient summary reviewed and Nursing notes reviewed  Airway: Mallampati: II  TM distance: >3 FB   Neck ROM: full  Mouth opening: > = 3 FB Dental:      Comment: -MISSING SOME LOWER TEETH BILATERALLY    Pulmonary:normal exam    (+) COPD:  shortness of breath:  sleep apnea: on CPAP,  asthma:           Patient did not smoke on day of surgery. ROS comment: -QUIT SMOKING 2018 - 21 PACK YEARS  -HX OF HYPOXIA, HYPOVENTILATION   Cardiovascular:    (+) hypertension:, CHF:, KIRKPATRICK:, pulmonary hypertension:,       ECG reviewed                     ROS comment: -COR PULMONALE     Neuro/Psych:   Negative Neuro/Psych ROS              GI/Hepatic/Renal:   (+) morbid obesity          Endo/Other:    (+) Diabetes, . ROS comment: -PROLONGED EMERGENCE  -NPO AFTER MIDNIGHT  -NKDA Abdominal:           Vascular:           ROS comment: -ANEMIA. Anesthesia Plan      MAC     ASA 4       Induction: intravenous. MIPS: Postoperative opioids intended and Prophylactic antiemetics administered. Anesthetic plan and risks discussed with patient. Plan discussed with CRNA.     Attending anesthesiologist reviewed and agrees with Pre Eval content              Buster Machuca MD   7/10/2020

## 2020-07-10 NOTE — H&P
Bariatric Surgery H&P     Patient - Genie Render            - 1985        MRN [facility]  [id number]   [facility]      ADMISSION DATE: 7/10/2020  5:46 AM   TODAY'S DATE: 7/10/2020     ATTENDING PHYSICIAN: Chema Humphrey DO    HISTORY OF PRESENT ILLNESS:  Petr Ernst is a 71-year-old female with history of morbid obesity (BMI 83.9 kg/m²) presenting for preoperative EGD. She is currently being considered for sleeve gastrectomy by Dr. Nabil Brooks. In addition to her severe morbid obesity, she also has medical history of type 2 diabetes, asthma, pulmonary hypertension, BRYSON, obesity hypoventilation syndrome, anemia, GERD, anxiety/depression, HLD, chronic migraine, arthritis, and COPD. She is currently in the preoperative bariatric surgery weight loss program.  She has been seen by the dietitian. She has preop evaluation by cardiology and pulmonology scheduled. She denies changes to her medical status since her last visit. She denies fever, chest pain, shortness of breath, nausea, vomiting, or diarrhea. Past Medical History:        Diagnosis Date    Abnormal vaginal bleeding 2019    Acquired hallux rigidus 2019    Acute and chronic respiratory failure with hypoxia ([facility] [address] 2018    Acute on chronic respiratory failure with hypoxia and hypercapnia (HCC) 2018    ASCUS with positive high risk HPV cervical     Asthma     Bipolar disorder ([facility] [address] 2019    Breast discharge 2015    Cellulitis 2019    Cervical radiculopathy 2019    Chest pain 2018    Chlamydia ? Reported hx    Chronic cor pulmonale (HCC)     COPD (chronic obstructive pulmonary disease) (HCC)     D-dimer, elevated 2018    Dysfunctional uterine bleeding 2014    Dysplasia of cervix, low grade (ISAIAS 1) 3/30/2017    Noted on colposcopy 3/7/17    Dyspnea 11/3/2018    Ectopic pregnancy - Right 2014    Pt was initiallly dx with a left ovarian ectopic.   After Laproscopic eval she was noted to have a Right Tubal ectopic. She underwent a RSO on 14.  Elevated blood pressure reading     Elevated brain natriuretic peptide (BNP) level     Gastroesophageal reflux disease 2019    H/O abnormal cervical Papanicolaou smear 2/3/2017    LSIL - 2015 Pap collected 2/3/2017. Patient is very difficult SSE as she is morbidly obese. Required ringed forceps and snowman speculum.       Heartburn     History of trichomonal vaginitis 20106    tx'd    Hypertension     Hypoalbuminemia due to protein-calorie malnutrition ([address] 2018    Hypocalcemia 2018    Migraine 2019    Morbid obesity with BMI of 70 and over, adult ([address]     BMI 74    MRSA (methicillin resistant Staphylococcus aureus) 2010    Left leg    Muscle weakness     Neck pain 2019    Normocytic normochromic anemia 2018    Obesity     Obesity hypoventilation syndrome ([address] 2018    BRYSON (obstructive sleep apnea)     BRYSON on CPAP    Ovarian ectopic pregnancy 2014    Right tubal ectopic tx'd with MTX AND LS RSO(initially dx as Left ovarian By USN 14)    Pain     Pelvic adhesive disease 14    C/S to bladder, as well as reactive to ectopic; also hx PID    Pulmonary hypertension ([address]     Shoulder joint pain 2013    Sprain of ankle 2019    Tobacco abuse 2018    Type 2 diabetes mellitus without complication, without long-term current use of insulin ([address] 2018    Unspecified sleep apnea     Vitamin D deficiency 2019       Past Surgical History:        Procedure Laterality Date     SECTION  2005    CHOLECYSTECTOMY, LAPAROSCOPIC      LEG SURGERY      right leg age 7 for growth plate    SALPINGO-OOPHORECTOMY  14    Right; ectopic pregnancy    TONSILLECTOMY      age 6       Medications Prior to Admission:   Medications Prior to Admission: diclofenac (CATAFLAM) 50 MG tablet, Take 50 mg by mouth 3 times daily  gabapentin (NEURONTIN) 300 MG capsule, Take 300 mg by mouth 3 times daily. tiotropium (SPIRIVA HANDIHALER) 18 MCG inhalation capsule, Spiriva Respimat 2.5 mcg/actuation solution for inhalation  Inhale 2 puffs every day by inhalation route. ondansetron (ZOFRAN ODT) 4 MG disintegrating tablet, Take 1 tablet by mouth every 8 hours as needed for Nausea  aspirin 81 MG chewable tablet, Take 1 tablet by mouth daily  butalbital-acetaminophen-caffeine (FIORICET, ESGIC) -40 MG per tablet, Take 1 tablet by mouth every 4 hours as needed for Headaches  albuterol sulfate  (90 Base) MCG/ACT inhaler, Inhale 2 puffs into the lungs 4 times daily  ipratropium (ATROVENT HFA) 17 MCG/ACT inhaler, Inhale 2 puffs into the lungs 4 times daily  lisinopril (PRINIVIL;ZESTRIL) 20 MG tablet, Take 1 tablet by mouth daily  metFORMIN (GLUCOPHAGE) 500 MG tablet, Take 1 tablet by mouth 2 times daily (with meals) (Patient taking differently: Take 1,000 mg by mouth daily (with breakfast) )  POTASSIUM CHLORIDE PO, Take 25 mg by mouth daily  hydrochlorothiazide (HYDRODIURIL) 25 MG tablet, Take 25 mg by mouth daily  vitamin D (ERGOCALCIFEROL) 10354 UNITS CAPS capsule, Take 1,000 Units by mouth daily   topiramate (TOPAMAX) 50 MG tablet, Take 100 mg by mouth 2 times daily   metoprolol (LOPRESSOR) 50 MG tablet, Take 50 mg by mouth 2 times daily  loratadine (CLARITIN) 10 MG tablet, Take 10 mg by mouth daily  Promethazine-DM (PROMETHAZINE-DEXTROMETHORPHAN) 6.25-15 MG/5ML SOLN solution, Take 5 mLs by mouth 4 times daily  famotidine (PEPCID) 20 MG tablet, Take 1 tablet by mouth 2 times daily  benzonatate (TESSALON PERLES) 100 MG capsule, Take 1 capsule by mouth every 8 hours as needed for Cough    Allergies:    Patient has no known allergies.     Social History:   Tobacco: Former smoker  Alcohol: Occasional  Recreational Drugs: Marijuana    Family History:       Problem Relation Age of Onset    High Blood Pressure Mother     Heart Disease Mother     Cancer Father lung    Lung Cancer Father     Cancer Maternal Aunt         breast    Diabetes Maternal Aunt     Breast Cancer Maternal Aunt     Cancer Paternal Aunt         lung    Lung Cancer Paternal Aunt     Thyroid Disease Sister     Colon Cancer Neg Hx     Eclampsia Neg Hx     Hypertension Neg Hx     Ovarian Cancer Neg Hx      Labor Neg Hx     Spont Abortions Neg Hx     Stroke Neg Hx        REVIEW OF SYSTEMS:    11 systems reviewed and are negative except as noted in the HPI. PHYSICAL EXAM:    VITALS:  Temp 96.7 °F (35.9 °C) (Temporal)   Ht 5' 11\" (1.803 m)   Wt (!) 600 lb (272.2 kg)   LMP 2020   BMI 83.68 kg/m²     General:  alert and not in distress  HEENT:  Normocephalic, Atraumatic. Conjunctiva moist without icterus. Ears are symmetric. Nares are patent. Oral mucus membranes are moist.  Neck:  Supple. Cardiovascular:  Regular rate and rhythm. Warm, well perfused. Respiratory:  Respiration are easy and symmetric. No increased WOB  Abdomen: Morbidly obese, surgical incisions well healed,   Genitourinary: deferred  Anus:  Deferred. Neuro: Motor and sensory grossly intact. Extremities:  Warm, dry, and well perfused. Limbs without apparent deformity. Skin:  No rashes or lesions.     DATA  Labs:  CBC:   Lab Results   Component Value Date    WBC 7.03 2020    RBC 5.42 2020    HGB 14.5 2020    HCT 47.1 2020    MCV 86.9 2020    MCH 26.8 2020    MCHC 30.8 2020    RDW 15.4 2020     2020    MPV 10.2 2018     BMP:    Lab Results   Component Value Date     2020    K 3.8 2020     2020    CO2 28 2020    BUN 16 2020    LABALBU 3.8 2020    CREATININE 0.80 2020    CALCIUM 9.5 2020    GFRAA >60 2018    LABGLOM >60 2018    GLUCOSE 99 2020     PT/INR:    Lab Results   Component Value Date    PROTIME 9.7 2018    INR 0.9 2018     Cultures: N/A    Imaging:  N/A    ASSESSMENT   Leander Gillette is a 60-year-old female with history of morbid obesity (BMI 83.9 kg/m²) currently being considered for sleeve gastrectomy presenting for preoperative EGD.     PLAN  -Pre-procedure EKG  -To OR for EGD    Electronically signed by Servando Drake on 7/10/2020  Attending: Laurie England MD

## 2020-07-10 NOTE — ANESTHESIA POSTPROCEDURE EVALUATION
Department of Anesthesiology  Postprocedure Note    Patient: Katie Orosco  MRN: [id number]  YOB: 1985  Date of evaluation: 7/10/2020  Time:  8:18 AM     Procedure Summary     Date:  07/10/20 Room / Location:  [address] [place] [address]    Anesthesia Start:  9105 Anesthesia Stop:  3027    Procedure:  EGD BIOPSY (N/A ) Diagnosis:  (OBESITY)    Surgeon:  Jesus Ward DO Responsible Provider:  JUANY Michelle CRNA    Anesthesia Type:  MAC ASA Status:  4          Anesthesia Type: MAC    Jorge Phase I: Jorge Score: 10    Jorge Phase II: Jorge Score: 10    Last vitals: Reviewed and per EMR flowsheets.        Anesthesia Post Evaluation    Patient location during evaluation: PACU  Patient participation: complete - patient participated  Level of consciousness: awake and alert  Airway patency: patent  Nausea & Vomiting: no nausea and no vomiting  Complications: no  Cardiovascular status: hemodynamically stable  Respiratory status: face mask  Hydration status: euvolemic

## 2020-07-10 NOTE — OP NOTE
Preoperative Diagnosis:    GERD  Morbid obesity, BMI of Body mass index is 83.68 kg/m². Postoperative Diagnosis:   GERD without esophagitis  Diffuse Gastritis  Morbid obesity, BMI of Body mass index is 83.68 kg/m². Procedure: Esophagogastroduodenoscopy with Biopsy    Surgeon: Venecia Roberts DO    Assistant:    Anesthesia: MAC, see anesthesia records    Specimen:    1) Antrum for H. Pylori    Findings:  No hiatal hernia, Diffuse Gastritis    EBL: NONE    Operative Narrative: The risks and benefits were explained in detail to the patient who agreed and consented to the procedure. The patient was taken to the endoscopic suite and placed in a lateral position. Oxygen was administered via nasal cannula and a mouth guard was placed. MAC was administered via the anesthetic team.      The endoscope was then advanced into the oropharynx and down into the esophagus under direct visualization. The scope was further advanced through the esophagus, GE junction and stomach to the pylorus under visualization. The scope was passed through the pylorus and duodenal sweep performed, advancing and visualizing to the second portion of the duodenum. The scope was then withdrawn to the antrum and cold forceps were used to take biopsies of the antrum for H. Pylori. Appropriate hemostasis was noted. The scope was then retroflexed to visualize the GE junction. Evidence of a hiatal hernia was not noted. The scope was then slowly withdrawn through the GE junction. The Z line was noted. The stomach was then decompressed. The scope was withdrawn from the esophagus and no further lesions noted. The scope was withdrawn. The patient tolerated the procedure well. PPI. She will follow up with the Bariatric Clinic for further assessment.

## 2020-07-10 NOTE — H&P
Subjective     The patient is a 30 y.o. female who is here to undergo EGD for GERD. She is considering bariatric surgery for BMI of 83 kg/m2. Past Medical History:   Diagnosis Date    Abnormal vaginal bleeding 4/19/2019    Acquired hallux rigidus 4/19/2019    Acute and chronic respiratory failure with hypoxia ([address] 11/6/2018    Acute on chronic respiratory failure with hypoxia and hypercapnia (HCC) 11/6/2018    ASCUS with positive high risk HPV cervical     Asthma     Bipolar disorder ([address] 4/19/2019    Breast discharge 4/9/2015    Cellulitis 4/19/2019    Cervical radiculopathy 4/19/2019    Chest pain 11/4/2018    Chlamydia ? Reported hx    Chronic cor pulmonale (HCC)     COPD (chronic obstructive pulmonary disease) (HCC)     D-dimer, elevated 11/18/2018    Dysfunctional uterine bleeding 1/27/2014    Dysplasia of cervix, low grade (ISAIAS 1) 3/30/2017    Noted on colposcopy 3/7/17    Dyspnea 11/3/2018    Ectopic pregnancy - Right 2/9/2014    Pt was initiallly dx with a left ovarian ectopic. After Laproscopic eval she was noted to have a Right Tubal ectopic. She underwent a RSO on 2/9/14.  Elevated blood pressure reading     Elevated brain natriuretic peptide (BNP) level     Gastroesophageal reflux disease 4/19/2019    H/O abnormal cervical Papanicolaou smear 2/3/2017    LSIL - 2/2015 Pap collected 2/3/2017. Patient is very difficult SSE as she is morbidly obese. Required ringed forceps and snowman speculum.       Heartburn     History of trichomonal vaginitis 1/20106    tx'd    Hypertension     Hypoalbuminemia due to protein-calorie malnutrition ([address] 11/6/2018    Hypocalcemia 11/21/2018    Migraine 4/19/2019    Morbid obesity with BMI of 70 and over, adult ([address]     BMI 74    MRSA (methicillin resistant Staphylococcus aureus) 7/2010    Left leg    Muscle weakness     Neck pain 4/19/2019    Normocytic normochromic anemia 11/6/2018    Obesity     Obesity hypoventilation syndrome ([address] 2018    BRYSON (obstructive sleep apnea)     BRYSON on CPAP    Ovarian ectopic pregnancy 2014    Right tubal ectopic tx'd with MTX AND LS RSO(initially dx as Left ovarian By USN 14)    Pain     Pelvic adhesive disease 14    C/S to bladder, as well as reactive to ectopic; also hx PID    Pulmonary hypertension ([address]     Shoulder joint pain 2013    Sprain of ankle 2019    Tobacco abuse 2018    Type 2 diabetes mellitus without complication, without long-term current use of insulin ([address] 2018    Unspecified sleep apnea     Vitamin D deficiency 2019   . Review of Systems - A complete 14 point review of systems was performed. All was negative unless otherwise documented in HPI.     Allergies:  No Known Allergies    Past Surgical History:  Past Surgical History:   Procedure Laterality Date     SECTION      CHOLECYSTECTOMY, LAPAROSCOPIC      LEG SURGERY      right leg age 7 for growth plate    SALPINGO-OOPHORECTOMY  15    Right; ectopic pregnancy    TONSILLECTOMY      age 12       Family History:  Family History   Problem Relation Age of Onset    High Blood Pressure Mother     Heart Disease Mother     Cancer Father         lung    Lung Cancer Father     Cancer Maternal Aunt         breast    Diabetes Maternal Aunt     Breast Cancer Maternal Aunt     Cancer Paternal Aunt         lung    Lung Cancer Paternal Aunt     Thyroid Disease Sister     Colon Cancer Neg Hx     Eclampsia Neg Hx     Hypertension Neg Hx     Ovarian Cancer Neg Hx      Labor Neg Hx     Spont Abortions Neg Hx     Stroke Neg Hx        Social History:  Social History     Socioeconomic History    Marital status: Single     Spouse name: Not on file    Number of children: Not on file    Years of education: Not on file    Highest education level: Not on file   Occupational History    Not on file   Social Needs    Financial resource strain: Not on file  Food insecurity     Worry: Not on file     Inability: Not on file    Transportation needs     Medical: Not on file     Non-medical: Not on file   Tobacco Use    Smoking status: Former Smoker     Packs/day: 0.10     Types: Cigarettes     Start date: 9/3/2014     Last attempt to quit: 2018     Years since quittin.5    Smokeless tobacco: Never Used    Tobacco comment: quit in  started back up    Substance and Sexual Activity    Alcohol use: Yes     Alcohol/week: 0.0 standard drinks     Comment: occassionally     Drug use: Not Currently     Types: Marijuana     Comment: 2020. Quit in May 2020    Sexual activity: Not Currently   Lifestyle    Physical activity     Days per week: Not on file     Minutes per session: Not on file    Stress: Not on file   Relationships    Social connections     Talks on phone: Not on file     Gets together: Not on file     Attends [employer] service: Not on file     Active member of club or organization: Not on file     Attends meetings of clubs or organizations: Not on file     Relationship status: Not on file    Intimate partner violence     Fear of current or ex partner: Not on file     Emotionally abused: Not on file     Physically abused: Not on file     Forced sexual activity: Not on file   Other Topics Concern    Not on file   Social History Narrative    Not on file       No current facility-administered medications for this encounter. Objective      Physical Exam  Temp 96.7 °F (35.9 °C) (Temporal)   Ht 5' 11\" (1.803 m)   Wt (!) 600 lb (272.2 kg)   LMP 2020   BMI 83.68 kg/m²    Constitutional:  Vital signs are normal. The patient appears well-developed and well-nourished. HEENT:   Head: Normocephalic. Atraumatic  Eyes: pupils are equal and reactive. No scleral icterus is present. Neck: No mass and no thyromegaly present.    Cardiovascular: Normal rate, regular rhythm, S1 normal and S2 normal.    Pulmonary/Chest: Effort normal and breath sounds normal.   Abdominal: Soft. Normal appearance. There is no organomegaly. No tenderness. There is no rigidity, no rebound, no guarding and no Hawk's sign. Musculoskeletal:        Right lower leg: Normal. No tenderness and no edema. Left lower leg: Normal. No tenderness and no edema. Lymphadenopathy:     No cervical adenopathy, No Exrtemity Adenopathy. Neurological: The patient is alert and oriented. Skin: Skin is warm, dry and intact. Psychiatric: The patient has a normal mood and affect.  Speech is normal and behavior is normal. Judgment and thought content normal. Cognition and memory are normal.     Assessment     Patient Active Problem List   Diagnosis    Ectopic pregnancy - Right    Morbid obesity with BMI of 70 and over, adult ([facility]    Hypertension    Asthma    History of trichomonal vaginitis    H/O abnormal cervical Papanicolaou smear    ASCUS with positive high risk HPV cervical    Dysplasia of cervix, low grade (ISAIAS 1)    Obesity hypoventilation syndrome (HCC)    Type 2 diabetes mellitus without complication, without long-term current use of insulin (HCC)    Pulmonary hypertension (HCC)    BRYSON (obstructive sleep apnea)    Normocytic normochromic anemia    Abnormal vaginal bleeding    Acquired hallux rigidus    Chronic bipolar disorder (HCC)    Cervical radiculopathy    Dysfunctional uterine bleeding    Gastroesophageal reflux disease    Chronic migraine    Neck pain    Osteoarthritis of knee    Shoulder joint pain    Vitamin D deficiency    Anxiety and depression    HLD (hyperlipidemia)    COPD (chronic obstructive pulmonary disease) (HCC)    History of marijuana use       Plan   EGD

## 2020-07-13 LAB — SURGICAL PATHOLOGY REPORT: NORMAL

## 2020-07-16 ENCOUNTER — TELEPHONE (OUTPATIENT)
Dept: BARIATRICS/WEIGHT MGMT | Age: 35
End: 2020-07-16

## 2020-07-16 RX ORDER — LANSOPRAZOLE, AMOXICILLIN, CLARITHROMYCIN 30-500-500
KIT ORAL
Qty: 28 EACH | Refills: 0 | Status: SHIPPED | OUTPATIENT
Start: 2020-07-16 | End: 2020-08-13 | Stop reason: ALTCHOICE

## 2020-07-16 NOTE — TELEPHONE ENCOUNTER
Joey Montez from [facility] asked that separate e scripts for the individual components for the prevpac be sent in. she is asking for this d/t insurance reasons

## 2020-08-04 LAB
6-ACETYLMORPHINE, UR: NORMAL
AMPHETAMINE SCREEN, URINE: NORMAL
BARBITURATE SCREEN, URINE: NORMAL
BENZODIAZEPINE SCREEN, URINE: NORMAL
CANNABINOID SCREEN URINE: NORMAL
COCAINE METABOLITE, URINE: NORMAL
CREATININE URINE: NORMAL
EDDP, URINE: NORMAL
ETHANOL URINE: NORMAL
MDMA URINE: NORMAL
METHADONE SCREEN, URINE: NORMAL
METHAMPHETAMINE, URINE: NORMAL
OPIATES, URINE: NORMAL
OXYCODONE: NORMAL
PCP: NORMAL
PH, URINE: NORMAL
PHENCYCLIDINE, URINE: NORMAL
PROPOXYPHENE, URINE: NORMAL
TRICYCLIC ANTIDEPRESSANTS, UR: NORMAL

## 2020-08-13 ENCOUNTER — OFFICE VISIT (OUTPATIENT)
Dept: BARIATRICS/WEIGHT MGMT | Age: 35
End: 2020-08-13
Payer: COMMERCIAL

## 2020-08-13 VITALS
SYSTOLIC BLOOD PRESSURE: 138 MMHG | TEMPERATURE: 96.9 F | BODY MASS INDEX: 41.95 KG/M2 | WEIGHT: 293 LBS | HEIGHT: 70 IN | DIASTOLIC BLOOD PRESSURE: 82 MMHG | HEART RATE: 72 BPM

## 2020-08-13 PROCEDURE — 99213 OFFICE O/P EST LOW 20 MIN: CPT | Performed by: SURGERY

## 2020-08-13 PROCEDURE — G8427 DOCREV CUR MEDS BY ELIG CLIN: HCPCS | Performed by: SURGERY

## 2020-08-13 PROCEDURE — G8417 CALC BMI ABV UP PARAM F/U: HCPCS | Performed by: SURGERY

## 2020-08-13 PROCEDURE — 2022F DILAT RTA XM EVC RTNOPTHY: CPT | Performed by: SURGERY

## 2020-08-13 PROCEDURE — 1036F TOBACCO NON-USER: CPT | Performed by: SURGERY

## 2020-08-13 PROCEDURE — 3044F HG A1C LEVEL LT 7.0%: CPT | Performed by: SURGERY

## 2020-08-13 NOTE — PROGRESS NOTES
Medical Nutrition Therapy   Metabolic and Bariatric Surgery         Supervised diet and exercise preparation  Visit 6 out of 6  Pt reports:  Per surgeon pt to be 500lb prior to surgery. We discussed that pt would continue to follow her plan; follow up here monthly and assist pt as appropriate    Pt currently following structured meal plan 8pro/3veg/2fr/6 starch/3fat from education binder diet for weight management. Reviewed with pt. Vitals: Wt Readings from Last 3 Encounters:   08/13/20 (!) 586 lb (265.8 kg)   07/10/20 (!) 600 lb (272.2 kg)   07/06/20 (!) 604 lb (274 kg)           Nutrition Assessment:   PES: Knowledge deficit related to healthy behaviors that support weight management post weight loss surgery as evidenced by Body mass index is 84.08 kg/m². Nutrition Assessment of Goal Attainment:  TREATMENT GOALS:    1. Pt  Completed 4 out of 4 goals. 2.TREATMENT GOALS FOR UPCOMING WEEK: continue all previous goals and add: # 0    All goals were planned with and agreed on by the patient. Goals    All goals were planned with and agreed on by the patient. appt # NA G What is your next step? C 1 2 3 4 5 6 7 8 9     0  one I will read the education binder provided to me x 100             0  2 I will make my pschological evaluation appoinment. x 100             4  3 I will bring this goal card to every appointment. 100 100  0 100 100         x 4 I will eliminate all tobacco/nicotine. x 5 I will limit alcoholic beverages to 1-6LC per week. x 6 I will limit dining out to 3 times per week or less. x 7 I will eliminate sugary beverages. x 8 I will eliminate carbonated beverages. x 9 I will eliminate drinking with a straw. x 10 I will limit caffeinated beverages to 16oz daily. 11 I will limit cold cereals prepared with milk. 12 I will do a 5 minute reflection.                  13 I will food journal daily. 5  14 I will log my exercise daily. 100 100  0 100 100        5  15 I will determine my  calcium and multivitamin plan. 100          16 I will purchase multivitamin. 17 I will start taking multivitamins following my plan. 18 I will have 1-2 servings of protein present at each meal. x                19 I will eat every 3-5 hours. x                20 I will drink 64oz of fluid daily. x                21 I will follow the 15-30-15 guideline. x                22 I will eat protein first at all meals followed by vegetables  Fruit and lastly whole grains. x              4  23 My one diet neutral approach is:  I will follow a 1200calorie diet. x  75  ? 0 100          24 my second diet neutral approach is:                 25 My third diet neutral approach is                                                                        Do you understand your goals? y    Do you have the information you need to achieve your goals? y    Do you have any questions  right now? n        [x]  Consistent goal achievement in the program thus far and further success with goals is expected. []  Unable to consistently make progress in goal achievement. At this time patient is not moving forward  in developing the skills needed for success after surgery. Plan:    Continue to follow monthly and review goals.          []  Nutrition visits complete    [x]

## 2020-08-23 NOTE — PROGRESS NOTES
[facility]  [facility]  [address]  [address]  [place] [address]  Dept: [phone]    8/13/2020    CC: Weight Loss    History:  30year old female with weight of 586 lbs and BMI of 84 kg/m2. She returns for supervised diet and exercise. She is still using marijuana. She has stopped smoking. She would like to continue with a surgical weight loss program.  She is aware she will need to get down to a weight near 500 lbs to be considered safe for surgery. She did lose about 19 lbs since starting the program.  She completed EGD, sleep study and psychology visit. She has further psychology visits to complete. She has not seen the cardiologist and has seen the pulmonologist. She returns for further supervised diet and exercise.      Review of Systems:    General  Negative for: [] Weight Change   [] Fatigue   [x] Fevers & Chills    [] Appetite change [] Other:    Positive for: [x] Weight Change   [] Fatigue   [] Fevers & Chills    [] Appetite change [] Other:   Cardiac  Negative for: [x] Chest Pain   [] Difficulty Breathing   [] Leg Cramps [x] Edema of Lower Extremeties    [] Left   [] Right      Positive for: [] Chest Pain   [] Difficulty Breathing   [] Leg Cramps [] Edema of Lower Extremeties    [] Left   [] Right   Pulmonary  Negative for: [x] Shortness of Breath [] Wheeze [x] Cough  [] Calf Pain     Positive for: [] Shortness of Breath [] Wheeze [] Cough  [] Calf Pain   Gastro-Intestinal Negative for: [] Heartburn   [] Reflux   [] Dysphagia   [] Melena   [] BRBPR  [x] Vomiting   [x] Abdominal Pain   [] Diarrhea  [] Hernia    [] Constipation  [] Other:     Positive for: [] Heartburn   [] Reflux   [] Dysphagia   [] Melena   [] BRBPR  [] Vomiting   [] Abdominal Pain   [] Diarrhea  [] Hernia    [] Constipation  [] Other:    Muskuloskeletal Negative for: [] Joint pain   [] Back pain   [] Knee Pain   [] Muscle weakness   [x] Edema [] Other:     Positive for: [x] Joint pain   [] Back pain   [] Knee Pain   [] Muscle weakness  [] Edema [] Other:    Neurologic Negative for: [x] Syncope   [] Insomnia   [x] Being treated for depression  [] Other:     Positive for: [] Syncope   [] Insomnia   [] Being treated for depression  [] Other:    Skin Negative for: [] Wound:   [] Open   [] Draining   [] Incisional     [x] Rash   [x] Hair Loss  [] Other:     Positive for: [] Wound:   [] Open   [] Draining    [] Incisional  [] Rash   [] Hair Loss  [] Other:      Physical Exam:  /82   Pulse 72   Temp 96.9 °F (36.1 °C)   Ht 5' 10\" (1.778 m)   Wt (!) 586 lb (265.8 kg)   BMI 84.08 kg/m²   Constitutional:  Vital signs are normal. The patient appears well-developed and well-nourished. HEENT:   Head: Normocephalic. Atraumatic  Eyes: pupils are equal and reactive. No scleral icterus is present. Neck: No mass and no thyromegaly present. Cardiovascular: Normal rate, regular rhythm, S1 normal and S2 normal.  Radial pulses present   Pulmonary/Chest: Effort normal and breath sounds normal. No retractions  Abdominal: Soft. Normal appearance. There is no organomegaly. No tenderness. There is no rigidity, no rebound, no guarding and no Hawk's sign. Musculoskeletal:        Right lower leg: Normal. No tenderness and no edema. Left lower leg: Normal. No tenderness and no edema. Neurological: The patient is alert and oriented. Moving all 4 extremities, sensation grossly intact bilateral  Skin: Skin is warm, dry and intact. Psychiatric: The patient has a normal mood and affect.  Speech is normal and behavior is normal. Judgment and thought content normal. Cognition and memory are normal.     Assessment:  Obstructive sleep apnea  THC abuse  Need for further psychotherapy  Type 2 diabetes  Morbid obesity, BMI 84 kg/m2    Plan:  Psychology visits  Complete cardiology visit  Will need to be 6 months free THC  CPAP compliance discussed  Dietician visits  Diet and exercise goals discussed  Strongly stressed need for compliance to be a candidate for surgery.

## 2020-09-14 ENCOUNTER — OFFICE VISIT (OUTPATIENT)
Dept: BARIATRICS/WEIGHT MGMT | Age: 35
End: 2020-09-14
Payer: COMMERCIAL

## 2020-09-14 VITALS
HEART RATE: 76 BPM | RESPIRATION RATE: 18 BRPM | WEIGHT: 293 LBS | DIASTOLIC BLOOD PRESSURE: 78 MMHG | HEIGHT: 70 IN | BODY MASS INDEX: 41.95 KG/M2 | TEMPERATURE: 97.6 F | SYSTOLIC BLOOD PRESSURE: 128 MMHG

## 2020-09-14 PROCEDURE — 1036F TOBACCO NON-USER: CPT | Performed by: NURSE PRACTITIONER

## 2020-09-14 PROCEDURE — 3023F SPIROM DOC REV: CPT | Performed by: NURSE PRACTITIONER

## 2020-09-14 PROCEDURE — G8417 CALC BMI ABV UP PARAM F/U: HCPCS | Performed by: NURSE PRACTITIONER

## 2020-09-14 PROCEDURE — G8926 SPIRO NO PERF OR DOC: HCPCS | Performed by: NURSE PRACTITIONER

## 2020-09-14 PROCEDURE — 2022F DILAT RTA XM EVC RTNOPTHY: CPT | Performed by: NURSE PRACTITIONER

## 2020-09-14 PROCEDURE — G8427 DOCREV CUR MEDS BY ELIG CLIN: HCPCS | Performed by: NURSE PRACTITIONER

## 2020-09-14 PROCEDURE — 3044F HG A1C LEVEL LT 7.0%: CPT | Performed by: NURSE PRACTITIONER

## 2020-09-14 PROCEDURE — 99213 OFFICE O/P EST LOW 20 MIN: CPT | Performed by: NURSE PRACTITIONER

## 2020-09-14 NOTE — PROGRESS NOTES
Medical Weight Management Progress Note    Subjective      Patient being seen for medically supervised weight loss for the chronic conditions of DM Type 2, Asthma, Pulmonary HTN, BRYSON, Obesity Hypoventilation Syndrome, Anemia, GERD, Anxiety/Depression, Bipolar, HLD, Chronic Migraine, Arthritis, COPD.      She is working on the behavior changes discussed at the initial appointment. Patient continues on diet plan. Physical activity includes walking.  Weight gain of 10 lbs since last visit.   Psych eval completed and has recommendations. Needs pulmonary and cardiac clearances. EGD completed and H Pylori positive. Completed treatment regimen. PCP rechecked stool, but no report currently available for review. Nicotine level negative, but UDS positive for THC.   No current issues.     Working toward bariatric surgery:    [x]? Sleeve Gastrectomy                                                           []? Ragini-en-Y Gastric Bypass    Allergies:  No Known Allergies    Past Medical History:     Past Medical History:   Diagnosis Date    Abnormal vaginal bleeding 4/19/2019    Acquired hallux rigidus 4/19/2019    Acute and chronic respiratory failure with hypoxia ([address] 11/6/2018    Acute on chronic respiratory failure with hypoxia and hypercapnia ([address] 11/6/2018    ASCUS with positive high risk HPV cervical     Asthma     Bipolar disorder ([address] 4/19/2019    Breast discharge 4/9/2015    Cellulitis 4/19/2019    Cervical radiculopathy 4/19/2019    Chest pain 11/4/2018    Chlamydia ? Reported hx    Chronic cor pulmonale (HCC)     COPD (chronic obstructive pulmonary disease) (HCC)     D-dimer, elevated 11/18/2018    Dysfunctional uterine bleeding 1/27/2014    Dysplasia of cervix, low grade (ISAIAS 1) 3/30/2017    Noted on colposcopy 3/7/17    Dyspnea 11/3/2018    Ectopic pregnancy - Right 2/9/2014    Pt was initiallly dx with a left ovarian ectopic.   After Laproscopic eval she was noted to have a Right Tubal ectopic. She underwent a RSO on 14.  Elevated blood pressure reading     Elevated brain natriuretic peptide (BNP) level     Gastroesophageal reflux disease 2019    H/O abnormal cervical Papanicolaou smear 2/3/2017    LSIL - 2015 Pap collected 2/3/2017. Patient is very difficult SSE as she is morbidly obese. Required ringed forceps and snowman speculum.  Heartburn     History of trichomonal vaginitis 20106    tx'd    Hypertension     Hypoalbuminemia due to protein-calorie malnutrition ([address] 2018    Hypocalcemia 2018    Migraine 2019    Morbid obesity with BMI of 70 and over, adult ([address]     BMI 74    MRSA (methicillin resistant Staphylococcus aureus) 2010    Left leg    Muscle weakness     Neck pain 2019    Normocytic normochromic anemia 2018    Obesity     Obesity hypoventilation syndrome ([address] 2018    BRYSON (obstructive sleep apnea)     BRYSON on CPAP    Ovarian ectopic pregnancy 2014    Right tubal ectopic tx'd with MTX AND LS RSO(initially dx as Left ovarian By USN 14)    Pain     Pelvic adhesive disease 14    C/S to bladder, as well as reactive to ectopic; also hx PID    Pulmonary hypertension ([address]     Shoulder joint pain 2013    Sprain of ankle 2019    Tobacco abuse 2018    Type 2 diabetes mellitus without complication, without long-term current use of insulin ([address] 2018    Unspecified sleep apnea     Vitamin D deficiency 2019   .     Past Surgical History:  Past Surgical History:   Procedure Laterality Date     SECTION  2005    CHOLECYSTECTOMY, LAPAROSCOPIC      LEG SURGERY      right leg age 7 for growth plate    SALPINGO-OOPHORECTOMY  14    Right; ectopic pregnancy    TONSILLECTOMY      age 11    UPPER GASTROINTESTINAL ENDOSCOPY  07/10/2020    UPPER GASTROINTESTINAL ENDOSCOPY N/A 7/10/2020    EGD BIOPSY performed by Davie Mackay DO at [id number]. Family History:  Family History   Problem Relation Age of Onset    High Blood Pressure Mother     Heart Disease Mother     Cancer Father         lung    Lung Cancer Father     Cancer Maternal Aunt         breast    Diabetes Maternal Aunt     Breast Cancer Maternal Aunt     Cancer Paternal Aunt         lung    Lung Cancer Paternal Aunt     Thyroid Disease Sister     Colon Cancer Neg Hx     Eclampsia Neg Hx     Hypertension Neg Hx     Ovarian Cancer Neg Hx      Labor Neg Hx     Spont Abortions Neg Hx     Stroke Neg Hx        Social History:  Social History     Socioeconomic History    Marital status: Single     Spouse name: Not on file    Number of children: Not on file    Years of education: Not on file    Highest education level: Not on file   Occupational History    Not on file   Social Needs    Financial resource strain: Not on file    Food insecurity     Worry: Not on file     Inability: Not on file    Transportation needs     Medical: Not on file     Non-medical: Not on file   Tobacco Use    Smoking status: Former Smoker     Packs/day: 0.10     Types: Cigarettes     Start date: 9/3/2014     Last attempt to quit:      Years since quittin.7    Smokeless tobacco: Never Used    Tobacco comment: quit in  started back up    Substance and Sexual Activity    Alcohol use: Yes     Alcohol/week: 0.0 standard drinks     Comment: occassionally     Drug use: Not Currently     Types: Marijuana     Comment: 2020.  Quit in May 2020    Sexual activity: Not Currently   Lifestyle    Physical activity     Days per week: Not on file     Minutes per session: Not on file    Stress: Not on file   Relationships    Social connections     Talks on phone: Not on file     Gets together: Not on file     Attends Mu-ism service: Not on file     Active member of club or organization: Not on file     Attends meetings of clubs or organizations: Not on file     Relationship status: Not on file    Intimate partner violence     Fear of current or ex partner: Not on file     Emotionally abused: Not on file     Physically abused: Not on file     Forced sexual activity: Not on file   Other Topics Concern    Not on file   Social History Narrative    Not on file       Current Medications:  Current Outpatient Medications   Medication Sig Dispense Refill    diclofenac (CATAFLAM) 50 MG tablet Take 50 mg by mouth 3 times daily      gabapentin (NEURONTIN) 300 MG capsule Take 300 mg by mouth 3 times daily.  tiotropium (SPIRIVA HANDIHALER) 18 MCG inhalation capsule Spiriva Respimat 2.5 mcg/actuation solution for inhalation   Inhale 2 puffs every day by inhalation route.       ondansetron (ZOFRAN ODT) 4 MG disintegrating tablet Take 1 tablet by mouth every 8 hours as needed for Nausea 5 tablet 0    Promethazine-DM (PROMETHAZINE-DEXTROMETHORPHAN) 6.25-15 MG/5ML SOLN solution Take 5 mLs by mouth 4 times daily  1    aspirin 81 MG chewable tablet Take 1 tablet by mouth daily 30 tablet 3    butalbital-acetaminophen-caffeine (FIORICET, ESGIC) -40 MG per tablet Take 1 tablet by mouth every 4 hours as needed for Headaches 20 tablet 0    albuterol sulfate  (90 Base) MCG/ACT inhaler Inhale 2 puffs into the lungs 4 times daily 1 Inhaler 3    ipratropium (ATROVENT HFA) 17 MCG/ACT inhaler Inhale 2 puffs into the lungs 4 times daily 1 Inhaler 3    lisinopril (PRINIVIL;ZESTRIL) 20 MG tablet Take 1 tablet by mouth daily 30 tablet 3    metFORMIN (GLUCOPHAGE) 500 MG tablet Take 1 tablet by mouth 2 times daily (with meals) (Patient taking differently: Take 1,000 mg by mouth daily (with breakfast) ) 60 tablet 3    POTASSIUM CHLORIDE PO Take 25 mg by mouth daily      famotidine (PEPCID) 20 MG tablet Take 1 tablet by mouth 2 times daily 60 tablet 0    benzonatate (TESSALON PERLES) 100 MG capsule Take 1 capsule by mouth every 8 hours as needed for Cough 20 capsule 0    hydrochlorothiazide (HYDRODIURIL) 25 MG tablet Take 25 mg by mouth daily      vitamin D (ERGOCALCIFEROL) 01587 UNITS CAPS capsule Take 1,000 Units by mouth daily       topiramate (TOPAMAX) 50 MG tablet Take 100 mg by mouth 2 times daily       metoprolol (LOPRESSOR) 50 MG tablet Take 50 mg by mouth 2 times daily      loratadine (CLARITIN) 10 MG tablet Take 10 mg by mouth daily       No current facility-administered medications for this visit. Vital Signs:  /78 (Site: Right Upper Arm, Position: Sitting, Cuff Size: Large Adult)   Pulse 76   Temp 97.6 °F (36.4 °C) (Infrared)   Resp 18   Ht 5' 10\" (1.778 m)   Wt (!) 596 lb (270.3 kg)   BMI 85.52 kg/m²     BMI/Height/Weight:  Body mass index is 85.52 kg/m². Review of Systems - A review of systems was performed. All was negative unless otherwise documented in HPI. Constitutional: Negative for fever, chills and diaphoresis. HENT: Negative for hearing loss and trouble swallowing. Eyes: Negative for photophobia and visual disturbance. Respiratory: Negative for cough, shortness of breath and wheezing. Cardiovascular: Negative for chest pain and palpitations. Gastrointestinal: Negative for nausea, vomiting, abdominal pain, diarrhea, constipation, blood in stool and abdominal distention. Endocrine: Negative for polydipsia, polyphagia and polyuria. Genitourinary: Negative for dysuria, frequency, hematuria and difficulty urinating. Musculoskeletal: Negative for myalgias, joint swelling. Skin: Negative for pallor and rash. Neurological: Negative for dizziness, tremors, light-headedness and headaches. Psychiatric/Behavioral: Negative for sleep disturbance and dysphoric mood. Objective:      Physical Exam   Vital signs reviewed. General: Well-developed and well-nourished. No acute distress. Skin: Warm, dry and intact. HEENT: Normocephalic. EOMs intact. Conjunctivae normal. Neck supple. Cardiovascular: Normal rate, regular rhythm. Pulmonary/Chest: Normal effort. Lungs clear to auscultation. No rales, rhonchi or wheezing. Abdominal: Positive bowel sounds. Soft, nontender. Nondistended. Musculoskeletal: Movement x4. Bilateral lower extremity edema. Neurological: Gait normal. Alert and oriented to person, place, and time. Psychiatric: Flat affect. Speech and behavior normal. Judgment and thought content normal. Cognition and memory intact. Assessment:       Diagnosis Orders   1. Type 2 diabetes mellitus without complication, without long-term current use of insulin [facility])  External Referral To Psychology   2. Obesity hypoventilation syndrome [facility])  External Referral To Psychology   3. Essential hypertension  External Referral To Psychology   4. BRYSON (obstructive sleep apnea)  External Referral To Psychology   5. Chronic bipolar disorder [facility])  External Referral To Psychology   6. Gastroesophageal reflux disease, esophagitis presence not specified  External Referral To Psychology   7. Anxiety and depression  External Referral To Psychology   8. Hyperlipidemia, unspecified hyperlipidemia type  External Referral To Psychology   9. Uncomplicated asthma, unspecified asthma severity, unspecified whether persistent  External Referral To Psychology   10. Pulmonary hypertension [facility])  External Referral To Psychology   11. Normocytic normochromic anemia  External Referral To Psychology   12. Cervical radiculopathy  External Referral To Psychology   13. Dysfunctional uterine bleeding  External Referral To Psychology   14. Chronic migraine  External Referral To Psychology   15. Chronic obstructive pulmonary disease, unspecified COPD type [facility])  External Referral To Psychology   16. History of marijuana use  External Referral To Psychology    Urine Drug Screen   17. Morbid obesity with BMI of 70 and over, adult [facility])  External Referral To Psychology       Plan:    Dietitian visit today. Patient was encouraged to journal all food intake.  Keep calorie level at approximately 4369-4080. Protein intake is to be a minimum of 60-80 grams per day. Water drinking was encouraged with a goal of 64oz-128oz daily. Beverages to be calorie free except for milk. Every other beverage should be water. Avoid soda. Continue to increase level of physical activity. Encouraged use of exercise log. Repeat UDS after being marijuana free for 2 months. Staff to call for H Pylori result. Follow-up  Return in about 1 month (around 10/14/2020). Orders this encounter:  Orders Placed This Encounter   Procedures    Urine Drug Screen     Standing Status:   Future     Standing Expiration Date:   9/15/2021    External Referral To Psychology     Referral Priority:   Routine     Referral Type:   Eval and Treat     Referral Reason:   Specialty Services Required     Referred to Provider:   Victor Manuel Knight, PhD     Requested Specialty:   Psychology     Number of Visits Requested:   1       Prescriptions this encounter:  No orders of the defined types were placed in this encounter.       Electronically signed by:  Shorty Howell CNP

## 2020-10-09 ENCOUNTER — OFFICE VISIT (OUTPATIENT)
Dept: BARIATRICS/WEIGHT MGMT | Age: 35
End: 2020-10-09
Payer: COMMERCIAL

## 2020-10-09 VITALS
RESPIRATION RATE: 18 BRPM | HEIGHT: 70 IN | TEMPERATURE: 96.6 F | BODY MASS INDEX: 41.95 KG/M2 | WEIGHT: 293 LBS | SYSTOLIC BLOOD PRESSURE: 126 MMHG | HEART RATE: 82 BPM | DIASTOLIC BLOOD PRESSURE: 78 MMHG

## 2020-10-09 PROCEDURE — G8417 CALC BMI ABV UP PARAM F/U: HCPCS | Performed by: NURSE PRACTITIONER

## 2020-10-09 PROCEDURE — G8427 DOCREV CUR MEDS BY ELIG CLIN: HCPCS | Performed by: NURSE PRACTITIONER

## 2020-10-09 PROCEDURE — 2022F DILAT RTA XM EVC RTNOPTHY: CPT | Performed by: NURSE PRACTITIONER

## 2020-10-09 PROCEDURE — G8926 SPIRO NO PERF OR DOC: HCPCS | Performed by: NURSE PRACTITIONER

## 2020-10-09 PROCEDURE — G8484 FLU IMMUNIZE NO ADMIN: HCPCS | Performed by: NURSE PRACTITIONER

## 2020-10-09 PROCEDURE — 3023F SPIROM DOC REV: CPT | Performed by: NURSE PRACTITIONER

## 2020-10-09 PROCEDURE — 3044F HG A1C LEVEL LT 7.0%: CPT | Performed by: NURSE PRACTITIONER

## 2020-10-09 PROCEDURE — 1036F TOBACCO NON-USER: CPT | Performed by: NURSE PRACTITIONER

## 2020-10-09 PROCEDURE — 99213 OFFICE O/P EST LOW 20 MIN: CPT | Performed by: NURSE PRACTITIONER

## 2020-10-09 NOTE — PROGRESS NOTES
Medical Weight Management Progress Note    Subjective      Patient being seen for medically supervised weight loss for the chronic conditions of DM Type 2, Asthma, Pulmonary HTN, BRYSON, Obesity Hypoventilation Syndrome, Anemia, GERD, Anxiety/Depression, Bipolar, HLD, Chronic Migraine, Arthritis, COPD.      She is working on the behavior changes discussed at the initial appointment. Patient continues on diet plan. Physical activity includes walking.  Weight loss of 6 lbs since last visit.   Psych eval completed and has recommendations. Needs pulmonary and cardiac clearances. EGD completed and H Pylori positive. Completed treatment regimen. PCP rechecked stool, but no report currently available for review. Nicotine level negative, but UDS positive for THC. Needs repeat UDS. Has order.  No current issues.     Working toward bariatric surgery:    [x]? Sleeve Gastrectomy                                                           []? Ragini-en-Y Gastric Bypass    Allergies:  No Known Allergies    Past Medical History:     Past Medical History:   Diagnosis Date    Abnormal vaginal bleeding 4/19/2019    Acquired hallux rigidus 4/19/2019    Acute and chronic respiratory failure with hypoxia ([address] 11/6/2018    Acute on chronic respiratory failure with hypoxia and hypercapnia ([address] 11/6/2018    ASCUS with positive high risk HPV cervical     Asthma     Bipolar disorder ([address] 4/19/2019    Breast discharge 4/9/2015    Cellulitis 4/19/2019    Cervical radiculopathy 4/19/2019    Chest pain 11/4/2018    Chlamydia ? Reported hx    Chronic cor pulmonale (HCC)     COPD (chronic obstructive pulmonary disease) (HCC)     D-dimer, elevated 11/18/2018    Dysfunctional uterine bleeding 1/27/2014    Dysplasia of cervix, low grade (ISAIAS 1) 3/30/2017    Noted on colposcopy 3/7/17    Dyspnea 11/3/2018    Ectopic pregnancy - Right 2/9/2014    Pt was initiallly dx with a left ovarian ectopic.   After Laproscopic eval she was noted to have a Right Tubal ectopic. She underwent a RSO on 14.  Elevated blood pressure reading     Elevated brain natriuretic peptide (BNP) level     Gastroesophageal reflux disease 2019    H/O abnormal cervical Papanicolaou smear 2/3/2017    LSIL - 2015 Pap collected 2/3/2017. Patient is very difficult SSE as she is morbidly obese. Required ringed forceps and snowman speculum.  Heartburn     History of trichomonal vaginitis 20106    tx'd    Hypertension     Hypoalbuminemia due to protein-calorie malnutrition ([address] 2018    Hypocalcemia 2018    Migraine 2019    Morbid obesity with BMI of 70 and over, adult ([address]     BMI 74    MRSA (methicillin resistant Staphylococcus aureus) 2010    Left leg    Muscle weakness     Neck pain 2019    Normocytic normochromic anemia 2018    Obesity     Obesity hypoventilation syndrome ([address] 2018    BRYSON (obstructive sleep apnea)     BRYSON on CPAP    Ovarian ectopic pregnancy 2014    Right tubal ectopic tx'd with MTX AND LS RSO(initially dx as Left ovarian By USN 14)    Pain     Pelvic adhesive disease 14    C/S to bladder, as well as reactive to ectopic; also hx PID    Pulmonary hypertension ([address]     Shoulder joint pain 2013    Sprain of ankle 2019    Tobacco abuse 2018    Type 2 diabetes mellitus without complication, without long-term current use of insulin ([address] 2018    Unspecified sleep apnea     Vitamin D deficiency 2019   .     Past Surgical History:  Past Surgical History:   Procedure Laterality Date     SECTION  2005    CHOLECYSTECTOMY, LAPAROSCOPIC      LEG SURGERY      right leg age 7 for growth plate    SALPINGO-OOPHORECTOMY  14    Right; ectopic pregnancy    TONSILLECTOMY      age 12   [facility] UPPER GASTROINTESTINAL ENDOSCOPY  07/10/2020    UPPER GASTROINTESTINAL ENDOSCOPY N/A 7/10/2020    EGD BIOPSY performed by Shieal Galdamez DO at [facility]       Family History:  Family History   Problem Relation Age of Onset    High Blood Pressure Mother     Heart Disease Mother     Cancer Father         lung    Lung Cancer Father     Cancer Maternal Aunt         breast    Diabetes Maternal Aunt     Breast Cancer Maternal Aunt     Cancer Paternal Aunt         lung    Lung Cancer Paternal Aunt     Thyroid Disease Sister     Colon Cancer Neg Hx     Eclampsia Neg Hx     Hypertension Neg Hx     Ovarian Cancer Neg Hx      Labor Neg Hx     Spont Abortions Neg Hx     Stroke Neg Hx        Social History:  Social History     Socioeconomic History    Marital status: Single     Spouse name: Not on file    Number of children: Not on file    Years of education: Not on file    Highest education level: Not on file   Occupational History    Not on file   Social Needs    Financial resource strain: Not on file    Food insecurity     Worry: Not on file     Inability: Not on file    Transportation needs     Medical: Not on file     Non-medical: Not on file   Tobacco Use    Smoking status: Former Smoker     Packs/day: 0.10     Types: Cigarettes     Start date: 9/3/2014     Last attempt to quit:      Years since quittin.7    Smokeless tobacco: Never Used    Tobacco comment: quit in  started back up    Substance and Sexual Activity    Alcohol use: Yes     Alcohol/week: 0.0 standard drinks     Comment: occassionally     Drug use: Not Currently     Types: Marijuana     Comment: 2020.  Quit in May 2020    Sexual activity: Not Currently   Lifestyle    Physical activity     Days per week: Not on file     Minutes per session: Not on file    Stress: Not on file   Relationships    Social connections     Talks on phone: Not on file     Gets together: Not on file     Attends Yazdanism service: Not on file     Active member of club or organization: Not on file     Attends meetings of clubs or organizations: Not on file hydrochlorothiazide (HYDRODIURIL) 25 MG tablet Take 25 mg by mouth daily      vitamin D (ERGOCALCIFEROL) 33370 UNITS CAPS capsule Take 1,000 Units by mouth daily       topiramate (TOPAMAX) 50 MG tablet Take 100 mg by mouth 2 times daily       metoprolol (LOPRESSOR) 50 MG tablet Take 50 mg by mouth 2 times daily      loratadine (CLARITIN) 10 MG tablet Take 10 mg by mouth daily       No current facility-administered medications for this visit. Vital Signs:  /78 (Site: Right Upper Arm, Position: Sitting, Cuff Size: Large Adult)   Pulse 82   Temp 96.6 °F (35.9 °C) (Infrared)   Resp 18   Ht 5' 10\" (1.778 m)   Wt (!) 590 lb (267.6 kg)   BMI 84.66 kg/m²     BMI/Height/Weight:  Body mass index is 84.66 kg/m². Review of Systems - A review of systems was performed. All was negative unless otherwise documented in HPI. Constitutional: Negative for fever, chills and diaphoresis. HENT: Negative for hearing loss and trouble swallowing. Eyes: Negative for photophobia and visual disturbance. Respiratory: Negative for cough, shortness of breath and wheezing. Cardiovascular: Negative for chest pain and palpitations. Gastrointestinal: Negative for nausea, vomiting, abdominal pain, diarrhea, constipation, blood in stool and abdominal distention. Endocrine: Negative for polydipsia, polyphagia and polyuria. Genitourinary: Negative for dysuria, frequency, hematuria and difficulty urinating. Musculoskeletal: Negative for myalgias, joint swelling. Skin: Negative for pallor and rash. Neurological: Negative for dizziness, tremors, light-headedness and headaches. Psychiatric/Behavioral: Negative for sleep disturbance and dysphoric mood. Objective:      Physical Exam   Vital signs reviewed. General: Well-developed and well-nourished. No acute distress. Skin: Warm, dry and intact. HEENT: Normocephalic. EOMs intact. Conjunctivae normal. Neck supple.   Cardiovascular: Normal rate, regular rhythm. Pulmonary/Chest: Normal effort. Lungs clear to auscultation. No rales, rhonchi or wheezing. Abdominal: Positive bowel sounds. Soft, nontender. Nondistended. Musculoskeletal: Movement x4. Bilateral lower extremity edema. Neurological: Gait normal. Alert and oriented to person, place, and time. Psychiatric: Flat affect. Speech and behavior normal. Judgment and thought content normal. Cognition and memory intact. Assessment:       Diagnosis Orders   1. Type 2 diabetes mellitus without complication, without long-term current use of insulin [address]     2. BRYSON (obstructive sleep apnea)     3. Chronic bipolar disorder ([facility]     4. Gastroesophageal reflux disease, unspecified whether esophagitis present     5. Anxiety and depression     6. Hyperlipidemia, unspecified hyperlipidemia type     7. Obesity hypoventilation syndrome ([facility]     8. Essential hypertension     9. Morbid obesity with BMI of 70 and over, adult ([facility]     10. Uncomplicated asthma, unspecified asthma severity, unspecified whether persistent     11. Pulmonary hypertension ([facility]     12. Normocytic normochromic anemia     13. Cervical radiculopathy     14. Chronic migraine     15. Chronic obstructive pulmonary disease, unspecified COPD type ([facility]     16. History of marijuana use         Plan:    No dietitian visit today. Patient was encouraged to journal all food intake. Keep calorie level at approximately 5989-9592. Protein intake is to be a minimum of 60-80 grams per day. Water drinking was encouraged with a goal of 64oz-128oz daily. Beverages to be calorie free except for milk. Every other beverage should be water. Avoid soda. Continue to increase level of physical activity. Encouraged use of exercise log. Repeat UDS after being marijuana free for 2 months. Staff to call for H Pylori result. Follow-up  Return in about 1 month (around 11/9/2020).     Orders this encounter:  No orders of the defined types were placed in this encounter. Prescriptions this encounter:  No orders of the defined types were placed in this encounter.       Electronically signed by:  Emir Alcocer CNP

## 2020-10-19 ENCOUNTER — HOSPITAL ENCOUNTER (OUTPATIENT)
Age: 35
Setting detail: SPECIMEN
Discharge: HOME | End: 2020-10-19
Payer: COMMERCIAL

## 2020-10-19 ENCOUNTER — OFFICE VISIT (OUTPATIENT)
Dept: OBGYN | Age: 35
End: 2020-10-19
Payer: COMMERCIAL

## 2020-10-19 VITALS
BODY MASS INDEX: 41.02 KG/M2 | WEIGHT: 293 LBS | DIASTOLIC BLOOD PRESSURE: 84 MMHG | HEART RATE: 80 BPM | SYSTOLIC BLOOD PRESSURE: 129 MMHG | HEIGHT: 71 IN

## 2020-10-19 PROCEDURE — 99211 OFF/OP EST MAY X REQ PHY/QHP: CPT | Performed by: STUDENT IN AN ORGANIZED HEALTH CARE EDUCATION/TRAINING PROGRAM

## 2020-10-19 PROCEDURE — 99213 OFFICE O/P EST LOW 20 MIN: CPT | Performed by: STUDENT IN AN ORGANIZED HEALTH CARE EDUCATION/TRAINING PROGRAM

## 2020-10-19 PROCEDURE — G8484 FLU IMMUNIZE NO ADMIN: HCPCS | Performed by: STUDENT IN AN ORGANIZED HEALTH CARE EDUCATION/TRAINING PROGRAM

## 2020-10-19 PROCEDURE — 1036F TOBACCO NON-USER: CPT | Performed by: STUDENT IN AN ORGANIZED HEALTH CARE EDUCATION/TRAINING PROGRAM

## 2020-10-19 PROCEDURE — G8427 DOCREV CUR MEDS BY ELIG CLIN: HCPCS | Performed by: STUDENT IN AN ORGANIZED HEALTH CARE EDUCATION/TRAINING PROGRAM

## 2020-10-19 PROCEDURE — 81025 URINE PREGNANCY TEST: CPT | Performed by: STUDENT IN AN ORGANIZED HEALTH CARE EDUCATION/TRAINING PROGRAM

## 2020-10-19 PROCEDURE — G8417 CALC BMI ABV UP PARAM F/U: HCPCS | Performed by: STUDENT IN AN ORGANIZED HEALTH CARE EDUCATION/TRAINING PROGRAM

## 2020-10-19 NOTE — PROGRESS NOTES
[facility] Annual Visit    Kiana Morales  10/19/2020                       Primary Care Physician: Gt Mares    CC:   Chief Complaint   Patient presents with    Annual Exam         HPI: Nick Dorado is a 30 y.o. female [id number]    The patient was seen and examined. She is here for an annual visit. She is complaining of irregular menses. She states sometimes her periods are monthly however sometimes she may skip 1 to 2 months and states that when that happens her periods are much heavier and experiences more abdominal cramping. Patient states she previously had an IUD in 2012 and did not like it but is unable to expand upon why. When asked if patient would like to do something about her menses she declined. Extensive conversation occurred between the patient and myself discussing risks of unopposed estrogen and oligomenorrhea in regards to risk of endometrial cancer. All questions answered. Patient verbalized understanding    She also complains of an itch \"that is external\". States she went to assisted 3 days ago and had to put on their clothes and did not have any underwear and has noticed itching since then. Also states she shaved with a razor recently and the hair is starting to grow back which she thinks is contributing to the itching. Her Patient's last menstrual period was 10/01/2020 (exact date). . She complains of irregular/heavy bleeding and dysmenorrhea. Her periods are irregular and last 7 days. She describes them as moderate to heavy. Her bowel habits are regular. She denies any bloating. She denies dysuria. She denies urinary leaking. She denies vaginal discharge. She is sexually active with males. She uses no method for contraception and is not desiring pregnancy.     Depression Screen: Symptoms of decreased mood absent  **If either question is answered in a  positive fashion then complete the PHQ9 Scoring Evaluation and make the appropriate referral**    REVIEW OF SYSTEMS: An 11 point review of systems was completed  Constitutional: negative fever, negative chills  HEENT: negative visual disturbances, negative headaches  Respiratory: negative dyspnea, negative cough  Cardiovascular: negative chest pain,  negative palpitations  Gastrointestinal: negative abdominal pain, negative RUQ pain, negative N/V, negative diarrhea, negative constipation  Genitourinary: negative dysuria, negative vaginal discharge, + vaginal pruritus  Dermatological: negative rash  Hematologic: negative bruising  Immunologic/Lymphatic: negative recent illness, negative recent sick contact  Musculoskeletal: negative back pain, negative myalgias, negative arthralgias  Neurological:  negative dizziness, negative weakness  Behavior/Psych: negative depression, negative anxiety    ________________________________________________________________________    GYNECOLOGICAL HISTORY:  Age of Menarche: 16  Age of Menopause: n/a     Sexually Active: has sex with males  STD History: no past history    Pap History: Last PAP was abnormal;  2017 - ASC-US with +HRHPV  Colposcopy History: ISAIAS-1 on 3/7/2017    Permanent Sterilization: no  Reversible Birth Control: no  Hormone Replacement Exposure: no    OBSTETRICAL HISTORY:  OB History    Para Term  AB Living   4 1 1 0 3 1   SAB TAB Ectopic Molar Multiple Live Births   2 0 1 0 0 0      # Outcome Date GA Lbr Mario/2nd Weight Sex Delivery Anes PTL Lv   4 Ectopic 2014           3 Term 2005     CS-Unspec      2 SAB               Birth Comments: System Generated. Please review and update pregnancy details.    1 SAB                PAST MEDICAL HISTORY:   has a past medical history of Abnormal vaginal bleeding, Acquired hallux rigidus, Acute and chronic respiratory failure with hypoxia ([address] Acute on chronic respiratory failure with hypoxia and hypercapnia ([address] ASCUS with positive high risk HPV cervical, Asthma, Bipolar disorder ([address] Breast discharge, Cellulitis, Cervical radiculopathy, Chest pain, Chlamydia, Chronic cor pulmonale (HCC), COPD (chronic obstructive pulmonary disease) ([address] D-dimer, elevated, Dysfunctional uterine bleeding, Dysplasia of cervix, low grade (ISAIAS 1), Dyspnea, Ectopic pregnancy - Right, Elevated blood pressure reading, Elevated brain natriuretic peptide (BNP) level, Gastroesophageal reflux disease, H/O abnormal cervical Papanicolaou smear, Heartburn, History of trichomonal vaginitis, Hypertension, Hypoalbuminemia due to protein-calorie malnutrition ([address] Hypocalcemia, Migraine, Morbid obesity with BMI of 70 and over, adult ([address] MRSA (methicillin resistant Staphylococcus aureus), Muscle weakness, Neck pain, Normocytic normochromic anemia, Obesity, Obesity hypoventilation syndrome ([address] BRYSON (obstructive sleep apnea), Ovarian ectopic pregnancy, Pain, Pelvic adhesive disease, Pulmonary hypertension ([address] Shoulder joint pain, Sprain of ankle, Tobacco abuse, Type 2 diabetes mellitus without complication, without long-term current use of insulin ([address] Unspecified sleep apnea, and Vitamin D deficiency. PAST SURGICAL HISTORY:   has a past surgical history that includes  section (); Cholecystectomy, laparoscopic; Salpingo-oophorectomy (15); Leg Surgery; Tonsillectomy; Upper gastrointestinal endoscopy (07/10/2020); and Upper gastrointestinal endoscopy (N/A, 7/10/2020). ALLERGIES:  has No Known Allergies. MEDICATIONS:  Prior to Admission medications    Medication Sig Start Date End Date Taking? Authorizing Provider   gabapentin (NEURONTIN) 300 MG capsule Take 300 mg by mouth 3 times daily. Yes Historical Provider, MD   tiotropium (SPIRIVA HANDIHALER) 18 MCG inhalation capsule Spiriva Respimat 2.5 mcg/actuation solution for inhalation   Inhale 2 puffs every day by inhalation route.    Yes Historical Provider, MD   ondansetron (ZOFRAN ODT) 4 MG disintegrating tablet Take 1 tablet by mouth every 8 hours as needed for Nausea 11/14/19  Yes Apple Green MD   Promethazine-DM [facility]) 6.25-15 MG/5ML SOLN solution Take 5 mLs by mouth 4 times daily 9/12/19  Yes Historical Provider, MD   aspirin 81 MG chewable tablet Take 1 tablet by mouth daily 11/22/18  Yes Rosalee Mike DO   butalbital-acetaminophen-caffeine (FIORICET, ESGIC) -74 MG per tablet Take 1 tablet by mouth every 4 hours as needed for Headaches 11/21/18  Yes Rosalee Mike DO   albuterol sulfate  (90 Base) MCG/ACT inhaler Inhale 2 puffs into the lungs 4 times daily 11/21/18  Yes Rosalee Mike DO   ipratropium (ATROVENT HFA) 17 MCG/ACT inhaler Inhale 2 puffs into the lungs 4 times daily 11/21/18  Yes Rosalee Mike DO   lisinopril (PRINIVIL;ZESTRIL) 20 MG tablet Take 1 tablet by mouth daily 11/7/18  Yes Rosalee Mike DO   metFORMIN (GLUCOPHAGE) 500 MG tablet Take 1 tablet by mouth 2 times daily (with meals)  Patient taking differently: Take 1,000 mg by mouth daily (with breakfast)  11/6/18  Yes Rosalee Mike DO   POTASSIUM CHLORIDE PO Take 25 mg by mouth daily   Yes Historical Provider, MD   famotidine (PEPCID) 20 MG tablet Take 1 tablet by mouth 2 times daily 10/28/18  Yes Lawrence Crowder MD   benzonatate (TESSALON PERLES) 100 MG capsule Take 1 capsule by mouth every 8 hours as needed for Cough 10/25/18  Yes Mark Wilkins MD   hydrochlorothiazide (HYDRODIURIL) 25 MG tablet Take 25 mg by mouth daily   Yes Historical Provider, MD   vitamin D (ERGOCALCIFEROL) 36274 UNITS CAPS capsule Take 1,000 Units by mouth daily    Yes Historical Provider, MD   topiramate (TOPAMAX) 50 MG tablet Take 100 mg by mouth 2 times daily  1/29/16  Yes Historical Provider, MD   metoprolol (LOPRESSOR) 50 MG tablet Take 50 mg by mouth 2 times daily 8/10/15  Yes Historical Provider, MD   loratadine (CLARITIN) 10 MG tablet Take 10 mg by mouth daily   Yes Historical Provider, MD   diclofenac (CATAFLAM) 50 MG tablet Take 50 mg by mouth 3 times daily    Historical Provider, MD       FAMILY HISTORY:  Family History of Breast, Ovarian, Colon or Uterine Cancer:  family history includes Breast Cancer in her maternal aunt; Cancer in her father, maternal aunt, and paternal aunt; Diabetes in her maternal aunt; Heart Disease in her mother; High Blood Pressure in her mother; Lung Cancer in her father and paternal aunt; Thyroid Disease in her sister. SOCIAL HISTORY:   reports that she quit smoking about 2 years ago. Her smoking use included cigarettes. She started smoking about 6 years ago. She smoked 0.10 packs per day. She has never used smokeless tobacco. She reports current alcohol use. She reports previous drug use. Drugs:  and Marijuana. HEALTH MAINTENANCE:  [facility] immunization: Received 3 dose series per the patient       Vitals:    10/19/20 1409   BP: 129/84   Site: Right Wrist   Position: Sitting   Cuff Size: Medium Adult   Pulse: 80   Weight: (!) 589 lb 6 oz (267.3 kg)   Height: 5' 11\" (1.803 m)                                                                                                                                                                    PHYSICAL EXAM:   General Appearance:  Alert; in no acute distress. Skin: No rashes or lesions. Acanthosis nigricans noted around the neck  Lymphatic: No abnormally enlarged lymph nodes. HEENT: normocephalic and atraumatic,   Breast:  (Chest): normal appearance, no masses or tenderness  Abdomen: Morbidly obese, soft, non-tender, non-distended, no right upper quadrant tenderness and no CVA tenderness  Pelvic Exam:   External genitalia: General appearance; normal, Hair distribution; normal, Lesions absent  Urinary system: urethral meatus normal  Vaginal: normal mucosa, no discharge  Cervix: Not visualized secondary to difficulty of exam and patient unable to tolerate.   Adnexa: normal adnexa in size, nontender and no masses  Uterus: Nonpalpable secondary to body habitus  Musculoskeletal: no gross Managed by PCP      Asthma      Priority: Medium    Anxiety and depression 03/06/2020    HLD (hyperlipidemia) 03/06/2020    COPD (chronic obstructive pulmonary disease) ([facility] 03/06/2020    History of marijuana use 03/06/2020    Acquired hallux rigidus 04/19/2019    Chronic bipolar disorder ([facility] 04/19/2019    Cervical radiculopathy 04/19/2019    Gastroesophageal reflux disease 04/19/2019    Chronic migraine 04/19/2019    Neck pain 04/19/2019    Osteoarthritis of knee 04/19/2019    Vitamin D deficiency 04/19/2019    Normocytic normochromic anemia 11/06/2018    Pulmonary hypertension ([facility]     BRYSON (obstructive sleep apnea)     Obesity hypoventilation syndrome ([facility] 11/04/2018    Type 2 diabetes mellitus without complication, without long-term current use of insulin ([facility] 11/04/2018    Dysplasia of cervix, low grade (ISAIAS 1) 03/30/2017     Noted on colposcopy 3/7/17      ASCUS with positive high risk HPV cervical 02/14/2017      Colposcopy 3/7/17. ISAIAS 1. Plan: repeat Co-testing in 12 months. Due 3/2018.  H/O abnormal cervical Papanicolaou smear 02/03/2017     LSIL - 2/2015  Pap collected 2/3/2017. Patient is very difficult SSE as she is morbidly obese. Required ringed forceps and snowman speculum. ASCUS; HPV+  Colpo 3/7/17: CIN1-recommend cotesting in 1 year        History of trichomonal vaginitis      2015      Dysfunctional uterine bleeding 01/27/2014    Shoulder joint pain 08/19/2013       Return in about 1 month (around 11/19/2020) for ultrasound results. Counseling Completed:    discussed need for repeat pap as per American Society for Colposcopy and Cervical Pathology guidelines. discussed need for mammograms every 1 year, If >44 yo and last mammogram was negative. discussed need for colonoscopy screening as well as onset for bone density testing. discussed birth control and barrier recommendations. discussed STD counseling and prevention.   discussed Gardisil counseling for all patients 7-33 yo. Hereditary Breast, Ovarian, Colon and Uterine Cancer screening discussed. Tobacco & Secondary smoke risks discussed; with recommendation for cessation and avoidance. Routine health maintenance per patients PCP discussed. Patient was seen with total face to face time of 30 minutes. More than 50% of this visit was on counseling and education regarding the problems listed below and her options. She was also counseled on her preventative health maintenance recommendations and follow-up. Diagnosis Orders   1.  Abnormal uterine bleeding  POCT urine pregnancy    C.trachomatis N.gonorrhoeae DNA    PAP SMEAR    VAGINITIS DNA PROBE    US NON OB [address] [place] [address] Ne, DO  Ob/Gyn Resident  [facility], [address]  10/19/2020, 5:04 PM

## 2020-10-19 NOTE — PROGRESS NOTES
Attending Physician Statement  I have discussed the care of Yadi Morales, including pertinent history and exam findings,  with the resident. I have reviewed the key elements of all parts of the encounter with the resident. I agree with the assessment, plan and orders as documented by the resident.   (GE Modifier)      Nory Buckley DO

## 2020-10-20 LAB
DIRECT EXAM: NORMAL
Lab: NORMAL
SPECIMEN DESCRIPTION: NORMAL

## 2020-10-22 LAB
C TRACH DNA GENITAL QL NAA+PROBE: NEGATIVE
N. GONORRHOEAE DNA: NEGATIVE
SPECIMEN DESCRIPTION: NORMAL

## 2020-10-27 LAB — CYTOLOGY REPORT: NORMAL

## 2020-11-04 PROBLEM — N93.9 ABNORMAL UTERINE BLEEDING: Status: ACTIVE | Noted: 2020-11-04

## 2020-11-10 ENCOUNTER — ANCILLARY PROCEDURE (OUTPATIENT)
Dept: OBGYN | Age: 35
End: 2020-11-10
Payer: COMMERCIAL

## 2020-11-10 ENCOUNTER — HOSPITAL ENCOUNTER (OUTPATIENT)
Age: 35
Setting detail: SPECIMEN
Discharge: HOME | End: 2020-11-10
Payer: COMMERCIAL

## 2020-11-10 PROCEDURE — 76856 US EXAM PELVIC COMPLETE: CPT | Performed by: RADIOLOGY

## 2020-11-10 PROCEDURE — 76830 TRANSVAGINAL US NON-OB: CPT | Performed by: RADIOLOGY

## 2020-11-11 ENCOUNTER — HOSPITAL ENCOUNTER (OUTPATIENT)
Age: 35
Setting detail: SPECIMEN
Discharge: HOME | End: 2020-11-11
Payer: COMMERCIAL

## 2020-11-11 LAB
AMPHETAMINE SCREEN URINE: NEGATIVE
BARBITURATE SCREEN URINE: NEGATIVE
BENZODIAZEPINE SCREEN, URINE: NEGATIVE
BUPRENORPHINE URINE: NORMAL
CANNABINOID SCREEN URINE: NEGATIVE
COCAINE METABOLITE, URINE: NEGATIVE
MDMA URINE: NORMAL
METHADONE SCREEN, URINE: NEGATIVE
METHAMPHETAMINE, URINE: NORMAL
OPIATES, URINE: NEGATIVE
OXYCODONE SCREEN URINE: NEGATIVE
PHENCYCLIDINE, URINE: NEGATIVE
PROPOXYPHENE, URINE: NORMAL
TEST INFORMATION: NORMAL
TRICYCLIC ANTIDEPRESSANTS, UR: NORMAL

## 2020-11-20 ENCOUNTER — VIRTUAL VISIT (OUTPATIENT)
Dept: OBGYN | Age: 35
End: 2020-11-20
Payer: COMMERCIAL

## 2020-11-20 PROCEDURE — 99213 OFFICE O/P EST LOW 20 MIN: CPT | Performed by: STUDENT IN AN ORGANIZED HEALTH CARE EDUCATION/TRAINING PROGRAM

## 2020-11-20 NOTE — PROGRESS NOTES
Porfirio Monae MATA Hair  2020    YOB: 1985    HPI:  Coco Laura is a 30 y.o. female [id number]     Coco Laura is a 30 y.o. female evaluated via telephone on 2020. Consent:  She and/or health care decision maker is aware that that she may receive a bill for this telephone service, depending on her insurance coverage, and has provided verbal consent to proceed: Yes      I affirm this is a Patient Initiated Episode with a Patient who has not had a related appointment within my department in the past 7 days or scheduled within the next 24 hours. Patient identification was verified at the start of the visit: Yes    Total Time: minutes: 11-20 minutes    Note: not billable if this call serves to triage the patient into an appointment for the relevant concern    Appointment today was to discuss ultrasound results. She had a TVUS done on 11/10 for work up for AUB. Her ultrasound showed an endometrial stripe measuring 8mm, normal appearing uterus and a 5 cm simple left ovarian cyst. Explained to patient that she will need follow up with a repeat ultrasound to monitor this ovarian cyst in one year. Also explained to patient that her pap smear did not show any malignancy but it did not contain TZ. Explained that ideally we would like to repeat this pap smear. Patient again stressed that she does not want to be started on birth control to regulate her periods. She states she does not want an IUD, depo or OCPs. She states she is not trying to get pregnant but does not want to use birth control to attempt menstrual regulation. Offered to patient that if she ends up receiving a gastric sleeve at [facility] [address] we can coordinate with her bariatric surgeons to repeat an exam under anesthesia to collect pap smear and endometrial biopsy. Patient said she would be agreeable to this and she will bring this up to her surgeons at her next bariatric appointment ().      OB History    Para Term  AB Living   4 1 1 0 3 1   SAB TAB Ectopic Molar Multiple Live Births   2 0 1 0 0 0      # Outcome Date GA Lbr Mario/2nd Weight Sex Delivery Anes PTL Lv   4 Ectopic            3 Term 2005     CS-Unspec      2 SAB               Birth Comments: System Generated. Please review and update pregnancy details. 1 SAB                Past Medical History:   Diagnosis Date    Abnormal vaginal bleeding 2019    Acquired hallux rigidus 2019    Acute and chronic respiratory failure with hypoxia ([address] 2018    Acute on chronic respiratory failure with hypoxia and hypercapnia ([address] 2018    ASCUS with positive high risk HPV cervical     Asthma     Bipolar disorder ([address] 2019    Breast discharge 2015    Cellulitis 2019    Cervical radiculopathy 2019    Chest pain 2018    Chlamydia ? Reported hx    Chronic cor pulmonale (HCC)     COPD (chronic obstructive pulmonary disease) (HCC)     D-dimer, elevated 2018    Dysfunctional uterine bleeding 2014    Dysplasia of cervix, low grade (ISAIAS 1) 3/30/2017    Noted on colposcopy 3/7/17    Dyspnea 11/3/2018    Ectopic pregnancy - Right 2014    Pt was initiallly dx with a left ovarian ectopic. After Laproscopic eval she was noted to have a Right Tubal ectopic. She underwent a RSO on 14.  Elevated blood pressure reading     Elevated brain natriuretic peptide (BNP) level     Gastroesophageal reflux disease 2019    H/O abnormal cervical Papanicolaou smear 2/3/2017    LSIL - 2015 Pap collected 2/3/2017. Patient is very difficult SSE as she is morbidly obese. Required ringed forceps and snowman speculum.       Heartburn     History of trichomonal vaginitis     tx'd    Hypertension     Hypoalbuminemia due to protein-calorie malnutrition ([address] 2018    Hypocalcemia 2018    Migraine 2019    Morbid obesity with BMI of 70 and over, adult ([address]     BMI 74    MRSA (methicillin resistant Staphylococcus aureus) 2010    Left leg    Muscle weakness     Neck pain 2019    Normocytic normochromic anemia 2018    Obesity     Obesity hypoventilation syndrome ([address] 2018    BRYSON (obstructive sleep apnea)     BRYSON on CPAP    Ovarian ectopic pregnancy 2014    Right tubal ectopic tx'd with MTX AND LS RSO(initially dx as Left ovarian By USN 14)    Pain     Pelvic adhesive disease 14    C/S to bladder, as well as reactive to ectopic; also hx PID    Pulmonary hypertension ([address]     Shoulder joint pain 2013    Sprain of ankle 2019    Tobacco abuse 2018    Type 2 diabetes mellitus without complication, without long-term current use of insulin ([address] 2018    Unspecified sleep apnea     Vitamin D deficiency 2019       Past Surgical History:   Procedure Laterality Date     SECTION      CHOLECYSTECTOMY, LAPAROSCOPIC      LEG SURGERY      right leg age 7 for growth plate    SALPINGO-OOPHORECTOMY  14    Right; ectopic pregnancy    TONSILLECTOMY      age 12   [facility] UPPER GASTROINTESTINAL ENDOSCOPY  07/10/2020    UPPER GASTROINTESTINAL ENDOSCOPY N/A 7/10/2020    EGD BIOPSY performed by Radha Candelario DO at [address].       Family History   Problem Relation Age of Onset    High Blood Pressure Mother     Heart Disease Mother     Cancer Father         lung    Lung Cancer Father     Cancer Maternal Aunt         breast    Diabetes Maternal Aunt     Breast Cancer Maternal Aunt     Cancer Paternal Aunt         lung    Lung Cancer Paternal Aunt     Thyroid Disease Sister     Colon Cancer Neg Hx     Eclampsia Neg Hx     Hypertension Neg Hx     Ovarian Cancer Neg Hx      Labor Neg Hx     Spont Abortions Neg Hx     Stroke Neg Hx        Social History     Socioeconomic History    Marital status: Single     Spouse name: Not on file    Number of children: Not on file    Years of education: Not on file    Highest education level: Not on file   Occupational History    Not on file   Social Needs    Financial resource strain: Not on file    Food insecurity     Worry: Not on file     Inability: Not on file    Transportation needs     Medical: Not on file     Non-medical: Not on file   Tobacco Use    Smoking status: Former Smoker     Packs/day: 0.10     Types: Cigarettes     Start date: 9/3/2014     Last attempt to quit: 2018     Years since quittin.8    Smokeless tobacco: Never Used    Tobacco comment: quit in  started back up    Substance and Sexual Activity    Alcohol use: Yes     Alcohol/week: 0.0 standard drinks     Comment: occassionally     Drug use: Not Currently     Types: Marijuana     Comment: 2020. Quit in May 2020    Sexual activity: Not Currently   Lifestyle    Physical activity     Days per week: Not on file     Minutes per session: Not on file    Stress: Not on file   Relationships    Social connections     Talks on phone: Not on file     Gets together: Not on file     Attends Restorationist service: Not on file     Active member of club or organization: Not on file     Attends meetings of clubs or organizations: Not on file     Relationship status: Not on file    Intimate partner violence     Fear of current or ex partner: Not on file     Emotionally abused: Not on file     Physically abused: Not on file     Forced sexual activity: Not on file   Other Topics Concern    Not on file   Social History Narrative    Not on file       MEDICATIONS:  Current Outpatient Medications   Medication Sig Dispense Refill    diclofenac (CATAFLAM) 50 MG tablet Take 50 mg by mouth 3 times daily      gabapentin (NEURONTIN) 300 MG capsule Take 300 mg by mouth 3 times daily.  tiotropium (SPIRIVA HANDIHALER) 18 MCG inhalation capsule Spiriva Respimat 2.5 mcg/actuation solution for inhalation   Inhale 2 puffs every day by inhalation route.       ondansetron (ZOFRAN ODT) 4 MG disintegrating tablet Take 1 tablet by mouth every 8 hours as needed for Nausea 5 tablet 0    Promethazine-DM (PROMETHAZINE-DEXTROMETHORPHAN) 6.25-15 MG/5ML SOLN solution Take 5 mLs by mouth 4 times daily  1    aspirin 81 MG chewable tablet Take 1 tablet by mouth daily 30 tablet 3    butalbital-acetaminophen-caffeine (FIORICET, ESGIC) -40 MG per tablet Take 1 tablet by mouth every 4 hours as needed for Headaches 20 tablet 0    albuterol sulfate  (90 Base) MCG/ACT inhaler Inhale 2 puffs into the lungs 4 times daily 1 Inhaler 3    ipratropium (ATROVENT HFA) 17 MCG/ACT inhaler Inhale 2 puffs into the lungs 4 times daily 1 Inhaler 3    lisinopril (PRINIVIL;ZESTRIL) 20 MG tablet Take 1 tablet by mouth daily 30 tablet 3    metFORMIN (GLUCOPHAGE) 500 MG tablet Take 1 tablet by mouth 2 times daily (with meals) (Patient taking differently: Take 1,000 mg by mouth daily (with breakfast) ) 60 tablet 3    POTASSIUM CHLORIDE PO Take 25 mg by mouth daily      famotidine (PEPCID) 20 MG tablet Take 1 tablet by mouth 2 times daily 60 tablet 0    benzonatate (TESSALON PERLES) 100 MG capsule Take 1 capsule by mouth every 8 hours as needed for Cough 20 capsule 0    hydrochlorothiazide (HYDRODIURIL) 25 MG tablet Take 25 mg by mouth daily      vitamin D (ERGOCALCIFEROL) 84166 UNITS CAPS capsule Take 1,000 Units by mouth daily       topiramate (TOPAMAX) 50 MG tablet Take 100 mg by mouth 2 times daily       metoprolol (LOPRESSOR) 50 MG tablet Take 50 mg by mouth 2 times daily      loratadine (CLARITIN) 10 MG tablet Take 10 mg by mouth daily       No current facility-administered medications for this visit.         ALLERGIES:  Allergies as of 11/20/2020    (No Known Allergies)       REVIEW OF SYSTEMS:   Constitutional: negative fever, negative chills, negative weight changes   HEENT: negative visual disturbances, negative headaches, negative dizziness, negative hearing loss  Breast: Negative breast abnormalities, negative breast lumps, negative nipple discharge  Respiratory: negative dyspnea, negative cough, negative SOB  Cardiovascular: negative chest pain,  negative palpitations, negative arrhythmia, negative syncope   Gastrointestinal: negative abdominal pain, negative RUQ pain, negative N/V, negative diarrhea, negative constipation, negative bowel changes, negative heartburn   Genitourinary: negative dysuria, negative hematuria, negative urinary incontinence, negative vaginal discharge  Dermatological: negative rash, negative pruritis, negative mole changes  Hematologic: negative bruising  Immunologic/Lymphatic: negative recent illness, negative recent sick contact  Musculoskeletal: negative back pain, negative myalgias, negative arthralgias  Neurological:  negative dizziness, negative migraines, negative seizures, negative weakness  Behavior/Psych: negative depression, negative anxiety, negative SI, negative HI        Diagnostics:  Us Non Ob Transvaginal    Result Date: 11/10/2020    EXAMINATION: PELVIC ULTRASOUND 11/10/2020 TECHNIQUE: Transabdominal and transvaginal pelvic ultrasound was performed. COMPARISON: 02/15/2017 HISTORY: ORDERING SYSTEM PROVIDED HISTORY: Abnormal uterine bleeding FINDINGS: Examination is limited due to body habitus. Measurements: Uterus:  11.9 x 5.4 x 3.0 cm Endometrial stripe:  8 mm Right Ovary:  Surgically absent Left Ovary:  Not measured Ultrasound Findings: Uterus: Uterus demonstrates normal myometrial echotexture. Endometrial stripe: Endometrial stripe is within normal limits. Right Ovary: Surgically absent Left Ovary: The left ovary is not confidently identified. There is a 4.6 x 5.0 x 3.8 cm simple appearing cyst within the left adnexa. Free Fluid: No evidence of free fluid. 1. Within the limitations described above, normal sonographic appearance of the uterus. 2. Prior right oophorectomy.  3. 5 cm simple appearing left adnexal cyst.  Left ovary is not confidently identified separate from this lesion. RECOMMENDATIONS: Management of Asymptomatic Ovarian and Other Adnexal Cysts Imaged at US: Simple Cyst: Recommendations for f/u of ovarian anechoic simple cyst, simple cyst with single thin <3 mm septation, or focal calcification in wall of cyst. Pre-menopause:>5 cm - <7 cm US f/u annually Reference: Radiology. 2010 Sep;256(3):333-72     Assessment/Plan:  Abnormal Uterine Bleeding with unsatisfactory pap smear    - Patient does not want to attempt hormonal regulation of her period    - Will attempt to coordinate with bariatric surgeons to perform EUA with pap and endometrial bx at the time of her gastric sleeve procedure    - Follow up with a phone visit after 12/14 once she talks to her surgeons     Ovarian Cyst    - Repeat pelvic ultrasound ordered for one year      Diagnosis Orders   1. Abnormal uterine bleeding     2. Left ovarian cyst  US PELVIS COMPLETE    US NON OB TRANSVAGINAL     Patient Active Problem List    Diagnosis Date Noted    Morbid obesity with BMI of 70 and over, adult [facility])      Priority: High     BMI 82      Ectopic pregnancy - Right 02/09/2014     Priority: Medium     Pt was initiallly dx with a left ovarian ectopic. After Laproscopic eval she was noted to have a Right Tubal ectopic. She underwent a RSO on 2/9/14.  Hypertension      Priority: Medium     Managed by PCP      Asthma      Priority: Medium    Abnormal uterine bleeding 11/04/2020     Oligomenorrhea. Discussed need for EMB given patient's age and weight. Patient declined.  TVUS ordered      Anxiety and depression 03/06/2020    HLD (hyperlipidemia) 03/06/2020    COPD (chronic obstructive pulmonary disease) ([facility] 03/06/2020    History of marijuana use 03/06/2020    Acquired hallux rigidus 04/19/2019    Chronic bipolar disorder ([address] 04/19/2019    Cervical radiculopathy 04/19/2019    Gastroesophageal reflux disease 04/19/2019    Chronic migraine 04/19/2019    Neck pain 04/19/2019    Osteoarthritis of knee 04/19/2019    Vitamin D deficiency 04/19/2019    Normocytic normochromic anemia 11/06/2018    Pulmonary hypertension ([facility]     BRYSON (obstructive sleep apnea)     Obesity hypoventilation syndrome ([facility] 11/04/2018    Type 2 diabetes mellitus without complication, without long-term current use of insulin ([facility] 11/04/2018    Dysplasia of cervix, low grade (ISAIAS 1) 03/30/2017     Noted on colposcopy 3/7/17      ASCUS with positive high risk HPV cervical 02/14/2017     Colposcopy 3/7/17. ISAIAS 1. Plan: repeat Co-testing in 12 months. Due 3/2018. Pap negative HPV negative 10/2020      H/O abnormal cervical Papanicolaou smear 02/03/2017     LSIL - 2/2015  Pap collected 2/3/2017. Patient is very difficult SSE as she is morbidly obese. Required ringed forceps and snowman speculum. ASCUS; HPV+  Colpo 3/7/17: CIN1-recommend cotesting in 1 year        History of trichomonal vaginitis      2015      Shoulder joint pain 08/19/2013         Counseling:  Repeat Annual every 1 year  Cervical Cytology Evaluation begins at 24years old. If Negative Cytology, Follow-up screening per current guidelines. Counseled on preventative health maintenance follow-up. Orders Placed This Encounter   Procedures    US PELVIS COMPLETE     Standing Status:   Future     Standing Expiration Date:   5/20/2022     Order Specific Question:   Reason for exam:     Answer:   follow up for 5 cm left ovarian cyst    US NON OB TRANSVAGINAL     Begin with transabdominal imaging. Standing Status:   Future     Standing Expiration Date:   11/20/2021     Order Specific Question:   Reason for exam:     Answer:   follow up for 5 cm left ovarian cyst       Patient was seen with total face to face time of 15 minutes. More than 50% of this visit was counseling and education regarding The primary encounter diagnosis was Abnormal uterine bleeding. A diagnosis of Left ovarian cyst was also pertinent to this visit.  and Results (Ultrasound results for irregular periods)   as well as counseling on preventative health maintenance follow-up.

## 2020-12-11 NOTE — PROGRESS NOTES
Date: 2020  Time: 11:42 AM      Patient Name: Annett Soulier  Patient : 1985  Room/Bed: Room/bed info not found  Admission Date/Time: No admission date for patient encounter. Attending Physician Statement  I have discussed the care of Yadi Morales, including pertinent history and exam findings with the resident. I have reviewed and edited their note in the electronic medical record. The key elements of all parts of the encounter have been performed/reviewed by me . I agree with the assessment, plan and orders as documented by the resident. The level of care submitted represents to the best of my ability the care documented in the medical record today. GE Modifier. This service has been performed in part by a resident under the direction of a teaching physician.         Attending's Name:  Whitney Brown DO

## 2020-12-14 ENCOUNTER — OFFICE VISIT (OUTPATIENT)
Dept: BARIATRICS/WEIGHT MGMT | Age: 35
End: 2020-12-14
Payer: COMMERCIAL

## 2020-12-14 VITALS
HEIGHT: 71 IN | BODY MASS INDEX: 41.02 KG/M2 | WEIGHT: 293 LBS | RESPIRATION RATE: 20 BRPM | HEART RATE: 76 BPM | SYSTOLIC BLOOD PRESSURE: 126 MMHG | DIASTOLIC BLOOD PRESSURE: 74 MMHG

## 2020-12-14 PROBLEM — R10.13 EPIGASTRIC PAIN: Status: ACTIVE | Noted: 2020-12-14

## 2020-12-14 PROCEDURE — 3044F HG A1C LEVEL LT 7.0%: CPT | Performed by: NURSE PRACTITIONER

## 2020-12-14 PROCEDURE — 1036F TOBACCO NON-USER: CPT | Performed by: NURSE PRACTITIONER

## 2020-12-14 PROCEDURE — 99213 OFFICE O/P EST LOW 20 MIN: CPT | Performed by: NURSE PRACTITIONER

## 2020-12-14 PROCEDURE — G8926 SPIRO NO PERF OR DOC: HCPCS | Performed by: NURSE PRACTITIONER

## 2020-12-14 PROCEDURE — G8427 DOCREV CUR MEDS BY ELIG CLIN: HCPCS | Performed by: NURSE PRACTITIONER

## 2020-12-14 PROCEDURE — G8417 CALC BMI ABV UP PARAM F/U: HCPCS | Performed by: NURSE PRACTITIONER

## 2020-12-14 PROCEDURE — G8484 FLU IMMUNIZE NO ADMIN: HCPCS | Performed by: NURSE PRACTITIONER

## 2020-12-14 PROCEDURE — 2022F DILAT RTA XM EVC RTNOPTHY: CPT | Performed by: NURSE PRACTITIONER

## 2020-12-14 PROCEDURE — 3023F SPIROM DOC REV: CPT | Performed by: NURSE PRACTITIONER

## 2020-12-14 RX ORDER — SUCRALFATE 1 G/1
1 TABLET ORAL 4 TIMES DAILY
Qty: 120 TABLET | Refills: 3 | Status: SHIPPED | OUTPATIENT
Start: 2020-12-14

## 2020-12-14 RX ORDER — PANTOPRAZOLE SODIUM 40 MG/1
40 TABLET, DELAYED RELEASE ORAL DAILY
Qty: 30 TABLET | Refills: 3 | Status: SHIPPED | OUTPATIENT
Start: 2020-12-14

## 2020-12-14 RX ORDER — FAMOTIDINE 20 MG/1
20 TABLET, FILM COATED ORAL NIGHTLY
Qty: 30 TABLET | Refills: 3 | Status: SHIPPED | OUTPATIENT
Start: 2020-12-14

## 2020-12-14 NOTE — PROGRESS NOTES
Medical Weight Management Progress Note    Subjective      Patient being seen for medically supervised weight loss for the chronic conditions of DM Type 2, Asthma, Pulmonary HTN, BRYSON, Obesity Hypoventilation Syndrome, Anemia, GERD, Anxiety/Depression, Bipolar, HLD, Chronic Migraine, Arthritis, COPD.      She is working on the behavior changes discussed at the initial appointment. Patient continues on diet plan. Physical activity includes walking.  Weight gain of 1 lb since last visit.   Psych eval completed and has recommendations. Needs pulmonary and cardiac clearances. EGD completed and H Pylori positive. Completed treatment regimen. Needs repeat H Pylori test.  Nicotine level negative, but UDS positive for THC. Repeat UDS was negative.  No current issues.     Working toward bariatric surgery:    [x]? Sleeve Gastrectomy                                                           []? Ragini-en-Y Gastric Bypass    Allergies:  No Known Allergies    Past Medical History:     Past Medical History:   Diagnosis Date    Abnormal vaginal bleeding 4/19/2019    Acquired hallux rigidus 4/19/2019    Acute and chronic respiratory failure with hypoxia ([address] 11/6/2018    Acute on chronic respiratory failure with hypoxia and hypercapnia ([address] 11/6/2018    ASCUS with positive high risk HPV cervical     Asthma     Bipolar disorder ([facility] 4/19/2019    Breast discharge 4/9/2015    Cellulitis 4/19/2019    Cervical radiculopathy 4/19/2019    Chest pain 11/4/2018    Chlamydia ? Reported hx    Chronic cor pulmonale (HCC)     COPD (chronic obstructive pulmonary disease) (HCC)     D-dimer, elevated 11/18/2018    Dysfunctional uterine bleeding 1/27/2014    Dysplasia of cervix, low grade (ISAIAS 1) 3/30/2017    Noted on colposcopy 3/7/17    Dyspnea 11/3/2018    Ectopic pregnancy - Right 2/9/2014    Pt was initiallly dx with a left ovarian ectopic. After Laproscopic eval she was noted to have a Right Tubal ectopic.   She underwent a RSO on 14.  Elevated blood pressure reading     Elevated brain natriuretic peptide (BNP) level     Gastroesophageal reflux disease 2019    H/O abnormal cervical Papanicolaou smear 2/3/2017    LSIL - 2015 Pap collected 2/3/2017. Patient is very difficult SSE as she is morbidly obese. Required ringed forceps and snowman speculum.  Heartburn     History of trichomonal vaginitis 20106    tx'd    Hypertension     Hypoalbuminemia due to protein-calorie malnutrition ([address] 2018    Hypocalcemia 2018    Migraine 2019    Morbid obesity with BMI of 70 and over, adult ([address]     BMI 74    MRSA (methicillin resistant Staphylococcus aureus) 2010    Left leg    Muscle weakness     Neck pain 2019    Normocytic normochromic anemia 2018    Obesity     Obesity hypoventilation syndrome ([address] 2018    BRYSON (obstructive sleep apnea)     BRYSON on CPAP    Ovarian ectopic pregnancy 2014    Right tubal ectopic tx'd with MTX AND LS RSO(initially dx as Left ovarian By USN 14)    Pain     Pelvic adhesive disease 14    C/S to bladder, as well as reactive to ectopic; also hx PID    Pulmonary hypertension ([address]     Shoulder joint pain 2013    Sprain of ankle 2019    Tobacco abuse 2018    Type 2 diabetes mellitus without complication, without long-term current use of insulin ([address] 2018    Unspecified sleep apnea     Vitamin D deficiency 2019   .     Past Surgical History:  Past Surgical History:   Procedure Laterality Date     SECTION  2005    CHOLECYSTECTOMY, LAPAROSCOPIC      LEG SURGERY      right leg age 7 for growth plate    SALPINGO-OOPHORECTOMY  15    Right; ectopic pregnancy    TONSILLECTOMY      age 11    UPPER GASTROINTESTINAL ENDOSCOPY  07/10/2020    UPPER GASTROINTESTINAL ENDOSCOPY N/A 7/10/2020    EGD BIOPSY performed by Amanda Herrera DO at [address] [facility].       Family History:  Family History   Problem Relation Age of Onset    High Blood Pressure Mother     Heart Disease Mother     Cancer Father         lung    Lung Cancer Father     Cancer Maternal Aunt         breast    Diabetes Maternal Aunt     Breast Cancer Maternal Aunt     Cancer Paternal Aunt         lung    Lung Cancer Paternal Aunt     Thyroid Disease Sister     Colon Cancer Neg Hx     Eclampsia Neg Hx     Hypertension Neg Hx     Ovarian Cancer Neg Hx      Labor Neg Hx     Spont Abortions Neg Hx     Stroke Neg Hx        Social History:  Social History     Socioeconomic History    Marital status: Single     Spouse name: Not on file    Number of children: Not on file    Years of education: Not on file    Highest education level: Not on file   Occupational History    Not on file   Social Needs    Financial resource strain: Not on file    Food insecurity     Worry: Not on file     Inability: Not on file    Transportation needs     Medical: Not on file     Non-medical: Not on file   Tobacco Use    Smoking status: Former Smoker     Packs/day: 0.10     Types: Cigarettes     Start date: 9/3/2014     Quit date:      Years since quittin.9    Smokeless tobacco: Never Used    Tobacco comment: quit in  started back up    Substance and Sexual Activity    Alcohol use: Yes     Alcohol/week: 0.0 standard drinks     Comment: occassionally     Drug use: Not Currently     Types: Marijuana     Comment: 2020.  Quit in May 2020    Sexual activity: Not Currently   Lifestyle    Physical activity     Days per week: Not on file     Minutes per session: Not on file    Stress: Not on file   Relationships    Social connections     Talks on phone: Not on file     Gets together: Not on file     Attends [employer] service: Not on file     Active member of club or organization: Not on file     Attends meetings of clubs or organizations: Not on file     Relationship status: Not on file    Intimate partner violence     Fear of current or ex partner: Not on file     Emotionally abused: Not on file     Physically abused: Not on file     Forced sexual activity: Not on file   Other Topics Concern    Not on file   Social History Narrative    Not on file       Current Medications:  Current Outpatient Medications   Medication Sig Dispense Refill    famotidine (PEPCID) 20 MG tablet Take 1 tablet by mouth nightly 30 tablet 3    pantoprazole (PROTONIX) 40 MG tablet Take 1 tablet by mouth daily 30 tablet 3    sucralfate (CARAFATE) 1 GM tablet Take 1 tablet by mouth 4 times daily 120 tablet 3    diclofenac (CATAFLAM) 50 MG tablet Take 50 mg by mouth 3 times daily      gabapentin (NEURONTIN) 300 MG capsule Take 300 mg by mouth 3 times daily.  tiotropium (SPIRIVA HANDIHALER) 18 MCG inhalation capsule Spiriva Respimat 2.5 mcg/actuation solution for inhalation   Inhale 2 puffs every day by inhalation route.       ondansetron (ZOFRAN ODT) 4 MG disintegrating tablet Take 1 tablet by mouth every 8 hours as needed for Nausea 5 tablet 0    Promethazine-DM (PROMETHAZINE-DEXTROMETHORPHAN) 6.25-15 MG/5ML SOLN solution Take 5 mLs by mouth 4 times daily  1    aspirin 81 MG chewable tablet Take 1 tablet by mouth daily 30 tablet 3    butalbital-acetaminophen-caffeine (FIORICET, ESGIC) -40 MG per tablet Take 1 tablet by mouth every 4 hours as needed for Headaches 20 tablet 0    albuterol sulfate  (90 Base) MCG/ACT inhaler Inhale 2 puffs into the lungs 4 times daily 1 Inhaler 3    ipratropium (ATROVENT HFA) 17 MCG/ACT inhaler Inhale 2 puffs into the lungs 4 times daily 1 Inhaler 3    lisinopril (PRINIVIL;ZESTRIL) 20 MG tablet Take 1 tablet by mouth daily 30 tablet 3    metFORMIN (GLUCOPHAGE) 500 MG tablet Take 1 tablet by mouth 2 times daily (with meals) (Patient taking differently: Take 1,000 mg by mouth daily (with breakfast) ) 60 tablet 3    POTASSIUM CHLORIDE PO Take 25 mg by mouth daily      benzonatate Normocephalic. EOMs intact. Conjunctivae normal. Neck supple. Cardiovascular: Normal rate, regular rhythm. Pulmonary/Chest: Normal effort. Lungs clear to auscultation. No rales, rhonchi or wheezing. Abdominal: Positive bowel sounds. Soft, nontender. Nondistended. Musculoskeletal: Movement x4. Bilateral lower extremity edema. Neurological: Gait normal. Alert and oriented to person, place, and time. Psychiatric: Flat affect. Speech and behavior normal. Judgment and thought content normal. Cognition and memory intact. Assessment:       Diagnosis Orders   1. Positive H. pylori test  H. Pylori Breath Test    famotidine (PEPCID) 20 MG tablet    pantoprazole (PROTONIX) 40 MG tablet    sucralfate (CARAFATE) 1 GM tablet   2. Gastroesophageal reflux disease, unspecified whether esophagitis present  famotidine (PEPCID) 20 MG tablet    pantoprazole (PROTONIX) 40 MG tablet    sucralfate (CARAFATE) 1 GM tablet   3. Type 2 diabetes mellitus without complication, without long-term current use of insulin ([address]     4. BRYSON (obstructive sleep apnea)     5. Chronic bipolar disorder ([address]     6. Anxiety and depression     7. Hyperlipidemia, unspecified hyperlipidemia type     8. Essential hypertension     9. Obesity hypoventilation syndrome ([address]     10. Morbid obesity with BMI of 70 and over, adult ([address]     11. Uncomplicated asthma, unspecified asthma severity, unspecified whether persistent     12. Pulmonary hypertension ([address]     13. Normocytic normochromic anemia     14. Cervical radiculopathy     15. Chronic migraine     16. Chronic obstructive pulmonary disease, unspecified COPD type ([address]     17. History of marijuana use     18. Epigastric pain         Plan:    No dietitian visit today. Patient was encouraged to journal all food intake. Keep calorie level at approximately 2452-0637. Protein intake is to be a minimum of 60-80 grams per day. Water drinking was encouraged with a goal of 64oz-128oz daily.  Beverages to be calorie free except for milk. Every other beverage should be water. Avoid soda. Continue to increase level of physical activity. Encouraged use of exercise log. Repeat H Pylori test ordered. Repeat UDS was negative. Protonix and carafate ordered. Pepcid changed to HS. Call if GERD and epigastric pain not improved. Follow-up  Return in about 1 month (around 1/14/2021). Orders this encounter:  Orders Placed This Encounter   Procedures    H.  Pylori Breath Test     Standing Status:   Future     Standing Expiration Date:   12/14/2021       Prescriptions this encounter:  Orders Placed This Encounter   Medications    famotidine (PEPCID) 20 MG tablet     Sig: Take 1 tablet by mouth nightly     Dispense:  30 tablet     Refill:  3    pantoprazole (PROTONIX) 40 MG tablet     Sig: Take 1 tablet by mouth daily     Dispense:  30 tablet     Refill:  3    sucralfate (CARAFATE) 1 GM tablet     Sig: Take 1 tablet by mouth 4 times daily     Dispense:  120 tablet     Refill:  3       Electronically signed by:  Bobby Samson CNP

## 2021-01-04 ENCOUNTER — TELEMEDICINE (OUTPATIENT)
Dept: PULMONOLOGY | Age: 36
End: 2021-01-04
Payer: COMMERCIAL

## 2021-01-04 DIAGNOSIS — G47.33 OSA (OBSTRUCTIVE SLEEP APNEA): ICD-10-CM

## 2021-01-04 DIAGNOSIS — I27.20 PULMONARY HYPERTENSION (HCC): ICD-10-CM

## 2021-01-04 DIAGNOSIS — F32.A ANXIETY AND DEPRESSION: ICD-10-CM

## 2021-01-04 DIAGNOSIS — J42 CHRONIC BRONCHITIS, UNSPECIFIED CHRONIC BRONCHITIS TYPE (HCC): Primary | ICD-10-CM

## 2021-01-04 DIAGNOSIS — I10 ESSENTIAL HYPERTENSION: ICD-10-CM

## 2021-01-04 DIAGNOSIS — E11.9 TYPE 2 DIABETES MELLITUS WITHOUT COMPLICATION, WITHOUT LONG-TERM CURRENT USE OF INSULIN (HCC): ICD-10-CM

## 2021-01-04 DIAGNOSIS — F41.9 ANXIETY AND DEPRESSION: ICD-10-CM

## 2021-01-04 DIAGNOSIS — F17.200 SMOKING: ICD-10-CM

## 2021-01-04 DIAGNOSIS — E66.01 MORBID OBESITY WITH BMI OF 70 AND OVER, ADULT (HCC): ICD-10-CM

## 2021-01-04 PROCEDURE — 99214 OFFICE O/P EST MOD 30 MIN: CPT | Performed by: INTERNAL MEDICINE

## 2021-01-04 PROCEDURE — 3046F HEMOGLOBIN A1C LEVEL >9.0%: CPT | Performed by: INTERNAL MEDICINE

## 2021-01-04 PROCEDURE — 2022F DILAT RTA XM EVC RTNOPTHY: CPT | Performed by: INTERNAL MEDICINE

## 2021-01-04 PROCEDURE — G8427 DOCREV CUR MEDS BY ELIG CLIN: HCPCS | Performed by: INTERNAL MEDICINE

## 2021-01-04 ASSESSMENT — SLEEP AND FATIGUE QUESTIONNAIRES
HOW LIKELY ARE YOU TO NOD OFF OR FALL ASLEEP WHILE SITTING AND READING: 0
HOW LIKELY ARE YOU TO NOD OFF OR FALL ASLEEP WHILE SITTING QUIETLY AFTER LUNCH WITHOUT ALCOHOL: 0
HOW LIKELY ARE YOU TO NOD OFF OR FALL ASLEEP IN A CAR, WHILE STOPPED FOR A FEW MINUTES IN TRAFFIC: 0
HOW LIKELY ARE YOU TO NOD OFF OR FALL ASLEEP WHILE LYING DOWN TO REST IN THE AFTERNOON WHEN CIRCUMSTANCES PERMIT: 0
HOW LIKELY ARE YOU TO NOD OFF OR FALL ASLEEP WHILE SITTING INACTIVE IN A PUBLIC PLACE: 0
HOW LIKELY ARE YOU TO NOD OFF OR FALL ASLEEP WHILE SITTING AND TALKING TO SOMEONE: 0

## 2021-01-06 ENCOUNTER — TELEPHONE (OUTPATIENT)
Dept: BARIATRICS/WEIGHT MGMT | Age: 36
End: 2021-01-06

## 2021-01-06 NOTE — PROGRESS NOTES
2021    TELEHEALTH EVALUATION -- Audio/Visual (During XPCEJ-99 public health emergency)    Patient and physician are located in their individual locations. This is visit is completed via [employer] application [x]/ Doxy. me[] / Telephone []     HPI:    Jose Bello (:  1985) has requested an audio/video evaluation for the following concern(s):  Mrs. Anastacia Carrera is known to have chronic obstructive lung disease due to chronic bronchitis with emphysema secondary to prior smoking. Fortunately she has given up smoking. Pulmonary status is stable. Last night she did have an episode of wheezing which seems to be more related to oxygen reflux disease rather than bronchospasm. She did not take the rescue inhaler. He continues to be morbidly obese has sleep apnea refuses to use her CPAP. She does have occasional pedal edema she does have evidence of cor pulmonale secondary to obesity hypoventilation COPD and BRYSON. No DVT. He is taking her medications regularly. Patient has not taken flu vaccine and does not plan to take the Covid vaccine as well. Review of Systems    Prior to Visit Medications    Medication Sig Taking? Authorizing Provider   famotidine (PEPCID) 20 MG tablet Take 1 tablet by mouth nightly Yes JUANY Plata CNP   pantoprazole (PROTONIX) 40 MG tablet Take 1 tablet by mouth daily Yes JUANY Plata CNP   sucralfate (CARAFATE) 1 GM tablet Take 1 tablet by mouth 4 times daily Yes JUANY Plata CNP   diclofenac (CATAFLAM) 50 MG tablet Take 50 mg by mouth 3 times daily Yes Historical Provider, MD   gabapentin (NEURONTIN) 300 MG capsule Take 300 mg by mouth 3 times daily. Yes Historical Provider, MD   tiotropium (SPIRIVA HANDIHALER) 18 MCG inhalation capsule Spiriva Respimat 2.5 mcg/actuation solution for inhalation   Inhale 2 puffs every day by inhalation route.  Yes Historical Provider, MD   ondansetron (ZOFRAN ODT) 4 MG disintegrating tablet Take 1 tablet by mouth every 8 hours as needed for Nausea Yes Nathanael Bruner MD   Promethazine-DM [facility]) 6.25-15 MG/5ML SOLN solution Take 5 mLs by mouth 4 times daily Yes Historical Provider, MD   aspirin 81 MG chewable tablet Take 1 tablet by mouth daily Yes Dea Andersen DO   butalbital-acetaminophen-caffeine (FIORICET, ESGIC) -40 MG per tablet Take 1 tablet by mouth every 4 hours as needed for Headaches Yes Dea Andersen DO   albuterol sulfate  (90 Base) MCG/ACT inhaler Inhale 2 puffs into the lungs 4 times daily Yes Dea Andersen DO   ipratropium (ATROVENT HFA) 17 MCG/ACT inhaler Inhale 2 puffs into the lungs 4 times daily Yes Dea Andersen DO   lisinopril (PRINIVIL;ZESTRIL) 20 MG tablet Take 1 tablet by mouth daily Yes Dea Andersen DO   metFORMIN (GLUCOPHAGE) 500 MG tablet Take 1 tablet by mouth 2 times daily (with meals)  Patient taking differently: Take 1,000 mg by mouth daily (with breakfast)  Yes Dea Andersen DO   POTASSIUM CHLORIDE PO Take 25 mg by mouth daily Yes Historical Provider, MD   benzonatate (TESSALON PERLES) 100 MG capsule Take 1 capsule by mouth every 8 hours as needed for Cough Yes Jefferson Mireles MD   hydrochlorothiazide (HYDRODIURIL) 25 MG tablet Take 25 mg by mouth daily Yes Historical Provider, MD   vitamin D (ERGOCALCIFEROL) 73803 UNITS CAPS capsule Take 1,000 Units by mouth daily  Yes Historical Provider, MD   topiramate (TOPAMAX) 50 MG tablet Take 100 mg by mouth 2 times daily  Yes Historical Provider, MD   metoprolol (LOPRESSOR) 50 MG tablet Take 50 mg by mouth 2 times daily Yes Historical Provider, MD   loratadine (CLARITIN) 10 MG tablet Take 10 mg by mouth daily Yes Historical Provider, MD       Social History     Tobacco Use    Smoking status: Former Smoker     Packs/day: 0.10     Types: Cigarettes     Start date: 9/3/2014     Quit date: 2018     Years since quitting: 3.0    Smokeless tobacco: Never Used    Tobacco comment: quit in 2016 started back up 1/20   Substance Use Topics    Alcohol use: Yes     Alcohol/week: 0.0 standard drinks     Comment: occassionally     Drug use: Not Currently     Types: Marijuana     Comment: 5/27/2020. Quit in May 2020            RECORD REVIEW: Previous medical records were reviewed at today's visit. Wt Readings from Last 3 Encounters:   12/14/20 (!) 594 lb (269.4 kg)   10/19/20 (!) 589 lb 6 oz (267.3 kg)   10/09/20 (!) 590 lb (267.6 kg)       Results for orders placed or performed during the hospital encounter of 11/11/20   Urine Drug Screen   Result Value Ref Range    Amphetamine Screen, Ur NEGATIVE NEGATIVE    Barbiturate Screen, Ur NEGATIVE NEGATIVE    Benzodiazepine Screen, Urine NEGATIVE NEGATIVE    Cocaine Metabolite, Urine NEGATIVE NEGATIVE    Methadone Screen, Urine NEGATIVE NEGATIVE    Opiates, Urine NEGATIVE NEGATIVE    Phencyclidine, Urine NEGATIVE NEGATIVE    Propoxyphene, Urine NOT REPORTED NEGATIVE    Cannabinoid Scrn, Ur NEGATIVE NEGATIVE    Oxycodone Screen, Ur NEGATIVE NEGATIVE    Methamphetamine, Urine NOT REPORTED NEGATIVE    Tricyclic Antidepressants, Urine NOT REPORTED NEGATIVE    MDMA, Urine NOT REPORTED NEGATIVE    Buprenorphine Urine NOT REPORTED NEGATIVE    Test Information       Assay provides medical screening only. The absence of expected drug(s) and/or metabolite(s) may indicate diluted or adulterated urine, limitations of testing or timing of collection. No results found. PHYSICAL EXAMINATION:  Due to this being a TeleHealth encounter, evaluation of the following organ systems is limited: Vitals/Constitutional/EENT/Resp/CV/GI//MS/Neuro/Skin/Heme-Lymph-Imm. Constitutional: [x] Appears well-developed and well-nourished.      [x] Abnormal  Mental status  [x] Alert and awake  [x] Oriented to person/place/time [x]Able to follow commands    [x] No apparent distress      Eyes:  EOM    [x]  Normal  [] Abnormal-  Sclera  [x]  Normal  [] Abnormal -         Discharge afraid she is not going to do so we plan  Patient is very much interested in bariatric surgery. I encouraged her to undergo the surgery. We will see her in follow-up few weeks before the surgery. An  electronic signature was used to authenticate this note. --German Lara MD on 1/6/2021 at 11:28 AM    9}    Pursuant to the emergency declaration under the [address] [place] [address] waiver authority and the [employer] Act, this Virtual  Visit was conducted, with patient's consent, to reduce the patient's risk of exposure to COVID-19 and provide continuity of care for an established patient. Services were provided through a video synchronous discussion virtually to substitute for in-person clinic visit.     _______________________________________________________________________________________________________________________________________________  FOR TELEPHONE VISITS PLEASE COMPLETE THE FOLLOWING      Consent:  She and/or health care decision maker is aware that that she may receive a bill for this telephone service, depending on her insurance coverage, and has provided verbal consent to proceed: Yes      I affirm this is a Patient Initiated Episode with an Established Patient who has not had a related appointment within my department in the past 7 days or scheduled within the next 24 hours.     Total Time: minutes: 21-30 minutes    Note: not billable if this call serves to triage the patient into an appointment for the relevant concern

## 2021-01-06 NOTE — TELEPHONE ENCOUNTER
Vasile Kirby from the [facility] would like to verify [facility]" for pt  Phone [phone]    Which meds should pt be on? 1. famotidine 20mg nightly  2. Omeprazole 40mg in AM  3.  Pantoprazole 40mg in AM  4. carafate 1 Gm QID

## 2021-01-18 ENCOUNTER — TELEMEDICINE (OUTPATIENT)
Dept: BARIATRICS/WEIGHT MGMT | Age: 36
End: 2021-01-18
Payer: COMMERCIAL

## 2021-01-18 DIAGNOSIS — I27.20 PULMONARY HYPERTENSION (HCC): ICD-10-CM

## 2021-01-18 DIAGNOSIS — F41.9 ANXIETY AND DEPRESSION: ICD-10-CM

## 2021-01-18 DIAGNOSIS — F12.91 HISTORY OF MARIJUANA USE: ICD-10-CM

## 2021-01-18 DIAGNOSIS — F31.9 CHRONIC BIPOLAR DISORDER (HCC): ICD-10-CM

## 2021-01-18 DIAGNOSIS — E66.2 OBESITY HYPOVENTILATION SYNDROME (HCC): ICD-10-CM

## 2021-01-18 DIAGNOSIS — F32.A ANXIETY AND DEPRESSION: ICD-10-CM

## 2021-01-18 DIAGNOSIS — J44.9 CHRONIC OBSTRUCTIVE PULMONARY DISEASE, UNSPECIFIED COPD TYPE (HCC): ICD-10-CM

## 2021-01-18 DIAGNOSIS — E66.01 MORBID OBESITY WITH BMI OF 70 AND OVER, ADULT (HCC): ICD-10-CM

## 2021-01-18 DIAGNOSIS — J45.909 UNCOMPLICATED ASTHMA, UNSPECIFIED ASTHMA SEVERITY, UNSPECIFIED WHETHER PERSISTENT: ICD-10-CM

## 2021-01-18 DIAGNOSIS — I10 ESSENTIAL HYPERTENSION: ICD-10-CM

## 2021-01-18 DIAGNOSIS — K21.9 GASTROESOPHAGEAL REFLUX DISEASE, UNSPECIFIED WHETHER ESOPHAGITIS PRESENT: ICD-10-CM

## 2021-01-18 DIAGNOSIS — D64.9 NORMOCYTIC NORMOCHROMIC ANEMIA: ICD-10-CM

## 2021-01-18 DIAGNOSIS — E78.5 HYPERLIPIDEMIA, UNSPECIFIED HYPERLIPIDEMIA TYPE: ICD-10-CM

## 2021-01-18 DIAGNOSIS — G47.33 OSA (OBSTRUCTIVE SLEEP APNEA): ICD-10-CM

## 2021-01-18 DIAGNOSIS — E11.9 TYPE 2 DIABETES MELLITUS WITHOUT COMPLICATION, WITHOUT LONG-TERM CURRENT USE OF INSULIN (HCC): Primary | ICD-10-CM

## 2021-01-18 PROCEDURE — G8428 CUR MEDS NOT DOCUMENT: HCPCS | Performed by: NURSE PRACTITIONER

## 2021-01-18 PROCEDURE — 3046F HEMOGLOBIN A1C LEVEL >9.0%: CPT | Performed by: NURSE PRACTITIONER

## 2021-01-18 PROCEDURE — 99213 OFFICE O/P EST LOW 20 MIN: CPT | Performed by: NURSE PRACTITIONER

## 2021-01-18 PROCEDURE — 2022F DILAT RTA XM EVC RTNOPTHY: CPT | Performed by: NURSE PRACTITIONER

## 2021-01-18 NOTE — PROGRESS NOTES
Medical Weight Management Progress Note    TELEHEALTH EVALUATION -- Audio/Visual (During GXJMH-33 public health emergency)    Subjective     Patient being seen for medically supervised weight loss for the chronic conditions of DM Type 2, Asthma, Pulmonary HTN, BRYSON, Obesity Hypoventilation Syndrome, Anemia, GERD, Anxiety/Depression, Bipolar, HLD, Chronic Migraine, Arthritis, COPD.      She is working on the behavior changes discussed at the initial appointment. Patient continues on diet plan. Physical activity includes walking.  Weight loss of 3 lbs since last visit.   Psych eval completed and has recommendations.  Needs pulmonary and cardiac clearances. EGD completed and H Pylori positive. Completed treatment regimen. Needs repeat H Pylori test.  Nicotine level negative, but UDS positive for THC. Repeat UDS was negative.  No current issues.     Working toward bariatric surgery:    [x]? ? Sleeve Gastrectomy                                                           []? ? Ragini-en-Y Gastric Bypass    Allergies:  No Known Allergies    Past Medical History:     Past Medical History:   Diagnosis Date    Abnormal vaginal bleeding 4/19/2019    Acquired hallux rigidus 4/19/2019    Acute and chronic respiratory failure with hypoxia ([address] 11/6/2018    Acute on chronic respiratory failure with hypoxia and hypercapnia ([address] 11/6/2018    ASCUS with positive high risk HPV cervical     Asthma     Bipolar disorder ([address] 4/19/2019    Breast discharge 4/9/2015    Cellulitis 4/19/2019    Cervical radiculopathy 4/19/2019    Chest pain 11/4/2018    Chlamydia ?     Reported hx    Chronic cor pulmonale (HCC)     COPD (chronic obstructive pulmonary disease) (HCC)     D-dimer, elevated 11/18/2018    Dysfunctional uterine bleeding 1/27/2014    Dysplasia of cervix, low grade (ISAIAS 1) 3/30/2017    Noted on colposcopy 3/7/17    Dyspnea 11/3/2018    Ectopic pregnancy - Right 2/9/2014    Pt was initiallly dx with a left ovarian ectopic. After Laproscopic eval she was noted to have a Right Tubal ectopic. She underwent a RSO on 14.  Elevated blood pressure reading     Elevated brain natriuretic peptide (BNP) level     Gastroesophageal reflux disease 2019    H/O abnormal cervical Papanicolaou smear 2/3/2017    LSIL - 2015 Pap collected 2/3/2017. Patient is very difficult SSE as she is morbidly obese. Required ringed forceps and snowman speculum.  Heartburn     History of trichomonal vaginitis 20106    tx'd    Hypertension     Hypoalbuminemia due to protein-calorie malnutrition ([address] 2018    Hypocalcemia 2018    Migraine 2019    Morbid obesity with BMI of 70 and over, adult ([address]     BMI 74    MRSA (methicillin resistant Staphylococcus aureus) 2010    Left leg    Muscle weakness     Neck pain 2019    Normocytic normochromic anemia 2018    Obesity     Obesity hypoventilation syndrome ([address] 2018    BRYSON (obstructive sleep apnea)     BRYSON on CPAP    Ovarian ectopic pregnancy 2014    Right tubal ectopic tx'd with MTX AND LS RSO(initially dx as Left ovarian By USN 14)    Pain     Pelvic adhesive disease 14    C/S to bladder, as well as reactive to ectopic; also hx PID    Pulmonary hypertension ([address]     Shoulder joint pain 2013    Sprain of ankle 2019    Tobacco abuse 2018    Type 2 diabetes mellitus without complication, without long-term current use of insulin ([address] 2018    Unspecified sleep apnea     Vitamin D deficiency 2019   .     Past Surgical History:  Past Surgical History:   Procedure Laterality Date     SECTION  2005    CHOLECYSTECTOMY, LAPAROSCOPIC      LEG SURGERY      right leg age 7 for growth plate    SALPINGO-OOPHORECTOMY  14    Right; ectopic pregnancy    TONSILLECTOMY      age 12   [facility] UPPER GASTROINTESTINAL ENDOSCOPY  07/10/2020    UPPER GASTROINTESTINAL ENDOSCOPY N/A 7/10/2020    EGD BIOPSY performed by Destinee Spaulding DO at [id number] [address] [facility].       Family History:  Family History   Problem Relation Age of Onset    High Blood Pressure Mother     Heart Disease Mother     Cancer Father         lung    Lung Cancer Father     Cancer Maternal Aunt         breast    Diabetes Maternal Aunt     Breast Cancer Maternal Aunt     Cancer Paternal Aunt         lung    Lung Cancer Paternal Aunt     Thyroid Disease Sister     Colon Cancer Neg Hx     Eclampsia Neg Hx     Hypertension Neg Hx     Ovarian Cancer Neg Hx      Labor Neg Hx     Spont Abortions Neg Hx     Stroke Neg Hx        Social History:  Social History     Socioeconomic History    Marital status: Single     Spouse name: Not on file    Number of children: Not on file    Years of education: Not on file    Highest education level: Not on file   Occupational History    Not on file   Social Needs    Financial resource strain: Not on file    Food insecurity     Worry: Not on file     Inability: Not on file    Transportation needs     Medical: Not on file     Non-medical: Not on file   Tobacco Use    Smoking status: Former Smoker     Packs/day: 0.10     Types: Cigarettes     Start date: 9/3/2014     Quit date:      Years since quitting: 3.0    Smokeless tobacco: Never Used    Tobacco comment: quit in  started back up    Substance and Sexual Activity    Alcohol use: Yes     Alcohol/week: 0.0 standard drinks     Comment: occassionally     Drug use: Not Currently     Types: Marijuana     Comment: 2020.  Quit in May 2020    Sexual activity: Not Currently   Lifestyle    Physical activity     Days per week: Not on file     Minutes per session: Not on file    Stress: Not on file   Relationships    Social connections     Talks on phone: Not on file     Gets together: Not on file     Attends Orthodoxy service: Not on file     Active member of club or organization: Not on file     Attends meetings of clubs or organizations: Not on file     Relationship status: Not on file    Intimate partner violence     Fear of current or ex partner: Not on file     Emotionally abused: Not on file     Physically abused: Not on file     Forced sexual activity: Not on file   Other Topics Concern    Not on file   Social History Narrative    Not on file       Current Medications:  Current Outpatient Medications   Medication Sig Dispense Refill    famotidine (PEPCID) 20 MG tablet Take 1 tablet by mouth nightly 30 tablet 3    pantoprazole (PROTONIX) 40 MG tablet Take 1 tablet by mouth daily 30 tablet 3    sucralfate (CARAFATE) 1 GM tablet Take 1 tablet by mouth 4 times daily 120 tablet 3    diclofenac (CATAFLAM) 50 MG tablet Take 50 mg by mouth 3 times daily      gabapentin (NEURONTIN) 300 MG capsule Take 300 mg by mouth 3 times daily.  tiotropium (SPIRIVA HANDIHALER) 18 MCG inhalation capsule Spiriva Respimat 2.5 mcg/actuation solution for inhalation   Inhale 2 puffs every day by inhalation route.       ondansetron (ZOFRAN ODT) 4 MG disintegrating tablet Take 1 tablet by mouth every 8 hours as needed for Nausea 5 tablet 0    Promethazine-DM (PROMETHAZINE-DEXTROMETHORPHAN) 6.25-15 MG/5ML SOLN solution Take 5 mLs by mouth 4 times daily  1    aspirin 81 MG chewable tablet Take 1 tablet by mouth daily 30 tablet 3    butalbital-acetaminophen-caffeine (FIORICET, ESGIC) -40 MG per tablet Take 1 tablet by mouth every 4 hours as needed for Headaches 20 tablet 0    albuterol sulfate  (90 Base) MCG/ACT inhaler Inhale 2 puffs into the lungs 4 times daily 1 Inhaler 3    ipratropium (ATROVENT HFA) 17 MCG/ACT inhaler Inhale 2 puffs into the lungs 4 times daily 1 Inhaler 3    lisinopril (PRINIVIL;ZESTRIL) 20 MG tablet Take 1 tablet by mouth daily 30 tablet 3    metFORMIN (GLUCOPHAGE) 500 MG tablet Take 1 tablet by mouth 2 times daily (with meals) (Patient taking differently: Take 1,000 mg by mouth daily (with complication, without long-term current use of insulin ([facility]     2. Obesity hypoventilation syndrome ([address]     3. Essential hypertension     4. Morbid obesity with BMI of 70 and over, adult ([facility]     5. BRYSON (obstructive sleep apnea)     6. Chronic bipolar disorder ([facility]     7. Gastroesophageal reflux disease, unspecified whether esophagitis present     8. Anxiety and depression     9. Hyperlipidemia, unspecified hyperlipidemia type     10. Uncomplicated asthma, unspecified asthma severity, unspecified whether persistent     11. Pulmonary hypertension ([facility]     12. Normocytic normochromic anemia     13. Chronic obstructive pulmonary disease, unspecified COPD type ([facility]     14. Chronic migraine     15. History of marijuana use         Plan:    Dietitian visit follow up call. Follow-up  Return in about 1 month (around 2/18/2021). Orders this encounter:  No orders of the defined types were placed in this encounter. Prescriptions this encounter:  No orders of the defined types were placed in this encounter. Electronically signed by:  JOE Hawthorne    Pursuant to the emergency declaration under the [facility] [address] waiver authority and the Coronavirus Preparedness and [employer] Act, this Virtual  Visit was conducted, with patient's consent, to reduce the patient's risk of exposure to COVID-19 and provide continuity of care for an established patient. Services were provided through a video synchronous discussion virtually to substitute for in-person clinic visit. Patient was in her home and provider was in the weight management clinic.

## 2021-01-27 ENCOUNTER — VIRTUAL VISIT (OUTPATIENT)
Dept: OBGYN | Age: 36
End: 2021-01-27
Payer: COMMERCIAL

## 2021-01-27 DIAGNOSIS — N93.9 ABNORMAL UTERINE BLEEDING (AUB): Primary | ICD-10-CM

## 2021-01-27 PROCEDURE — 1036F TOBACCO NON-USER: CPT | Performed by: STUDENT IN AN ORGANIZED HEALTH CARE EDUCATION/TRAINING PROGRAM

## 2021-01-27 PROCEDURE — G8417 CALC BMI ABV UP PARAM F/U: HCPCS | Performed by: STUDENT IN AN ORGANIZED HEALTH CARE EDUCATION/TRAINING PROGRAM

## 2021-01-27 PROCEDURE — 99212 OFFICE O/P EST SF 10 MIN: CPT | Performed by: STUDENT IN AN ORGANIZED HEALTH CARE EDUCATION/TRAINING PROGRAM

## 2021-01-27 PROCEDURE — G8484 FLU IMMUNIZE NO ADMIN: HCPCS | Performed by: STUDENT IN AN ORGANIZED HEALTH CARE EDUCATION/TRAINING PROGRAM

## 2021-01-27 PROCEDURE — G8427 DOCREV CUR MEDS BY ELIG CLIN: HCPCS | Performed by: STUDENT IN AN ORGANIZED HEALTH CARE EDUCATION/TRAINING PROGRAM

## 2021-01-27 PROCEDURE — 1036F TOBACCO NON-USER: CPT | Performed by: OBSTETRICS & GYNECOLOGY

## 2021-01-27 NOTE — PROGRESS NOTES
Papa Morales  2021    YOB: 1985    HPI:  Jase Erazo is a 30 y.o. female [id number] evaluated via telephone on 2021. Consent:  She and/or health care decision maker is aware that that she may receive a bill for this telephone service, depending on her insurance coverage, and has provided verbal consent to proceed: Yes    Documentation:  I communicated with the patient and/or health care decision maker about follow up for endometrial biopsy during bariatric surgery     I affirm this is a Patient Initiated Episode with a Patient who has not had a related appointment within my department in the past 7 days or scheduled within the next 24 hours. Patient identification was verified at the start of the visit: Yes    She states her surgeon will not perform surgery until she is under 550 lbs. She is starting a liquid diet to help expedite her weight loss. She still desires for this endometrial biopsy and repeat pap to be collected while she is under general anesthesia. Again did not want to start any type of birth control to help regulate her menses. Her last period started on 21 and ended 21. She reports it was light. Denies any headache or dizziness. Reports she will call back after her next appointment (one month) to give an update about her new surgery date. OB History    Para Term  AB Living   4 1 1 0 3 1   SAB TAB Ectopic Molar Multiple Live Births   2 0 1 0 0 0      # Outcome Date GA Lbr Mario/2nd Weight Sex Delivery Anes PTL Lv   4 Ectopic 2014           3 Term 2005     CS-Unspec      2 SAB               Birth Comments: System Generated. Please review and update pregnancy details.    1 SAB                Past Medical History:   Diagnosis Date    Abnormal vaginal bleeding 2019    Acquired hallux rigidus 2019    Acute and chronic respiratory failure with hypoxia ([facility] 2018    Acute on chronic respiratory failure with hypoxia and hypercapnia ([address] 11/6/2018    ASCUS with positive high risk HPV cervical     Asthma     Bipolar disorder ([address] 4/19/2019    Breast discharge 4/9/2015    Cellulitis 4/19/2019    Cervical radiculopathy 4/19/2019    Chest pain 11/4/2018    Chlamydia ? Reported hx    Chronic cor pulmonale (HCC)     COPD (chronic obstructive pulmonary disease) (HCC)     D-dimer, elevated 11/18/2018    Dysfunctional uterine bleeding 1/27/2014    Dysplasia of cervix, low grade (ISAIAS 1) 3/30/2017    Noted on colposcopy 3/7/17    Dyspnea 11/3/2018    Ectopic pregnancy - Right 2/9/2014    Pt was initiallly dx with a left ovarian ectopic. After Laproscopic eval she was noted to have a Right Tubal ectopic. She underwent a RSO on 2/9/14.  Elevated blood pressure reading     Elevated brain natriuretic peptide (BNP) level     Gastroesophageal reflux disease 4/19/2019    H/O abnormal cervical Papanicolaou smear 2/3/2017    LSIL - 2/2015 Pap collected 2/3/2017. Patient is very difficult SSE as she is morbidly obese. Required ringed forceps and snowman speculum.       Heartburn     History of trichomonal vaginitis 1/20106    tx'd    Hypertension     Hypoalbuminemia due to protein-calorie malnutrition ([address] 11/6/2018    Hypocalcemia 11/21/2018    Migraine 4/19/2019    Morbid obesity with BMI of 70 and over, adult ([address]     BMI 74    MRSA (methicillin resistant Staphylococcus aureus) 7/2010    Left leg    Muscle weakness     Neck pain 4/19/2019    Normocytic normochromic anemia 11/6/2018    Obesity     Obesity hypoventilation syndrome ([address] 11/4/2018    BRYSON (obstructive sleep apnea)     BRYSON on CPAP    Ovarian ectopic pregnancy 2/9/2014    Right tubal ectopic tx'd with MTX AND LS RSO(initially dx as Left ovarian By USN 2/8/14)    Pain     Pelvic adhesive disease 2/9/14    C/S to bladder, as well as reactive to ectopic; also hx PID    Pulmonary hypertension ([address]     Shoulder joint pain 8/19/2013    Sprain of ankle 2019    Tobacco abuse 2018    Type 2 diabetes mellitus without complication, without long-term current use of insulin ([facility] 2018    Unspecified sleep apnea     Vitamin D deficiency 2019       Past Surgical History:   Procedure Laterality Date     SECTION      CHOLECYSTECTOMY, LAPAROSCOPIC      LEG SURGERY      right leg age 7 for growth plate    SALPINGO-OOPHORECTOMY  14    Right; ectopic pregnancy    TONSILLECTOMY      age 12   [facility] UPPER GASTROINTESTINAL ENDOSCOPY  07/10/2020    UPPER GASTROINTESTINAL ENDOSCOPY N/A 7/10/2020    EGD BIOPSY performed by Nikki Kearney DO at [id number].       Family History   Problem Relation Age of Onset    High Blood Pressure Mother     Heart Disease Mother     Cancer Father         lung    Lung Cancer Father     Cancer Maternal Aunt         breast    Diabetes Maternal Aunt     Breast Cancer Maternal Aunt     Cancer Paternal Aunt         lung    Lung Cancer Paternal Aunt     Thyroid Disease Sister     Colon Cancer Neg Hx     Eclampsia Neg Hx     Hypertension Neg Hx     Ovarian Cancer Neg Hx      Labor Neg Hx     Spont Abortions Neg Hx     Stroke Neg Hx        Social History     Socioeconomic History    Marital status: Single     Spouse name: Not on file    Number of children: Not on file    Years of education: Not on file    Highest education level: Not on file   Occupational History    Not on file   Social Needs    Financial resource strain: Not on file    Food insecurity     Worry: Not on file     Inability: Not on file    Transportation needs     Medical: Not on file     Non-medical: Not on file   Tobacco Use    Smoking status: Former Smoker     Packs/day: 0.10     Types: Cigarettes     Start date: 9/3/2014     Quit date:      Years since quitting: 3.0    Smokeless tobacco: Never Used    Tobacco comment: quit in  started back up    Substance and Sexual Activity    Alcohol use: Yes     Alcohol/week: 0.0 standard drinks     Comment: occassionally     Drug use: Not Currently     Types: Marijuana     Comment: 5/27/2020. Quit in May 2020    Sexual activity: Not Currently   Lifestyle    Physical activity     Days per week: Not on file     Minutes per session: Not on file    Stress: Not on file   Relationships    Social connections     Talks on phone: Not on file     Gets together: Not on file     Attends Cheondoism service: Not on file     Active member of club or organization: Not on file     Attends meetings of clubs or organizations: Not on file     Relationship status: Not on file    Intimate partner violence     Fear of current or ex partner: Not on file     Emotionally abused: Not on file     Physically abused: Not on file     Forced sexual activity: Not on file   Other Topics Concern    Not on file   Social History Narrative    Not on file       MEDICATIONS:  Current Outpatient Medications   Medication Sig Dispense Refill    diclofenac sodium (VOLTAREN) 1 % GEL 1 g      famotidine (PEPCID) 20 MG tablet Take 1 tablet by mouth nightly 30 tablet 3    pantoprazole (PROTONIX) 40 MG tablet Take 1 tablet by mouth daily 30 tablet 3    sucralfate (CARAFATE) 1 GM tablet Take 1 tablet by mouth 4 times daily 120 tablet 3    gabapentin (NEURONTIN) 300 MG capsule Take 300 mg by mouth 3 times daily.  tiotropium (SPIRIVA HANDIHALER) 18 MCG inhalation capsule Spiriva Respimat 2.5 mcg/actuation solution for inhalation   Inhale 2 puffs every day by inhalation route.       ondansetron (ZOFRAN ODT) 4 MG disintegrating tablet Take 1 tablet by mouth every 8 hours as needed for Nausea 5 tablet 0    Promethazine-DM (PROMETHAZINE-DEXTROMETHORPHAN) 6.25-15 MG/5ML SOLN solution Take 5 mLs by mouth 4 times daily  1    aspirin 81 MG chewable tablet Take 1 tablet by mouth daily 30 tablet 3    butalbital-acetaminophen-caffeine (FIORICET, ESGIC) -40 MG per tablet Take 1 tablet by mouth every 4 hours as needed for Headaches 20 tablet 0    albuterol sulfate  (90 Base) MCG/ACT inhaler Inhale 2 puffs into the lungs 4 times daily 1 Inhaler 3    ipratropium (ATROVENT HFA) 17 MCG/ACT inhaler Inhale 2 puffs into the lungs 4 times daily 1 Inhaler 3    lisinopril (PRINIVIL;ZESTRIL) 20 MG tablet Take 1 tablet by mouth daily 30 tablet 3    metFORMIN (GLUCOPHAGE) 500 MG tablet Take 1 tablet by mouth 2 times daily (with meals) (Patient taking differently: Take 1,000 mg by mouth daily (with breakfast) ) 60 tablet 3    POTASSIUM CHLORIDE PO Take 25 mg by mouth daily      benzonatate (TESSALON PERLES) 100 MG capsule Take 1 capsule by mouth every 8 hours as needed for Cough 20 capsule 0    hydrochlorothiazide (HYDRODIURIL) 25 MG tablet Take 25 mg by mouth daily      vitamin D (ERGOCALCIFEROL) 88561 UNITS CAPS capsule Take 1,000 Units by mouth daily       topiramate (TOPAMAX) 50 MG tablet Take 100 mg by mouth 2 times daily       metoprolol (LOPRESSOR) 50 MG tablet Take 50 mg by mouth 2 times daily      loratadine (CLARITIN) 10 MG tablet Take 10 mg by mouth daily      diclofenac (CATAFLAM) 50 MG tablet Take 50 mg by mouth 3 times daily       No current facility-administered medications for this visit.         ALLERGIES:  Allergies as of 01/27/2021    (No Known Allergies)       REVIEW OF SYSTEMS:   Constitutional: negative fever, negative chills, negative weight changes   HEENT: negative visual disturbances, negative headaches, negative dizziness   Respiratory: negative dyspnea, negative cough, negative SOB  Cardiovascular: negative chest pain,  negative palpitations, negative arrhythmia, negative syncope   Gastrointestinal: negative abdominal pain,   negative N/V   Genitourinary: negative dysuria,  negative vaginal discharge  Dermatological: negative rash, negative mole changes  Hematologic: negative bruising  Immunologic/Lymphatic: negative recent illness, negative recent sick contact  Musculoskeletal: negative back pain, negative myalgias, negative arthralgias  Neurological:  negative dizziness, negative migraines   Behavior/Psych: negative SI, negative HI       Assessment/Plan:  Abnormal Uterine Bleeding with Thickened Endometrial Strip    - Had a telephone encounter with patient as she desires repeat pap smear and endometrial biopsy to be performed when she is under general anesthesia. Her bariatric surgery was postponed due to her not losing enough weight. - She recently had a light period that ended a few days and denies any s/s of anemia    -  She will call office back in 1 month with an update on when her surgery is planned. Encouraged her to reach out sooner if she desires medical management for AUB     Diagnosis Orders   1. Abnormal uterine bleeding (AUB)           Patient was on the phone for a total of 10 minutes. More than 50% of this visit was counseling and education regarding The encounter diagnosis was Abnormal uterine bleeding (AUB). and Follow-up (post bariatric consult)   as well as counseling on preventative health maintenance follow-up.       Nixon Jaeger DO   OB/GYN Resident  Pager [phone] [address]  1/27/2021, 4:53 PM

## 2021-02-24 ENCOUNTER — OFFICE VISIT (OUTPATIENT)
Dept: BARIATRICS/WEIGHT MGMT | Age: 36
End: 2021-02-24
Payer: COMMERCIAL

## 2021-02-24 VITALS
HEART RATE: 70 BPM | DIASTOLIC BLOOD PRESSURE: 84 MMHG | BODY MASS INDEX: 41.02 KG/M2 | WEIGHT: 293 LBS | HEIGHT: 71 IN | SYSTOLIC BLOOD PRESSURE: 124 MMHG | TEMPERATURE: 97 F

## 2021-02-24 DIAGNOSIS — I10 ESSENTIAL HYPERTENSION: ICD-10-CM

## 2021-02-24 DIAGNOSIS — J45.909 UNCOMPLICATED ASTHMA, UNSPECIFIED ASTHMA SEVERITY, UNSPECIFIED WHETHER PERSISTENT: ICD-10-CM

## 2021-02-24 DIAGNOSIS — K21.9 GASTROESOPHAGEAL REFLUX DISEASE, UNSPECIFIED WHETHER ESOPHAGITIS PRESENT: ICD-10-CM

## 2021-02-24 DIAGNOSIS — E78.5 HYPERLIPIDEMIA, UNSPECIFIED HYPERLIPIDEMIA TYPE: ICD-10-CM

## 2021-02-24 DIAGNOSIS — M54.12 CERVICAL RADICULOPATHY: ICD-10-CM

## 2021-02-24 DIAGNOSIS — G47.33 OSA (OBSTRUCTIVE SLEEP APNEA): Primary | ICD-10-CM

## 2021-02-24 DIAGNOSIS — E11.9 TYPE 2 DIABETES MELLITUS WITHOUT COMPLICATION, WITHOUT LONG-TERM CURRENT USE OF INSULIN (HCC): ICD-10-CM

## 2021-02-24 DIAGNOSIS — F12.91 HISTORY OF MARIJUANA USE: ICD-10-CM

## 2021-02-24 DIAGNOSIS — F32.A ANXIETY AND DEPRESSION: ICD-10-CM

## 2021-02-24 DIAGNOSIS — N93.9 ABNORMAL UTERINE BLEEDING: ICD-10-CM

## 2021-02-24 DIAGNOSIS — I27.20 PULMONARY HYPERTENSION (HCC): ICD-10-CM

## 2021-02-24 DIAGNOSIS — J44.9 CHRONIC OBSTRUCTIVE PULMONARY DISEASE, UNSPECIFIED COPD TYPE (HCC): ICD-10-CM

## 2021-02-24 DIAGNOSIS — F31.9 CHRONIC BIPOLAR DISORDER (HCC): ICD-10-CM

## 2021-02-24 DIAGNOSIS — F41.9 ANXIETY AND DEPRESSION: ICD-10-CM

## 2021-02-24 DIAGNOSIS — E66.2 OBESITY HYPOVENTILATION SYNDROME (HCC): ICD-10-CM

## 2021-02-24 DIAGNOSIS — E66.01 MORBID OBESITY WITH BMI OF 70 AND OVER, ADULT (HCC): ICD-10-CM

## 2021-02-24 PROCEDURE — 3046F HEMOGLOBIN A1C LEVEL >9.0%: CPT | Performed by: NURSE PRACTITIONER

## 2021-02-24 PROCEDURE — 2022F DILAT RTA XM EVC RTNOPTHY: CPT | Performed by: NURSE PRACTITIONER

## 2021-02-24 PROCEDURE — 1036F TOBACCO NON-USER: CPT | Performed by: NURSE PRACTITIONER

## 2021-02-24 PROCEDURE — G8427 DOCREV CUR MEDS BY ELIG CLIN: HCPCS | Performed by: NURSE PRACTITIONER

## 2021-02-24 PROCEDURE — G8417 CALC BMI ABV UP PARAM F/U: HCPCS | Performed by: NURSE PRACTITIONER

## 2021-02-24 PROCEDURE — 99213 OFFICE O/P EST LOW 20 MIN: CPT | Performed by: NURSE PRACTITIONER

## 2021-02-24 PROCEDURE — G8484 FLU IMMUNIZE NO ADMIN: HCPCS | Performed by: NURSE PRACTITIONER

## 2021-02-24 PROCEDURE — 3023F SPIROM DOC REV: CPT | Performed by: NURSE PRACTITIONER

## 2021-02-24 PROCEDURE — G8926 SPIRO NO PERF OR DOC: HCPCS | Performed by: NURSE PRACTITIONER

## 2021-02-24 NOTE — PATIENT INSTRUCTIONS
Schedule a Vaccine  When you qualify to receive the vaccine, call the [facility]) COVID-19 Vaccination Hotline to schedule your appointment or to get additional information about the [facility]) locations which are offering the COVID-19 vaccine. To be 94% effective, it's important that you receive two doses of one of the COVID-19 vaccines. -If you are receiving the Courtney Peter vaccine, your second shot will be scheduled as close to 21 days after the first shot as possible. -If you are receiving the Moderna vaccine, your second shot will be scheduled as close to 28 days after the first shot as possible. [facility]) COVID-19 Vaccination Hotline: [phone]    Links to [facility]) website and [facility] website:    [employer]/[facility]-monitoring-coronavirus-covid-19/covid-19-vaccine/[place]-vaccine    [email or web address]

## 2021-02-24 NOTE — PROGRESS NOTES
Medical Weight Management Progress Note    Subjective     Patient being seen for medically supervised weight loss for the chronic conditions of DM Type 2, Asthma, Pulmonary HTN, BRYSON, Obesity Hypoventilation Syndrome, Anemia, GERD, Anxiety/Depression, Bipolar, HLD, Chronic Migraine, Arthritis, COPD.      She is working on the behavior changes discussed at the initial appointment. Patient continues on diet plan. Physical activity includes walking.  Weight gain of 4 lbs since last visit.   Psych eval completed and has recommendations.  Needs pulmonary and cardiac clearances.  EGD completed and H Pylori positive.  Completed treatment regimen.  Needs repeat H Pylori test.  Nicotine level negative. UDS previously positive for THC.  Repeat UDS was negative.  No current issues. Working toward bariatric surgery:    [x] Sleeve Gastrectomy                                                           [] Ragini-en-Y Gastric Bypass    Allergies:  No Known Allergies    Past Medical History:     Past Medical History:   Diagnosis Date    Abnormal vaginal bleeding 4/19/2019    Acquired hallux rigidus 4/19/2019    Acute and chronic respiratory failure with hypoxia ([address] 11/6/2018    Acute on chronic respiratory failure with hypoxia and hypercapnia ([address] 11/6/2018    ASCUS with positive high risk HPV cervical     Asthma     Bipolar disorder ([address] 4/19/2019    Breast discharge 4/9/2015    Cellulitis 4/19/2019    Cervical radiculopathy 4/19/2019    Chest pain 11/4/2018    Chlamydia ? Reported hx    Chronic cor pulmonale (HCC)     COPD (chronic obstructive pulmonary disease) (HCC)     D-dimer, elevated 11/18/2018    Dysfunctional uterine bleeding 1/27/2014    Dysplasia of cervix, low grade (ISAIAS 1) 3/30/2017    Noted on colposcopy 3/7/17    Dyspnea 11/3/2018    Ectopic pregnancy - Right 2/9/2014    Pt was initiallly dx with a left ovarian ectopic. After Laproscopic eval she was noted to have a Right Tubal ectopic. History:  Family History   Problem Relation Age of Onset    High Blood Pressure Mother     Heart Disease Mother     Cancer Father         lung    Lung Cancer Father     Cancer Maternal Aunt         breast    Diabetes Maternal Aunt     Breast Cancer Maternal Aunt     Cancer Paternal Aunt         lung    Lung Cancer Paternal Aunt     Thyroid Disease Sister     Colon Cancer Neg Hx     Eclampsia Neg Hx     Hypertension Neg Hx     Ovarian Cancer Neg Hx      Labor Neg Hx     Spont Abortions Neg Hx     Stroke Neg Hx        Social History:  Social History     Socioeconomic History    Marital status: Single     Spouse name: Not on file    Number of children: Not on file    Years of education: Not on file    Highest education level: Not on file   Occupational History    Not on file   Social Needs    Financial resource strain: Not on file    Food insecurity     Worry: Not on file     Inability: Not on file    Transportation needs     Medical: Not on file     Non-medical: Not on file   Tobacco Use    Smoking status: Former Smoker     Packs/day: 0.10     Types: Cigarettes     Start date: 9/3/2014     Quit date:      Years since quitting: 3.1    Smokeless tobacco: Never Used    Tobacco comment: quit in  started back up    Substance and Sexual Activity    Alcohol use: Yes     Alcohol/week: 0.0 standard drinks     Comment: occassionally     Drug use: Not Currently     Types: Marijuana     Comment: 2020.  Quit in May 2020    Sexual activity: Not Currently   Lifestyle    Physical activity     Days per week: Not on file     Minutes per session: Not on file    Stress: Not on file   Relationships    Social connections     Talks on phone: Not on file     Gets together: Not on file     Attends Jainism service: Not on file     Active member of club or organization: Not on file     Attends meetings of clubs or organizations: Not on file     Relationship status: Not on file   Nick Intimate partner violence     Fear of current or ex partner: Not on file     Emotionally abused: Not on file     Physically abused: Not on file     Forced sexual activity: Not on file   Other Topics Concern    Not on file   Social History Narrative    Not on file       Current Medications:  Current Outpatient Medications   Medication Sig Dispense Refill    Liraglutide (VICTOZA SC) Inject 0.6 mg into the skin daily      diclofenac sodium (VOLTAREN) 1 % GEL 1 g      famotidine (PEPCID) 20 MG tablet Take 1 tablet by mouth nightly 30 tablet 3    pantoprazole (PROTONIX) 40 MG tablet Take 1 tablet by mouth daily 30 tablet 3    sucralfate (CARAFATE) 1 GM tablet Take 1 tablet by mouth 4 times daily 120 tablet 3    diclofenac (CATAFLAM) 50 MG tablet Take 50 mg by mouth 3 times daily      gabapentin (NEURONTIN) 300 MG capsule Take 600 mg by mouth 3 times daily.  tiotropium (SPIRIVA HANDIHALER) 18 MCG inhalation capsule Spiriva Respimat 2.5 mcg/actuation solution for inhalation   Inhale 2 puffs every day by inhalation route.       ondansetron (ZOFRAN ODT) 4 MG disintegrating tablet Take 1 tablet by mouth every 8 hours as needed for Nausea 5 tablet 0    aspirin 81 MG chewable tablet Take 1 tablet by mouth daily 30 tablet 3    butalbital-acetaminophen-caffeine (FIORICET, ESGIC) -40 MG per tablet Take 1 tablet by mouth every 4 hours as needed for Headaches 20 tablet 0    albuterol sulfate  (90 Base) MCG/ACT inhaler Inhale 2 puffs into the lungs 4 times daily 1 Inhaler 3    ipratropium (ATROVENT HFA) 17 MCG/ACT inhaler Inhale 2 puffs into the lungs 4 times daily 1 Inhaler 3    lisinopril (PRINIVIL;ZESTRIL) 20 MG tablet Take 1 tablet by mouth daily 30 tablet 3    POTASSIUM CHLORIDE PO Take 25 mg by mouth daily      benzonatate (TESSALON PERLES) 100 MG capsule Take 1 capsule by mouth every 8 hours as needed for Cough 20 capsule 0    hydrochlorothiazide (HYDRODIURIL) 25 MG tablet Take 25 mg by mouth daily      vitamin D (ERGOCALCIFEROL) 83409 UNITS CAPS capsule Take 1,000 Units by mouth daily       topiramate (TOPAMAX) 50 MG tablet Take 100 mg by mouth 2 times daily       metoprolol (LOPRESSOR) 50 MG tablet Take 50 mg by mouth 2 times daily      loratadine (CLARITIN) 10 MG tablet Take 10 mg by mouth daily      Promethazine-DM (PROMETHAZINE-DEXTROMETHORPHAN) 6.25-15 MG/5ML SOLN solution Take 5 mLs by mouth 4 times daily  1    metFORMIN (GLUCOPHAGE) 500 MG tablet Take 1 tablet by mouth 2 times daily (with meals) (Patient not taking: Reported on 2/24/2021) 60 tablet 3     No current facility-administered medications for this visit. Vital Signs:  /84   Pulse 70   Temp 97 °F (36.1 °C) (Temporal)   Ht 5' 11\" (1.803 m)   Wt (!) 595 lb (269.9 kg)   BMI 82.99 kg/m²     BMI/Height/Weight:  Body mass index is 82.99 kg/m². Review of Systems - A review of systems was performed. All was negative unless otherwise documented in HPI. Constitutional: Negative for fever, chills and diaphoresis. HENT: Negative for hearing loss and trouble swallowing. Eyes: Negative for photophobia and visual disturbance. Respiratory: Negative for cough, shortness of breath and wheezing. Cardiovascular: Negative for chest pain and palpitations. Gastrointestinal: Negative for nausea, vomiting, abdominal pain, diarrhea, constipation, blood in stool and abdominal distention. Endocrine: Negative for polydipsia, polyphagia and polyuria. Genitourinary: Negative for dysuria, frequency, hematuria and difficulty urinating. Musculoskeletal: Negative for myalgias, joint swelling. Skin: Negative for pallor and rash. Neurological: Negative for dizziness, tremors, light-headedness and headaches. Psychiatric/Behavioral: Negative for sleep disturbance and dysphoric mood. Objective:      Physical Exam   Vital signs reviewed. General: Well-developed and well-nourished. No acute distress.    Skin: Warm, dry and intact. HEENT: Normocephalic. EOMs intact. Conjunctivae normal. Neck supple. Cardiovascular: Normal rate, regular rhythm. Pulmonary/Chest: Normal effort. Lungs clear to auscultation. No rales, rhonchi or wheezing. Abdominal: Positive bowel sounds. Soft, nontender. Nondistended. Musculoskeletal: Movement x4. Bilateral lower extremity edema. Neurological: Gait normal. Alert and oriented to person, place, and time. Psychiatric: Normal mood and affect. Speech and behavior normal. Judgment and thought content normal. Cognition and memory intact. Assessment:       Diagnosis Orders   1. BRYSON (obstructive sleep apnea)     2. Chronic bipolar disorder ([address]     3. Gastroesophageal reflux disease, unspecified whether esophagitis present     4. Chronic migraine     5. Cervical radiculopathy     6. Anxiety and depression     7. Hyperlipidemia, unspecified hyperlipidemia type     8. Chronic obstructive pulmonary disease, unspecified COPD type ([address]     9. History of marijuana use     10. Abnormal uterine bleeding     11. Type 2 diabetes mellitus without complication, without long-term current use of insulin ([facility]     12. Obesity hypoventilation syndrome ([address]     13. Pulmonary hypertension ([address]     14. Essential hypertension     15. Uncomplicated asthma, unspecified asthma severity, unspecified whether persistent     16. Morbid obesity with BMI of 70 and over, adult [facility])         Plan:    No dietitian visit today. Patient was encouraged to journal all food intake. Keep calorie level at approximately 3219-0498. Protein intake is to be a minimum of 60-80 grams per day. Water drinking was encouraged with a goal of 64oz-128oz daily. Beverages to be calorie free except for milk. Every other beverage should be water. Avoid soda. Continue to increase level of physical activity. Encouraged use of exercise log. Long discussion with patient related to progress in the surgical pathway over past year.   Surgeon wants her weight to be down to 500 lbs before pursuing surgery. She is at 595 lbs and has not made much progress with weight loss since start weight of 605 lbs. She needs to complete repeat H Pylori testing. Needs cardiac and pulmonary clearances. Patient states pulmonologist will not clear her until she loses 50 lbs. Having trouble getting echocardiogram ordered by cardiologist covered by insurance. She is also pending psych clearance at this time. Offered non-surgical weight loss program and she is interested. She will register for the non-surgical information session at check out. Surgical program to be on hold for now until she can lose 95 more lbs. Follow-up  No follow-ups on file. Orders this encounter:  No orders of the defined types were placed in this encounter. Prescriptions this encounter:  No orders of the defined types were placed in this encounter.       Electronically signed by:  Yandel Braun CNP

## 2021-03-03 ENCOUNTER — HOSPITAL ENCOUNTER (OUTPATIENT)
Dept: PHYSICAL THERAPY | Age: 36
Setting detail: THERAPIES SERIES
Discharge: HOME | End: 2021-03-03
Payer: COMMERCIAL

## 2021-03-03 NOTE — FLOWSHEET NOTE
[] [facility]) [facility] &  Therapy  [address]    [phone]  F: [phone]   [] [address]   [address]  P: [phone]  F: [phone]  [] [address] &  Therapy  [address] [facility]  P: [phone]  F: [phone] [] [address]  P: [phone]  F: [phone]  [] [address]  [facility]   [address]   [place]: [phone]  F: [phone]   [x] [address]  [place]: [phone]   F: [phone]     Physical Therapy Cancel/No Show note    Date: 3/3/2021  Patient: Michelle Morales  : 1985  MRN: [id number]    Cancels/No Shows to date:     For today's appointment patient:    []  Cancelled    [x] Rescheduled appointment    [] No-show     Reason given by patient:    []  Patient ill    []  Conflicting appointment    [x] No transportation      [] Conflict with work    [] No reason given    [] Weather related    [] COVID-19    [x] Other:      Comments: Pt re-scheduled initial evaluation for R knee OA d/t transportation issues.         [] Next appointment was confirmed    Electronically signed by: Aline Mata, PT

## 2021-03-11 ENCOUNTER — HOSPITAL ENCOUNTER (OUTPATIENT)
Dept: PHYSICAL THERAPY | Age: 36
Setting detail: THERAPIES SERIES
Discharge: HOME | End: 2021-03-11
Payer: COMMERCIAL

## 2021-03-11 PROCEDURE — 97110 THERAPEUTIC EXERCISES: CPT

## 2021-03-11 PROCEDURE — 97161 PT EVAL LOW COMPLEX 20 MIN: CPT

## 2021-03-11 NOTE — PROGRESS NOTES
Physical Therapy       [address] [facility]  [address] [facility]         Phone: [phone]       Fax: [phone]    Physical Therapy Lower Extremity Evaluation    Date:  3/11/2021  Patient: Hemalatha Morales  : 1985  MRN: [id number]  Physician: Raad Osorio PA-C  Insurance: [facility] Medicare replacement  Medical Diagnosis: OA R KNEE M17.11  Rehab Codes:  M62.81 weakness, difficulty walking R26.2  Onset date:   Next Dr's appt.:  ~5/10/2021    Subjective:   Patient comes to us with R knee pain and difficulty walking. \"My knee locks up in the shower and I cant bend it. I'm trying to lose the last bit of weight so I can have gastric bypass surgery. \"  CC: Pt has had injections in the knee. The first two worked but the 3rd one did no good. Pt has a knee brace but the straps broke. Working on getting another knee brace. They're also putting a new shoe lift in my R shoe. 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 Had surgery in  for a growth plate fracture in my tibia and fibula. Pt has a leg length discrepancy due to rickets as a child and has a R shoe build up.    PMHx: [] Unremarkable [x] Diabetes [] HTN  [] Pacemaker   [] MI/Heart Problems [] Cancer [] Arthritis [x] Other:              [] Refer to full medical chart  In EPIC     Comorbidities:   [x] Obesity [] Dialysis  [x] Other: anxiety/panic attacks   [x] Asthma/COPD [] Dementia [] Other:   [] Stroke [] Sleep apnea [] Other:   [] Vascular disease [] Rheumatic disease [] Other:     Tests: [] X-Ray: [] MRI:  [] Other:     Medications: [x] Refer to full medical record [] None [] Other:  Allergies:      [x] Refer to full medical record [] None [] Other:        ADL/IADL Previous level of function Current level of function Who currently assists the patient with task   Bathing  [x] Independent  [] Assist [x] Independent  [] Assist    Dress/grooming [x] Independent  [] Assist [x] Independent  [] Assist    Transfer/mobility [x] Independent  [] Assist [x] Independent  [] Assist    Feeding [x] Independent  [] Assist [x] Independent  [] Assist    Toileting [x] Independent  [] Assist [x] Independent  [] Assist    Driving [x] Independent  [] Assist [x] Independent  [] Assist    Housekeeping [x] Independent  [] Assist [x] Independent  [] Assist    Grocery shop/meal prep [x] Independent  [] Assist [x] Independent  [] Assist      Gait Prior level of function Current level of function    [x] Independent  [] Assist [x] Independent  [] Assist   Device: [x] Independent [x] Independent *walker suggested    [] Straight Cane [] Quad cane [] Straight Cane [] Quad cane    [] Standard walker [] Rolling walker   [] 4 wheeled walker [] Standard walker [] Rolling walker   [] 4 wheeled walker    [] Wheelchair [] Wheelchair     Pain:  [x] Yes  [] No Location:R knee and lower extremity Pain Rating: (0-10 scale) 8/10  Pain altered Tx:  [] Yes  [x] No  Action:  Symptoms:  [] Improving [x] Worsening [] Same      Objective:    ROM  ° A/P STRENGTH TESTS (+/-) Left Right Not Tested    Left Right Left Right Ant. Drawer   []   Hip Flex     Post. Drawer   []   Ext     Lachmans   []   ER     Valgus Stress   []   IR     Varus Stress   []   ABD     Mandas   []   ADD     Apleys Comp.   []   Knee Flex     Apleys Dist.   []   Ext  3+/5 pain   Hip Scouring   []   Ankle DF     ROSINAs   []   PF     Piriformis   []   INV     Bobs   []   EVER     Talor Tilt   []        Pat-Fem Grind   []     LE AROM limited by morbid obesity. Pt states she does ex's at home including KTC stretches, HS stretch, SDLY hip ABD.      OBSERVATION No Deficit Deficit Not Tested Comments   Posture       Forward Head [] [] []    Rounded Shoulders [] [] []    Kyphosis [] [] []    Lordosis [] [] []    Lateral Shift [] [] []    Scoliosis [] [] []    Iliac Crest [] [] []    PSIS [] [] [] ASIS [] [] []    Genu Valgus [] [x] []    Genu Varus [] [] []    Genu Recurvatum [] [x] []    Pronation [] [] []    Supination [] [] []    Leg Length Discrp [] [x] [] Being addressed professionally   Slumped Sitting [] [] []    Palpation [] [x] [] Diffusely tender throughout R knee and lower extremity. Sensation [] [x] [] Intermittent T/N in RLE   Edema [] [] []    Neurological [] [] []    Patellar Mobility [] [] []    Patellar Orientation [] [] []    Gait-TUG [] [] [] Analysis: 23 sec no device. LE's in ER, dec heel strike, lateral sway. Function: SEE LEFS                                                                BALANCE/PROPRIOCEPTION              [] Not tested   Single leg stance       R                  L                    PAIN   Eyes open                    3  Sec         0Sec                  . []    Eyes closed               NT Sec. NT Sec                  . []        FUNCTIONAL TESTS PAIN NO PAIN COMMENTS   Step Test 4 [] []    6 [] []    8 [] []    Squat [x] [] Hands braced on thighs - bearable pain       Comments: Patient lives in a 3rd floor apartment with no elevator. Son does all the grocery carrying. Pt has to do the stairs between 3- 7x per week. Assessment: [x] Patient would continue to benefit from skilled physical therapy services in order to improve R quad strength to improve walking tolerance and decrease incidence of knee locking in the shower. Problems:    [x] ? Pain:     [] ? ROM:    [x] ? Strength:    [x] ? Function:    [] ? Balance  [] Edema:  [] Postural Deviations  [x] Gait Deviations  [] Other:      STG: (to be met in 6 treatments)  1. ? Strength to tolerate 30 min aquatic ex without inc pain   2. Initiate Home Exercise Program and aquatics    LTG: (to be met in 12 treatments)  1. Improve LEFS to >40/80  2.  Indep HEP to continue strengthening and weight loss in prep for bariatric surgery                   Patient goals:  the shower without my knee buckling. Functional Assessment Used:   Current Status: Score 33/80  Goal Status: Score 40/80 OR BETTER    Evaluation Complexity:  History (Personal factors, comorbidities) [] 0 [] 1-2 [x] 3+   Exam (limitations, restrictions) [] 1-2 [x] 3 [] 4+   Clinical presentation (progression) [] Stable [x] Evolving  [] Unstable   Decision Making [] Low [x] Moderate [] High    [] Low Complexity [x] Moderate Complexity [] High Complexity       Rehab Potential:  [] Good  [x] Fair  [] Poor   Suggested Professional Referral:  [x] No  [] Yes:  Barriers to Goal Achievement[de-identified]  [] No  [x] Yes: Morbid Obesity, longstanding knee problems  Domestic Concerns:  [x] No  [] Yes:    Pt. Education:  [x] Plans/Goals, Risks/Benefits discussed  [x] Home exercise program and, a use/benefits of wheeled walker to dec stress on RLE. Method of Education: [x] Verbal  [x] Demo  [x] Written  Comprehension of Education:  [x] Verbalizes understanding. [] Demonstrates understanding. [] Needs Review. [] Demonstrates/verbalizes understanding of HEP/Ed previously given. Treatment Plan:  [x] Therapeutic Exercise   18946  [] Iontophoresis: 4 mg/mL Dexamethasone Sodium Phosphate  mAmin  87876   [] Therapeutic Activity  15609 [] Vasopneumatic cold with compression  65928    [] Gait Training   93665 [] Ultrasound   24863   [] Neuromuscular Re-education  75517 [] Electrical Stimulation Unattended  49483   [] Manual Therapy  32399 [] Electrical Stimulation Attended  98578   [x] Instruction in HEP  [] Lumbar/Cervical Traction  92135   [x] Aquatic Therapy   84296 [] Cold/hotpack    [] Massage   27489      [] Dry Needling, 1 or 2 muscles  13673   [] Biofeedback, first 15 minutes   12854  [] Biofeedback, additional 15 minutes   93839 [] Dry Needling, 3 or more muscles  38517         Frequency:  2 x/week for 12 visits to start. Pt probably will need more visits.          Todays Treatment:  Modalities:   Precautions:  Exercises:  Exercise Reps/ Time Weight/ Level Comments   Standing HEP Verbal ed  Copy in chart for days pt doesn't have aquatics                             Specific Instructions for next treatment begin aquatics    Treatment Charges: Mins Units   [x] Evaluation       []  Low       [x]  Moderate       []  High 30 1   []  Modalities     [x]  Ther Exercise 15 1   []  Manual Therapy     []  Ther Activities     []  Aquatics     []  Neuromuscular     []  Gait Training     []  Dry Needling           1-2 muscles     []  Dry Needling           3 or more          muscles     [] Vasocompression     []  Other         TOTAL TREATMENT TIME: 15    Time in: 1110   Time Out: 9700    Electronically signed by: Kennedi La, PT

## 2021-03-11 NOTE — PROGRESS NOTES
Physical Therapy       [facility] Exercise Log  Aquatic, Hip & DLS Program- Phase 1    Date of Eval:        3/11/2021                        Primary PT: Victoria Huston  Diagnosis: OA R knee  Things to Focus On (goals): dec pain, inc strength  Surgical Precautions:  Medical Precautions: DM, asthma      Inhaler poolside and snack for DM  Date        Visit #        Walk F/L/R        Marching        Squats        Step-Ups F/L        Heel-toe raises        SLR F/L/R        Knee/Flex/Ext        F/L Lunges        Kickboard Ex. Iso Abd. Push-pull        Paddling                Balance                        Stretches        Achllies        Hamstring                                .                 Pain Rating

## 2021-03-24 ENCOUNTER — OFFICE VISIT (OUTPATIENT)
Dept: BARIATRICS/WEIGHT MGMT | Age: 36
End: 2021-03-24
Payer: COMMERCIAL

## 2021-03-24 VITALS
DIASTOLIC BLOOD PRESSURE: 88 MMHG | SYSTOLIC BLOOD PRESSURE: 130 MMHG | HEIGHT: 71 IN | HEART RATE: 78 BPM | TEMPERATURE: 96.9 F | WEIGHT: 293 LBS | BODY MASS INDEX: 41.02 KG/M2

## 2021-03-24 DIAGNOSIS — F41.9 ANXIETY AND DEPRESSION: ICD-10-CM

## 2021-03-24 DIAGNOSIS — D64.9 NORMOCYTIC NORMOCHROMIC ANEMIA: ICD-10-CM

## 2021-03-24 DIAGNOSIS — E66.2 OBESITY HYPOVENTILATION SYNDROME (HCC): ICD-10-CM

## 2021-03-24 DIAGNOSIS — K21.9 GASTROESOPHAGEAL REFLUX DISEASE, UNSPECIFIED WHETHER ESOPHAGITIS PRESENT: ICD-10-CM

## 2021-03-24 DIAGNOSIS — I10 ESSENTIAL HYPERTENSION: Primary | ICD-10-CM

## 2021-03-24 DIAGNOSIS — F32.A ANXIETY AND DEPRESSION: ICD-10-CM

## 2021-03-24 DIAGNOSIS — E78.5 HYPERLIPIDEMIA, UNSPECIFIED HYPERLIPIDEMIA TYPE: ICD-10-CM

## 2021-03-24 DIAGNOSIS — J45.909 UNCOMPLICATED ASTHMA, UNSPECIFIED ASTHMA SEVERITY, UNSPECIFIED WHETHER PERSISTENT: ICD-10-CM

## 2021-03-24 DIAGNOSIS — E66.01 MORBID OBESITY WITH BMI OF 70 AND OVER, ADULT (HCC): ICD-10-CM

## 2021-03-24 DIAGNOSIS — E11.9 TYPE 2 DIABETES MELLITUS WITHOUT COMPLICATION, WITHOUT LONG-TERM CURRENT USE OF INSULIN (HCC): ICD-10-CM

## 2021-03-24 DIAGNOSIS — F31.9 CHRONIC BIPOLAR DISORDER (HCC): ICD-10-CM

## 2021-03-24 DIAGNOSIS — G47.33 OSA (OBSTRUCTIVE SLEEP APNEA): ICD-10-CM

## 2021-03-24 DIAGNOSIS — J44.9 CHRONIC OBSTRUCTIVE PULMONARY DISEASE, UNSPECIFIED COPD TYPE (HCC): ICD-10-CM

## 2021-03-24 DIAGNOSIS — I27.20 PULMONARY HYPERTENSION (HCC): ICD-10-CM

## 2021-03-24 PROCEDURE — 3046F HEMOGLOBIN A1C LEVEL >9.0%: CPT | Performed by: NURSE PRACTITIONER

## 2021-03-24 PROCEDURE — 99214 OFFICE O/P EST MOD 30 MIN: CPT | Performed by: NURSE PRACTITIONER

## 2021-03-24 PROCEDURE — 3023F SPIROM DOC REV: CPT | Performed by: NURSE PRACTITIONER

## 2021-03-24 PROCEDURE — 2022F DILAT RTA XM EVC RTNOPTHY: CPT | Performed by: NURSE PRACTITIONER

## 2021-03-24 PROCEDURE — 1036F TOBACCO NON-USER: CPT | Performed by: NURSE PRACTITIONER

## 2021-03-24 PROCEDURE — G8427 DOCREV CUR MEDS BY ELIG CLIN: HCPCS | Performed by: NURSE PRACTITIONER

## 2021-03-24 PROCEDURE — G8926 SPIRO NO PERF OR DOC: HCPCS | Performed by: NURSE PRACTITIONER

## 2021-03-24 PROCEDURE — G8417 CALC BMI ABV UP PARAM F/U: HCPCS | Performed by: NURSE PRACTITIONER

## 2021-03-24 PROCEDURE — G8484 FLU IMMUNIZE NO ADMIN: HCPCS | Performed by: NURSE PRACTITIONER

## 2021-03-24 RX ORDER — LANCETS 33 GAUGE
EACH MISCELLANEOUS
COMMUNITY
End: 2022-02-07 | Stop reason: ALTCHOICE

## 2021-03-24 RX ORDER — GABAPENTIN 600 MG/1
600 TABLET ORAL 3 TIMES DAILY
COMMUNITY
Start: 2021-03-22

## 2021-03-24 RX ORDER — LIRAGLUTIDE 6 MG/ML
INJECTION SUBCUTANEOUS
COMMUNITY
End: 2022-02-07 | Stop reason: ALTCHOICE

## 2021-03-24 RX ORDER — TOPIRAMATE 100 MG/1
100 TABLET, FILM COATED ORAL 2 TIMES DAILY
COMMUNITY
Start: 2021-03-22

## 2021-03-24 RX ORDER — MELATONIN
COMMUNITY
Start: 2021-03-22 | End: 2022-02-07

## 2021-03-24 NOTE — PROGRESS NOTES
Clinical Induction for Group Lifestyle Balance Program Progress Note    Subjective     The patient is a 30 y.o. female being seen regarding supervised weight loss. The patient's PCP is Gonzales Meza . Highest adult weight was 697 lbs and lowest adult weight was 200 lbs. Her Body mass index is 80.48 kg/m². Ella Smith Her weight goal is 200 lbs. The patient reports that her obesity is significantly affecting her life on a daily basis. Weight Loss Program History:   She has tried Calorie Restriction, Low Fat, Grapefruit, and Low Carb diets. These attempts have been somewhat successful with weight loss, but have failed to sustain adequate weight loss. The patient has also tried self directed diet and exercise in attempts to sustain weight loss. Comorbid Conditions:  Significant diseases affecting this patient are: DM Type 2, Asthma, Pulmonary HTN, BRYSON, Obesity Hypoventilation Syndrome, Anemia, GERD, Anxiety/Depression, Bipolar, HLD, Chronic Migraine, Arthritis, COPD    Allergies:  No Known Allergies    Past Medical History:     Past Medical History:   Diagnosis Date    Abnormal vaginal bleeding 4/19/2019    Acquired hallux rigidus 4/19/2019    Acute and chronic respiratory failure with hypoxia ([address] 11/6/2018    Acute on chronic respiratory failure with hypoxia and hypercapnia ([address] 11/6/2018    ASCUS with positive high risk HPV cervical     Asthma     Bipolar disorder ([address] 4/19/2019    Breast discharge 4/9/2015    Cellulitis 4/19/2019    Cervical radiculopathy 4/19/2019    Chest pain 11/4/2018    Chlamydia ? Reported hx    Chronic cor pulmonale (HCC)     COPD (chronic obstructive pulmonary disease) (HCC)     D-dimer, elevated 11/18/2018    Dysfunctional uterine bleeding 1/27/2014    Dysplasia of cervix, low grade (ISAIAS 1) 3/30/2017    Noted on colposcopy 3/7/17    Dyspnea 11/3/2018    Ectopic pregnancy - Right 2/9/2014    Pt was initiallly dx with a left ovarian ectopic.   After Zoya Juarez DO at [facility] OR       Family History:  Family History   Problem Relation Age of Onset    High Blood Pressure Mother     Heart Disease Mother     Cancer Father         lung    Lung Cancer Father     Cancer Maternal Aunt         breast    Diabetes Maternal Aunt     Breast Cancer Maternal Aunt     Cancer Paternal Aunt         lung    Lung Cancer Paternal Aunt     Thyroid Disease Sister     Colon Cancer Neg Hx     Eclampsia Neg Hx     Hypertension Neg Hx     Ovarian Cancer Neg Hx      Labor Neg Hx     Spont Abortions Neg Hx     Stroke Neg Hx        Social History:  Social History     Socioeconomic History    Marital status: Single     Spouse name: Not on file    Number of children: Not on file    Years of education: Not on file    Highest education level: Not on file   Occupational History    Not on file   Social Needs    Financial resource strain: Not on file    Food insecurity     Worry: Not on file     Inability: Not on file    Transportation needs     Medical: Not on file     Non-medical: Not on file   Tobacco Use    Smoking status: Former Smoker     Packs/day: 0.10     Types: Cigarettes     Start date: 9/3/2014     Quit date:      Years since quitting: 3.2    Smokeless tobacco: Never Used    Tobacco comment: quit in  started back up    Substance and Sexual Activity    Alcohol use: Yes     Alcohol/week: 0.0 standard drinks     Comment: occassionally     Drug use: Not Currently     Types: Marijuana     Comment: 2020.  Quit in May 2020    Sexual activity: Not Currently   Lifestyle    Physical activity     Days per week: Not on file     Minutes per session: Not on file    Stress: Not on file   Relationships    Social connections     Talks on phone: Not on file     Gets together: Not on file     Attends Zoroastrian service: Not on file     Active member of club or organization: Not on file     Attends meetings of clubs or organizations: Not on file     Relationship status: Not on file    Intimate partner violence     Fear of current or ex partner: Not on file     Emotionally abused: Not on file     Physically abused: Not on file     Forced sexual activity: Not on file   Other Topics Concern    Not on file   Social History Narrative    Not on file       Current Medications:  Current Outpatient Medications   Medication Sig Dispense Refill    gabapentin (NEURONTIN) 600 MG tablet       topiramate (TOPAMAX) 100 MG tablet       WIXELA INHUB 250-50 MCG/DOSE AEPB       diclofenac (VOLTAREN) 50 MG EC tablet       vitamin D3 (CHOLECALCIFEROL) 25 MCG (1000 UT) TABS tablet       diclofenac sodium (VOLTAREN) 1 % GEL 1 g      famotidine (PEPCID) 20 MG tablet Take 1 tablet by mouth nightly 30 tablet 3    pantoprazole (PROTONIX) 40 MG tablet Take 1 tablet by mouth daily 30 tablet 3    sucralfate (CARAFATE) 1 GM tablet Take 1 tablet by mouth 4 times daily 120 tablet 3    tiotropium (SPIRIVA HANDIHALER) 18 MCG inhalation capsule Spiriva Respimat 2.5 mcg/actuation solution for inhalation   Inhale 2 puffs every day by inhalation route.       ondansetron (ZOFRAN ODT) 4 MG disintegrating tablet Take 1 tablet by mouth every 8 hours as needed for Nausea 5 tablet 0    aspirin 81 MG chewable tablet Take 1 tablet by mouth daily 30 tablet 3    butalbital-acetaminophen-caffeine (FIORICET, ESGIC) -40 MG per tablet Take 1 tablet by mouth every 4 hours as needed for Headaches 20 tablet 0    albuterol sulfate  (90 Base) MCG/ACT inhaler Inhale 2 puffs into the lungs 4 times daily 1 Inhaler 3    ipratropium (ATROVENT HFA) 17 MCG/ACT inhaler Inhale 2 puffs into the lungs 4 times daily 1 Inhaler 3    lisinopril (PRINIVIL;ZESTRIL) 20 MG tablet Take 1 tablet by mouth daily 30 tablet 3    POTASSIUM CHLORIDE PO Take 25 mg by mouth daily      benzonatate (TESSALON PERLES) 100 MG capsule Take 1 capsule by mouth every 8 hours as needed for Cough 20 capsule 0    hydrochlorothiazide (HYDRODIURIL) 25 MG tablet Take 25 mg by mouth daily      vitamin D (ERGOCALCIFEROL) 44488 UNITS CAPS capsule Take 1,000 Units by mouth daily       metoprolol (LOPRESSOR) 50 MG tablet Take 50 mg by mouth 2 times daily      loratadine (CLARITIN) 10 MG tablet Take 10 mg by mouth daily      Insulin Pen Needle (COMFORT EZ PEN NEEDLES) 32G X 6 MM MISC Comfort EZ Pen Needles 32 gauge x 1/4\"      Liraglutide (VICTOZA) 18 MG/3ML SOPN SC injection Victoza 2-Monty 0.6 mg/0.1 mL (18 mg/3 mL) subcutaneous pen injector      Promethazine-DM (PROMETHAZINE-DEXTROMETHORPHAN) 6.25-15 MG/5ML SOLN solution Take 5 mLs by mouth 4 times daily  1    metFORMIN (GLUCOPHAGE) 500 MG tablet Take 1 tablet by mouth 2 times daily (with meals) (Patient not taking: Reported on 2/24/2021) 60 tablet 3     No current facility-administered medications for this visit. Vital Signs:  /88 (Site: Left Lower Arm, Position: Sitting, Cuff Size: Large Adult)   Pulse 78   Temp 96.9 °F (36.1 °C)   Ht 5' 11\" (1.803 m)   Wt (!) 577 lb (261.7 kg)   BMI 80.48 kg/m²     BMI/Height/Weight:  Body mass index is 80.48 kg/m². Waist Circumference 76\"          Review of Systems - A review of systems was performed. All was negative unless otherwise documented in HPI. Constitutional: Negative for fever, chills and diaphoresis. HENT: Negative for hearing loss and trouble swallowing. Eyes: Negative for photophobia and visual disturbance. Respiratory: Negative for cough, shortness of breath and wheezing. Cardiovascular: Negative for chest pain and palpitations. Gastrointestinal: Negative for nausea, vomiting, abdominal pain, diarrhea, constipation, blood in stool and abdominal distention. Endocrine: Negative for polydipsia, polyphagia and polyuria. Genitourinary: Negative for dysuria, frequency, hematuria and difficulty urinating. Musculoskeletal: Negative for myalgias, joint swelling.    Skin: Negative for pallor

## 2021-04-13 ENCOUNTER — TELEPHONE (OUTPATIENT)
Dept: BARIATRICS/WEIGHT MGMT | Age: 36
End: 2021-04-13

## 2021-04-19 ENCOUNTER — OFFICE VISIT (OUTPATIENT)
Dept: PULMONOLOGY | Age: 36
End: 2021-04-19
Payer: COMMERCIAL

## 2021-04-19 VITALS
OXYGEN SATURATION: 98 % | SYSTOLIC BLOOD PRESSURE: 129 MMHG | WEIGHT: 293 LBS | DIASTOLIC BLOOD PRESSURE: 78 MMHG | HEIGHT: 71 IN | HEART RATE: 78 BPM | TEMPERATURE: 97.5 F | BODY MASS INDEX: 41.02 KG/M2

## 2021-04-19 DIAGNOSIS — E66.01 MORBID OBESITY WITH BMI OF 70 AND OVER, ADULT (HCC): ICD-10-CM

## 2021-04-19 DIAGNOSIS — E66.2 OBESITY HYPOVENTILATION SYNDROME (HCC): ICD-10-CM

## 2021-04-19 DIAGNOSIS — J44.9 CHRONIC OBSTRUCTIVE PULMONARY DISEASE, UNSPECIFIED COPD TYPE (HCC): ICD-10-CM

## 2021-04-19 DIAGNOSIS — G47.33 OSA (OBSTRUCTIVE SLEEP APNEA): Primary | ICD-10-CM

## 2021-04-19 DIAGNOSIS — I27.20 PULMONARY HYPERTENSION (HCC): ICD-10-CM

## 2021-04-19 PROCEDURE — 3023F SPIROM DOC REV: CPT | Performed by: INTERNAL MEDICINE

## 2021-04-19 PROCEDURE — G8926 SPIRO NO PERF OR DOC: HCPCS | Performed by: INTERNAL MEDICINE

## 2021-04-19 PROCEDURE — 4004F PT TOBACCO SCREEN RCVD TLK: CPT | Performed by: INTERNAL MEDICINE

## 2021-04-19 PROCEDURE — 99214 OFFICE O/P EST MOD 30 MIN: CPT | Performed by: INTERNAL MEDICINE

## 2021-04-19 PROCEDURE — G8427 DOCREV CUR MEDS BY ELIG CLIN: HCPCS | Performed by: INTERNAL MEDICINE

## 2021-04-19 PROCEDURE — G8417 CALC BMI ABV UP PARAM F/U: HCPCS | Performed by: INTERNAL MEDICINE

## 2021-04-19 ASSESSMENT — SLEEP AND FATIGUE QUESTIONNAIRES
HOW LIKELY ARE YOU TO NOD OFF OR FALL ASLEEP WHEN YOU ARE A PASSENGER IN A CAR FOR AN HOUR WITHOUT A BREAK: 0
HOW LIKELY ARE YOU TO NOD OFF OR FALL ASLEEP WHILE SITTING AND TALKING TO SOMEONE: 0
HOW LIKELY ARE YOU TO NOD OFF OR FALL ASLEEP WHILE SITTING QUIETLY AFTER LUNCH WITHOUT ALCOHOL: 0
HOW LIKELY ARE YOU TO NOD OFF OR FALL ASLEEP WHILE SITTING INACTIVE IN A PUBLIC PLACE: 2
ESS TOTAL SCORE: 4

## 2021-04-22 NOTE — PROGRESS NOTES
Physical Therapy        [] [facility] ([facility]) @ [facility]  [address] [address] [address]  [place], [address] [address] [facility]  Phone [phone]  Fax [phone]    Physical Therapy Discharge Note    Date: 2021      Patient: Kayla Lazaro  : 1985  MRN: [id number]    Physician: Dereje Walton PASJ    Diagnosis: OA R KNEE                Rehab Codes:  M62.81 weakness, difficulty walking R26.2 Onset Date:    Rehab Diagnosis: weakness, difficulty walking  Rehab Codes:  M62.81  R26.2 ICD-10 Codes: M17.11    Total visits attended: 1  Cancels/No shows:   Date of initial visit: 3/11/2021                [] Patient recovered from conditions. Treatment goals were met. [] Patient received maximum benefit. No further therapy indicated at this time. [] Patient demonstrated improvement from condition with  ** Of  ** Short term goals met. []Patient demonstrated improvement from condition with **   Of **  Long term goals met. [] Patient to continue exercise/home instructions independently. [] Therapy interrupted due to:    [x] Patient has 2 or more no shows/cancels, is discontinued per our policy. [] Patient has completed prescribed number of treatment sessions. [x] Other: Patient canceled all aquatic appointments after eval.      Pain level at evaluation was      8 /10 and at discharge was       8/10    It Is My Understanding That The:  [] Patient returned to work. [] Patient demonstrated improved level of function. [] Patient returned to previous functional level. [x] Patient's current functional status is unknown due to no-shows  [] Other:        If you have any questions or concerns regarding this patient's care, please contact us.    Thank you for your referral.      Electronically signed by: Jean Carlos Suresh PT

## 2021-05-07 ENCOUNTER — APPOINTMENT (OUTPATIENT)
Dept: CT IMAGING | Age: 36
End: 2021-05-07
Payer: COMMERCIAL

## 2021-05-07 ENCOUNTER — APPOINTMENT (OUTPATIENT)
Dept: GENERAL RADIOLOGY | Age: 36
End: 2021-05-07
Payer: COMMERCIAL

## 2021-05-07 ENCOUNTER — HOSPITAL ENCOUNTER (OUTPATIENT)
Age: 36
Setting detail: OBSERVATION
Discharge: HOME | End: 2021-05-08
Attending: EMERGENCY MEDICINE | Admitting: SURGERY
Payer: COMMERCIAL
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
## 2021-05-07 LAB — GLUCOSE BLD-MCNC: 84 MG/DL (ref 65–105)

## 2021-05-07 PROCEDURE — G0378 HOSPITAL OBSERVATION PER HR: HCPCS

## 2021-05-07 PROCEDURE — 73060 X-RAY EXAM OF HUMERUS: CPT

## 2021-05-07 PROCEDURE — 6370000000 HC RX 637 (ALT 250 FOR IP): Performed by: GENERAL PRACTICE

## 2021-05-07 PROCEDURE — 99282 EMERGENCY DEPT VISIT SF MDM: CPT

## 2021-05-07 PROCEDURE — 73030 X-RAY EXAM OF SHOULDER: CPT

## 2021-05-07 PROCEDURE — 82947 ASSAY GLUCOSE BLOOD QUANT: CPT

## 2021-05-07 PROCEDURE — 72070 X-RAY EXAM THORAC SPINE 2VWS: CPT

## 2021-05-07 PROCEDURE — 6370000000 HC RX 637 (ALT 250 FOR IP): Performed by: STUDENT IN AN ORGANIZED HEALTH CARE EDUCATION/TRAINING PROGRAM

## 2021-05-07 PROCEDURE — 99284 EMERGENCY DEPT VISIT MOD MDM: CPT

## 2021-05-07 RX ORDER — FAMOTIDINE 20 MG/1
20 TABLET, FILM COATED ORAL NIGHTLY
Status: DISCONTINUED | OUTPATIENT
Start: 2021-05-07 | End: 2021-05-08 | Stop reason: HOSPADM

## 2021-05-07 RX ORDER — SODIUM CHLORIDE 9 MG/ML
25 INJECTION, SOLUTION INTRAVENOUS PRN
Status: DISCONTINUED | OUTPATIENT
Start: 2021-05-07 | End: 2021-05-08 | Stop reason: HOSPADM

## 2021-05-07 RX ORDER — POLYETHYLENE GLYCOL 3350 17 G/17G
17 POWDER, FOR SOLUTION ORAL DAILY PRN
Status: DISCONTINUED | OUTPATIENT
Start: 2021-05-07 | End: 2021-05-08 | Stop reason: HOSPADM

## 2021-05-07 RX ORDER — ACETAMINOPHEN 500 MG
1000 TABLET ORAL EVERY 8 HOURS SCHEDULED
Status: DISCONTINUED | OUTPATIENT
Start: 2021-05-07 | End: 2021-05-08 | Stop reason: HOSPADM

## 2021-05-07 RX ORDER — IBUPROFEN 400 MG/1
400 TABLET ORAL EVERY 6 HOURS PRN
Status: DISCONTINUED | OUTPATIENT
Start: 2021-05-07 | End: 2021-05-08 | Stop reason: HOSPADM

## 2021-05-07 RX ORDER — IBUPROFEN 800 MG/1
800 TABLET ORAL ONCE
Status: COMPLETED | OUTPATIENT
Start: 2021-05-07 | End: 2021-05-07

## 2021-05-07 RX ORDER — METOPROLOL TARTRATE 50 MG/1
50 TABLET, FILM COATED ORAL 2 TIMES DAILY
Status: DISCONTINUED | OUTPATIENT
Start: 2021-05-07 | End: 2021-05-08 | Stop reason: HOSPADM

## 2021-05-07 RX ORDER — HYDROCHLOROTHIAZIDE 25 MG/1
25 TABLET ORAL DAILY
Status: DISCONTINUED | OUTPATIENT
Start: 2021-05-08 | End: 2021-05-08 | Stop reason: HOSPADM

## 2021-05-07 RX ORDER — SODIUM CHLORIDE 0.9 % (FLUSH) 0.9 %
5-40 SYRINGE (ML) INJECTION PRN
Status: DISCONTINUED | OUTPATIENT
Start: 2021-05-07 | End: 2021-05-08 | Stop reason: HOSPADM

## 2021-05-07 RX ORDER — ASPIRIN 81 MG/1
81 TABLET, CHEWABLE ORAL DAILY
Status: DISCONTINUED | OUTPATIENT
Start: 2021-05-08 | End: 2021-05-08 | Stop reason: HOSPADM

## 2021-05-07 RX ORDER — ONDANSETRON 2 MG/ML
4 INJECTION INTRAMUSCULAR; INTRAVENOUS EVERY 6 HOURS PRN
Status: DISCONTINUED | OUTPATIENT
Start: 2021-05-07 | End: 2021-05-08 | Stop reason: HOSPADM

## 2021-05-07 RX ORDER — SODIUM CHLORIDE 0.9 % (FLUSH) 0.9 %
5-40 SYRINGE (ML) INJECTION EVERY 12 HOURS SCHEDULED
Status: DISCONTINUED | OUTPATIENT
Start: 2021-05-07 | End: 2021-05-08 | Stop reason: HOSPADM

## 2021-05-07 RX ORDER — LISINOPRIL 20 MG/1
20 TABLET ORAL DAILY
Status: DISCONTINUED | OUTPATIENT
Start: 2021-05-08 | End: 2021-05-08 | Stop reason: HOSPADM

## 2021-05-07 RX ADMIN — FAMOTIDINE 20 MG: 20 TABLET, FILM COATED ORAL at 22:17

## 2021-05-07 RX ADMIN — ACETAMINOPHEN 1000 MG: 500 TABLET ORAL at 22:17

## 2021-05-07 RX ADMIN — METOPROLOL TARTRATE 50 MG: 50 TABLET, FILM COATED ORAL at 22:17

## 2021-05-07 RX ADMIN — IBUPROFEN 800 MG: 800 TABLET, FILM COATED ORAL at 20:30

## 2021-05-07 ASSESSMENT — ENCOUNTER SYMPTOMS
BLOOD IN STOOL: 0
COUGH: 0
ABDOMINAL PAIN: 0
VOMITING: 0
EYE PAIN: 0
NAUSEA: 0
WHEEZING: 0
APNEA: 0
SORE THROAT: 0
DIARRHEA: 0
SHORTNESS OF BREATH: 0
RHINORRHEA: 0

## 2021-05-07 ASSESSMENT — PAIN DESCRIPTION - DESCRIPTORS: DESCRIPTORS: ACHING;THROBBING

## 2021-05-07 ASSESSMENT — PAIN SCALES - GENERAL
PAINLEVEL_OUTOF10: 9
PAINLEVEL_OUTOF10: 8

## 2021-05-07 ASSESSMENT — PAIN DESCRIPTION - FREQUENCY: FREQUENCY: CONTINUOUS
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
## 2021-05-08 VITALS
HEART RATE: 75 BPM | TEMPERATURE: 97.9 F | OXYGEN SATURATION: 98 % | HEIGHT: 71 IN | SYSTOLIC BLOOD PRESSURE: 128 MMHG | RESPIRATION RATE: 16 BRPM | DIASTOLIC BLOOD PRESSURE: 74 MMHG | BODY MASS INDEX: 41.02 KG/M2 | WEIGHT: 293 LBS

## 2021-05-08 LAB
ABSOLUTE EOS #: 0.14 K/UL (ref 0–0.44)
ABSOLUTE IMMATURE GRANULOCYTE: 0.03 K/UL (ref 0–0.3)
ABSOLUTE LYMPH #: 2.42 K/UL (ref 1.1–3.7)
ABSOLUTE MONO #: 0.58 K/UL (ref 0.1–1.2)
ANION GAP SERPL CALCULATED.3IONS-SCNC: 9 MMOL/L (ref 9–17)
BASOPHILS # BLD: 1 % (ref 0–2)
BASOPHILS ABSOLUTE: 0.05 K/UL (ref 0–0.2)
BUN BLDV-MCNC: 14 MG/DL (ref 6–20)
BUN/CREAT BLD: ABNORMAL (ref 9–20)
CALCIUM SERPL-MCNC: 8.6 MG/DL (ref 8.6–10.4)
CHLORIDE BLD-SCNC: 107 MMOL/L (ref 98–107)
CO2: 23 MMOL/L (ref 20–31)
CREAT SERPL-MCNC: 0.62 MG/DL (ref 0.5–0.9)
DIFFERENTIAL TYPE: ABNORMAL
EOSINOPHILS RELATIVE PERCENT: 2 % (ref 1–4)
GFR AFRICAN AMERICAN: >60 ML/MIN
GFR NON-AFRICAN AMERICAN: >60 ML/MIN
GFR SERPL CREATININE-BSD FRML MDRD: ABNORMAL ML/MIN/{1.73_M2}
GFR SERPL CREATININE-BSD FRML MDRD: ABNORMAL ML/MIN/{1.73_M2}
GLUCOSE BLD-MCNC: 100 MG/DL (ref 70–99)
GLUCOSE BLD-MCNC: 109 MG/DL (ref 65–105)
GLUCOSE BLD-MCNC: 92 MG/DL (ref 65–105)
HCT VFR BLD CALC: 41 % (ref 36.3–47.1)
HEMOGLOBIN: 12.5 G/DL (ref 11.9–15.1)
IMMATURE GRANULOCYTES: 0 %
LYMPHOCYTES # BLD: 29 % (ref 24–43)
MCH RBC QN AUTO: 25.6 PG (ref 25.2–33.5)
MCHC RBC AUTO-ENTMCNC: 30.5 G/DL (ref 28.4–34.8)
MCV RBC AUTO: 83.8 FL (ref 82.6–102.9)
MONOCYTES # BLD: 7 % (ref 3–12)
NRBC AUTOMATED: 0.2 PER 100 WBC
PDW BLD-RTO: 16 % (ref 11.8–14.4)
PLATELET # BLD: 440 K/UL (ref 138–453)
PLATELET ESTIMATE: ABNORMAL
PMV BLD AUTO: 11 FL (ref 8.1–13.5)
POTASSIUM SERPL-SCNC: 3.7 MMOL/L (ref 3.7–5.3)
RBC # BLD: 4.89 M/UL (ref 3.95–5.11)
RBC # BLD: ABNORMAL 10*6/UL
SEG NEUTROPHILS: 61 % (ref 36–65)
SEGMENTED NEUTROPHILS ABSOLUTE COUNT: 5.27 K/UL (ref 1.5–8.1)
SODIUM BLD-SCNC: 139 MMOL/L (ref 135–144)
WBC # BLD: 8.5 K/UL (ref 3.5–11.3)
WBC # BLD: ABNORMAL 10*3/UL

## 2021-05-08 PROCEDURE — 97535 SELF CARE MNGMENT TRAINING: CPT

## 2021-05-08 PROCEDURE — 80048 BASIC METABOLIC PNL TOTAL CA: CPT

## 2021-05-08 PROCEDURE — 97530 THERAPEUTIC ACTIVITIES: CPT

## 2021-05-08 PROCEDURE — G0378 HOSPITAL OBSERVATION PER HR: HCPCS

## 2021-05-08 PROCEDURE — 36415 COLL VENOUS BLD VENIPUNCTURE: CPT

## 2021-05-08 PROCEDURE — 82947 ASSAY GLUCOSE BLOOD QUANT: CPT

## 2021-05-08 PROCEDURE — 6370000000 HC RX 637 (ALT 250 FOR IP): Performed by: STUDENT IN AN ORGANIZED HEALTH CARE EDUCATION/TRAINING PROGRAM

## 2021-05-08 PROCEDURE — 97162 PT EVAL MOD COMPLEX 30 MIN: CPT

## 2021-05-08 PROCEDURE — 85025 COMPLETE CBC W/AUTO DIFF WBC: CPT

## 2021-05-08 PROCEDURE — 97165 OT EVAL LOW COMPLEX 30 MIN: CPT

## 2021-05-08 RX ADMIN — IBUPROFEN 400 MG: 400 TABLET, FILM COATED ORAL at 06:29

## 2021-05-08 RX ADMIN — HYDROCHLOROTHIAZIDE 25 MG: 25 TABLET ORAL at 08:10

## 2021-05-08 RX ADMIN — ACETAMINOPHEN 1000 MG: 500 TABLET ORAL at 06:29

## 2021-05-08 RX ADMIN — METOPROLOL TARTRATE 50 MG: 50 TABLET, FILM COATED ORAL at 08:11

## 2021-05-08 RX ADMIN — LISINOPRIL 20 MG: 20 TABLET ORAL at 08:11

## 2021-05-08 RX ADMIN — IBUPROFEN 400 MG: 400 TABLET, FILM COATED ORAL at 13:52

## 2021-05-08 RX ADMIN — ASPIRIN 81 MG: 81 TABLET, CHEWABLE ORAL at 08:10

## 2021-05-08 ASSESSMENT — PAIN SCALES - GENERAL
PAINLEVEL_OUTOF10: 5

## 2021-05-08 ASSESSMENT — PAIN DESCRIPTION - ORIENTATION: ORIENTATION: RIGHT

## 2021-05-08 NOTE — PROGRESS NOTES
Paulino Morales requires a cane due to impaired ambulation and for increased stability in order to participate in or complete daily living tasks of: personal cares and ambulating. The patient is able to safely use the cane and the functional mobility deficit can be sufficiently resolved.

## 2021-05-08 NOTE — ED NOTES
Spoke with CT pt is not a canidate for the CT.  Dr Vince Quevedo and Rachel Hilario aware of this from prior to writers arrival     Akiko Clancy, [facility]  05/07/21 4341

## 2021-05-08 NOTE — CARE COORDINATION
Transitional Planning    Spoke to Sonny with [facility]. Facesheet, DME order for cane and face to face faxed to [phone]. He will have a cane delivered to patient's room (248). 1400 Cane that [facility] brought will not support patient's weight. [address] at [facility], he does not have any bariatric canes in stock. It would have to be special ordered. Bob 1574 patient that DME company does not have any canes that are appropriate for her in stock. CM told her she can take prescription for the cane and can take it to a DME store and she can get it in person. She verbalized understanding.
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
## 2021-05-08 NOTE — PLAN OF CARE
Problem: Pain:  Goal: Pain level will decrease  Description: Pain level will decrease  Outcome: Ongoing     Problem: Falls - Risk of:  Goal: Will remain free from falls  Description: Will remain free from falls  Outcome: Ongoing     Problem: Falls - Risk of:  Goal: Absence of physical injury  Description: Absence of physical injury  Outcome: Ongoing
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
## 2021-05-08 NOTE — CARE COORDINATION
Case Management Initial Discharge Plan  Yadi Morales,             Met with:patient to discuss discharge plans. Information verified: address, contacts, phone number, , insurance Yes    Emergency Contact/Next of Kin name & number: Yaritza Toledo [phone]    PCP: Ghanshyam Padron  Date of last visit: couple months ago    Insurance Provider: Pratik Lowe Dual    Discharge Planning    Living Arrangements:  Children   Support Systems:  [id number] [address] has   stories    stairs to climb to get into front door,  stairs to climb to reach second floor  Location of bedroom/bathroom in apt is one level. 3 flights of stairs to reach apt. Patient able to perform ADL's:Independent    Current Services (outpatient & in home) none  DME equipment:    DME provider:      Receiving oral anticoagulation therapy? No    If indicated:   Physician managing anticoagulation treatment: na   Where does patient obtain lab work for ATC treatment? na      Potential Assistance Needed:  N/A    Patient agreeable to home care: No  [place] of choice provided:  yes    Prior SNF/Rehab Placement and Facility:    Agreeable to SNF/Rehab: No  [place] of choice provided: n/a     Evaluation: no    Expected Discharge date:  05/10/21    Patient expects to be discharged to:  home  Follow Up Appointment: Best Day/ Time: Monday AM    Transportation provider: has a ride  Transportation arrangements needed for discharge: No    Readmission Risk              Risk of Unplanned Readmission:        0             Does patient have a readmission risk score greater than 14?: No  If yes, follow-up appointment must be made within 7 days of discharge. Goals of Care:       Discharge Plan: home independent. Requesting cane. Relates PT recommended one for her.           Electronically signed by Romelia Darnell RN on 21 at 10:48 AM EDT
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
## 2021-05-24 NOTE — PROGRESS NOTES
disease) ([address]     D-dimer, elevated 2018    Dysfunctional uterine bleeding 2014    Dysplasia of cervix, low grade (ISAIAS 1) 3/30/2017    Noted on colposcopy 3/7/17    Dyspnea 11/3/2018    Ectopic pregnancy - Right 2014    Pt was initiallly dx with a left ovarian ectopic. After Laproscopic eval she was noted to have a Right Tubal ectopic. She underwent a RSO on 14.  Elevated blood pressure reading     Elevated brain natriuretic peptide (BNP) level     Gastroesophageal reflux disease 2019    H/O abnormal cervical Papanicolaou smear 2/3/2017    LSIL - 2015 Pap collected 2/3/2017. Patient is very difficult SSE as she is morbidly obese. Required ringed forceps and snowman speculum.       Heartburn     History of trichomonal vaginitis     tx'd    Hypertension     Hypoalbuminemia due to protein-calorie malnutrition ([address] 2018    Hypocalcemia 2018    Migraine 2019    Morbid obesity with BMI of 70 and over, adult ([address]     BMI 74    MRSA (methicillin resistant Staphylococcus aureus) 2010    Left leg    Muscle weakness     Neck pain 2019    Normocytic normochromic anemia 2018    Obesity     Obesity hypoventilation syndrome ([address] 2018    BRYSON (obstructive sleep apnea)     BRYSON on CPAP    Ovarian ectopic pregnancy 2014    Right tubal ectopic tx'd with MTX AND LS RSO(initially dx as Left ovarian By USN 14)    Pain     Pelvic adhesive disease 14    C/S to bladder, as well as reactive to ectopic; also hx PID    Pulmonary hypertension ([address]     Shoulder joint pain 2013    Sprain of ankle 2019    Tobacco abuse 2018    Type 2 diabetes mellitus without complication, without long-term current use of insulin ([address] 2018    Unspecified sleep apnea     Vitamin D deficiency 2019       Surgical History:  Past Surgical History:   Procedure Laterality Date     SECTION  2005    CHOLECYSTECTOMY, patch onto the skin daily 10 patch 0    lisinopril (PRINIVIL;ZESTRIL) 20 MG tablet Take 1 tablet by mouth daily 30 tablet 3    metFORMIN (GLUCOPHAGE) 500 MG tablet Take 1 tablet by mouth 2 times daily (with meals) (Patient taking differently: Take 1,000 mg by mouth daily (with breakfast) ) 60 tablet 3    POTASSIUM CHLORIDE PO Take 25 mg by mouth daily      famotidine (PEPCID) 20 MG tablet Take 1 tablet by mouth 2 times daily 60 tablet 0    SUMAtriptan Succinate (IMITREX PO) Take by mouth      benzonatate (TESSALON PERLES) 100 MG capsule Take 1 capsule by mouth every 8 hours as needed for Cough 20 capsule 0    hydrochlorothiazide (HYDRODIURIL) 25 MG tablet Take 25 mg by mouth daily      vitamin D (ERGOCALCIFEROL) 09697 UNITS CAPS capsule Take 50,000 Units by mouth once a week      topiramate (TOPAMAX) 50 MG tablet Take 100 mg by mouth 2 times daily       metoprolol (LOPRESSOR) 50 MG tablet Take 50 mg by mouth 2 times daily      loratadine (CLARITIN) 10 MG tablet Take 10 mg by mouth daily       No current facility-administered medications for this visit. No Known Allergies    SOCIAL:      This patient is alone for the evaluation today.       [] HIV Risk Factors (i.e.) intravenous drug abuser; at risk sexual behavior; received blood products    [] TB Risk Factors (i.e.) Medically underserved, institutional care, foreign born, endemic area; exposure to active case    [] Hepatitis B&C Risk Factors (i.e.) Received blood transfusion prior to 1992; recreational drug use; high risk sexual behaviors; tattoos or body piercings; contact with blood or needle sticks in the workplace    Comprehension    Ability to grasp concepts and respond to questions:   [x] High   [] Medium   [] Low    Motivation    [x] Asks Questions; eager to learn   [] Needs education   [] Extreme anxiety    [] uncooperative   [] Denies need for education    English Speaking Ability    [x] Speaks English well   [x] Reads English well   [x] edema. Left lower leg: Normal. No tenderness and no edema. Lymphadenopathy:     No cervical adenopathy, No Exrtemity Adenopathy. Neurological: The patient is alert and oriented. Moving all four extremities equally, sensation grossly intact bilateral.  Skin: Skin is warm, dry and intact. Psychiatric: The patient has a normal mood and affect. Speech is normal and behavior is normal. Judgment and thought content normal. Cognition and memory are normal.     RECOMMENDATIONS:     We spent a great deal of time discussing the risks and benefits of Sleeve Gastrectomy, including but not limited to injury to intra-abdominal organs, breakdown of the gastric staple line, the need for re-operative therapy,  prolonged hospitalization,  mechanical ventilation,  and death. We discussed the possibility of bleeding, the need for blood transfusions, blood clots, hospital-acquired and intra-abdominal infection, anastomotic stricture, and worsening GERD. And we discussed the need for post-operative visit compliance, behavior modifications and diet changes, protein and vitamin supplementation, as well as routine scheduled and dedicated exercise. I instructed the patient to utilize the exercise log that will be given to them at their fist dietician appointment. We discussed the potential weight loss benefit of approximately 60-70% of her excess body weight at 12-18 months post-op, as well as the possibility of insufficient weight loss or weight gain after 2 years post-operative time. PLAN:       Diagnosis Orders   1.  Type 2 diabetes mellitus without complication, without long-term current use of insulin (HCC)  CBC Auto Differential    Comprehensive Metabolic Panel    Ferritin    Hemoglobin A1C    Iron and TIBC    Lipid Panel    Magnesium    PTH, Intact    Zinc    Vitamin D 25 Hydroxy    Vitamin B12 & Folate    Vitamin B1    Vitamin A    TSH without Reflex    T4, Free    Nicotine, Blood    Urine Drug Screen    Amb External Yes, record another set of vital signs

## 2021-06-10 ENCOUNTER — TELEPHONE (OUTPATIENT)
Dept: BARIATRICS/WEIGHT MGMT | Age: 36
End: 2021-06-10

## 2021-06-10 LAB
ALBUMIN SERPL-MCNC: 4 G/DL
ALP BLD-CCNC: 49 U/L
ALT SERPL-CCNC: 17 U/L
ANION GAP SERPL CALCULATED.3IONS-SCNC: NORMAL MMOL/L
AST SERPL-CCNC: 16 U/L
AVERAGE GLUCOSE: 117
BASOPHILS ABSOLUTE: 0.1 /ΜL
BASOPHILS RELATIVE PERCENT: 2.1 %
BILIRUB SERPL-MCNC: 0.4 MG/DL (ref 0.1–1.4)
BUN BLDV-MCNC: 17 MG/DL
CALCIUM SERPL-MCNC: 9.6 MG/DL
CHLORIDE BLD-SCNC: 105 MMOL/L
CHOLESTEROL, TOTAL: 134 MG/DL
CHOLESTEROL/HDL RATIO: 4.1
CO2: 26 MMOL/L
CREAT SERPL-MCNC: 0.78 MG/DL
EOSINOPHILS ABSOLUTE: 2.3 /ΜL
EOSINOPHILS RELATIVE PERCENT: 7 %
GFR CALCULATED: NORMAL
GLUCOSE BLD-MCNC: 91 MG/DL
HBA1C MFR BLD: 5.7 %
HCT VFR BLD CALC: 45.7 % (ref 36–46)
HDLC SERPL-MCNC: 33 MG/DL (ref 35–70)
HEMOGLOBIN: 13.8 G/DL (ref 12–16)
LDL CHOLESTEROL CALCULATED: 80 MG/DL (ref 0–160)
LYMPHOCYTES ABSOLUTE: 0 /ΜL
LYMPHOCYTES RELATIVE PERCENT: 0.2 %
MCH RBC QN AUTO: 25.1 PG
MCHC RBC AUTO-ENTMCNC: 30.2 G/DL
MCV RBC AUTO: 83.2 FL
MONOCYTES ABSOLUTE: 5.1 /ΜL
MONOCYTES RELATIVE PERCENT: 28.4 %
NEUTROPHILS ABSOLUTE: 0.6 /ΜL
NEUTROPHILS RELATIVE PERCENT: 61.7 %
NONHDLC SERPL-MCNC: ABNORMAL MG/DL
PDW BLD-RTO: 17.6 %
PLATELET # BLD: 311 K/ΜL
PMV BLD AUTO: NORMAL FL
POTASSIUM SERPL-SCNC: 3.9 MMOL/L
RBC # BLD: 5.49 10^6/ΜL
SODIUM BLD-SCNC: 138 MMOL/L
TOTAL PROTEIN: 7.9
TRIGL SERPL-MCNC: 104 MG/DL
URIC ACID: 6.6
VLDLC SERPL CALC-MCNC: ABNORMAL MG/DL
WBC # BLD: 8.18 10^3/ML

## 2021-06-18 DIAGNOSIS — I10 ESSENTIAL HYPERTENSION: ICD-10-CM

## 2021-06-18 DIAGNOSIS — F41.9 ANXIETY AND DEPRESSION: ICD-10-CM

## 2021-06-18 DIAGNOSIS — E11.9 TYPE 2 DIABETES MELLITUS WITHOUT COMPLICATION, WITHOUT LONG-TERM CURRENT USE OF INSULIN (HCC): ICD-10-CM

## 2021-06-18 DIAGNOSIS — K21.9 GASTROESOPHAGEAL REFLUX DISEASE, UNSPECIFIED WHETHER ESOPHAGITIS PRESENT: ICD-10-CM

## 2021-06-18 DIAGNOSIS — E66.01 MORBID OBESITY WITH BMI OF 70 AND OVER, ADULT (HCC): ICD-10-CM

## 2021-06-18 DIAGNOSIS — F32.A ANXIETY AND DEPRESSION: ICD-10-CM

## 2021-06-18 DIAGNOSIS — E78.5 HYPERLIPIDEMIA, UNSPECIFIED HYPERLIPIDEMIA TYPE: ICD-10-CM

## 2021-06-18 DIAGNOSIS — I27.20 PULMONARY HYPERTENSION (HCC): ICD-10-CM

## 2021-06-18 DIAGNOSIS — F31.9 CHRONIC BIPOLAR DISORDER (HCC): ICD-10-CM

## 2021-06-18 DIAGNOSIS — G47.33 OSA (OBSTRUCTIVE SLEEP APNEA): ICD-10-CM

## 2021-06-18 DIAGNOSIS — D64.9 NORMOCYTIC NORMOCHROMIC ANEMIA: ICD-10-CM

## 2021-06-18 DIAGNOSIS — J44.9 CHRONIC OBSTRUCTIVE PULMONARY DISEASE, UNSPECIFIED COPD TYPE (HCC): ICD-10-CM

## 2021-06-18 DIAGNOSIS — E66.2 OBESITY HYPOVENTILATION SYNDROME (HCC): ICD-10-CM

## 2021-06-18 DIAGNOSIS — J45.909 UNCOMPLICATED ASTHMA, UNSPECIFIED ASTHMA SEVERITY, UNSPECIFIED WHETHER PERSISTENT: ICD-10-CM

## 2021-07-02 ENCOUNTER — OFFICE VISIT (OUTPATIENT)
Dept: OBGYN | Age: 36
End: 2021-07-02
Payer: COMMERCIAL

## 2021-07-02 ENCOUNTER — HOSPITAL ENCOUNTER (OUTPATIENT)
Age: 36
Setting detail: SPECIMEN
Discharge: HOME | End: 2021-07-02
Payer: COMMERCIAL

## 2021-07-02 ENCOUNTER — HOSPITAL ENCOUNTER (OUTPATIENT)
Age: 36
Discharge: HOME | End: 2021-07-02
Payer: COMMERCIAL

## 2021-07-02 VITALS
HEART RATE: 74 BPM | DIASTOLIC BLOOD PRESSURE: 82 MMHG | HEIGHT: 71 IN | BODY MASS INDEX: 41.02 KG/M2 | SYSTOLIC BLOOD PRESSURE: 116 MMHG | WEIGHT: 293 LBS

## 2021-07-02 DIAGNOSIS — N92.6 MISSED PERIODS: Primary | ICD-10-CM

## 2021-07-02 DIAGNOSIS — A04.8 POSITIVE H. PYLORI TEST: ICD-10-CM

## 2021-07-02 LAB
CONTROL: YES
PREGNANCY TEST URINE, POC: NEGATIVE

## 2021-07-02 PROCEDURE — G8427 DOCREV CUR MEDS BY ELIG CLIN: HCPCS | Performed by: STUDENT IN AN ORGANIZED HEALTH CARE EDUCATION/TRAINING PROGRAM

## 2021-07-02 PROCEDURE — 81025 URINE PREGNANCY TEST: CPT | Performed by: STUDENT IN AN ORGANIZED HEALTH CARE EDUCATION/TRAINING PROGRAM

## 2021-07-02 PROCEDURE — G8417 CALC BMI ABV UP PARAM F/U: HCPCS | Performed by: STUDENT IN AN ORGANIZED HEALTH CARE EDUCATION/TRAINING PROGRAM

## 2021-07-02 PROCEDURE — 99213 OFFICE O/P EST LOW 20 MIN: CPT | Performed by: STUDENT IN AN ORGANIZED HEALTH CARE EDUCATION/TRAINING PROGRAM

## 2021-07-02 PROCEDURE — 93005 ELECTROCARDIOGRAM TRACING: CPT | Performed by: NURSE PRACTITIONER

## 2021-07-02 PROCEDURE — 1036F TOBACCO NON-USER: CPT | Performed by: STUDENT IN AN ORGANIZED HEALTH CARE EDUCATION/TRAINING PROGRAM
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
## 2021-07-03 LAB
EKG ATRIAL RATE: 70 BPM
EKG P AXIS: 59 DEGREES
EKG P-R INTERVAL: 236 MS
EKG Q-T INTERVAL: 408 MS
EKG QRS DURATION: 110 MS
EKG QTC CALCULATION (BAZETT): 440 MS
EKG R AXIS: 22 DEGREES
EKG T AXIS: 39 DEGREES
EKG VENTRICULAR RATE: 70 BPM

## 2021-07-03 PROCEDURE — 93010 ELECTROCARDIOGRAM REPORT: CPT | Performed by: INTERNAL MEDICINE

## 2021-07-04 LAB — H PYLORI BREATH TEST: NEGATIVE

## 2021-07-08 ENCOUNTER — TELEPHONE (OUTPATIENT)
Dept: BARIATRICS/WEIGHT MGMT | Age: 36
End: 2021-07-08

## 2021-07-20 ENCOUNTER — OFFICE VISIT (OUTPATIENT)
Dept: BARIATRICS/WEIGHT MGMT | Age: 36
End: 2021-07-20
Payer: COMMERCIAL

## 2021-07-20 VITALS
WEIGHT: 293 LBS | HEIGHT: 71 IN | HEART RATE: 82 BPM | SYSTOLIC BLOOD PRESSURE: 128 MMHG | BODY MASS INDEX: 41.02 KG/M2 | DIASTOLIC BLOOD PRESSURE: 74 MMHG

## 2021-07-20 DIAGNOSIS — I27.20 PULMONARY HYPERTENSION (HCC): ICD-10-CM

## 2021-07-20 DIAGNOSIS — F31.9 CHRONIC BIPOLAR DISORDER (HCC): ICD-10-CM

## 2021-07-20 DIAGNOSIS — D64.9 NORMOCYTIC NORMOCHROMIC ANEMIA: ICD-10-CM

## 2021-07-20 DIAGNOSIS — E66.01 MORBID OBESITY WITH BMI OF 70 AND OVER, ADULT (HCC): ICD-10-CM

## 2021-07-20 DIAGNOSIS — I10 ESSENTIAL HYPERTENSION: Primary | ICD-10-CM

## 2021-07-20 DIAGNOSIS — E11.9 TYPE 2 DIABETES MELLITUS WITHOUT COMPLICATION, WITHOUT LONG-TERM CURRENT USE OF INSULIN (HCC): ICD-10-CM

## 2021-07-20 DIAGNOSIS — G47.33 OSA (OBSTRUCTIVE SLEEP APNEA): ICD-10-CM

## 2021-07-20 DIAGNOSIS — K21.9 GASTROESOPHAGEAL REFLUX DISEASE, UNSPECIFIED WHETHER ESOPHAGITIS PRESENT: ICD-10-CM

## 2021-07-20 DIAGNOSIS — E78.5 HYPERLIPIDEMIA, UNSPECIFIED HYPERLIPIDEMIA TYPE: ICD-10-CM

## 2021-07-20 DIAGNOSIS — E66.2 OBESITY HYPOVENTILATION SYNDROME (HCC): ICD-10-CM

## 2021-07-20 PROCEDURE — G8428 CUR MEDS NOT DOCUMENT: HCPCS | Performed by: NURSE PRACTITIONER

## 2021-07-20 PROCEDURE — 2022F DILAT RTA XM EVC RTNOPTHY: CPT | Performed by: NURSE PRACTITIONER

## 2021-07-20 PROCEDURE — 1036F TOBACCO NON-USER: CPT | Performed by: NURSE PRACTITIONER

## 2021-07-20 PROCEDURE — 3044F HG A1C LEVEL LT 7.0%: CPT | Performed by: NURSE PRACTITIONER

## 2021-07-20 PROCEDURE — G8417 CALC BMI ABV UP PARAM F/U: HCPCS | Performed by: NURSE PRACTITIONER

## 2021-07-20 PROCEDURE — 99213 OFFICE O/P EST LOW 20 MIN: CPT | Performed by: NURSE PRACTITIONER

## 2021-07-20 NOTE — PROGRESS NOTES
Group Lifestyle Balance Follow-up Progress Note      Subjective     Patient is here for group medical appointment for Group Lifestyle Balance weight management program follow-up for the chronic conditions of DM Type 2, Asthma, Pulmonary HTN, BRYSON, Obesity Hypoventilation Syndrome, Anemia, GERD, Anxiety/Depression, Bipolar, HLD, Chronic Migraine, Arthritis, COPD. Patient continues on the program and tolerating well. Total weight loss to date is 0 lbs. No current issues. Allergies:  No Known Allergies    Past Medical History:     Past Medical History:   Diagnosis Date    Abnormal vaginal bleeding 4/19/2019    Acquired hallux rigidus 4/19/2019    Acute and chronic respiratory failure with hypoxia ([address] 11/6/2018    Acute on chronic respiratory failure with hypoxia and hypercapnia ([address] 11/6/2018    ASCUS with positive high risk HPV cervical     Asthma     Bipolar disorder ([facility] 4/19/2019    Breast discharge 4/9/2015    Cellulitis 4/19/2019    Cervical radiculopathy 4/19/2019    Chest pain 11/4/2018    Chlamydia ? Reported hx    Chronic cor pulmonale (HCC)     COPD (chronic obstructive pulmonary disease) (HCC)     D-dimer, elevated 11/18/2018    Dysfunctional uterine bleeding 1/27/2014    Dysplasia of cervix, low grade (ISAIAS 1) 3/30/2017    Noted on colposcopy 3/7/17    Dyspnea 11/3/2018    Ectopic pregnancy - Right 2/9/2014    Pt was initiallly dx with a left ovarian ectopic. After Laproscopic eval she was noted to have a Right Tubal ectopic. She underwent a RSO on 2/9/14.  Elevated blood pressure reading     Elevated brain natriuretic peptide (BNP) level     Gastroesophageal reflux disease 4/19/2019    H/O abnormal cervical Papanicolaou smear 2/3/2017    LSIL - 2/2015 Pap collected 2/3/2017. Patient is very difficult SSE as she is morbidly obese. Required ringed forceps and snowman speculum.       Heartburn     History of trichomonal vaginitis 1/20106    tx'd    Hypertension   Labor Neg Hx     Spont Abortions Neg Hx     Stroke Neg Hx        Social History:  Social History     Socioeconomic History    Marital status: Single     Spouse name: Not on file    Number of children: Not on file    Years of education: Not on file    Highest education level: Not on file   Occupational History    Not on file   Tobacco Use    Smoking status: Former Smoker     Packs/day: 0.10     Types: Cigarettes     Start date: 2018    Smokeless tobacco: Never Used    Tobacco comment:   smokes marijuana and cigarettes   Vaping Use    Vaping Use: Never used   Substance and Sexual Activity    Alcohol use: Yes     Alcohol/week: 0.0 standard drinks     Comment: occassionally     Drug use: Yes     Types: Marijuana     Comment: 2020. Quit in May 2020    Sexual activity: Not Currently   Other Topics Concern    Not on file   Social History Narrative    Not on file     Social Determinants of Health     Financial Resource Strain:     Difficulty of Paying Living Expenses:    Food Insecurity:     Worried About Running Out of Food in the Last Year:     [address] in the Last Year:    Transportation Needs:     Lack of Transportation (Medical):      Lack of Transportation (Non-Medical):    Physical Activity:     Days of Exercise per Week:     Minutes of Exercise per Session:    Stress:     Feeling of Stress :    Social Connections:     Frequency of Communication with Friends and Family:     Frequency of Social Gatherings with Friends and Family:     Attends Rastafari Services:     Active Member of Clubs or Organizations:     Attends Club or Organization Meetings:     Marital Status:    Intimate Partner Violence:     Fear of Current or Ex-Partner:     Emotionally Abused:     Physically Abused:     Sexually Abused:        Current Medications:  Current Outpatient Medications   Medication Sig Dispense Refill    gabapentin (NEURONTIN) 600 MG tablet       topiramate (TOPAMAX) 100 MG tablet       WIXELA INHUB 250-50 MCG/DOSE AEPB       diclofenac (VOLTAREN) 50 MG EC tablet       vitamin D3 (CHOLECALCIFEROL) 25 MCG (1000 UT) TABS tablet       Insulin Pen Needle (COMFORT EZ PEN NEEDLES) 32G X 6 MM MISC Comfort EZ Pen Needles 32 gauge x 1/4\" (Patient not taking: Reported on 7/2/2021)      Liraglutide (VICTOZA) 18 MG/3ML SOPN SC injection Victoza 2-Monty 0.6 mg/0.1 mL (18 mg/3 mL) subcutaneous pen injector      diclofenac sodium (VOLTAREN) 1 % GEL 1 g      famotidine (PEPCID) 20 MG tablet Take 1 tablet by mouth nightly 30 tablet 3    pantoprazole (PROTONIX) 40 MG tablet Take 1 tablet by mouth daily 30 tablet 3    sucralfate (CARAFATE) 1 GM tablet Take 1 tablet by mouth 4 times daily 120 tablet 3    tiotropium (SPIRIVA HANDIHALER) 18 MCG inhalation capsule Spiriva Respimat 2.5 mcg/actuation solution for inhalation   Inhale 2 puffs every day by inhalation route.       ondansetron (ZOFRAN ODT) 4 MG disintegrating tablet Take 1 tablet by mouth every 8 hours as needed for Nausea (Patient not taking: Reported on 7/2/2021) 5 tablet 0    Promethazine-DM (PROMETHAZINE-DEXTROMETHORPHAN) 6.25-15 MG/5ML SOLN solution Take 5 mLs by mouth 4 times daily (Patient not taking: Reported on 7/2/2021)  1    aspirin 81 MG chewable tablet Take 1 tablet by mouth daily 30 tablet 3    butalbital-acetaminophen-caffeine (FIORICET, ESGIC) -40 MG per tablet Take 1 tablet by mouth every 4 hours as needed for Headaches 20 tablet 0    albuterol sulfate  (90 Base) MCG/ACT inhaler Inhale 2 puffs into the lungs 4 times daily 1 Inhaler 3    ipratropium (ATROVENT HFA) 17 MCG/ACT inhaler Inhale 2 puffs into the lungs 4 times daily 1 Inhaler 3    lisinopril (PRINIVIL;ZESTRIL) 20 MG tablet Take 1 tablet by mouth daily 30 tablet 3    metFORMIN (GLUCOPHAGE) 500 MG tablet Take 1 tablet by mouth 2 times daily (with meals) (Patient not taking: Reported on 7/2/2021) 60 tablet 3    POTASSIUM CHLORIDE PO Take 25 mg by mouth daily      benzonatate (TESSALON PERLES) 100 MG capsule Take 1 capsule by mouth every 8 hours as needed for Cough (Patient not taking: Reported on 7/2/2021) 20 capsule 0    hydrochlorothiazide (HYDRODIURIL) 25 MG tablet Take 25 mg by mouth daily      vitamin D (ERGOCALCIFEROL) 95516 UNITS CAPS capsule Take 1,000 Units by mouth daily       metoprolol (LOPRESSOR) 50 MG tablet Take 50 mg by mouth 2 times daily      loratadine (CLARITIN) 10 MG tablet Take 10 mg by mouth daily (Patient not taking: Reported on 7/2/2021)       No current facility-administered medications for this visit. Vital Signs:  /74 (Site: Right Upper Arm, Position: Sitting, Cuff Size: Large Adult)   Pulse 82   Ht 5' 11\" (1.803 m)   Wt (!) 602 lb (273.1 kg)   BMI 83.96 kg/m²     BMI/Height/Weight:  Body mass index is 83.96 kg/m². Review of Systems  Constitutional: No weight loss/gain      Objective:      Physical Exam   Vital signs reviewed. General Appearance: Well-developed and well-nourished. No acute distress. Skin: Warm, dry. Head: Normocephalic. Pulmonary/Chest: Normal respiratory effort. Musculoskeletal: Movement x4. Neurological:  Alert and oriented. Individual goal for this encounter: To be able to get a knee replacement. I just learned when I had knee surgery as a kid that my knee didn't grow back where it's supposed to be. X ? Vital signs reviewed and discussed with patient. X ? Labs/EKG reviewed and discussed with patient. ? Blood sugar log reviewed and discussed with patient. ? Physical activity discussed. ? Medication changes recommended. ? Smoking cessation discussed. X ? Specific questions/concerns addressed. Specific group medical question(s) addressed in this encounter:  Discussion about metabolic syndrome. Group discussion topic for this encounter:    X ? Welcome Jumpstart   ? Be a Fat & Calorie    ? Healthy Eating   ? Move Those Muscles   ? Tip the Calorie Balance   ? Take charge of What's Around You   ? Problem Solving   ? Step Up Your Physical Activity Plan   ? Manage Slips and Self-Defeating Thoughts   ? [address]   ? Make Social Cues Work for Landon Clarke   ? Ways to Stay Motivated   ? Preparing for Long Term Self-Management   ? Take Charge of Your Lifestyle   ? Mindful Eating, Mindful Movement   ? Manage Your Stress   ? Sit Less for Health   ? More Volume, Fewer Calories   ? Stay Active   ? Balance Your Thoughts   ? Heart Health    ? Looking Back and Looking Forward    Total time:  90 minutes, greater than 50% of visit spent in group counseling/education. Assessment:       Diagnosis Orders   1. Essential hypertension     2. Obesity hypoventilation syndrome ([facility]     3. Type 2 diabetes mellitus without complication, without long-term current use of insulin ([address]     4. BRYSON (obstructive sleep apnea)     5. Chronic bipolar disorder ([address]     6. Gastroesophageal reflux disease, unspecified whether esophagitis present     7. Hyperlipidemia, unspecified hyperlipidemia type     8. Morbid obesity with BMI of 70 and over, adult ([facility]     9. Pulmonary hypertension ([address]     10. Normocytic normochromic anemia         Plan:      Track food and weight. Return to clinic as per group medical appointment schedule. Follow-up:  Return in about 1 week (around 7/27/2021). Orders:  No orders of the defined types were placed in this encounter. Prescriptions:  No orders of the defined types were placed in this encounter.       Electronically signed by:  Haritha Farmer CNP

## 2021-07-27 ENCOUNTER — OFFICE VISIT (OUTPATIENT)
Dept: BARIATRICS/WEIGHT MGMT | Age: 36
End: 2021-07-27
Payer: COMMERCIAL

## 2021-07-27 VITALS
SYSTOLIC BLOOD PRESSURE: 126 MMHG | BODY MASS INDEX: 41.02 KG/M2 | HEIGHT: 71 IN | WEIGHT: 293 LBS | DIASTOLIC BLOOD PRESSURE: 80 MMHG | HEART RATE: 76 BPM

## 2021-07-27 DIAGNOSIS — E66.01 MORBID OBESITY WITH BMI OF 70 AND OVER, ADULT (HCC): ICD-10-CM

## 2021-07-27 DIAGNOSIS — D64.9 NORMOCYTIC NORMOCHROMIC ANEMIA: ICD-10-CM

## 2021-07-27 DIAGNOSIS — E78.5 HYPERLIPIDEMIA, UNSPECIFIED HYPERLIPIDEMIA TYPE: ICD-10-CM

## 2021-07-27 DIAGNOSIS — J44.9 CHRONIC OBSTRUCTIVE PULMONARY DISEASE, UNSPECIFIED COPD TYPE (HCC): ICD-10-CM

## 2021-07-27 DIAGNOSIS — I27.20 PULMONARY HYPERTENSION (HCC): ICD-10-CM

## 2021-07-27 DIAGNOSIS — E11.9 TYPE 2 DIABETES MELLITUS WITHOUT COMPLICATION, WITHOUT LONG-TERM CURRENT USE OF INSULIN (HCC): ICD-10-CM

## 2021-07-27 DIAGNOSIS — F31.9 CHRONIC BIPOLAR DISORDER (HCC): ICD-10-CM

## 2021-07-27 DIAGNOSIS — E66.2 OBESITY HYPOVENTILATION SYNDROME (HCC): ICD-10-CM

## 2021-07-27 DIAGNOSIS — K21.9 GASTROESOPHAGEAL REFLUX DISEASE, UNSPECIFIED WHETHER ESOPHAGITIS PRESENT: ICD-10-CM

## 2021-07-27 DIAGNOSIS — I10 ESSENTIAL HYPERTENSION: Primary | ICD-10-CM

## 2021-07-27 DIAGNOSIS — F32.A ANXIETY AND DEPRESSION: ICD-10-CM

## 2021-07-27 DIAGNOSIS — J45.909 UNCOMPLICATED ASTHMA, UNSPECIFIED ASTHMA SEVERITY, UNSPECIFIED WHETHER PERSISTENT: ICD-10-CM

## 2021-07-27 DIAGNOSIS — F41.9 ANXIETY AND DEPRESSION: ICD-10-CM

## 2021-07-27 DIAGNOSIS — G47.33 OSA (OBSTRUCTIVE SLEEP APNEA): ICD-10-CM

## 2021-07-27 PROCEDURE — G8417 CALC BMI ABV UP PARAM F/U: HCPCS | Performed by: NURSE PRACTITIONER

## 2021-07-27 PROCEDURE — 3044F HG A1C LEVEL LT 7.0%: CPT | Performed by: NURSE PRACTITIONER

## 2021-07-27 PROCEDURE — G8427 DOCREV CUR MEDS BY ELIG CLIN: HCPCS | Performed by: NURSE PRACTITIONER

## 2021-07-27 PROCEDURE — 2022F DILAT RTA XM EVC RTNOPTHY: CPT | Performed by: NURSE PRACTITIONER

## 2021-07-27 PROCEDURE — 99213 OFFICE O/P EST LOW 20 MIN: CPT | Performed by: NURSE PRACTITIONER

## 2021-07-27 PROCEDURE — 3023F SPIROM DOC REV: CPT | Performed by: NURSE PRACTITIONER

## 2021-07-27 PROCEDURE — G8926 SPIRO NO PERF OR DOC: HCPCS | Performed by: NURSE PRACTITIONER

## 2021-07-27 PROCEDURE — 1036F TOBACCO NON-USER: CPT | Performed by: NURSE PRACTITIONER

## 2021-07-27 NOTE — PROGRESS NOTES
Group Lifestyle Balance Follow-up Progress Note      Subjective     Patient is here for group medical appointment for Group Lifestyle Balance weight management program follow-up for the chronic conditions of DM Type 2, Asthma, Pulmonary HTN, BRYSON, Obesity Hypoventilation Syndrome, Anemia, GERD, Anxiety/Depression, Bipolar, HLD, Chronic Migraine, Arthritis, COPD. Patient continues on the program and tolerating well. Total weight loss to date is 4 lbs. No current issues. Allergies:  No Known Allergies    Past Medical History:     Past Medical History:   Diagnosis Date    Abnormal vaginal bleeding 4/19/2019    Acquired hallux rigidus 4/19/2019    Acute and chronic respiratory failure with hypoxia ([address] 11/6/2018    Acute on chronic respiratory failure with hypoxia and hypercapnia ([address] 11/6/2018    ASCUS with positive high risk HPV cervical     Asthma     Bipolar disorder ([facility] 4/19/2019    Breast discharge 4/9/2015    Cellulitis 4/19/2019    Cervical radiculopathy 4/19/2019    Chest pain 11/4/2018    Chlamydia ? Reported hx    Chronic cor pulmonale (HCC)     COPD (chronic obstructive pulmonary disease) (HCC)     D-dimer, elevated 11/18/2018    Dysfunctional uterine bleeding 1/27/2014    Dysplasia of cervix, low grade (ISAIAS 1) 3/30/2017    Noted on colposcopy 3/7/17    Dyspnea 11/3/2018    Ectopic pregnancy - Right 2/9/2014    Pt was initiallly dx with a left ovarian ectopic. After Laproscopic eval she was noted to have a Right Tubal ectopic. She underwent a RSO on 2/9/14.  Elevated blood pressure reading     Elevated brain natriuretic peptide (BNP) level     Gastroesophageal reflux disease 4/19/2019    H/O abnormal cervical Papanicolaou smear 2/3/2017    LSIL - 2/2015 Pap collected 2/3/2017. Patient is very difficult SSE as she is morbidly obese. Required ringed forceps and snowman speculum.       Heartburn     History of trichomonal vaginitis 1/20106    tx'd    Hypertension  Hypoalbuminemia due to protein-calorie malnutrition ([address] 2018    Hypocalcemia 2018    Migraine 2019    Morbid obesity with BMI of 70 and over, adult ([address]     BMI 74    MRSA (methicillin resistant Staphylococcus aureus) 2010    Left leg    Muscle weakness     Neck pain 2019    Normocytic normochromic anemia 2018    Obesity     Obesity hypoventilation syndrome ([address] 2018    BRYSON (obstructive sleep apnea)     BRYSON on CPAP    Ovarian ectopic pregnancy 2014    Right tubal ectopic tx'd with MTX AND LS RSO(initially dx as Left ovarian By USN 14)    Pain     Pelvic adhesive disease 14    C/S to bladder, as well as reactive to ectopic; also hx PID    Pulmonary hypertension ([address]     Shoulder joint pain 2013    Sprain of ankle 2019    Tobacco abuse 2018    Type 2 diabetes mellitus without complication, without long-term current use of insulin ([address] 2018    Unspecified sleep apnea     Vitamin D deficiency 2019   .     Past Surgical History:  Past Surgical History:   Procedure Laterality Date     SECTION  2005    CHOLECYSTECTOMY, LAPAROSCOPIC      LEG SURGERY      right leg age 7 for growth plate    SALPINGO-OOPHORECTOMY  14    Right; ectopic pregnancy    TONSILLECTOMY      age 12   Bl[facility] Liming UPPER GASTROINTESTINAL ENDOSCOPY  07/10/2020    UPPER GASTROINTESTINAL ENDOSCOPY N/A 7/10/2020    EGD BIOPSY performed by Rashida Johnson DO at [address].       Family History:  Family History   Problem Relation Age of Onset    High Blood Pressure Mother     Heart Disease Mother     Cancer Father         lung    Lung Cancer Father     Cancer Maternal Aunt         breast    Diabetes Maternal Aunt     Breast Cancer Maternal Aunt     Cancer Paternal Aunt         lung    Lung Cancer Paternal Aunt     Thyroid Disease Sister     Colon Cancer Neg Hx     Eclampsia Neg Hx     Hypertension Neg Hx     Ovarian Cancer Neg Hx   Labor Neg Hx     Spont Abortions Neg Hx     Stroke Neg Hx        Social History:  Social History     Socioeconomic History    Marital status: Single     Spouse name: Not on file    Number of children: Not on file    Years of education: Not on file    Highest education level: Not on file   Occupational History    Not on file   Tobacco Use    Smoking status: Former Smoker     Packs/day: 0.10     Types: Cigarettes     Start date: 2018    Smokeless tobacco: Never Used    Tobacco comment:   smokes marijuana and cigarettes   Vaping Use    Vaping Use: Never used   Substance and Sexual Activity    Alcohol use: Yes     Alcohol/week: 0.0 standard drinks     Comment: occassionally     Drug use: Yes     Types: Marijuana     Comment: 2020. Quit in May 2020    Sexual activity: Not Currently   Other Topics Concern    Not on file   Social History Narrative    Not on file     Social Determinants of Health     Financial Resource Strain:     Difficulty of Paying Living Expenses:    Food Insecurity:     Worried About Running Out of Food in the Last Year:     [address] in the Last Year:    Transportation Needs:     Lack of Transportation (Medical):      Lack of Transportation (Non-Medical):    Physical Activity:     Days of Exercise per Week:     Minutes of Exercise per Session:    Stress:     Feeling of Stress :    Social Connections:     Frequency of Communication with Friends and Family:     Frequency of Social Gatherings with Friends and Family:     Attends Protestant Services:     Active Member of Clubs or Organizations:     Attends Club or Organization Meetings:     Marital Status:    Intimate Partner Violence:     Fear of Current or Ex-Partner:     Emotionally Abused:     Physically Abused:     Sexually Abused:        Current Medications:  Current Outpatient Medications   Medication Sig Dispense Refill    gabapentin (NEURONTIN) 600 MG tablet       topiramate (TOPAMAX) 100 MG tablet       WIXELA INHUB 250-50 MCG/DOSE AEPB       diclofenac (VOLTAREN) 50 MG EC tablet       vitamin D3 (CHOLECALCIFEROL) 25 MCG (1000 UT) TABS tablet       Insulin Pen Needle (COMFORT EZ PEN NEEDLES) 32G X 6 MM MISC Comfort EZ Pen Needles 32 gauge x 1/4\" (Patient not taking: Reported on 7/2/2021)      Liraglutide (VICTOZA) 18 MG/3ML SOPN SC injection Victoza 2-Monty 0.6 mg/0.1 mL (18 mg/3 mL) subcutaneous pen injector      diclofenac sodium (VOLTAREN) 1 % GEL 1 g      famotidine (PEPCID) 20 MG tablet Take 1 tablet by mouth nightly 30 tablet 3    pantoprazole (PROTONIX) 40 MG tablet Take 1 tablet by mouth daily 30 tablet 3    sucralfate (CARAFATE) 1 GM tablet Take 1 tablet by mouth 4 times daily 120 tablet 3    tiotropium (SPIRIVA HANDIHALER) 18 MCG inhalation capsule Spiriva Respimat 2.5 mcg/actuation solution for inhalation   Inhale 2 puffs every day by inhalation route.       ondansetron (ZOFRAN ODT) 4 MG disintegrating tablet Take 1 tablet by mouth every 8 hours as needed for Nausea (Patient not taking: Reported on 7/2/2021) 5 tablet 0    Promethazine-DM (PROMETHAZINE-DEXTROMETHORPHAN) 6.25-15 MG/5ML SOLN solution Take 5 mLs by mouth 4 times daily (Patient not taking: Reported on 7/2/2021)  1    aspirin 81 MG chewable tablet Take 1 tablet by mouth daily 30 tablet 3    butalbital-acetaminophen-caffeine (FIORICET, ESGIC) -40 MG per tablet Take 1 tablet by mouth every 4 hours as needed for Headaches 20 tablet 0    albuterol sulfate  (90 Base) MCG/ACT inhaler Inhale 2 puffs into the lungs 4 times daily 1 Inhaler 3    ipratropium (ATROVENT HFA) 17 MCG/ACT inhaler Inhale 2 puffs into the lungs 4 times daily 1 Inhaler 3    lisinopril (PRINIVIL;ZESTRIL) 20 MG tablet Take 1 tablet by mouth daily 30 tablet 3    metFORMIN (GLUCOPHAGE) 500 MG tablet Take 1 tablet by mouth 2 times daily (with meals) (Patient not taking: Reported on 7/2/2021) 60 tablet 3    POTASSIUM CHLORIDE PO Take 25 mg by mouth daily      benzonatate (TESSALON PERLES) 100 MG capsule Take 1 capsule by mouth every 8 hours as needed for Cough (Patient not taking: Reported on 7/2/2021) 20 capsule 0    hydrochlorothiazide (HYDRODIURIL) 25 MG tablet Take 25 mg by mouth daily      vitamin D (ERGOCALCIFEROL) 71840 UNITS CAPS capsule Take 1,000 Units by mouth daily       metoprolol (LOPRESSOR) 50 MG tablet Take 50 mg by mouth 2 times daily      loratadine (CLARITIN) 10 MG tablet Take 10 mg by mouth daily (Patient not taking: Reported on 7/2/2021)       No current facility-administered medications for this visit. Vital Signs:  /80 (Site: Right Upper Arm, Position: Sitting, Cuff Size: Large Adult)   Pulse 76   Ht 5' 11\" (1.803 m)   Wt (!) 598 lb (271.3 kg)   BMI 83.40 kg/m²     BMI/Height/Weight:  Body mass index is 83.4 kg/m². Review of Systems  Constitutional: Weight loss      Objective:      Physical Exam   Vital signs reviewed. General Appearance: Well-developed and well-nourished. No acute distress. Skin: Warm, dry. Head: Normocephalic. Pulmonary/Chest: Normal respiratory effort. Musculoskeletal: Movement x4. Neurological:  Alert and oriented. Individual goal for this encounter: To be able to get a knee replacement. I just learned when I had knee surgery as a kid that my knee didn't grow back where it's supposed to be. X ? Vital signs reviewed and discussed with patient. X ? Labs/EKG reviewed and discussed with patient. ? Blood sugar log reviewed and discussed with patient. ? Physical activity discussed. ? Medication changes recommended. ? Smoking cessation discussed. X ? Specific questions/concerns addressed. Specific group medical question(s) addressed in this encounter:  Discussion about fatty liver. Group discussion topic for this encounter:     ? Welcome Jumpstart  X ? Be a Fat & Calorie    ? Healthy Eating   ? Move Those Muscles   ?  Tip the Calorie Balance   ? Take charge of What's Around You   ? Problem Solving   ? Step Up Your Physical Activity Plan   ? Manage Slips and Self-Defeating Thoughts   ? [address]   ? Make Social Cues Work for Landon Clarke   ? Ways to Stay Motivated   ? Preparing for Long Term Self-Management   ? Take Charge of Your Lifestyle   ? Mindful Eating, Mindful Movement   ? Manage Your Stress   ? Sit Less for Health   ? More Volume, Fewer Calories   ? Stay Active   ? Balance Your Thoughts   ? Heart Health    ? Looking Back and Looking Forward    Total time:  90 minutes, greater than 50% of visit spent in group counseling/education. Assessment:       Diagnosis Orders   1. Essential hypertension     2. Obesity hypoventilation syndrome ([facility]     3. Type 2 diabetes mellitus without complication, without long-term current use of insulin ([facility]     4. BRYSON (obstructive sleep apnea)     5. Chronic bipolar disorder ([facility]     6. Gastroesophageal reflux disease, unspecified whether esophagitis present     7. Anxiety and depression     8. Hyperlipidemia, unspecified hyperlipidemia type     9. Morbid obesity with BMI of 70 and over, adult ([facility]     10. Uncomplicated asthma, unspecified asthma severity, unspecified whether persistent     11. Pulmonary hypertension ([facility]     12. Normocytic normochromic anemia     13. Chronic migraine     14. Chronic obstructive pulmonary disease, unspecified COPD type ([facility]         Plan:      Track food and weight. Return to clinic as per group medical appointment schedule. Follow-up:  Return in about 1 week (around 8/3/2021). Orders:  No orders of the defined types were placed in this encounter. Prescriptions:  No orders of the defined types were placed in this encounter.       Electronically signed by:  Patrizia Marin CNP

## 2021-07-30 ENCOUNTER — PROCEDURE VISIT (OUTPATIENT)
Dept: OBGYN | Age: 36
End: 2021-07-30
Payer: COMMERCIAL

## 2021-07-30 ENCOUNTER — HOSPITAL ENCOUNTER (OUTPATIENT)
Age: 36
Setting detail: SPECIMEN
Discharge: HOME | End: 2021-07-30
Payer: COMMERCIAL

## 2021-07-30 VITALS
DIASTOLIC BLOOD PRESSURE: 75 MMHG | HEIGHT: 72 IN | HEART RATE: 73 BPM | BODY MASS INDEX: 39.68 KG/M2 | WEIGHT: 293 LBS | SYSTOLIC BLOOD PRESSURE: 117 MMHG

## 2021-07-30 DIAGNOSIS — Z20.2 POSSIBLE EXPOSURE TO STD: ICD-10-CM

## 2021-07-30 DIAGNOSIS — N93.9 ABNORMAL UTERINE BLEEDING (AUB): Primary | ICD-10-CM

## 2021-07-30 LAB
CONTROL: PRESENT
DIRECT EXAM: NORMAL
Lab: NORMAL
PREGNANCY TEST URINE, POC: NEGATIVE
SPECIMEN DESCRIPTION: NORMAL

## 2021-07-30 PROCEDURE — 1036F TOBACCO NON-USER: CPT | Performed by: STUDENT IN AN ORGANIZED HEALTH CARE EDUCATION/TRAINING PROGRAM

## 2021-07-30 PROCEDURE — 99213 OFFICE O/P EST LOW 20 MIN: CPT | Performed by: STUDENT IN AN ORGANIZED HEALTH CARE EDUCATION/TRAINING PROGRAM

## 2021-07-30 PROCEDURE — G8427 DOCREV CUR MEDS BY ELIG CLIN: HCPCS | Performed by: STUDENT IN AN ORGANIZED HEALTH CARE EDUCATION/TRAINING PROGRAM

## 2021-07-30 PROCEDURE — 99211 OFF/OP EST MAY X REQ PHY/QHP: CPT | Performed by: STUDENT IN AN ORGANIZED HEALTH CARE EDUCATION/TRAINING PROGRAM

## 2021-07-30 PROCEDURE — 81025 URINE PREGNANCY TEST: CPT | Performed by: STUDENT IN AN ORGANIZED HEALTH CARE EDUCATION/TRAINING PROGRAM

## 2021-07-30 PROCEDURE — G8417 CALC BMI ABV UP PARAM F/U: HCPCS | Performed by: STUDENT IN AN ORGANIZED HEALTH CARE EDUCATION/TRAINING PROGRAM
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
## 2021-08-06 ENCOUNTER — OFFICE VISIT (OUTPATIENT)
Dept: BARIATRICS/WEIGHT MGMT | Age: 36
End: 2021-08-06
Payer: COMMERCIAL

## 2021-08-06 VITALS
HEART RATE: 80 BPM | BODY MASS INDEX: 39.68 KG/M2 | WEIGHT: 293 LBS | SYSTOLIC BLOOD PRESSURE: 130 MMHG | DIASTOLIC BLOOD PRESSURE: 74 MMHG | HEIGHT: 72 IN

## 2021-08-06 DIAGNOSIS — J45.909 UNCOMPLICATED ASTHMA, UNSPECIFIED ASTHMA SEVERITY, UNSPECIFIED WHETHER PERSISTENT: ICD-10-CM

## 2021-08-06 DIAGNOSIS — M54.12 CERVICAL RADICULOPATHY: ICD-10-CM

## 2021-08-06 DIAGNOSIS — I10 ESSENTIAL HYPERTENSION: ICD-10-CM

## 2021-08-06 DIAGNOSIS — E66.01 MORBID OBESITY WITH BMI OF 70 AND OVER, ADULT (HCC): ICD-10-CM

## 2021-08-06 DIAGNOSIS — F41.9 ANXIETY AND DEPRESSION: ICD-10-CM

## 2021-08-06 DIAGNOSIS — F31.9 CHRONIC BIPOLAR DISORDER (HCC): ICD-10-CM

## 2021-08-06 DIAGNOSIS — I27.20 PULMONARY HYPERTENSION (HCC): ICD-10-CM

## 2021-08-06 DIAGNOSIS — F32.A ANXIETY AND DEPRESSION: ICD-10-CM

## 2021-08-06 DIAGNOSIS — E66.2 OBESITY HYPOVENTILATION SYNDROME (HCC): ICD-10-CM

## 2021-08-06 DIAGNOSIS — E11.9 TYPE 2 DIABETES MELLITUS WITHOUT COMPLICATION, WITHOUT LONG-TERM CURRENT USE OF INSULIN (HCC): Primary | ICD-10-CM

## 2021-08-06 DIAGNOSIS — J44.9 CHRONIC OBSTRUCTIVE PULMONARY DISEASE, UNSPECIFIED COPD TYPE (HCC): ICD-10-CM

## 2021-08-06 DIAGNOSIS — D64.9 NORMOCYTIC NORMOCHROMIC ANEMIA: ICD-10-CM

## 2021-08-06 DIAGNOSIS — E78.5 HYPERLIPIDEMIA, UNSPECIFIED HYPERLIPIDEMIA TYPE: ICD-10-CM

## 2021-08-06 DIAGNOSIS — K21.9 GASTROESOPHAGEAL REFLUX DISEASE, UNSPECIFIED WHETHER ESOPHAGITIS PRESENT: ICD-10-CM

## 2021-08-06 DIAGNOSIS — G47.33 OSA (OBSTRUCTIVE SLEEP APNEA): ICD-10-CM

## 2021-08-06 PROCEDURE — G8926 SPIRO NO PERF OR DOC: HCPCS | Performed by: NURSE PRACTITIONER

## 2021-08-06 PROCEDURE — G8427 DOCREV CUR MEDS BY ELIG CLIN: HCPCS | Performed by: NURSE PRACTITIONER

## 2021-08-06 PROCEDURE — 2022F DILAT RTA XM EVC RTNOPTHY: CPT | Performed by: NURSE PRACTITIONER

## 2021-08-06 PROCEDURE — G8417 CALC BMI ABV UP PARAM F/U: HCPCS | Performed by: NURSE PRACTITIONER

## 2021-08-06 PROCEDURE — 99213 OFFICE O/P EST LOW 20 MIN: CPT | Performed by: NURSE PRACTITIONER

## 2021-08-06 PROCEDURE — 3044F HG A1C LEVEL LT 7.0%: CPT | Performed by: NURSE PRACTITIONER

## 2021-08-06 PROCEDURE — 3023F SPIROM DOC REV: CPT | Performed by: NURSE PRACTITIONER

## 2021-08-06 PROCEDURE — 1036F TOBACCO NON-USER: CPT | Performed by: NURSE PRACTITIONER

## 2021-08-06 NOTE — PROGRESS NOTES
Group Lifestyle Balance Follow-up Progress Note      Subjective     Patient is here for group medical appointment for Group Lifestyle Balance weight management program follow-up for the chronic conditions of DM Type 2, Asthma, Pulmonary HTN, BRYSON, Obesity Hypoventilation Syndrome, Anemia, GERD, Anxiety/Depression, Bipolar, HLD, Chronic Migraine, Arthritis, COPD. Patient continues on the program and tolerating well. Total weight loss to date is 4 lbs. No current issues. Allergies:  No Known Allergies    Past Medical History:     Past Medical History:   Diagnosis Date    Abnormal vaginal bleeding 4/19/2019    Acquired hallux rigidus 4/19/2019    Acute and chronic respiratory failure with hypoxia ([address] 11/6/2018    Acute on chronic respiratory failure with hypoxia and hypercapnia ([address] 11/6/2018    ASCUS with positive high risk HPV cervical     Asthma     Bipolar disorder ([facility] 4/19/2019    Breast discharge 4/9/2015    Cellulitis 4/19/2019    Cervical radiculopathy 4/19/2019    Chest pain 11/4/2018    Chlamydia ? Reported hx    Chronic cor pulmonale (HCC)     COPD (chronic obstructive pulmonary disease) (HCC)     D-dimer, elevated 11/18/2018    Dysfunctional uterine bleeding 1/27/2014    Dysplasia of cervix, low grade (ISAIAS 1) 3/30/2017    Noted on colposcopy 3/7/17    Dyspnea 11/3/2018    Ectopic pregnancy - Right 2/9/2014    Pt was initiallly dx with a left ovarian ectopic. After Laproscopic eval she was noted to have a Right Tubal ectopic. She underwent a RSO on 2/9/14.  Elevated blood pressure reading     Elevated brain natriuretic peptide (BNP) level     Gastroesophageal reflux disease 4/19/2019    H/O abnormal cervical Papanicolaou smear 2/3/2017    LSIL - 2/2015 Pap collected 2/3/2017. Patient is very difficult SSE as she is morbidly obese. Required ringed forceps and snowman speculum.       Heartburn     History of trichomonal vaginitis 1/20106    tx'd    Hypertension  Hypoalbuminemia due to protein-calorie malnutrition ([address] 2018    Hypocalcemia 2018    Migraine 2019    Morbid obesity with BMI of 70 and over, adult ([address]     BMI 74    MRSA (methicillin resistant Staphylococcus aureus) 2010    Left leg    Muscle weakness     Neck pain 2019    Normocytic normochromic anemia 2018    Obesity     Obesity hypoventilation syndrome ([address] 2018    BRYSON (obstructive sleep apnea)     BRYSON on CPAP    Ovarian ectopic pregnancy 2014    Right tubal ectopic tx'd with MTX AND LS RSO(initially dx as Left ovarian By USN 14)    Pain     Pelvic adhesive disease 14    C/S to bladder, as well as reactive to ectopic; also hx PID    Pulmonary hypertension ([address]     Shoulder joint pain 2013    Sprain of ankle 2019    Tobacco abuse 2018    Type 2 diabetes mellitus without complication, without long-term current use of insulin ([address] 2018    Unspecified sleep apnea     Vitamin D deficiency 2019   .     Past Surgical History:  Past Surgical History:   Procedure Laterality Date     SECTION  2005    CHOLECYSTECTOMY, LAPAROSCOPIC      LEG SURGERY      right leg age 7 for growth plate    SALPINGO-OOPHORECTOMY  14    Right; ectopic pregnancy    TONSILLECTOMY      age 12   [facility] UPPER GASTROINTESTINAL ENDOSCOPY  07/10/2020    UPPER GASTROINTESTINAL ENDOSCOPY N/A 7/10/2020    EGD BIOPSY performed by Papo Javier DO at [id number].       Family History:  Family History   Problem Relation Age of Onset    High Blood Pressure Mother     Heart Disease Mother     Cancer Father         lung    Lung Cancer Father     Cancer Maternal Aunt         breast    Diabetes Maternal Aunt     Breast Cancer Maternal Aunt     Cancer Paternal Aunt         lung    Lung Cancer Paternal Aunt     Thyroid Disease Sister     Colon Cancer Neg Hx     Eclampsia Neg Hx     Hypertension Neg Hx     Ovarian Cancer Neg Hx tablet       WIXELA INHUB 250-50 MCG/DOSE AEPB       diclofenac (VOLTAREN) 50 MG EC tablet       vitamin D3 (CHOLECALCIFEROL) 25 MCG (1000 UT) TABS tablet       Insulin Pen Needle (COMFORT EZ PEN NEEDLES) 32G X 6 MM MISC Comfort EZ Pen Needles 32 gauge x 1/4\"      Liraglutide (VICTOZA) 18 MG/3ML SOPN SC injection Victoza 2-Monty 0.6 mg/0.1 mL (18 mg/3 mL) subcutaneous pen injector      diclofenac sodium (VOLTAREN) 1 % GEL 1 g      famotidine (PEPCID) 20 MG tablet Take 1 tablet by mouth nightly 30 tablet 3    pantoprazole (PROTONIX) 40 MG tablet Take 1 tablet by mouth daily 30 tablet 3    sucralfate (CARAFATE) 1 GM tablet Take 1 tablet by mouth 4 times daily 120 tablet 3    tiotropium (SPIRIVA HANDIHALER) 18 MCG inhalation capsule Spiriva Respimat 2.5 mcg/actuation solution for inhalation   Inhale 2 puffs every day by inhalation route.       ondansetron (ZOFRAN ODT) 4 MG disintegrating tablet Take 1 tablet by mouth every 8 hours as needed for Nausea 5 tablet 0    Promethazine-DM (PROMETHAZINE-DEXTROMETHORPHAN) 6.25-15 MG/5ML SOLN solution Take 5 mLs by mouth 4 times daily (Patient not taking: Reported on 7/2/2021)  1    aspirin 81 MG chewable tablet Take 1 tablet by mouth daily 30 tablet 3    butalbital-acetaminophen-caffeine (FIORICET, ESGIC) -40 MG per tablet Take 1 tablet by mouth every 4 hours as needed for Headaches 20 tablet 0    albuterol sulfate  (90 Base) MCG/ACT inhaler Inhale 2 puffs into the lungs 4 times daily 1 Inhaler 3    ipratropium (ATROVENT HFA) 17 MCG/ACT inhaler Inhale 2 puffs into the lungs 4 times daily 1 Inhaler 3    lisinopril (PRINIVIL;ZESTRIL) 20 MG tablet Take 1 tablet by mouth daily 30 tablet 3    metFORMIN (GLUCOPHAGE) 500 MG tablet Take 1 tablet by mouth 2 times daily (with meals) (Patient not taking: Reported on 7/2/2021) 60 tablet 3    POTASSIUM CHLORIDE PO Take 25 mg by mouth daily      benzonatate (TESSALON PERLES) 100 MG capsule Take 1 capsule by mouth every 8 hours as needed for Cough (Patient not taking: Reported on 7/2/2021) 20 capsule 0    hydrochlorothiazide (HYDRODIURIL) 25 MG tablet Take 25 mg by mouth daily      vitamin D (ERGOCALCIFEROL) 07383 UNITS CAPS capsule Take 1,000 Units by mouth daily       metoprolol (LOPRESSOR) 50 MG tablet Take 50 mg by mouth 2 times daily      loratadine (CLARITIN) 10 MG tablet Take 10 mg by mouth daily        No current facility-administered medications for this visit. Vital Signs:  /74 (Site: Right Upper Arm, Position: Sitting, Cuff Size: Large Adult)   Pulse 80   Ht 5' 11.5\" (1.816 m)   Wt (!) 598 lb (271.3 kg)   BMI 82.24 kg/m²     BMI/Height/Weight:  Body mass index is 82.24 kg/m². Review of Systems  Constitutional: Weight loss      Objective:      Physical Exam   Vital signs reviewed. General Appearance: Well-developed and well-nourished. No acute distress. Skin: Warm, dry. Head: Normocephalic. Pulmonary/Chest: Normal respiratory effort. Musculoskeletal: Movement x4. Neurological:  Alert and oriented. Individual goal for this encounter: To be able to get a knee replacement. I just learned when I had knee surgery as a kid that my knee didn't grow back where it's supposed to be. X ? Vital signs reviewed and discussed with patient. ? Labs/EKG reviewed and discussed with patient. ? Blood sugar log reviewed and discussed with patient. ? Physical activity discussed. ? Medication changes recommended. ? Smoking cessation discussed. X ? Specific questions/concerns addressed. Specific group medical question(s) addressed in this encounter:  Discussion about GERD. Group discussion topic for this encounter:     ? Cleavon Pucker   ? Be a Fat & Calorie   X ? Healthy Eating   ? Move Those Muscles   ? Tip the Calorie Balance   ? Take charge of What's Around You   ? Problem Solving   ? Step Up Your Physical Activity Plan   ?  Manage Slips and Self-Defeating Thoughts   ? [address]   ? Make Social Cues Work for Hieu Kapoor   ? Ways to Stay Motivated   ? Preparing for Long Term Self-Management   ? Take Charge of Your Lifestyle   ? Mindful Eating, Mindful Movement   ? Manage Your Stress   ? Sit Less for Health   ? More Volume, Fewer Calories   ? Stay Active   ? Balance Your Thoughts   ? Heart Health    ? Looking Back and Looking Forward    Total time:  90 minutes, greater than 50% of visit spent in group counseling/education. Assessment:       Diagnosis Orders   1. Type 2 diabetes mellitus without complication, without long-term current use of insulin ([facility]     2. BRYSON (obstructive sleep apnea)     3. Chronic bipolar disorder ([address]     4. Gastroesophageal reflux disease, unspecified whether esophagitis present     5. Anxiety and depression     6. Hyperlipidemia, unspecified hyperlipidemia type     7. Obesity hypoventilation syndrome ([address]     8. Essential hypertension     9. Morbid obesity with BMI of 70 and over, adult ([address]     10. Uncomplicated asthma, unspecified asthma severity, unspecified whether persistent     11. Pulmonary hypertension ([address]     12. Normocytic normochromic anemia     13. Cervical radiculopathy     14. Chronic migraine     15. Chronic obstructive pulmonary disease, unspecified COPD type ([address]         Plan:      Track food and weight. Return to clinic as per group medical appointment schedule. Follow-up:  Return in about 1 week (around 8/13/2021). Orders:  No orders of the defined types were placed in this encounter. Prescriptions:  No orders of the defined types were placed in this encounter.       Electronically signed by:  Marjorie Ford CNP

## 2021-08-13 ENCOUNTER — OFFICE VISIT (OUTPATIENT)
Dept: BARIATRICS/WEIGHT MGMT | Age: 36
End: 2021-08-13
Payer: COMMERCIAL

## 2021-08-13 VITALS
SYSTOLIC BLOOD PRESSURE: 125 MMHG | WEIGHT: 293 LBS | HEIGHT: 72 IN | HEART RATE: 78 BPM | BODY MASS INDEX: 39.68 KG/M2 | DIASTOLIC BLOOD PRESSURE: 85 MMHG

## 2021-08-13 DIAGNOSIS — J44.9 CHRONIC OBSTRUCTIVE PULMONARY DISEASE, UNSPECIFIED COPD TYPE (HCC): ICD-10-CM

## 2021-08-13 DIAGNOSIS — G47.33 OSA (OBSTRUCTIVE SLEEP APNEA): ICD-10-CM

## 2021-08-13 DIAGNOSIS — I27.20 PULMONARY HYPERTENSION (HCC): ICD-10-CM

## 2021-08-13 DIAGNOSIS — E11.9 TYPE 2 DIABETES MELLITUS WITHOUT COMPLICATION, WITHOUT LONG-TERM CURRENT USE OF INSULIN (HCC): Primary | ICD-10-CM

## 2021-08-13 DIAGNOSIS — F32.A ANXIETY AND DEPRESSION: ICD-10-CM

## 2021-08-13 DIAGNOSIS — F31.9 CHRONIC BIPOLAR DISORDER (HCC): ICD-10-CM

## 2021-08-13 DIAGNOSIS — I10 ESSENTIAL HYPERTENSION: ICD-10-CM

## 2021-08-13 DIAGNOSIS — E66.01 MORBID OBESITY WITH BMI OF 70 AND OVER, ADULT (HCC): ICD-10-CM

## 2021-08-13 DIAGNOSIS — F41.9 ANXIETY AND DEPRESSION: ICD-10-CM

## 2021-08-13 DIAGNOSIS — E66.2 OBESITY HYPOVENTILATION SYNDROME (HCC): ICD-10-CM

## 2021-08-13 DIAGNOSIS — K21.9 GASTROESOPHAGEAL REFLUX DISEASE, UNSPECIFIED WHETHER ESOPHAGITIS PRESENT: ICD-10-CM

## 2021-08-13 DIAGNOSIS — E78.2 MIXED HYPERLIPIDEMIA: ICD-10-CM

## 2021-08-13 DIAGNOSIS — D64.9 NORMOCYTIC NORMOCHROMIC ANEMIA: ICD-10-CM

## 2021-08-13 DIAGNOSIS — M54.12 CERVICAL RADICULOPATHY: ICD-10-CM

## 2021-08-13 PROCEDURE — G8926 SPIRO NO PERF OR DOC: HCPCS | Performed by: NURSE PRACTITIONER

## 2021-08-13 PROCEDURE — G8427 DOCREV CUR MEDS BY ELIG CLIN: HCPCS | Performed by: NURSE PRACTITIONER

## 2021-08-13 PROCEDURE — 3023F SPIROM DOC REV: CPT | Performed by: NURSE PRACTITIONER

## 2021-08-13 PROCEDURE — 2022F DILAT RTA XM EVC RTNOPTHY: CPT | Performed by: NURSE PRACTITIONER

## 2021-08-13 PROCEDURE — 3044F HG A1C LEVEL LT 7.0%: CPT | Performed by: NURSE PRACTITIONER

## 2021-08-13 PROCEDURE — 99213 OFFICE O/P EST LOW 20 MIN: CPT | Performed by: NURSE PRACTITIONER

## 2021-08-13 PROCEDURE — G8417 CALC BMI ABV UP PARAM F/U: HCPCS | Performed by: NURSE PRACTITIONER

## 2021-08-13 PROCEDURE — 1036F TOBACCO NON-USER: CPT | Performed by: NURSE PRACTITIONER

## 2021-08-13 NOTE — PROGRESS NOTES
Group Lifestyle Balance Follow-up Progress Note      Subjective     Patient is here for group medical appointment for Group Lifestyle Balance weight management program follow-up for the chronic conditions of DM Type 2, Asthma, Pulmonary HTN, BRYSON, Obesity Hypoventilation Syndrome, Anemia, GERD, Anxiety/Depression, Bipolar, HLD, Chronic Migraine, Arthritis, COPD. Patient continues on the program and tolerating well. Total weight loss to date is 4 lbs. No current issues. Allergies:  No Known Allergies    Past Medical History:     Past Medical History:   Diagnosis Date    Abnormal vaginal bleeding 4/19/2019    Acquired hallux rigidus 4/19/2019    Acute and chronic respiratory failure with hypoxia ([address] 11/6/2018    Acute on chronic respiratory failure with hypoxia and hypercapnia ([address] 11/6/2018    ASCUS with positive high risk HPV cervical     Asthma     Bipolar disorder ([facility] 4/19/2019    Breast discharge 4/9/2015    Cellulitis 4/19/2019    Cervical radiculopathy 4/19/2019    Chest pain 11/4/2018    Chlamydia ? Reported hx    Chronic cor pulmonale (HCC)     COPD (chronic obstructive pulmonary disease) (HCC)     D-dimer, elevated 11/18/2018    Dysfunctional uterine bleeding 1/27/2014    Dysplasia of cervix, low grade (ISAIAS 1) 3/30/2017    Noted on colposcopy 3/7/17    Dyspnea 11/3/2018    Ectopic pregnancy - Right 2/9/2014    Pt was initiallly dx with a left ovarian ectopic. After Laproscopic eval she was noted to have a Right Tubal ectopic. She underwent a RSO on 2/9/14.  Elevated blood pressure reading     Elevated brain natriuretic peptide (BNP) level     Gastroesophageal reflux disease 4/19/2019    H/O abnormal cervical Papanicolaou smear 2/3/2017    LSIL - 2/2015 Pap collected 2/3/2017. Patient is very difficult SSE as she is morbidly obese. Required ringed forceps and snowman speculum.       Heartburn     History of trichomonal vaginitis 1/20106    tx'd    Hypertension  Hypoalbuminemia due to protein-calorie malnutrition ([address] 2018    Hypocalcemia 2018    Migraine 2019    Morbid obesity with BMI of 70 and over, adult ([address]     BMI 74    MRSA (methicillin resistant Staphylococcus aureus) 2010    Left leg    Muscle weakness     Neck pain 2019    Normocytic normochromic anemia 2018    Obesity     Obesity hypoventilation syndrome ([address] 2018    BRYSON (obstructive sleep apnea)     BRYSON on CPAP    Ovarian ectopic pregnancy 2014    Right tubal ectopic tx'd with MTX AND LS RSO(initially dx as Left ovarian By USN 14)    Pain     Pelvic adhesive disease 14    C/S to bladder, as well as reactive to ectopic; also hx PID    Pulmonary hypertension ([address]     Shoulder joint pain 2013    Sprain of ankle 2019    Tobacco abuse 2018    Type 2 diabetes mellitus without complication, without long-term current use of insulin ([address] 2018    Unspecified sleep apnea     Vitamin D deficiency 2019   .     Past Surgical History:  Past Surgical History:   Procedure Laterality Date     SECTION  2005    CHOLECYSTECTOMY, LAPAROSCOPIC      LEG SURGERY      right leg age 7 for growth plate    SALPINGO-OOPHORECTOMY  14    Right; ectopic pregnancy    TONSILLECTOMY      age 12   [facility] UPPER GASTROINTESTINAL ENDOSCOPY  07/10/2020    UPPER GASTROINTESTINAL ENDOSCOPY N/A 7/10/2020    EGD BIOPSY performed by Donald Smiley DO at [address].       Family History:  Family History   Problem Relation Age of Onset    High Blood Pressure Mother     Heart Disease Mother     Cancer Father         lung    Lung Cancer Father     Cancer Maternal Aunt         breast    Diabetes Maternal Aunt     Breast Cancer Maternal Aunt     Cancer Paternal Aunt         lung    Lung Cancer Paternal Aunt     Thyroid Disease Sister     Colon Cancer Neg Hx     Eclampsia Neg Hx     Hypertension Neg Hx     Ovarian Cancer Neg Hx   Labor Neg Hx     Spont Abortions Neg Hx     Stroke Neg Hx        Social History:  Social History     Socioeconomic History    Marital status: Single     Spouse name: Not on file    Number of children: Not on file    Years of education: Not on file    Highest education level: Not on file   Occupational History    Not on file   Tobacco Use    Smoking status: Former Smoker     Packs/day: 0.10     Types: Cigarettes     Start date: 2018    Smokeless tobacco: Never Used    Tobacco comment:   smokes marijuana and cigarettes   Vaping Use    Vaping Use: Never used   Substance and Sexual Activity    Alcohol use: Yes     Alcohol/week: 0.0 standard drinks     Comment: occassionally     Drug use: Yes     Frequency: 2.0 times per week     Types: Marijuana    Sexual activity: Not Currently   Other Topics Concern    Not on file   Social History Narrative    Not on file     Social Determinants of Health     Financial Resource Strain:     Difficulty of Paying Living Expenses:    Food Insecurity:     Worried About Running Out of Food in the Last Year:     Ran Out of Food in the Last Year:    Transportation Needs:     Lack of Transportation (Medical):      Lack of Transportation (Non-Medical):    Physical Activity:     Days of Exercise per Week:     Minutes of Exercise per Session:    Stress:     Feeling of Stress :    Social Connections:     Frequency of Communication with Friends and Family:     Frequency of Social Gatherings with Friends and Family:     Attends [employer] Services:     Active Member of Clubs or Organizations:     Attends Club or Organization Meetings:     Marital Status:    Intimate Partner Violence:     Fear of Current or Ex-Partner:     Emotionally Abused:     Physically Abused:     Sexually Abused:        Current Medications:  Current Outpatient Medications   Medication Sig Dispense Refill    gabapentin (NEURONTIN) 600 MG tablet       topiramate (TOPAMAX) 100 MG tablet       WIXELA INHUB 250-50 MCG/DOSE AEPB       diclofenac (VOLTAREN) 50 MG EC tablet       vitamin D3 (CHOLECALCIFEROL) 25 MCG (1000 UT) TABS tablet       Insulin Pen Needle (COMFORT EZ PEN NEEDLES) 32G X 6 MM MISC Comfort EZ Pen Needles 32 gauge x 1/4\"      Liraglutide (VICTOZA) 18 MG/3ML SOPN SC injection Victoza 2-Monty 0.6 mg/0.1 mL (18 mg/3 mL) subcutaneous pen injector      diclofenac sodium (VOLTAREN) 1 % GEL 1 g      famotidine (PEPCID) 20 MG tablet Take 1 tablet by mouth nightly 30 tablet 3    pantoprazole (PROTONIX) 40 MG tablet Take 1 tablet by mouth daily 30 tablet 3    sucralfate (CARAFATE) 1 GM tablet Take 1 tablet by mouth 4 times daily 120 tablet 3    tiotropium (SPIRIVA HANDIHALER) 18 MCG inhalation capsule Spiriva Respimat 2.5 mcg/actuation solution for inhalation   Inhale 2 puffs every day by inhalation route.       ondansetron (ZOFRAN ODT) 4 MG disintegrating tablet Take 1 tablet by mouth every 8 hours as needed for Nausea 5 tablet 0    Promethazine-DM (PROMETHAZINE-DEXTROMETHORPHAN) 6.25-15 MG/5ML SOLN solution Take 5 mLs by mouth 4 times daily (Patient not taking: Reported on 7/2/2021)  1    aspirin 81 MG chewable tablet Take 1 tablet by mouth daily 30 tablet 3    butalbital-acetaminophen-caffeine (FIORICET, ESGIC) -40 MG per tablet Take 1 tablet by mouth every 4 hours as needed for Headaches 20 tablet 0    albuterol sulfate  (90 Base) MCG/ACT inhaler Inhale 2 puffs into the lungs 4 times daily 1 Inhaler 3    ipratropium (ATROVENT HFA) 17 MCG/ACT inhaler Inhale 2 puffs into the lungs 4 times daily 1 Inhaler 3    lisinopril (PRINIVIL;ZESTRIL) 20 MG tablet Take 1 tablet by mouth daily 30 tablet 3    metFORMIN (GLUCOPHAGE) 500 MG tablet Take 1 tablet by mouth 2 times daily (with meals) (Patient not taking: Reported on 7/2/2021) 60 tablet 3    POTASSIUM CHLORIDE PO Take 25 mg by mouth daily      benzonatate (TESSALON PERLES) 100 MG capsule Take 1 capsule by mouth every 8 hours as needed for Cough (Patient not taking: Reported on 7/2/2021) 20 capsule 0    hydrochlorothiazide (HYDRODIURIL) 25 MG tablet Take 25 mg by mouth daily      vitamin D (ERGOCALCIFEROL) 86279 UNITS CAPS capsule Take 1,000 Units by mouth daily       metoprolol (LOPRESSOR) 50 MG tablet Take 50 mg by mouth 2 times daily      loratadine (CLARITIN) 10 MG tablet Take 10 mg by mouth daily        No current facility-administered medications for this visit. Vital Signs:  /85 (Site: Right Upper Arm, Position: Sitting, Cuff Size: Large Adult)   Pulse 78   Ht 5' 11.5\" (1.816 m)   Wt (!) 598 lb (271.3 kg)   BMI 82.24 kg/m²     BMI/Height/Weight:  Body mass index is 82.24 kg/m². Review of Systems  Constitutional: Weight loss      Objective:      Physical Exam   Vital signs reviewed. General Appearance: Well-developed and well-nourished. No acute distress. Skin: Warm, dry. Head: Normocephalic. Pulmonary/Chest: Normal respiratory effort. Musculoskeletal: Movement x4. Neurological:  Alert and oriented. Individual goal for this encounter: To be able to get a knee replacement. I just learned when I had knee surgery as a kid that my knee didn't grow back where it's supposed to be. X ? Vital signs reviewed and discussed with patient. ? Labs/EKG reviewed and discussed with patient. ? Blood sugar log reviewed and discussed with patient. ? Physical activity discussed. ? Medication changes recommended. ? Smoking cessation discussed. X ? Specific questions/concerns addressed. Specific group medical question(s) addressed in this encounter:  Discussion about depression. Group discussion topic for this encounter:     ? [employer]   ? Be a Fat & Calorie    ? Healthy Eating  X ? Move Those Muscles   ? Tip the Calorie Balance   ? Take charge of What's Around You   ? Problem Solving   ? Step Up Your Physical Activity Plan   ?  Manage Slips and Self-Defeating Thoughts   ? [address]   ? Make Social Cues Work for Landon Clarke   ? Ways to Stay Motivated   ? Preparing for Long Term Self-Management   ? Take Charge of Your Lifestyle   ? Mindful Eating, Mindful Movement   ? Manage Your Stress   ? Sit Less for Health   ? More Volume, Fewer Calories   ? Stay Active   ? Balance Your Thoughts   ? Heart Health    ? Looking Back and Looking Forward    Total time:  90 minutes, greater than 50% of visit spent in group counseling/education. Assessment:       Diagnosis Orders   1. Type 2 diabetes mellitus without complication, without long-term current use of insulin ([facility]     2. BRYSON (obstructive sleep apnea)     3. Chronic bipolar disorder ([facility]     4. Gastroesophageal reflux disease, unspecified whether esophagitis present     5. Anxiety and depression     6. Mixed hyperlipidemia     7. Obesity hypoventilation syndrome ([address]     8. Essential hypertension     9. Cervical radiculopathy     10. Normocytic normochromic anemia     11. Pulmonary hypertension ([facility]     12. Chronic obstructive pulmonary disease, unspecified COPD type ([facility]     13. Chronic migraine     14. Morbid obesity with BMI of 70 and over, adult [facility])         Plan:      Track food and weight. Return to clinic as per group medical appointment schedule. Follow-up:  Return in about 1 week (around 8/20/2021). Orders:  No orders of the defined types were placed in this encounter. Prescriptions:  No orders of the defined types were placed in this encounter.       Electronically signed by:  Aurelio Wylie CNP

## 2021-08-27 ENCOUNTER — OFFICE VISIT (OUTPATIENT)
Dept: BARIATRICS/WEIGHT MGMT | Age: 36
End: 2021-08-27
Payer: COMMERCIAL

## 2021-08-27 VITALS
BODY MASS INDEX: 39.68 KG/M2 | SYSTOLIC BLOOD PRESSURE: 122 MMHG | HEART RATE: 74 BPM | WEIGHT: 293 LBS | DIASTOLIC BLOOD PRESSURE: 80 MMHG | HEIGHT: 72 IN

## 2021-08-27 DIAGNOSIS — M54.12 CERVICAL RADICULOPATHY: ICD-10-CM

## 2021-08-27 DIAGNOSIS — I27.20 PULMONARY HYPERTENSION (HCC): ICD-10-CM

## 2021-08-27 DIAGNOSIS — J45.909 UNCOMPLICATED ASTHMA, UNSPECIFIED ASTHMA SEVERITY, UNSPECIFIED WHETHER PERSISTENT: ICD-10-CM

## 2021-08-27 DIAGNOSIS — E66.2 OBESITY HYPOVENTILATION SYNDROME (HCC): ICD-10-CM

## 2021-08-27 DIAGNOSIS — E11.9 TYPE 2 DIABETES MELLITUS WITHOUT COMPLICATION, WITHOUT LONG-TERM CURRENT USE OF INSULIN (HCC): Primary | ICD-10-CM

## 2021-08-27 DIAGNOSIS — F31.9 CHRONIC BIPOLAR DISORDER (HCC): ICD-10-CM

## 2021-08-27 DIAGNOSIS — E66.01 MORBID OBESITY WITH BMI OF 70 AND OVER, ADULT (HCC): ICD-10-CM

## 2021-08-27 DIAGNOSIS — G47.33 OSA (OBSTRUCTIVE SLEEP APNEA): ICD-10-CM

## 2021-08-27 DIAGNOSIS — I10 ESSENTIAL HYPERTENSION: ICD-10-CM

## 2021-08-27 DIAGNOSIS — E78.5 HYPERLIPIDEMIA, UNSPECIFIED HYPERLIPIDEMIA TYPE: ICD-10-CM

## 2021-08-27 PROCEDURE — 99213 OFFICE O/P EST LOW 20 MIN: CPT | Performed by: NURSE PRACTITIONER

## 2021-08-27 PROCEDURE — 1036F TOBACCO NON-USER: CPT | Performed by: NURSE PRACTITIONER

## 2021-08-27 PROCEDURE — 2022F DILAT RTA XM EVC RTNOPTHY: CPT | Performed by: NURSE PRACTITIONER

## 2021-08-27 PROCEDURE — 3044F HG A1C LEVEL LT 7.0%: CPT | Performed by: NURSE PRACTITIONER

## 2021-08-27 PROCEDURE — G8427 DOCREV CUR MEDS BY ELIG CLIN: HCPCS | Performed by: NURSE PRACTITIONER

## 2021-08-27 PROCEDURE — G8417 CALC BMI ABV UP PARAM F/U: HCPCS | Performed by: NURSE PRACTITIONER

## 2021-08-27 NOTE — PROGRESS NOTES
Group Lifestyle Balance Follow-up Progress Note      Subjective     Patient is here for group medical appointment for Group Lifestyle Balance weight management program follow-up for the chronic conditions of DM Type 2, Asthma, Pulmonary HTN, BRYSON, Obesity Hypoventilation Syndrome, Anemia, GERD, Anxiety/Depression, Bipolar, HLD, Chronic Migraine, Arthritis, COPD. Patient continues on the program and tolerating well. Total weight loss to date is 4 lbs. No current issues. Allergies:  No Known Allergies    Past Medical History:     Past Medical History:   Diagnosis Date    Abnormal vaginal bleeding 4/19/2019    Acquired hallux rigidus 4/19/2019    Acute and chronic respiratory failure with hypoxia ([address] 11/6/2018    Acute on chronic respiratory failure with hypoxia and hypercapnia ([address] 11/6/2018    ASCUS with positive high risk HPV cervical     Asthma     Bipolar disorder ([facility] 4/19/2019    Breast discharge 4/9/2015    Cellulitis 4/19/2019    Cervical radiculopathy 4/19/2019    Chest pain 11/4/2018    Chlamydia ? Reported hx    Chronic cor pulmonale (HCC)     COPD (chronic obstructive pulmonary disease) (HCC)     D-dimer, elevated 11/18/2018    Dysfunctional uterine bleeding 1/27/2014    Dysplasia of cervix, low grade (ISAIAS 1) 3/30/2017    Noted on colposcopy 3/7/17    Dyspnea 11/3/2018    Ectopic pregnancy - Right 2/9/2014    Pt was initiallly dx with a left ovarian ectopic. After Laproscopic eval she was noted to have a Right Tubal ectopic. She underwent a RSO on 2/9/14.  Elevated blood pressure reading     Elevated brain natriuretic peptide (BNP) level     Gastroesophageal reflux disease 4/19/2019    H/O abnormal cervical Papanicolaou smear 2/3/2017    LSIL - 2/2015 Pap collected 2/3/2017. Patient is very difficult SSE as she is morbidly obese. Required ringed forceps and snowman speculum.       Heartburn     History of trichomonal vaginitis 1/20106    tx'd    Hypertension  Hypoalbuminemia due to protein-calorie malnutrition ([address] 2018    Hypocalcemia 2018    Migraine 2019    Morbid obesity with BMI of 70 and over, adult ([address]     BMI 74    MRSA (methicillin resistant Staphylococcus aureus) 2010    Left leg    Muscle weakness     Neck pain 2019    Normocytic normochromic anemia 2018    Obesity     Obesity hypoventilation syndrome ([address] 2018    BRYSON (obstructive sleep apnea)     BRYSON on CPAP    Ovarian ectopic pregnancy 2014    Right tubal ectopic tx'd with MTX AND LS RSO(initially dx as Left ovarian By USN 14)    Pain     Pelvic adhesive disease 14    C/S to bladder, as well as reactive to ectopic; also hx PID    Pulmonary hypertension ([address]     Shoulder joint pain 2013    Sprain of ankle 2019    Tobacco abuse 2018    Type 2 diabetes mellitus without complication, without long-term current use of insulin ([address] 2018    Unspecified sleep apnea     Vitamin D deficiency 2019   .     Past Surgical History:  Past Surgical History:   Procedure Laterality Date     SECTION  2005    CHOLECYSTECTOMY, LAPAROSCOPIC      LEG SURGERY      right leg age [address] for growth plate    SALPINGO-OOPHORECTOMY  15    Right; ectopic pregnancy    TONSILLECTOMY      age 12   [employer] UPPER GASTROINTESTINAL ENDOSCOPY  07/10/2020    UPPER GASTROINTESTINAL ENDOSCOPY N/A 7/10/2020    EGD BIOPSY performed by Danielle Connors DO at [address]       Family History:  Family History   Problem Relation Age of Onset    High Blood Pressure Mother     Heart Disease Mother     Cancer Father         lung    Lung Cancer Father     Cancer Maternal Aunt         breast    Diabetes Maternal Aunt     Breast Cancer Maternal Aunt     Cancer Paternal Aunt         lung    Lung Cancer Paternal Aunt     Thyroid Disease Sister     Colon Cancer Neg Hx     Eclampsia Neg Hx     Hypertension Neg Hx     Ovarian Cancer Neg Hx   Labor Neg Hx     Spont Abortions Neg Hx     Stroke Neg Hx        Social History:  Social History     Socioeconomic History    Marital status: Single     Spouse name: Not on file    Number of children: Not on file    Years of education: Not on file    Highest education level: Not on file   Occupational History    Not on file   Tobacco Use    Smoking status: Former Smoker     Packs/day: 0.10     Types: Cigarettes     Start date: 2018    Smokeless tobacco: Never Used    Tobacco comment:   smokes marijuana and cigarettes   Vaping Use    Vaping Use: Never used   Substance and Sexual Activity    Alcohol use: Yes     Alcohol/week: 0.0 standard drinks     Comment: occassionally     Drug use: Yes     Frequency: 2.0 times per week     Types: Marijuana    Sexual activity: Not Currently   Other Topics Concern    Not on file   Social History Narrative    Not on file     Social Determinants of Health     Financial Resource Strain:     Difficulty of Paying Living Expenses:    Food Insecurity:     Worried About Running Out of Food in the Last Year:     Ran Out of Food in the Last Year:    Transportation Needs:     Lack of Transportation (Medical):      Lack of Transportation (Non-Medical):    Physical Activity:     Days of Exercise per Week:     Minutes of Exercise per Session:    Stress:     Feeling of Stress :    Social Connections:     Frequency of Communication with Friends and Family:     Frequency of Social Gatherings with Friends and Family:     Attends [employer]regational Services:     Active Member of Clubs or Organizations:     Attends Club or Organization Meetings:     Marital Status:    Intimate Partner Violence:     Fear of Current or Ex-Partner:     Emotionally Abused:     Physically Abused:     Sexually Abused:        Current Medications:  Current Outpatient Medications   Medication Sig Dispense Refill    gabapentin (NEURONTIN) 600 MG tablet       topiramate (TOPAMAX) 100 MG tablet       WIXELA INHUB 250-50 MCG/DOSE AEPB       diclofenac (VOLTAREN) 50 MG EC tablet       vitamin D3 (CHOLECALCIFEROL) 25 MCG (1000 UT) TABS tablet       Insulin Pen Needle (COMFORT EZ PEN NEEDLES) 32G X 6 MM MISC Comfort EZ Pen Needles 32 gauge x 1/4\"      Liraglutide (VICTOZA) 18 MG/3ML SOPN SC injection Victoza 2-Monty 0.6 mg/0.1 mL (18 mg/3 mL) subcutaneous pen injector      diclofenac sodium (VOLTAREN) 1 % GEL 1 g      famotidine (PEPCID) 20 MG tablet Take 1 tablet by mouth nightly 30 tablet 3    pantoprazole (PROTONIX) 40 MG tablet Take 1 tablet by mouth daily 30 tablet 3    sucralfate (CARAFATE) 1 GM tablet Take 1 tablet by mouth 4 times daily 120 tablet 3    tiotropium (SPIRIVA HANDIHALER) 18 MCG inhalation capsule Spiriva Respimat 2.5 mcg/actuation solution for inhalation   Inhale 2 puffs every day by inhalation route.       ondansetron (ZOFRAN ODT) 4 MG disintegrating tablet Take 1 tablet by mouth every 8 hours as needed for Nausea 5 tablet 0    Promethazine-DM (PROMETHAZINE-DEXTROMETHORPHAN) 6.25-15 MG/5ML SOLN solution Take 5 mLs by mouth 4 times daily (Patient not taking: Reported on 7/2/2021)  1    aspirin 81 MG chewable tablet Take 1 tablet by mouth daily 30 tablet 3    butalbital-acetaminophen-caffeine (FIORICET, ESGIC) -40 MG per tablet Take 1 tablet by mouth every 4 hours as needed for Headaches 20 tablet 0    albuterol sulfate  (90 Base) MCG/ACT inhaler Inhale 2 puffs into the lungs 4 times daily 1 Inhaler 3    ipratropium (ATROVENT HFA) 17 MCG/ACT inhaler Inhale 2 puffs into the lungs 4 times daily 1 Inhaler 3    lisinopril (PRINIVIL;ZESTRIL) 20 MG tablet Take 1 tablet by mouth daily 30 tablet 3    metFORMIN (GLUCOPHAGE) 500 MG tablet Take 1 tablet by mouth 2 times daily (with meals) (Patient not taking: Reported on 7/2/2021) 60 tablet 3    POTASSIUM CHLORIDE PO Take 25 mg by mouth daily      benzonatate (TESSALON PERLES) 100 MG capsule Take 1 capsule by mouth Self-Defeating Thoughts   ? [address]   ? Make Social Cues Work for Landon Clarke   ? Ways to Stay Motivated   ? Preparing for Long Term Self-Management   ? Take Charge of Your Lifestyle   ? Mindful Eating, Mindful Movement   ? Manage Your Stress   ? Sit Less for Health   ? More Volume, Fewer Calories   ? Stay Active   ? Balance Your Thoughts   ? Heart Health    ? Looking Back and Looking Forward    Total time:  90 minutes, greater than 50% of visit spent in group counseling/education. Assessment:       Diagnosis Orders   1. Type 2 diabetes mellitus without complication, without long-term current use of insulin (HCC)     2. Obesity hypoventilation syndrome ([facility]     3. Essential hypertension     4. Morbid obesity with BMI of 70 and over, adult ([facility]     5. BRYSON (obstructive sleep apnea)     6. Chronic bipolar disorder ([facility]     7. Hyperlipidemia, unspecified hyperlipidemia type     8. Uncomplicated asthma, unspecified asthma severity, unspecified whether persistent     9. Pulmonary hypertension ([facility]     10. Cervical radiculopathy     11. Chronic migraine         Plan:      Track food and weight. Return to clinic as per group medical appointment schedule. Follow-up:  Return in about 1 week (around 9/3/2021). Orders:  No orders of the defined types were placed in this encounter. Prescriptions:  No orders of the defined types were placed in this encounter.       Electronically signed by:  Marge Bridges CNP

## 2021-09-10 ENCOUNTER — OFFICE VISIT (OUTPATIENT)
Dept: BARIATRICS/WEIGHT MGMT | Age: 36
End: 2021-09-10
Payer: COMMERCIAL

## 2021-09-10 VITALS
HEIGHT: 72 IN | SYSTOLIC BLOOD PRESSURE: 128 MMHG | WEIGHT: 293 LBS | BODY MASS INDEX: 39.68 KG/M2 | DIASTOLIC BLOOD PRESSURE: 84 MMHG | HEART RATE: 86 BPM

## 2021-09-10 DIAGNOSIS — F31.9 CHRONIC BIPOLAR DISORDER (HCC): ICD-10-CM

## 2021-09-10 DIAGNOSIS — I10 ESSENTIAL HYPERTENSION: ICD-10-CM

## 2021-09-10 DIAGNOSIS — K21.9 GASTROESOPHAGEAL REFLUX DISEASE, UNSPECIFIED WHETHER ESOPHAGITIS PRESENT: ICD-10-CM

## 2021-09-10 DIAGNOSIS — I27.20 PULMONARY HYPERTENSION (HCC): ICD-10-CM

## 2021-09-10 DIAGNOSIS — F32.A ANXIETY AND DEPRESSION: ICD-10-CM

## 2021-09-10 DIAGNOSIS — D64.9 NORMOCYTIC NORMOCHROMIC ANEMIA: ICD-10-CM

## 2021-09-10 DIAGNOSIS — F41.9 ANXIETY AND DEPRESSION: ICD-10-CM

## 2021-09-10 DIAGNOSIS — E66.01 MORBID OBESITY WITH BMI OF 70 AND OVER, ADULT (HCC): ICD-10-CM

## 2021-09-10 DIAGNOSIS — E78.5 HYPERLIPIDEMIA, UNSPECIFIED HYPERLIPIDEMIA TYPE: ICD-10-CM

## 2021-09-10 DIAGNOSIS — E66.2 OBESITY HYPOVENTILATION SYNDROME (HCC): ICD-10-CM

## 2021-09-10 DIAGNOSIS — E11.9 TYPE 2 DIABETES MELLITUS WITHOUT COMPLICATION, WITHOUT LONG-TERM CURRENT USE OF INSULIN (HCC): Primary | ICD-10-CM

## 2021-09-10 DIAGNOSIS — G47.33 OSA (OBSTRUCTIVE SLEEP APNEA): ICD-10-CM

## 2021-09-10 PROCEDURE — G8427 DOCREV CUR MEDS BY ELIG CLIN: HCPCS | Performed by: NURSE PRACTITIONER

## 2021-09-10 PROCEDURE — 99213 OFFICE O/P EST LOW 20 MIN: CPT | Performed by: NURSE PRACTITIONER

## 2021-09-10 PROCEDURE — 3044F HG A1C LEVEL LT 7.0%: CPT | Performed by: NURSE PRACTITIONER

## 2021-09-10 PROCEDURE — 2022F DILAT RTA XM EVC RTNOPTHY: CPT | Performed by: NURSE PRACTITIONER

## 2021-09-10 PROCEDURE — 1036F TOBACCO NON-USER: CPT | Performed by: NURSE PRACTITIONER

## 2021-09-10 PROCEDURE — G8417 CALC BMI ABV UP PARAM F/U: HCPCS | Performed by: NURSE PRACTITIONER

## 2021-09-10 NOTE — PROGRESS NOTES
 Hypoalbuminemia due to protein-calorie malnutrition ([address] 2018    Hypocalcemia 2018    Migraine 2019    Morbid obesity with BMI of 70 and over, adult ([address]     BMI 74    MRSA (methicillin resistant Staphylococcus aureus) 2010    Left leg    Muscle weakness     Neck pain 2019    Normocytic normochromic anemia 2018    Obesity     Obesity hypoventilation syndrome ([address] 2018    BRYSON (obstructive sleep apnea)     BRYSON on CPAP    Ovarian ectopic pregnancy 2014    Right tubal ectopic tx'd with MTX AND LS RSO(initially dx as Left ovarian By USN 14)    Pain     Pelvic adhesive disease 14    C/S to bladder, as well as reactive to ectopic; also hx PID    Pulmonary hypertension ([address]     Shoulder joint pain 2013    Sprain of ankle 2019    Tobacco abuse 2018    Type 2 diabetes mellitus without complication, without long-term current use of insulin ([address] 2018    Unspecified sleep apnea     Vitamin D deficiency 2019   .     Past Surgical History:  Past Surgical History:   Procedure Laterality Date     SECTION  2005    CHOLECYSTECTOMY, LAPAROSCOPIC      LEG SURGERY      right leg age 7 for growth plate    SALPINGO-OOPHORECTOMY  14    Right; ectopic pregnancy    TONSILLECTOMY      age 12   [facility] UPPER GASTROINTESTINAL ENDOSCOPY  07/10/2020    UPPER GASTROINTESTINAL ENDOSCOPY N/A 7/10/2020    EGD BIOPSY performed by Magan Vital DO at [id number]       Family History:  Family History   Problem Relation Age of Onset    High Blood Pressure Mother     Heart Disease Mother     Cancer Father         lung    Lung Cancer Father     Cancer Maternal Aunt         breast    Diabetes Maternal Aunt     Breast Cancer Maternal Aunt     Cancer Paternal Aunt         lung    Lung Cancer Paternal Aunt     Thyroid Disease Sister     Colon Cancer Neg Hx     Eclampsia Neg Hx     Hypertension Neg Hx     Ovarian Cancer Neg Hx   Labor Neg Hx     Spont Abortions Neg Hx     Stroke Neg Hx        Social History:  Social History     Socioeconomic History    Marital status: Single     Spouse name: Not on file    Number of children: Not on file    Years of education: Not on file    Highest education level: Not on file   Occupational History    Not on file   Tobacco Use    Smoking status: Former Smoker     Packs/day: 0.10     Types: Cigarettes     Start date: 2018    Smokeless tobacco: Never Used    Tobacco comment:   smokes marijuana and cigarettes   Vaping Use    Vaping Use: Never used   Substance and Sexual Activity    Alcohol use: Yes     Alcohol/week: 0.0 standard drinks     Comment: occassionally     Drug use: Yes     Frequency: 2.0 times per week     Types: Marijuana    Sexual activity: Not Currently   Other Topics Concern    Not on file   Social History Narrative    Not on file     Social Determinants of Health     Financial Resource Strain:     Difficulty of Paying Living Expenses:    Food Insecurity:     Worried About Running Out of Food in the Last Year:     Ran Out of Food in the Last Year:    Transportation Needs:     Lack of Transportation (Medical):      Lack of Transportation (Non-Medical):    Physical Activity:     Days of Exercise per Week:     Minutes of Exercise per Session:    Stress:     Feeling of Stress :    Social Connections:     Frequency of Communication with Friends and Family:     Frequency of Social Gatherings with Friends and Family:     Attends Judaism Services:     Active Member of Clubs or Organizations:     Attends Club or Organization Meetings:     Marital Status:    Intimate Partner Violence:     Fear of Current or Ex-Partner:     Emotionally Abused:     Physically Abused:     Sexually Abused:        Current Medications:  Current Outpatient Medications   Medication Sig Dispense Refill    gabapentin (NEURONTIN) 600 MG tablet       topiramate (TOPAMAX) 100 MG tablet       WIXELA INHUB 250-50 MCG/DOSE AEPB       diclofenac (VOLTAREN) 50 MG EC tablet       vitamin D3 (CHOLECALCIFEROL) 25 MCG (1000 UT) TABS tablet       Insulin Pen Needle (COMFORT EZ PEN NEEDLES) 32G X 6 MM MISC Comfort EZ Pen Needles 32 gauge x 1/4\"      Liraglutide (VICTOZA) 18 MG/3ML SOPN SC injection Victoza 2-Monty 0.6 mg/0.1 mL (18 mg/3 mL) subcutaneous pen injector      diclofenac sodium (VOLTAREN) 1 % GEL 1 g      famotidine (PEPCID) 20 MG tablet Take 1 tablet by mouth nightly 30 tablet 3    pantoprazole (PROTONIX) 40 MG tablet Take 1 tablet by mouth daily 30 tablet 3    sucralfate (CARAFATE) 1 GM tablet Take 1 tablet by mouth 4 times daily 120 tablet 3    tiotropium (SPIRIVA HANDIHALER) 18 MCG inhalation capsule Spiriva Respimat 2.5 mcg/actuation solution for inhalation   Inhale 2 puffs every day by inhalation route.       ondansetron (ZOFRAN ODT) 4 MG disintegrating tablet Take 1 tablet by mouth every 8 hours as needed for Nausea 5 tablet 0    Promethazine-DM (PROMETHAZINE-DEXTROMETHORPHAN) 6.25-15 MG/5ML SOLN solution Take 5 mLs by mouth 4 times daily (Patient not taking: Reported on 7/2/2021)  1    aspirin 81 MG chewable tablet Take 1 tablet by mouth daily 30 tablet 3    butalbital-acetaminophen-caffeine (FIORICET, ESGIC) -40 MG per tablet Take 1 tablet by mouth every 4 hours as needed for Headaches 20 tablet 0    albuterol sulfate  (90 Base) MCG/ACT inhaler Inhale 2 puffs into the lungs 4 times daily 1 Inhaler 3    ipratropium (ATROVENT HFA) 17 MCG/ACT inhaler Inhale 2 puffs into the lungs 4 times daily 1 Inhaler 3    lisinopril (PRINIVIL;ZESTRIL) 20 MG tablet Take 1 tablet by mouth daily 30 tablet 3    metFORMIN (GLUCOPHAGE) 500 MG tablet Take 1 tablet by mouth 2 times daily (with meals) (Patient not taking: Reported on 7/2/2021) 60 tablet 3    POTASSIUM CHLORIDE PO Take 25 mg by mouth daily      benzonatate (TESSALON PERLES) 100 MG capsule Take 1 capsule by mouth every 8 hours as needed for Cough (Patient not taking: Reported on 7/2/2021) 20 capsule 0    hydrochlorothiazide (HYDRODIURIL) 25 MG tablet Take 25 mg by mouth daily      vitamin D (ERGOCALCIFEROL) 94271 UNITS CAPS capsule Take 1,000 Units by mouth daily       metoprolol (LOPRESSOR) 50 MG tablet Take 50 mg by mouth 2 times daily      loratadine (CLARITIN) 10 MG tablet Take 10 mg by mouth daily        No current facility-administered medications for this visit. Vital Signs:  /84 (Site: Right Upper Arm, Position: Sitting, Cuff Size: Large Adult)   Pulse 86   Ht 5' 11.5\" (1.816 m)   Wt (!) 591 lb (268.1 kg)   BMI 81.28 kg/m²     BMI/Height/Weight:  Body mass index is 81.28 kg/m². Review of Systems  Constitutional: Weight loss      Objective:      Physical Exam   Vital signs reviewed. General Appearance: Well-developed and well-nourished. No acute distress. Skin: Warm, dry. Head: Normocephalic. Pulmonary/Chest: Normal respiratory effort. Musculoskeletal: Movement x4. Neurological:  Alert and oriented. Individual goal for this encounter: To be able to get a knee replacement. I just learned when I had knee surgery as a kid that my knee didn't grow back where it's supposed to be. X ? Vital signs reviewed and discussed with patient. ? Labs/EKG reviewed and discussed with patient. ? Blood sugar log reviewed and discussed with patient. ? Physical activity discussed. ? Medication changes recommended. ? Smoking cessation discussed. X ? Specific questions/concerns addressed. Specific group medical question(s) addressed in this encounter:  Discussion about diabetes complications. Group discussion topic for this encounter:     ? Ynes Fleming   ? Be a Fat & Calorie    ? Healthy Eating   ? Move Those Muscles   ? Tip the Calorie Balance   ? Take charge of What's Around You  X ? Problem Solving   ? Step Up Your Physical Activity Plan   ?  Manage Slips and Self-Defeating Thoughts   ? [address]   ? Make Social Cues Work for Landon Clarke   ? Ways to Stay Motivated   ? Preparing for Long Term Self-Management   ? Take Charge of Your Lifestyle   ? Mindful Eating, Mindful Movement   ? Manage Your Stress   ? Sit Less for Health   ? More Volume, Fewer Calories   ? Stay Active   ? Balance Your Thoughts   ? Heart Health    ? Looking Back and Looking Forward    Total time:  90 minutes, greater than 50% of visit spent in group counseling/education. Assessment:       Diagnosis Orders   1. Type 2 diabetes mellitus without complication, without long-term current use of insulin ([facility]     2. Essential hypertension     3. Obesity hypoventilation syndrome ([address]     4. Morbid obesity with BMI of 70 and over, adult ([facility]     5. BRYSON (obstructive sleep apnea)     6. Chronic bipolar disorder ([facility]     7. Gastroesophageal reflux disease, unspecified whether esophagitis present     8. Anxiety and depression     9. Hyperlipidemia, unspecified hyperlipidemia type     10. Pulmonary hypertension ([address]     11. Normocytic normochromic anemia         Plan:      Track food and weight. Return to clinic as per group medical appointment schedule. Follow-up:  Return in about 1 week (around 9/17/2021). Orders:  No orders of the defined types were placed in this encounter. Prescriptions:  No orders of the defined types were placed in this encounter.       Electronically signed by:  Kell Lees CNP

## 2021-09-17 ENCOUNTER — NURSE ONLY (OUTPATIENT)
Dept: BARIATRICS/WEIGHT MGMT | Age: 36
End: 2021-09-17

## 2021-09-17 VITALS — BODY MASS INDEX: 81.55 KG/M2 | WEIGHT: 293 LBS

## 2021-10-01 ENCOUNTER — OFFICE VISIT (OUTPATIENT)
Dept: BARIATRICS/WEIGHT MGMT | Age: 36
End: 2021-10-01
Payer: COMMERCIAL

## 2021-10-01 VITALS
BODY MASS INDEX: 81.14 KG/M2 | HEART RATE: 77 BPM | WEIGHT: 293 LBS | SYSTOLIC BLOOD PRESSURE: 117 MMHG | DIASTOLIC BLOOD PRESSURE: 82 MMHG

## 2021-10-01 DIAGNOSIS — G47.33 OSA (OBSTRUCTIVE SLEEP APNEA): ICD-10-CM

## 2021-10-01 DIAGNOSIS — E78.5 HYPERLIPIDEMIA, UNSPECIFIED HYPERLIPIDEMIA TYPE: ICD-10-CM

## 2021-10-01 DIAGNOSIS — E66.2 OBESITY HYPOVENTILATION SYNDROME (HCC): ICD-10-CM

## 2021-10-01 DIAGNOSIS — I27.20 PULMONARY HYPERTENSION (HCC): ICD-10-CM

## 2021-10-01 DIAGNOSIS — M54.12 CERVICAL RADICULOPATHY: ICD-10-CM

## 2021-10-01 DIAGNOSIS — F41.9 ANXIETY AND DEPRESSION: ICD-10-CM

## 2021-10-01 DIAGNOSIS — J44.9 CHRONIC OBSTRUCTIVE PULMONARY DISEASE, UNSPECIFIED COPD TYPE (HCC): ICD-10-CM

## 2021-10-01 DIAGNOSIS — D64.9 NORMOCYTIC NORMOCHROMIC ANEMIA: ICD-10-CM

## 2021-10-01 DIAGNOSIS — I10 HYPERTENSION, UNSPECIFIED TYPE: ICD-10-CM

## 2021-10-01 DIAGNOSIS — E66.01 MORBID OBESITY WITH BMI OF 70 AND OVER, ADULT (HCC): ICD-10-CM

## 2021-10-01 DIAGNOSIS — F32.A ANXIETY AND DEPRESSION: ICD-10-CM

## 2021-10-01 DIAGNOSIS — F31.9 CHRONIC BIPOLAR DISORDER (HCC): ICD-10-CM

## 2021-10-01 DIAGNOSIS — J45.909 UNCOMPLICATED ASTHMA, UNSPECIFIED ASTHMA SEVERITY, UNSPECIFIED WHETHER PERSISTENT: ICD-10-CM

## 2021-10-01 DIAGNOSIS — K21.9 GASTROESOPHAGEAL REFLUX DISEASE, UNSPECIFIED WHETHER ESOPHAGITIS PRESENT: ICD-10-CM

## 2021-10-01 DIAGNOSIS — E11.9 TYPE 2 DIABETES MELLITUS WITHOUT COMPLICATION, WITHOUT LONG-TERM CURRENT USE OF INSULIN (HCC): Primary | ICD-10-CM

## 2021-10-01 PROCEDURE — G8427 DOCREV CUR MEDS BY ELIG CLIN: HCPCS | Performed by: NURSE PRACTITIONER

## 2021-10-01 PROCEDURE — 1036F TOBACCO NON-USER: CPT | Performed by: NURSE PRACTITIONER

## 2021-10-01 PROCEDURE — G8926 SPIRO NO PERF OR DOC: HCPCS | Performed by: NURSE PRACTITIONER

## 2021-10-01 PROCEDURE — 3044F HG A1C LEVEL LT 7.0%: CPT | Performed by: NURSE PRACTITIONER

## 2021-10-01 PROCEDURE — 2022F DILAT RTA XM EVC RTNOPTHY: CPT | Performed by: NURSE PRACTITIONER

## 2021-10-01 PROCEDURE — G8484 FLU IMMUNIZE NO ADMIN: HCPCS | Performed by: NURSE PRACTITIONER

## 2021-10-01 PROCEDURE — G8417 CALC BMI ABV UP PARAM F/U: HCPCS | Performed by: NURSE PRACTITIONER

## 2021-10-01 PROCEDURE — 3023F SPIROM DOC REV: CPT | Performed by: NURSE PRACTITIONER

## 2021-10-01 PROCEDURE — 99213 OFFICE O/P EST LOW 20 MIN: CPT | Performed by: NURSE PRACTITIONER

## 2021-10-01 NOTE — PROGRESS NOTES
Group Lifestyle Balance Follow-up Progress Note      Subjective     Patient is here for group medical appointment for Group Lifestyle Balance weight management program follow-up for the chronic conditions of DM Type 2, Asthma, Pulmonary HTN, BRYSON, Obesity Hypoventilation Syndrome, Anemia, GERD, Anxiety/Depression, Bipolar, HLD, Chronic Migraine, Arthritis, COPD. Patient continues on the program and tolerating well. Total weight loss to date is 12 lbs. No current issues. Allergies:  No Known Allergies    Past Medical History:     Past Medical History:   Diagnosis Date    Abnormal vaginal bleeding 4/19/2019    Acquired hallux rigidus 4/19/2019    Acute and chronic respiratory failure with hypoxia ([address] 11/6/2018    Acute on chronic respiratory failure with hypoxia and hypercapnia ([address] 11/6/2018    ASCUS with positive high risk HPV cervical     Asthma     Bipolar disorder ([facility] 4/19/2019    Breast discharge 4/9/2015    Cellulitis 4/19/2019    Cervical radiculopathy 4/19/2019    Chest pain 11/4/2018    Chlamydia ? Reported hx    Chronic cor pulmonale (HCC)     COPD (chronic obstructive pulmonary disease) (HCC)     D-dimer, elevated 11/18/2018    Dysfunctional uterine bleeding 1/27/2014    Dysplasia of cervix, low grade ([facility]) 3/30/2017    Noted on colposcopy 3/7/17    Dyspnea 11/3/2018    Ectopic pregnancy - Right 2/9/2014    Pt was initiallly dx with a left ovarian ectopic. After Laproscopic eval she was noted to have a Right Tubal ectopic. She underwent a RSO on 2/9/14.  Elevated blood pressure reading     Elevated brain natriuretic peptide (BNP) level     Gastroesophageal reflux disease 4/19/2019    H/O abnormal cervical Papanicolaou smear 2/3/2017    LSIL - 2/2015 Pap collected 2/3/2017. Patient is very difficult SSE as she is morbidly obese. Required ringed forceps and snowman speculum.       Heartburn     History of trichomonal vaginitis 1/20106    tx'd    Hypertension     Hypoalbuminemia due to protein-calorie malnutrition ([address] 2018    Hypocalcemia 2018    Migraine 2019    Morbid obesity with BMI of 70 and over, adult ([address]     BMI 74    MRSA (methicillin resistant Staphylococcus aureus) 2010    Left leg    Muscle weakness     Neck pain 2019    Normocytic normochromic anemia 2018    Obesity     Obesity hypoventilation syndrome ([address] 2018    BRYSON (obstructive sleep apnea)     BRYSON on CPAP    Ovarian ectopic pregnancy 2014    Right tubal ectopic tx'd with MTX AND LS RSO(initially dx as Left ovarian By USN 14)    Pain     Pelvic adhesive disease 14    C/S to bladder, as well as reactive to ectopic; also hx PID    Pulmonary hypertension ([address]     Shoulder joint pain 2013    Sprain of ankle 2019    Tobacco abuse 2018    Type 2 diabetes mellitus without complication, without long-term current use of insulin ([address] 2018    Unspecified sleep apnea     Vitamin D deficiency 2019   .     Past Surgical History:  Past Surgical History:   Procedure Laterality Date     SECTION  2005    CHOLECYSTECTOMY, LAPAROSCOPIC      LEG SURGERY      right leg age 7 for growth plate    SALPINGO-OOPHORECTOMY  14    Right; ectopic pregnancy    TONSILLECTOMY      age 12   [facility] UPPER GASTROINTESTINAL ENDOSCOPY  07/10/2020    UPPER GASTROINTESTINAL ENDOSCOPY N/A 7/10/2020    EGD BIOPSY performed by Polo Dozier DO at [id number]       Family History:  Family History   Problem Relation Age of Onset    High Blood Pressure Mother     Heart Disease Mother     Cancer Father         lung    Lung Cancer Father     Cancer Maternal Aunt         breast    Diabetes Maternal Aunt     Breast Cancer Maternal Aunt     Cancer Paternal Aunt         lung    Lung Cancer Paternal Aunt     Thyroid Disease Sister     Colon Cancer Neg Hx     Eclampsia Neg Hx     Hypertension Neg Hx     Ovarian Cancer Neg Hx      Labor Neg Hx     Spont Abortions Neg Hx     Stroke Neg Hx        Social History:  Social History     Socioeconomic History    Marital status: Single     Spouse name: Not on file    Number of children: Not on file    Years of education: Not on file    Highest education level: Not on file   Occupational History    Not on file   Tobacco Use    Smoking status: Former Smoker     Packs/day: 0.10     Types: Cigarettes     Start date: 2018    Smokeless tobacco: Never Used    Tobacco comment:   smokes marijuana and cigarettes   Vaping Use    Vaping Use: Never used   Substance and Sexual Activity    Alcohol use: Yes     Alcohol/week: 0.0 standard drinks     Comment: occassionally     Drug use: Yes     Frequency: 2.0 times per week     Types: Marijuana    Sexual activity: Not Currently   Other Topics Concern    Not on file   Social History Narrative    Not on file     Social Determinants of Health     Financial Resource Strain:     Difficulty of Paying Living Expenses:    Food Insecurity:     Worried About Running Out of Food in the Last Year:     Ran Out of Food in the Last Year:    Transportation Needs:     Lack of Transportation (Medical):      Lack of Transportation (Non-Medical):    Physical Activity:     Days of Exercise per Week:     Minutes of Exercise per Session:    Stress:     Feeling of Stress :    Social Connections:     Frequency of Communication with Friends and Family:     Frequency of Social Gatherings with Friends and Family:     Attends Orthodox Services:     Active Member of Clubs or Organizations:     Attends Club or Organization Meetings:     Marital Status:    Intimate Partner Violence:     Fear of Current or Ex-Partner:     Emotionally Abused:     Physically Abused:     Sexually Abused:        Current Medications:  Current Outpatient Medications   Medication Sig Dispense Refill    gabapentin (NEURONTIN) 600 MG tablet       topiramate (TOPAMAX) 100 MG tablet       WIXELA INHUB 250-50 MCG/DOSE AEPB       diclofenac (VOLTAREN) 50 MG EC tablet       vitamin D3 (CHOLECALCIFEROL) 25 MCG (1000 UT) TABS tablet       Insulin Pen Needle (COMFORT EZ PEN NEEDLES) 32G X 6 MM MISC Comfort EZ Pen Needles 32 gauge x 1/4\"      Liraglutide (VICTOZA) 18 MG/3ML SOPN SC injection Victoza 2-Monty 0.6 mg/0.1 mL (18 mg/3 mL) subcutaneous pen injector      diclofenac sodium (VOLTAREN) 1 % GEL 1 g      famotidine (PEPCID) 20 MG tablet Take 1 tablet by mouth nightly 30 tablet 3    pantoprazole (PROTONIX) 40 MG tablet Take 1 tablet by mouth daily 30 tablet 3    sucralfate (CARAFATE) 1 GM tablet Take 1 tablet by mouth 4 times daily 120 tablet 3    tiotropium (SPIRIVA HANDIHALER) 18 MCG inhalation capsule Spiriva Respimat 2.5 mcg/actuation solution for inhalation   Inhale 2 puffs every day by inhalation route.       ondansetron (ZOFRAN ODT) 4 MG disintegrating tablet Take 1 tablet by mouth every 8 hours as needed for Nausea 5 tablet 0    Promethazine-DM (PROMETHAZINE-DEXTROMETHORPHAN) 6.25-15 MG/5ML SOLN solution Take 5 mLs by mouth 4 times daily (Patient not taking: Reported on 7/2/2021)  1    aspirin 81 MG chewable tablet Take 1 tablet by mouth daily 30 tablet 3    butalbital-acetaminophen-caffeine (FIORICET, ESGIC) -40 MG per tablet Take 1 tablet by mouth every 4 hours as needed for Headaches 20 tablet 0    albuterol sulfate  (90 Base) MCG/ACT inhaler Inhale 2 puffs into the lungs 4 times daily 1 Inhaler 3    ipratropium (ATROVENT HFA) 17 MCG/ACT inhaler Inhale 2 puffs into the lungs 4 times daily 1 Inhaler 3    lisinopril (PRINIVIL;ZESTRIL) 20 MG tablet Take 1 tablet by mouth daily 30 tablet 3    metFORMIN (GLUCOPHAGE) 500 MG tablet Take 1 tablet by mouth 2 times daily (with meals) (Patient not taking: Reported on 7/2/2021) 60 tablet 3    POTASSIUM CHLORIDE PO Take 25 mg by mouth daily      benzonatate (TESSALON PERLES) 100 MG capsule Take 1 capsule by mouth every 8 hours as needed for Cough (Patient not taking: Reported on 7/2/2021) 20 capsule 0    hydrochlorothiazide (HYDRODIURIL) 25 MG tablet Take 25 mg by mouth daily      vitamin D (ERGOCALCIFEROL) 17674 UNITS CAPS capsule Take 1,000 Units by mouth daily       metoprolol (LOPRESSOR) 50 MG tablet Take 50 mg by mouth 2 times daily      loratadine (CLARITIN) 10 MG tablet Take 10 mg by mouth daily        No current facility-administered medications for this visit. Vital Signs:  /82 (Site: Right Upper Arm, Position: Sitting, Cuff Size: Large Adult)   Pulse 77   Wt (!) 590 lb (267.6 kg)   BMI 81.14 kg/m²     BMI/Height/Weight:  Body mass index is 81.14 kg/m². Review of Systems  Constitutional: Weight loss      Objective:      Physical Exam   Vital signs reviewed. General Appearance: Well-developed and well-nourished. No acute distress. Skin: Warm, dry. Head: Normocephalic. Pulmonary/Chest: Normal respiratory effort. Musculoskeletal: Movement x4. Neurological:  Alert and oriented. Individual goal for this encounter: To be able to get a knee replacement. I just learned when I had knee surgery as a kid that my knee didn't grow back where it's supposed to be. X ? Vital signs reviewed and discussed with patient. ? Labs/EKG reviewed and discussed with patient. ? Blood sugar log reviewed and discussed with patient. ? Physical activity discussed. ? Medication changes recommended. ? Smoking cessation discussed. X ? Specific questions/concerns addressed. Specific group medical question(s) addressed in this encounter:  Discussion about varicose veins. Group discussion topic for this encounter:     ? Janet Garcia   ? Be a Fat & Calorie    ? Healthy Eating   ? Move Those Muscles   ? Tip the Calorie Balance   ? Take charge of What's Around You   ? Problem Solving   ? Step Up Your Physical Activity Plan   ?  Manage Slips and Self-Defeating Thoughts  X ? [address]   ? Make Social Cues Work for Landon Clarke   ? Ways to Stay Motivated   ? Preparing for Long Term Self-Management   ? Take Charge of Your Lifestyle   ? Mindful Eating, Mindful Movement   ? Manage Your Stress   ? Sit Less for Health   ? More Volume, Fewer Calories   ? Stay Active   ? Balance Your Thoughts   ? Heart Health    ? Looking Back and Looking Forward    Total time:  90 minutes, greater than 50% of visit spent in group counseling/education. Assessment:       Diagnosis Orders   1. Type 2 diabetes mellitus without complication, without long-term current use of insulin ([address]     2. BRYSON (obstructive sleep apnea)     3. Chronic bipolar disorder ([address]     4. Obesity hypoventilation syndrome ([address]     5. Hypertension, unspecified type     6. Gastroesophageal reflux disease, unspecified whether esophagitis present     7. Anxiety and depression     8. Hyperlipidemia, unspecified hyperlipidemia type     9. Morbid obesity with BMI of 70 and over, adult ([address]     10. Uncomplicated asthma, unspecified asthma severity, unspecified whether persistent     11. Pulmonary hypertension ([address]     12. Normocytic normochromic anemia     13. Cervical radiculopathy     14. Chronic obstructive pulmonary disease, unspecified COPD type ([address]         Plan:      Track food and weight. Return to clinic as per group medical appointment schedule. Follow-up:  Return in about 1 week (around 10/8/2021). Orders:  No orders of the defined types were placed in this encounter. Prescriptions:  No orders of the defined types were placed in this encounter.       Electronically signed by:  Nikki Quesada CNP

## 2021-10-08 ENCOUNTER — OFFICE VISIT (OUTPATIENT)
Dept: BARIATRICS/WEIGHT MGMT | Age: 36
End: 2021-10-08
Payer: COMMERCIAL

## 2021-10-08 VITALS
BODY MASS INDEX: 39.68 KG/M2 | HEIGHT: 72 IN | SYSTOLIC BLOOD PRESSURE: 133 MMHG | HEART RATE: 86 BPM | DIASTOLIC BLOOD PRESSURE: 86 MMHG | WEIGHT: 293 LBS

## 2021-10-08 DIAGNOSIS — K21.9 GASTROESOPHAGEAL REFLUX DISEASE, UNSPECIFIED WHETHER ESOPHAGITIS PRESENT: ICD-10-CM

## 2021-10-08 DIAGNOSIS — I27.20 PULMONARY HYPERTENSION (HCC): ICD-10-CM

## 2021-10-08 DIAGNOSIS — E11.9 TYPE 2 DIABETES MELLITUS WITHOUT COMPLICATION, WITHOUT LONG-TERM CURRENT USE OF INSULIN (HCC): ICD-10-CM

## 2021-10-08 DIAGNOSIS — F31.9 CHRONIC BIPOLAR DISORDER (HCC): ICD-10-CM

## 2021-10-08 DIAGNOSIS — E66.2 OBESITY HYPOVENTILATION SYNDROME (HCC): ICD-10-CM

## 2021-10-08 DIAGNOSIS — G47.33 OSA (OBSTRUCTIVE SLEEP APNEA): ICD-10-CM

## 2021-10-08 DIAGNOSIS — M54.12 CERVICAL RADICULOPATHY: ICD-10-CM

## 2021-10-08 DIAGNOSIS — D64.9 NORMOCYTIC NORMOCHROMIC ANEMIA: ICD-10-CM

## 2021-10-08 DIAGNOSIS — I10 HYPERTENSION, UNSPECIFIED TYPE: Primary | ICD-10-CM

## 2021-10-08 DIAGNOSIS — J45.909 UNCOMPLICATED ASTHMA, UNSPECIFIED ASTHMA SEVERITY, UNSPECIFIED WHETHER PERSISTENT: ICD-10-CM

## 2021-10-08 DIAGNOSIS — F41.9 ANXIETY AND DEPRESSION: ICD-10-CM

## 2021-10-08 DIAGNOSIS — E78.5 HYPERLIPIDEMIA, UNSPECIFIED HYPERLIPIDEMIA TYPE: ICD-10-CM

## 2021-10-08 DIAGNOSIS — F32.A ANXIETY AND DEPRESSION: ICD-10-CM

## 2021-10-08 DIAGNOSIS — E66.01 MORBID OBESITY WITH BMI OF 70 AND OVER, ADULT (HCC): ICD-10-CM

## 2021-10-08 PROCEDURE — 3044F HG A1C LEVEL LT 7.0%: CPT | Performed by: NURSE PRACTITIONER

## 2021-10-08 PROCEDURE — G8417 CALC BMI ABV UP PARAM F/U: HCPCS | Performed by: NURSE PRACTITIONER

## 2021-10-08 PROCEDURE — G8427 DOCREV CUR MEDS BY ELIG CLIN: HCPCS | Performed by: NURSE PRACTITIONER

## 2021-10-08 PROCEDURE — G8484 FLU IMMUNIZE NO ADMIN: HCPCS | Performed by: NURSE PRACTITIONER

## 2021-10-08 PROCEDURE — 2022F DILAT RTA XM EVC RTNOPTHY: CPT | Performed by: NURSE PRACTITIONER

## 2021-10-08 PROCEDURE — 99213 OFFICE O/P EST LOW 20 MIN: CPT | Performed by: NURSE PRACTITIONER

## 2021-10-08 PROCEDURE — 1036F TOBACCO NON-USER: CPT | Performed by: NURSE PRACTITIONER

## 2021-10-08 NOTE — PROGRESS NOTES
Group Lifestyle Balance Follow-up Progress Note      Subjective     Patient is here for group medical appointment for Group Lifestyle Balance weight management program follow-up for the chronic conditions of DM Type 2, Asthma, Pulmonary HTN, BRYSON, Obesity Hypoventilation Syndrome, Anemia, GERD, Anxiety/Depression, Bipolar, HLD, Chronic Migraine, Arthritis, COPD. Patient continues on the program and tolerating well. Total weight loss to date is 14 lbs. No current issues. Allergies:  No Known Allergies    Past Medical History:     Past Medical History:   Diagnosis Date    Abnormal vaginal bleeding 4/19/2019    Acquired hallux rigidus 4/19/2019    Acute and chronic respiratory failure with hypoxia ([address] 11/6/2018    Acute on chronic respiratory failure with hypoxia and hypercapnia ([address] 11/6/2018    ASCUS with positive high risk HPV cervical     Asthma     Bipolar disorder ([facility] 4/19/2019    Breast discharge 4/9/2015    Cellulitis 4/19/2019    Cervical radiculopathy 4/19/2019    Chest pain 11/4/2018    Chlamydia ? Reported hx    Chronic cor pulmonale (HCC)     COPD (chronic obstructive pulmonary disease) (HCC)     D-dimer, elevated 11/18/2018    Dysfunctional uterine bleeding 1/27/2014    Dysplasia of cervix, low grade (ISAIAS 1) 3/30/2017    Noted on colposcopy 3/7/17    Dyspnea 11/3/2018    Ectopic pregnancy - Right 2/9/2014    Pt was initiallly dx with a left ovarian ectopic. After Laproscopic eval she was noted to have a Right Tubal ectopic. She underwent a RSO on 2/9/14.  Elevated blood pressure reading     Elevated brain natriuretic peptide (BNP) level     Gastroesophageal reflux disease 4/19/2019    H/O abnormal cervical Papanicolaou smear 2/3/2017    LSIL - 2/2015 Pap collected 2/3/2017. Patient is very difficult SSE as she is morbidly obese. Required ringed forceps and snowman speculum.       Heartburn     History of trichomonal vaginitis 1/20106    tx'd    Hypertension     Hypoalbuminemia due to protein-calorie malnutrition ([address] 2018    Hypocalcemia 2018    Migraine 2019    Morbid obesity with BMI of 70 and over, adult ([address]     BMI 74    MRSA (methicillin resistant Staphylococcus aureus) 2010    Left leg    Muscle weakness     Neck pain 2019    Normocytic normochromic anemia 2018    Obesity     Obesity hypoventilation syndrome ([address] 2018    BRYSON (obstructive sleep apnea)     BRYSON on CPAP    Ovarian ectopic pregnancy 2014    Right tubal ectopic tx'd with MTX AND LS RSO(initially dx as Left ovarian By USN 14)    Pain     Pelvic adhesive disease 14    C/S to bladder, as well as reactive to ectopic; also hx PID    Pulmonary hypertension ([address]     Shoulder joint pain 2013    Sprain of ankle 2019    Tobacco abuse 2018    Type 2 diabetes mellitus without complication, without long-term current use of insulin ([address] 2018    Unspecified sleep apnea     Vitamin D deficiency 2019   .     Past Surgical History:  Past Surgical History:   Procedure Laterality Date     SECTION  2005    CHOLECYSTECTOMY, LAPAROSCOPIC      LEG SURGERY      right leg age 7 for growth plate    SALPINGO-OOPHORECTOMY  14    Right; ectopic pregnancy    TONSILLECTOMY      age 12   [facility] UPPER GASTROINTESTINAL ENDOSCOPY  07/10/2020    UPPER GASTROINTESTINAL ENDOSCOPY N/A 7/10/2020    [facility] [address]       Family History:  Family History   Problem Relation Age of Onset    High Blood Pressure Mother     Heart Disease Mother     Cancer Father         lung    Lung Cancer Father     Cancer Maternal Aunt         breast    Diabetes Maternal Aunt     Breast Cancer Maternal Aunt     Cancer Paternal Aunt         lung    Lung Cancer Paternal Aunt     Thyroid Disease Sister     Colon Cancer Neg Hx     Eclampsia Neg Hx     Hypertension Neg Hx     Ovarian Cancer Neg Hx      Labor Neg Hx     Spont Abortions Neg Hx     Stroke Neg Hx        Social History:  Social History     Socioeconomic History    Marital status: Single     Spouse name: Not on file    Number of children: Not on file    Years of education: Not on file    Highest education level: Not on file   Occupational History    Not on file   Tobacco Use    Smoking status: Former Smoker     Packs/day: 0.10     Types: Cigarettes     Start date: 2018    Smokeless tobacco: Never Used    Tobacco comment:   smokes marijuana and cigarettes   Vaping Use    Vaping Use: Never used   Substance and Sexual Activity    Alcohol use: Yes     Alcohol/week: 0.0 standard drinks     Comment: occassionally     Drug use: Yes     Frequency: 2.0 times per week     Types: Marijuana    Sexual activity: Not Currently   Other Topics Concern    Not on file   Social History Narrative    Not on file     Social Determinants of Health     Financial Resource Strain:     Difficulty of Paying Living Expenses:    Food Insecurity:     Worried About Running Out of Food in the Last Year:     Ran Out of Food in the Last Year:    Transportation Needs:     Lack of Transportation (Medical):      Lack of Transportation (Non-Medical):    Physical Activity:     Days of Exercise per Week:     Minutes of Exercise per Session:    Stress:     Feeling of Stress :    Social Connections:     Frequency of Communication with Friends and Family:     Frequency of Social Gatherings with Friends and Family:     Attends Yazdanism Services:     Active Member of Clubs or Organizations:     Attends Club or Organization Meetings:     Marital Status:    Intimate Partner Violence:     Fear of Current or Ex-Partner:     Emotionally Abused:     Physically Abused:     Sexually Abused:        Current Medications:  Current Outpatient Medications   Medication Sig Dispense Refill    gabapentin (NEURONTIN) 600 MG tablet       topiramate (TOPAMAX) 100 MG tablet       WIXELA INHUB 250-50 MCG/DOSE AEPB       diclofenac (VOLTAREN) 50 MG EC tablet       vitamin D3 (CHOLECALCIFEROL) 25 MCG (1000 UT) TABS tablet       Insulin Pen Needle (COMFORT EZ PEN NEEDLES) 32G X 6 MM MISC Comfort EZ Pen Needles 32 gauge x 1/4\"      Liraglutide (VICTOZA) 18 MG/3ML SOPN SC injection Victoza 2-Monty 0.6 mg/0.1 mL (18 mg/3 mL) subcutaneous pen injector      diclofenac sodium (VOLTAREN) 1 % GEL 1 g      famotidine (PEPCID) 20 MG tablet Take 1 tablet by mouth nightly 30 tablet 3    pantoprazole (PROTONIX) 40 MG tablet Take 1 tablet by mouth daily 30 tablet 3    sucralfate (CARAFATE) 1 GM tablet Take 1 tablet by mouth 4 times daily 120 tablet 3    tiotropium (SPIRIVA HANDIHALER) 18 MCG inhalation capsule Spiriva Respimat 2.5 mcg/actuation solution for inhalation   Inhale 2 puffs every day by inhalation route.       ondansetron (ZOFRAN ODT) 4 MG disintegrating tablet Take 1 tablet by mouth every 8 hours as needed for Nausea 5 tablet 0    Promethazine-DM (PROMETHAZINE-DEXTROMETHORPHAN) 6.25-15 MG/5ML SOLN solution Take 5 mLs by mouth 4 times daily (Patient not taking: Reported on 7/2/2021)  1    aspirin 81 MG chewable tablet Take 1 tablet by mouth daily 30 tablet 3    butalbital-acetaminophen-caffeine (FIORICET, ESGIC) -40 MG per tablet Take 1 tablet by mouth every 4 hours as needed for Headaches 20 tablet 0    albuterol sulfate  (90 Base) MCG/ACT inhaler Inhale 2 puffs into the lungs 4 times daily 1 Inhaler 3    ipratropium (ATROVENT HFA) 17 MCG/ACT inhaler Inhale 2 puffs into the lungs 4 times daily 1 Inhaler 3    lisinopril (PRINIVIL;ZESTRIL) 20 MG tablet Take 1 tablet by mouth daily 30 tablet 3    metFORMIN (GLUCOPHAGE) 500 MG tablet Take 1 tablet by mouth 2 times daily (with meals) (Patient not taking: Reported on 7/2/2021) 60 tablet 3    POTASSIUM CHLORIDE PO Take 25 mg by mouth daily      benzonatate (TESSALON PERLES) 100 MG capsule Take 1 capsule by mouth every 8 hours as needed for Cough (Patient not taking: Reported on 7/2/2021) 20 capsule 0    hydrochlorothiazide (HYDRODIURIL) 25 MG tablet Take 25 mg by mouth daily      vitamin D (ERGOCALCIFEROL) 86107 UNITS CAPS capsule Take 1,000 Units by mouth daily       metoprolol (LOPRESSOR) 50 MG tablet Take 50 mg by mouth 2 times daily      loratadine (CLARITIN) 10 MG tablet Take 10 mg by mouth daily        No current facility-administered medications for this visit. Vital Signs:  /86 (Site: Right Upper Arm, Position: Sitting, Cuff Size: Large Adult)   Pulse 86   Ht 5' 11.5\" (1.816 m)   Wt (!) 588 lb (266.7 kg)   BMI 80.87 kg/m²     BMI/Height/Weight:  Body mass index is 80.87 kg/m². Review of Systems  Constitutional: Weight loss      Objective:      Physical Exam   Vital signs reviewed. General Appearance: Well-developed and well-nourished. No acute distress. Skin: Warm, dry. Head: Normocephalic. Pulmonary/Chest: Normal respiratory effort. Musculoskeletal: Movement x4. Neurological:  Alert and oriented. Individual goal for this encounter: To be able to get a knee replacement. I just learned when I had knee surgery as a kid that my knee didn't grow back where it's supposed to be. X ? Vital signs reviewed and discussed with patient. ? Labs/EKG reviewed and discussed with patient. ? Blood sugar log reviewed and discussed with patient. ? Physical activity discussed. ? Medication changes recommended. ? Smoking cessation discussed. X ? Specific questions/concerns addressed. Specific group medical question(s) addressed in this encounter:  Discussion about osteoarthritis. Group discussion topic for this encounter:     ? Lashanda Chute   ? Be a Fat & Calorie    ? Healthy Eating   ? Move Those Muscles   ? Tip the Calorie Balance   ? Take charge of What's Around You   ? Problem Solving   ? Step Up Your Physical Activity Plan   ?

## 2021-11-09 ENCOUNTER — OFFICE VISIT (OUTPATIENT)
Dept: BARIATRICS/WEIGHT MGMT | Age: 36
End: 2021-11-09
Payer: COMMERCIAL

## 2021-11-09 VITALS
HEIGHT: 72 IN | SYSTOLIC BLOOD PRESSURE: 105 MMHG | WEIGHT: 293 LBS | HEART RATE: 82 BPM | DIASTOLIC BLOOD PRESSURE: 47 MMHG | BODY MASS INDEX: 39.68 KG/M2

## 2021-11-09 DIAGNOSIS — E78.5 HYPERLIPIDEMIA, UNSPECIFIED HYPERLIPIDEMIA TYPE: ICD-10-CM

## 2021-11-09 DIAGNOSIS — E11.9 TYPE 2 DIABETES MELLITUS WITHOUT COMPLICATION, WITHOUT LONG-TERM CURRENT USE OF INSULIN (HCC): Primary | ICD-10-CM

## 2021-11-09 DIAGNOSIS — I10 HYPERTENSION, UNSPECIFIED TYPE: ICD-10-CM

## 2021-11-09 DIAGNOSIS — E66.2 OBESITY HYPOVENTILATION SYNDROME (HCC): ICD-10-CM

## 2021-11-09 DIAGNOSIS — F41.9 ANXIETY AND DEPRESSION: ICD-10-CM

## 2021-11-09 DIAGNOSIS — D64.9 NORMOCYTIC NORMOCHROMIC ANEMIA: ICD-10-CM

## 2021-11-09 DIAGNOSIS — G47.33 OSA (OBSTRUCTIVE SLEEP APNEA): ICD-10-CM

## 2021-11-09 DIAGNOSIS — F32.A ANXIETY AND DEPRESSION: ICD-10-CM

## 2021-11-09 DIAGNOSIS — I27.20 PULMONARY HYPERTENSION (HCC): ICD-10-CM

## 2021-11-09 DIAGNOSIS — M54.12 CERVICAL RADICULOPATHY: ICD-10-CM

## 2021-11-09 PROCEDURE — 99213 OFFICE O/P EST LOW 20 MIN: CPT | Performed by: NURSE PRACTITIONER

## 2021-11-09 PROCEDURE — 3044F HG A1C LEVEL LT 7.0%: CPT | Performed by: NURSE PRACTITIONER

## 2021-11-09 PROCEDURE — G8427 DOCREV CUR MEDS BY ELIG CLIN: HCPCS | Performed by: NURSE PRACTITIONER

## 2021-11-09 PROCEDURE — 1036F TOBACCO NON-USER: CPT | Performed by: NURSE PRACTITIONER

## 2021-11-09 PROCEDURE — 2022F DILAT RTA XM EVC RTNOPTHY: CPT | Performed by: NURSE PRACTITIONER

## 2021-11-09 PROCEDURE — G8484 FLU IMMUNIZE NO ADMIN: HCPCS | Performed by: NURSE PRACTITIONER

## 2021-11-09 PROCEDURE — G8417 CALC BMI ABV UP PARAM F/U: HCPCS | Performed by: NURSE PRACTITIONER

## 2021-11-09 NOTE — PROGRESS NOTES
Group Lifestyle Balance Follow-up Progress Note      Subjective     Patient is here for group medical appointment for Group Lifestyle Balance weight management program follow-up for the chronic conditions of DM Type 2, Asthma, Pulmonary HTN, BRYSON, Obesity Hypoventilation Syndrome, Anemia, GERD, Anxiety/Depression, Bipolar, HLD, Chronic Migraine, Arthritis, COPD. Patient continues on the program and tolerating well. Total weight loss to date is 17 lbs. No current issues. Allergies:  No Known Allergies    Past Medical History:     Past Medical History:   Diagnosis Date    Abnormal vaginal bleeding 4/19/2019    Acquired hallux rigidus 4/19/2019    Acute and chronic respiratory failure with hypoxia ([address] 11/6/2018    Acute on chronic respiratory failure with hypoxia and hypercapnia ([address] 11/6/2018    ASCUS with positive high risk HPV cervical     Asthma     Bipolar disorder ([facility] 4/19/2019    Breast discharge 4/9/2015    Cellulitis 4/19/2019    Cervical radiculopathy 4/19/2019    Chest pain 11/4/2018    Chlamydia ? Reported hx    Chronic cor pulmonale (HCC)     COPD (chronic obstructive pulmonary disease) (HCC)     D-dimer, elevated 11/18/2018    Dysfunctional uterine bleeding 1/27/2014    Dysplasia of cervix, low grade (ISAIAS 1) 3/30/2017    Noted on colposcopy 3/7/17    Dyspnea 11/3/2018    Ectopic pregnancy - Right 2/9/2014    Pt was initiallly dx with a left ovarian ectopic. After Laproscopic eval she was noted to have a Right Tubal ectopic. She underwent a RSO on 2/9/14.  Elevated blood pressure reading     Elevated brain natriuretic peptide (BNP) level     Gastroesophageal reflux disease 4/19/2019    H/O abnormal cervical Papanicolaou smear 2/3/2017    LSIL - 2/2015 Pap collected 2/3/2017. Patient is very difficult SSE as she is morbidly obese. Required ringed forceps and snowman speculum.       Heartburn     History of trichomonal vaginitis 1/20106    tx'd    Hypertension     Hypoalbuminemia due to protein-calorie malnutrition ([address] 2018    Hypocalcemia 2018    Migraine 2019    Morbid obesity with BMI of 70 and over, adult ([address]     BMI 74    MRSA (methicillin resistant Staphylococcus aureus) 2010    Left leg    Muscle weakness     Neck pain 2019    Normocytic normochromic anemia 2018    Obesity     Obesity hypoventilation syndrome ([address] 2018    BRYSON (obstructive sleep apnea)     BRYSON on CPAP    Ovarian ectopic pregnancy 2014    Right tubal ectopic tx'd with MTX AND LS RSO(initially dx as Left ovarian By USN 14)    Pain     Pelvic adhesive disease 14    C/S to bladder, as well as reactive to ectopic; also hx PID    Pulmonary hypertension ([address]     Shoulder joint pain 2013    Sprain of ankle 2019    Tobacco abuse 2018    Type 2 diabetes mellitus without complication, without long-term current use of insulin ([address] 2018    Unspecified sleep apnea     Vitamin D deficiency 2019   .     Past Surgical History:  Past Surgical History:   Procedure Laterality Date     SECTION  2005    CHOLECYSTECTOMY, LAPAROSCOPIC      LEG SURGERY      right leg age 7 for growth plate    SALPINGO-OOPHORECTOMY  14    Right; ectopic pregnancy    TONSILLECTOMY      age 12   [employer] UPPER GASTROINTESTINAL ENDOSCOPY  07/10/2020    UPPER GASTROINTESTINAL ENDOSCOPY N/A 7/10/2020    EGD BIOPSY performed by Alexis Logan DO at [address].       Family History:  Family History   Problem Relation Age of Onset    High Blood Pressure Mother     Heart Disease Mother     Cancer Father         lung    Lung Cancer Father     Cancer Maternal Aunt         breast    Diabetes Maternal Aunt     Breast Cancer Maternal Aunt     Cancer Paternal Aunt         lung    Lung Cancer Paternal Aunt     Thyroid Disease Sister     Colon Cancer Neg Hx     Eclampsia Neg Hx     Hypertension Neg Hx     Ovarian Cancer Neg Hx      Labor Neg Hx     Spont Abortions Neg Hx     Stroke Neg Hx        Social History:  Social History     Socioeconomic History    Marital status: Single     Spouse name: Not on file    Number of children: Not on file    Years of education: Not on file    Highest education level: Not on file   Occupational History    Not on file   Tobacco Use    Smoking status: Former Smoker     Packs/day: 0.10     Types: Cigarettes     Start date: 2018    Smokeless tobacco: Never Used    Tobacco comment:   smokes marijuana and cigarettes   Vaping Use    Vaping Use: Never used   Substance and Sexual Activity    Alcohol use: Yes     Alcohol/week: 0.0 standard drinks     Comment: occassionally     Drug use: Yes     Frequency: 2.0 times per week     Types: Marijuana Tao Jock)    Sexual activity: Not Currently   Other Topics Concern    Not on file   Social History Narrative    Not on file     Social Determinants of Health     Financial Resource Strain:     Difficulty of Paying Living Expenses: Not on file   Food Insecurity:     Worried About Running Out of Food in the Last Year: Not on file    Marlon of Food in the Last Year: Not on file   Transportation Needs:     Lack of Transportation (Medical): Not on file    Lack of Transportation (Non-Medical):  Not on file   Physical Activity:     Days of Exercise per Week: Not on file    Minutes of Exercise per Session: Not on file   Stress:     Feeling of Stress : Not on file   Social Connections:     Frequency of Communication with Friends and Family: Not on file    Frequency of Social Gatherings with Friends and Family: Not on file    Attends Taoism Services: Not on file    Active Member of Clubs or Organizations: Not on file    Attends Club or Organization Meetings: Not on file    Marital Status: Not on file   Intimate Partner Violence:     Fear of Current or Ex-Partner: Not on file    Emotionally Abused: Not on file    Physically Abused: Not on file    Sexually Abused: Not on file   Housing Stability:     Unable to Pay for Housing in the Last Year: Not on file    Number of Places Lived in the Last Year: Not on file    Unstable Housing in the Last Year: Not on file       Current Medications:  Current Outpatient Medications   Medication Sig Dispense Refill    gabapentin (NEURONTIN) 600 MG tablet       topiramate (TOPAMAX) 100 MG tablet       WIXELA INHUB 250-50 MCG/DOSE AEPB       diclofenac (VOLTAREN) 50 MG EC tablet       vitamin D3 (CHOLECALCIFEROL) 25 MCG (1000 UT) TABS tablet       Insulin Pen Needle (COMFORT EZ PEN NEEDLES) 32G X 6 MM MISC Comfort EZ Pen Needles 32 gauge x 1/4\"      Liraglutide (VICTOZA) 18 MG/3ML SOPN SC injection Victoza 2-Monty 0.6 mg/0.1 mL (18 mg/3 mL) subcutaneous pen injector      diclofenac sodium (VOLTAREN) 1 % GEL 1 g      famotidine (PEPCID) 20 MG tablet Take 1 tablet by mouth nightly 30 tablet 3    pantoprazole (PROTONIX) 40 MG tablet Take 1 tablet by mouth daily 30 tablet 3    sucralfate (CARAFATE) 1 GM tablet Take 1 tablet by mouth 4 times daily 120 tablet 3    tiotropium (SPIRIVA HANDIHALER) 18 MCG inhalation capsule Spiriva Respimat 2.5 mcg/actuation solution for inhalation   Inhale 2 puffs every day by inhalation route.       ondansetron (ZOFRAN ODT) 4 MG disintegrating tablet Take 1 tablet by mouth every 8 hours as needed for Nausea 5 tablet 0    Promethazine-DM (PROMETHAZINE-DEXTROMETHORPHAN) 6.25-15 MG/5ML SOLN solution Take 5 mLs by mouth 4 times daily (Patient not taking: Reported on 7/2/2021)  1    aspirin 81 MG chewable tablet Take 1 tablet by mouth daily 30 tablet 3    butalbital-acetaminophen-caffeine (FIORICET, ESGIC) -40 MG per tablet Take 1 tablet by mouth every 4 hours as needed for Headaches 20 tablet 0    albuterol sulfate  (90 Base) MCG/ACT inhaler Inhale 2 puffs into the lungs 4 times daily 1 Inhaler 3    ipratropium (ATROVENT HFA) 17 MCG/ACT inhaler Inhale 2 puffs into the lungs 4 times daily 1 Inhaler 3    lisinopril (PRINIVIL;ZESTRIL) 20 MG tablet Take 1 tablet by mouth daily 30 tablet 3    metFORMIN (GLUCOPHAGE) 500 MG tablet Take 1 tablet by mouth 2 times daily (with meals) (Patient not taking: Reported on 7/2/2021) 60 tablet 3    POTASSIUM CHLORIDE PO Take 25 mg by mouth daily      benzonatate (TESSALON PERLES) 100 MG capsule Take 1 capsule by mouth every 8 hours as needed for Cough (Patient not taking: Reported on 7/2/2021) 20 capsule 0    hydrochlorothiazide (HYDRODIURIL) 25 MG tablet Take 25 mg by mouth daily      vitamin D (ERGOCALCIFEROL) 78887 UNITS CAPS capsule Take 1,000 Units by mouth daily       metoprolol (LOPRESSOR) 50 MG tablet Take 50 mg by mouth 2 times daily      loratadine (CLARITIN) 10 MG tablet Take 10 mg by mouth daily        No current facility-administered medications for this visit. Vital Signs:  BP (!) 105/47 (Site: Right Upper Arm, Position: Sitting, Cuff Size: Large Adult)   Pulse 82   Ht 5' 11.5\" (1.816 m)   Wt (!) 585 lb (265.4 kg)   BMI 80.45 kg/m²     BMI/Height/Weight:  Body mass index is 80.45 kg/m². Review of Systems  Constitutional: Weight loss      Objective:      Physical Exam   Vital signs reviewed. General Appearance: Well-developed and well-nourished. No acute distress. Skin: Warm, dry. Head: Normocephalic. Pulmonary/Chest: Normal respiratory effort. Musculoskeletal: Movement x4. Neurological:  Alert and oriented. Individual goal for this encounter: To be able to get a knee replacement. I just learned when I had knee surgery as a kid that my knee didn't grow back where it's supposed to be. X ? Vital signs reviewed and discussed with patient. ? Labs/EKG reviewed and discussed with patient. ? Blood sugar log reviewed and discussed with patient. ? Physical activity discussed. ? Medication changes recommended. ? Smoking cessation discussed. X ?  Specific questions/concerns addressed. Specific group medical question(s) addressed in this encounter:  Discussion about gout. Group discussion topic for this encounter:     ? Shorty Zuñiga   ? Be a Fat & Calorie    ? Healthy Eating   ? Move Those Muscles   ? Tip the Calorie Balance   ? Take charge of What's Around You   ? Problem Solving   ? Step Up Your Physical Activity Plan   ? Manage Slips and Self-Defeating Thoughts   ? [address]   ? Make Social Cues Work for Keturah Nathan   ? Ways to Stay Motivated   ? Preparing for Long Term Self-Management   ? Take Charge of Your Lifestyle   ? Mindful Eating, Mindful Movement   ? Manage Your Stress   ? Sit Less for Health   ? More Volume, Fewer Calories  X ? Stay Active   ? Balance Your Thoughts   ? Heart Health    ? Looking Back and Looking Forward    Total time:  90 minutes, greater than 50% of visit spent in group counseling/education. Assessment:       Diagnosis Orders   1. Type 2 diabetes mellitus without complication, without long-term current use of insulin ([address]     2. Hypertension, unspecified type     3. Obesity hypoventilation syndrome ([address]     4. BRYSON (obstructive sleep apnea)     5. Anxiety and depression     6. Hyperlipidemia, unspecified hyperlipidemia type     7. Pulmonary hypertension ([address]     8. Normocytic normochromic anemia     9. Cervical radiculopathy         Plan:      Track food and weight. Return to clinic as per group medical appointment schedule. Follow-up:  Return in about 1 month (around 12/9/2021). Orders:  No orders of the defined types were placed in this encounter. Prescriptions:  No orders of the defined types were placed in this encounter.       Electronically signed by:  Nikki Quesada CNP

## 2021-11-11 ENCOUNTER — TELEPHONE (OUTPATIENT)
Dept: OBGYN | Age: 36
End: 2021-11-11

## 2021-12-14 ENCOUNTER — TELEMEDICINE (OUTPATIENT)
Dept: PULMONOLOGY | Age: 36
End: 2021-12-14
Payer: COMMERCIAL

## 2021-12-14 DIAGNOSIS — I27.20 PULMONARY HYPERTENSION (HCC): ICD-10-CM

## 2021-12-14 DIAGNOSIS — E66.01 MORBID OBESITY WITH BMI OF 70 AND OVER, ADULT (HCC): ICD-10-CM

## 2021-12-14 DIAGNOSIS — J44.9 CHRONIC OBSTRUCTIVE PULMONARY DISEASE, UNSPECIFIED COPD TYPE (HCC): Primary | ICD-10-CM

## 2021-12-14 DIAGNOSIS — G47.33 OSA (OBSTRUCTIVE SLEEP APNEA): ICD-10-CM

## 2021-12-14 PROCEDURE — 1036F TOBACCO NON-USER: CPT | Performed by: INTERNAL MEDICINE

## 2021-12-14 PROCEDURE — G8926 SPIRO NO PERF OR DOC: HCPCS | Performed by: INTERNAL MEDICINE

## 2021-12-14 PROCEDURE — 3023F SPIROM DOC REV: CPT | Performed by: INTERNAL MEDICINE

## 2021-12-14 PROCEDURE — 99214 OFFICE O/P EST MOD 30 MIN: CPT | Performed by: INTERNAL MEDICINE

## 2021-12-14 PROCEDURE — G8427 DOCREV CUR MEDS BY ELIG CLIN: HCPCS | Performed by: INTERNAL MEDICINE

## 2021-12-14 PROCEDURE — G8484 FLU IMMUNIZE NO ADMIN: HCPCS | Performed by: INTERNAL MEDICINE

## 2021-12-14 PROCEDURE — G8417 CALC BMI ABV UP PARAM F/U: HCPCS | Performed by: INTERNAL MEDICINE

## 2021-12-14 RX ORDER — SEMAGLUTIDE 1.34 MG/ML
INJECTION, SOLUTION SUBCUTANEOUS
COMMUNITY
End: 2022-02-07

## 2021-12-14 NOTE — PROGRESS NOTES
2021    TELEHEALTH EVALUATION -- Audio/Visual (During ZKPZD-[id number] public health emergency)    Patient and physician are located in their individual locations. This is visit is completed via Kixer application []/ Doxy. me[x] / Telephone []     HPI:    Albert Molina (:  1985) has requested an audio/video evaluation for the following concern(s):    The patient is known to have chronic obstructive lung disease due to chronic bronchitis and emphysema which responding very well to the present bronchodilator therapy. There is no evidence of any acute exacerbation of COPD no yellow sputum no hemoptysis no fever no chills. She has continued to refuse taking Covid and or flu vaccine as well as Pneumovax. He has a chronic edema of the right foot. There is no clinical evidence for DVT. She is known to have diabetes which is under good control. Her blood pressure is under good control. She has sleep apnea syndrome and is being treated with CPAP. She has been successful in losing weight. Blood pressure is well controlled. Review of Systems    Prior to Visit Medications    Medication Sig Taking?  Authorizing Provider   Semaglutide, 1 MG/DOSE, (OZEMPIC, 1 MG/DOSE,) 2 MG/1.5ML SOPN Inject into the skin Yes Historical Provider, MD   gabapentin (NEURONTIN) 600 MG tablet  Yes Historical Provider, MD   topiramate (TOPAMAX) 100 MG tablet  Yes Historical Provider, MD Morse Joe 250-50 MCG/DOSE AEPB  Yes Historical Provider, MD   diclofenac (VOLTAREN) 50 MG EC tablet  Yes Historical Provider, MD   vitamin D3 (CHOLECALCIFEROL) 25 MCG (1000 UT) TABS tablet  Yes Historical Provider, MD   Insulin Pen Needle (COMFORT EZ PEN NEEDLES) 32G X 6 MM MISC Comfort EZ Pen Needles 32 gauge x 1/4\" Yes Historical Provider, MD   Liraglutide (VICTOZA) 18 MG/3ML SOPN SC injection Victoza 2-Monty 0.6 mg/0.1 mL (18 mg/3 mL) subcutaneous pen injector Yes Historical Provider, MD   diclofenac sodium (VOLTAREN) 1 % GEL 1 g Yes Historical Provider, MD   famotidine (PEPCID) 20 MG tablet Take 1 tablet by mouth nightly Yes JUAYN Guallpa CNP   pantoprazole (PROTONIX) 40 MG tablet Take 1 tablet by mouth daily Yes JUANY Guallpa CNP   sucralfate (CARAFATE) 1 GM tablet Take 1 tablet by mouth 4 times daily Yes JUANY Guallpa CNP   tiotropium (SPIRIVA HANDIHALER) 18 MCG inhalation capsule Spiriva Respimat 2.5 mcg/actuation solution for inhalation   Inhale 2 puffs every day by inhalation route.  Yes Historical Provider, MD   ondansetron (ZOFRAN ODT) 4 MG disintegrating tablet Take 1 tablet by mouth every 8 hours as needed for Nausea Yes Glynn Mike MD   aspirin 81 MG chewable tablet Take 1 tablet by mouth daily Yes Leisa Brdaford,    butalbital-acetaminophen-caffeine (FIORICET, ESGIC) -40 MG per tablet Take 1 tablet by mouth every 4 hours as needed for Headaches Yes Leisa Bradford,    albuterol sulfate  (90 Base) MCG/ACT inhaler Inhale 2 puffs into the lungs 4 times daily Yes Leisa Bradford DO   ipratropium (ATROVENT HFA) 17 MCG/ACT inhaler Inhale 2 puffs into the lungs 4 times daily Yes Leisa Bradford DO   lisinopril (PRINIVIL;ZESTRIL) 20 MG tablet Take 1 tablet by mouth daily Yes Leisa Bradford DO   POTASSIUM CHLORIDE PO Take 25 mg by mouth daily Yes Historical Provider, MD   hydrochlorothiazide (HYDRODIURIL) 25 MG tablet Take 25 mg by mouth daily Yes Historical Provider, MD   vitamin D (ERGOCALCIFEROL) 81712 UNITS CAPS capsule Take 1,000 Units by mouth daily  Yes Historical Provider, MD   metoprolol (LOPRESSOR) 50 MG tablet Take 50 mg by mouth 2 times daily Yes Historical Provider, MD   loratadine (CLARITIN) 10 MG tablet Take 10 mg by mouth daily  Yes Historical Provider, MD   Promethazine-DM (PROMETHAZINE-DEXTROMETHORPHAN) 6.25-15 MG/5ML SOLN solution Take 5 mLs by mouth 4 times daily  Patient not taking: Reported on 7/2/2021  Historical Provider, MD   metFORMIN (GLUCOPHAGE) 500 MG tablet Take 1 tablet by mouth 2 times daily (with meals)  Patient not taking: Reported on 7/2/2021  Rexene Zeinab, DO   benzonatate (TESSALON PERLES) 100 MG capsule Take 1 capsule by mouth every 8 hours as needed for Cough  Patient not taking: Reported on 7/2/2021  Mansoor Reardon MD       Social History     Tobacco Use    Smoking status: Former Smoker     Packs/day: 0.10     Types: Cigarettes     Start date: 9/6/2018    Smokeless tobacco: Never Used    Tobacco comment:   smokes marijuana and cigarettes   Vaping Use    Vaping Use: Never used   Substance Use Topics    Alcohol use: Yes     Alcohol/week: 0.0 standard drinks     Comment: occassionally     Drug use: Yes     Frequency: 2.0 times per week     Types: Marijuana Jennifer Gomez)            RECORD REVIEW: Previous medical records were reviewed at today's visit. Wt Readings from Last 3 Encounters:   11/09/21 (!) 585 lb (265.4 kg)   10/08/21 (!) 588 lb (266.7 kg)   10/01/21 (!) 590 lb (267.6 kg)       Results for orders placed or performed during the hospital encounter of 07/30/21   Chlamydia Trachomatis & Neisseria gonorrhoeae (GC) by amplified detection    Specimen: Cervix   Result Value Ref Range    Specimen Description . CERVIX     C. trachomatis DNA NEGATIVE NEGATIVE    N. gonorrhoeae DNA NEGATIVE NEGATIVE   Vaginitis DNA Probe    Specimen: Vaginal   Result Value Ref Range    Specimen Description . VAGINA     Special Requests NOT REPORTED     Direct Exam NEGATIVE for Gardnerella vaginalis     Direct Exam NEGATIVE for Trichomonas vaginalis     Direct Exam NEGATIVE for Candida sp. Direct Exam       Method of testing is a DNA probe intended for detection and identification of Candida species, Gardnerella vaginalis, and Trichomonas vaginalis nucleic acid in vaginal fluid specimens from patients with symptoms of vaginitis/vaginosis. No results found.     PHYSICAL EXAMINATION:  Due to this being a TeleHealth encounter, evaluation of the following organ systems is limited: Vitals/Constitutional/EENT/Resp/CV/GI//MS/Neuro/Skin/Heme-Lymph-Imm. Constitutional: [x] Appears well-developed and well-nourished. [] Abnormal  Mental status  [x] Alert and awake  [x] Oriented to person/place/time [x]Able to follow commands    [x] No apparent distress      Eyes:  EOM    [x]  Normal  [] Abnormal-  Sclera  [x]  Normal  [] Abnormal -         Discharge [x]  None visible  [] Abnormal -    HENT:   [x] Normocephalic, atraumatic. [] Abnormal shaped head   [x] Mouth/Throat: Mucous membranes are moist.     Ears [x] Normal  [] Abnormal-    Neck: [x] Normal range of motion [x] Supple [x] No visualized mass. Pulmonary/Chest: [x] Respiratory effort normal.  [x] No visualized signs of difficulty breathing or respiratory distress        [] Abnormal      Musculoskeletal:   [x] Normal range of motion. [x] Normal gait with no signs of ataxia. [x]  No signs of cyanosis of the peripheral portions of extremities. [] Abnormal       Neurological:        [x] Normal cranial nerve (limited exam to video visit) [x] No focal weakness observed       [] Abnormal          Speech       [x] Normal   [] Abnormal     Skin:        [x] No rash on visible skin  [x] Normal  [] Abnormal     Psychiatric:       [x] Normal  [] Abnormal        [x] Normal Mood  [] Anxious appearing        Other Pertinent Exam Findings:       ASSESSMENT:  1. Chronic obstructive pulmonary disease, unspecified COPD type ([facility]    2. Morbid obesity with BMI of 70 and over, adult ([facility]    3. Pulmonary hypertension ([facility]    4. BRYSON (obstructive sleep apnea)    Hypertension  Diabetes    Plan:   Patient pulmonary status is stable. Advised her to continue the same treatment as before    I advised her to continue her effort to lose weight which which she has been successful.     Continue use of CPAP as before    Continue treatment of her treatment of hypertension as before    Continue the treatment diabetes as before which is well controlled. Patient was once again advised to take Covid vaccine and Pneumovax and flu vaccine which she refused. We will see her follow-up in 3 months    Dictated by Dr. Edgar Lindsay MD dictation over thank you         An  electronic signature was used to authenticate this note. --Char Estrada MD on 12/14/2021 at 2:09 PM    9}    Pursuant to the emergency declaration under the [address] [place] [address] waiver authority and the [employer] Act, this Virtual  Visit was conducted, with patient's consent, to reduce the patient's risk of exposure to COVID-19 and provide continuity of care for an established patient. Services were provided through a video synchronous discussion virtually to substitute for in-person clinic visit.     _______________________________________________________________________________________________________________________________________________  FOR TELEPHONE VISITS PLEASE COMPLETE THE FOLLOWING      Consent:  She and/or health care decision maker is aware that that she may receive a bill for this telephone service, depending on her insurance coverage, and has provided verbal consent to proceed: Yes      I affirm this is a Patient Initiated Episode with an Established Patient who has not had a related appointment within my department in the past 7 days or scheduled within the next 24 hours.     Total Time: minutes: 21-30 minutes    Note: not billable if this call serves to triage the patient into an appointment for the relevant concern

## 2021-12-21 ENCOUNTER — VIRTUAL VISIT (OUTPATIENT)
Dept: OBGYN | Age: 36
End: 2021-12-21
Payer: COMMERCIAL

## 2021-12-21 DIAGNOSIS — Z09 FOLLOW UP: Primary | ICD-10-CM

## 2021-12-21 PROCEDURE — G8417 CALC BMI ABV UP PARAM F/U: HCPCS | Performed by: STUDENT IN AN ORGANIZED HEALTH CARE EDUCATION/TRAINING PROGRAM

## 2021-12-21 PROCEDURE — 99213 OFFICE O/P EST LOW 20 MIN: CPT | Performed by: STUDENT IN AN ORGANIZED HEALTH CARE EDUCATION/TRAINING PROGRAM

## 2021-12-21 PROCEDURE — G8427 DOCREV CUR MEDS BY ELIG CLIN: HCPCS | Performed by: STUDENT IN AN ORGANIZED HEALTH CARE EDUCATION/TRAINING PROGRAM

## 2021-12-21 PROCEDURE — 1036F TOBACCO NON-USER: CPT | Performed by: STUDENT IN AN ORGANIZED HEALTH CARE EDUCATION/TRAINING PROGRAM

## 2021-12-21 PROCEDURE — G8484 FLU IMMUNIZE NO ADMIN: HCPCS | Performed by: STUDENT IN AN ORGANIZED HEALTH CARE EDUCATION/TRAINING PROGRAM

## 2022-01-04 ENCOUNTER — OFFICE VISIT (OUTPATIENT)
Dept: BARIATRICS/WEIGHT MGMT | Age: 37
End: 2022-01-04
Payer: COMMERCIAL

## 2022-01-04 VITALS
SYSTOLIC BLOOD PRESSURE: 126 MMHG | HEART RATE: 78 BPM | BODY MASS INDEX: 39.68 KG/M2 | WEIGHT: 293 LBS | DIASTOLIC BLOOD PRESSURE: 82 MMHG | HEIGHT: 72 IN

## 2022-01-04 DIAGNOSIS — J45.909 UNCOMPLICATED ASTHMA, UNSPECIFIED ASTHMA SEVERITY, UNSPECIFIED WHETHER PERSISTENT: ICD-10-CM

## 2022-01-04 DIAGNOSIS — E66.01 MORBID OBESITY WITH BMI OF 70 AND OVER, ADULT (HCC): ICD-10-CM

## 2022-01-04 DIAGNOSIS — E11.9 TYPE 2 DIABETES MELLITUS WITHOUT COMPLICATION, WITHOUT LONG-TERM CURRENT USE OF INSULIN (HCC): Primary | ICD-10-CM

## 2022-01-04 DIAGNOSIS — I10 HYPERTENSION, UNSPECIFIED TYPE: ICD-10-CM

## 2022-01-04 DIAGNOSIS — G47.33 OSA (OBSTRUCTIVE SLEEP APNEA): ICD-10-CM

## 2022-01-04 DIAGNOSIS — D64.9 NORMOCYTIC NORMOCHROMIC ANEMIA: ICD-10-CM

## 2022-01-04 DIAGNOSIS — E66.2 OBESITY HYPOVENTILATION SYNDROME (HCC): ICD-10-CM

## 2022-01-04 DIAGNOSIS — F31.9 CHRONIC BIPOLAR DISORDER (HCC): ICD-10-CM

## 2022-01-04 DIAGNOSIS — I27.20 PULMONARY HYPERTENSION (HCC): ICD-10-CM

## 2022-01-04 DIAGNOSIS — M54.12 CERVICAL RADICULOPATHY: ICD-10-CM

## 2022-01-04 DIAGNOSIS — J44.9 CHRONIC OBSTRUCTIVE PULMONARY DISEASE, UNSPECIFIED COPD TYPE (HCC): ICD-10-CM

## 2022-01-04 DIAGNOSIS — F41.9 ANXIETY AND DEPRESSION: ICD-10-CM

## 2022-01-04 DIAGNOSIS — E78.5 HYPERLIPIDEMIA, UNSPECIFIED HYPERLIPIDEMIA TYPE: ICD-10-CM

## 2022-01-04 DIAGNOSIS — F32.A ANXIETY AND DEPRESSION: ICD-10-CM

## 2022-01-04 DIAGNOSIS — K21.9 GASTROESOPHAGEAL REFLUX DISEASE, UNSPECIFIED WHETHER ESOPHAGITIS PRESENT: ICD-10-CM

## 2022-01-04 PROCEDURE — 3023F SPIROM DOC REV: CPT | Performed by: NURSE PRACTITIONER

## 2022-01-04 PROCEDURE — 99213 OFFICE O/P EST LOW 20 MIN: CPT | Performed by: NURSE PRACTITIONER

## 2022-01-04 PROCEDURE — G8484 FLU IMMUNIZE NO ADMIN: HCPCS | Performed by: NURSE PRACTITIONER

## 2022-01-04 PROCEDURE — G8427 DOCREV CUR MEDS BY ELIG CLIN: HCPCS | Performed by: NURSE PRACTITIONER

## 2022-01-04 PROCEDURE — G8417 CALC BMI ABV UP PARAM F/U: HCPCS | Performed by: NURSE PRACTITIONER

## 2022-01-04 PROCEDURE — 2022F DILAT RTA XM EVC RTNOPTHY: CPT | Performed by: NURSE PRACTITIONER

## 2022-01-04 PROCEDURE — 3046F HEMOGLOBIN A1C LEVEL >9.0%: CPT | Performed by: NURSE PRACTITIONER

## 2022-01-04 PROCEDURE — 1036F TOBACCO NON-USER: CPT | Performed by: NURSE PRACTITIONER

## 2022-01-04 NOTE — PROGRESS NOTES
Group Lifestyle Balance Follow-up Progress Note      Subjective     Patient is here for group medical appointment for Group Lifestyle Balance weight management program follow-up for the chronic conditions of DM Type 2, Asthma, Pulmonary HTN, BRYSON, Obesity Hypoventilation Syndrome, Anemia, GERD, Anxiety/Depression, Bipolar, HLD, Chronic Migraine, Arthritis, COPD. Patient continues on the program and tolerating well. Total weight loss to date is 27 lbs. No current issues. Allergies:  No Known Allergies    Past Medical History:     Past Medical History:   Diagnosis Date    Abnormal vaginal bleeding 4/19/2019    Acquired hallux rigidus 4/19/2019    Acute and chronic respiratory failure with hypoxia ([address] 11/6/2018    Acute on chronic respiratory failure with hypoxia and hypercapnia ([address] 11/6/2018    ASCUS with positive high risk HPV cervical     Asthma     Bipolar disorder ([facility] 4/19/2019    Breast discharge 4/9/2015    Cellulitis 4/19/2019    Cervical radiculopathy 4/19/2019    Chest pain 11/4/2018    Chlamydia ? Reported hx    Chronic cor pulmonale (HCC)     COPD (chronic obstructive pulmonary disease) (HCC)     D-dimer, elevated 11/18/2018    Dysfunctional uterine bleeding 1/27/2014    Dysplasia of cervix, low grade (ISAIAS 1) 3/30/2017    Noted on colposcopy 3/7/17    Dyspnea 11/3/2018    Ectopic pregnancy - Right 2/9/2014    Pt was initiallly dx with a left ovarian ectopic. After Laproscopic eval she was noted to have a Right Tubal ectopic. She underwent a RSO on 2/9/14.  Elevated blood pressure reading     Elevated brain natriuretic peptide (BNP) level     Gastroesophageal reflux disease 4/19/2019    H/O abnormal cervical Papanicolaou smear 2/3/2017    LSIL - 2/2015 Pap collected 2/3/2017. Patient is very difficult SSE as she is morbidly obese. Required ringed forceps and snowman speculum.       Heartburn     History of trichomonal vaginitis 1/20106    tx'd    Hypertension     Hypoalbuminemia due to protein-calorie malnutrition ([address] 2018    Hypocalcemia 2018    Migraine 2019    Morbid obesity with BMI of 70 and over, adult ([address]     BMI 74    MRSA (methicillin resistant Staphylococcus aureus) 2010    Left leg    Muscle weakness     Neck pain 2019    Normocytic normochromic anemia 2018    Obesity     Obesity hypoventilation syndrome ([address] 2018    BRYSON (obstructive sleep apnea)     BRYSON on CPAP    Ovarian ectopic pregnancy 2014    Right tubal ectopic tx'd with MTX AND LS RSO(initially dx as Left ovarian By USN 14)    Pain     Pelvic adhesive disease 14    C/S to bladder, as well as reactive to ectopic; also hx PID    Pulmonary hypertension ([address]     Shoulder joint pain 2013    Sprain of ankle 2019    Tobacco abuse 2018    Type 2 diabetes mellitus without complication, without long-term current use of insulin ([address] 2018    Unspecified sleep apnea     Vitamin D deficiency 2019   .     Past Surgical History:  Past Surgical History:   Procedure Laterality Date     SECTION  2005    CHOLECYSTECTOMY, LAPAROSCOPIC      LEG SURGERY      right leg age 7 for growth plate    SALPINGO-OOPHORECTOMY  15    Right; ectopic pregnancy    TONSILLECTOMY      age 12   Brenna Rojas UPPER GASTROINTESTINAL ENDOSCOPY  07/10/2020    UPPER GASTROINTESTINAL ENDOSCOPY N/A 7/10/2020    EGD BIOPSY performed by Shagufta Ham DO at [address]       Family History:  Family History   Problem Relation Age of Onset    High Blood Pressure Mother     Heart Disease Mother     Cancer Father         lung    Lung Cancer Father     Cancer Maternal Aunt         breast    Diabetes Maternal Aunt     Breast Cancer Maternal Aunt     Cancer Paternal Aunt         lung    Lung Cancer Paternal Aunt     Thyroid Disease Sister     Colon Cancer Neg Hx     Eclampsia Neg Hx     Hypertension Neg Hx     Ovarian Cancer Neg Hx      Labor Neg Hx     Spont Abortions Neg Hx     Stroke Neg Hx        Social History:  Social History     Socioeconomic History    Marital status: Single     Spouse name: Not on file    Number of children: Not on file    Years of education: Not on file    Highest education level: Not on file   Occupational History    Not on file   Tobacco Use    Smoking status: Former Smoker     Packs/day: 0.10     Types: Cigarettes     Start date: 2018    Smokeless tobacco: Never Used    Tobacco comment:   smokes marijuana and cigarettes   Vaping Use    Vaping Use: Never used   Substance and Sexual Activity    Alcohol use: Yes     Alcohol/week: 0.0 standard drinks     Comment: occassionally     Drug use: Yes     Frequency: 2.0 times per week     Types: Marijuana Ardia [place])    Sexual activity: Not Currently   Other Topics Concern    Not on file   Social History Narrative    Not on file     Social Determinants of Health     Financial Resource Strain:     Difficulty of Paying Living Expenses: Not on file   Food Insecurity:     Worried About Running Out of Food in the Last Year: Not on file    Marlon of Food in the Last Year: Not on file   Transportation Needs:     Lack of Transportation (Medical): Not on file    Lack of Transportation (Non-Medical):  Not on file   Physical Activity:     Days of Exercise per Week: Not on file    Minutes of Exercise per Session: Not on file   Stress:     Feeling of Stress : Not on file   Social Connections:     Frequency of Communication with Friends and Family: Not on file    Frequency of Social Gatherings with Friends and Family: Not on file    Attends [employer] Services: Not on file    Active Member of Clubs or Organizations: Not on file    Attends Club or Organization Meetings: Not on file    Marital Status: Not on file   Intimate Partner Violence:     Fear of Current or Ex-Partner: Not on file    Emotionally Abused: Not on file    Physically Abused: Not on file    Sexually Abused: Not on file   Housing Stability:     Unable to Pay for Housing in the Last Year: Not on file    Number of Places Lived in the Last Year: Not on file    Unstable Housing in the Last Year: Not on file       Current Medications:  Current Outpatient Medications   Medication Sig Dispense Refill    Semaglutide, 1 MG/DOSE, (OZEMPIC, 1 MG/DOSE,) 2 MG/1.5ML SOPN Inject into the skin      gabapentin (NEURONTIN) 600 MG tablet       topiramate (TOPAMAX) 100 MG tablet       WIXELA INHUB 250-50 MCG/DOSE AEPB       diclofenac (VOLTAREN) 50 MG EC tablet       vitamin D3 (CHOLECALCIFEROL) 25 MCG (1000 UT) TABS tablet       Insulin Pen Needle (COMFORT EZ PEN NEEDLES) 32G X 6 MM MISC Comfort EZ Pen Needles 32 gauge x 1/4\"      Liraglutide (VICTOZA) 18 MG/3ML SOPN SC injection Victoza 2-Monty 0.6 mg/0.1 mL (18 mg/3 mL) subcutaneous pen injector (Patient not taking: Reported on 12/21/2021)      diclofenac sodium (VOLTAREN) 1 % GEL 1 g      famotidine (PEPCID) 20 MG tablet Take 1 tablet by mouth nightly 30 tablet 3    pantoprazole (PROTONIX) 40 MG tablet Take 1 tablet by mouth daily 30 tablet 3    sucralfate (CARAFATE) 1 GM tablet Take 1 tablet by mouth 4 times daily 120 tablet 3    tiotropium (SPIRIVA HANDIHALER) 18 MCG inhalation capsule Spiriva Respimat 2.5 mcg/actuation solution for inhalation   Inhale 2 puffs every day by inhalation route.       ondansetron (ZOFRAN ODT) 4 MG disintegrating tablet Take 1 tablet by mouth every 8 hours as needed for Nausea 5 tablet 0    Promethazine-DM (PROMETHAZINE-DEXTROMETHORPHAN) 6.25-15 MG/5ML SOLN solution Take 5 mLs by mouth 4 times daily   1    aspirin 81 MG chewable tablet Take 1 tablet by mouth daily 30 tablet 3    butalbital-acetaminophen-caffeine (FIORICET, ESGIC) -40 MG per tablet Take 1 tablet by mouth every 4 hours as needed for Headaches 20 tablet 0    albuterol sulfate  (90 Base) MCG/ACT inhaler Inhale 2 puffs into the lungs 4 times daily 1 Inhaler 3    ipratropium (ATROVENT HFA) 17 MCG/ACT inhaler Inhale 2 puffs into the lungs 4 times daily 1 Inhaler 3    lisinopril (PRINIVIL;ZESTRIL) 20 MG tablet Take 1 tablet by mouth daily 30 tablet 3    metFORMIN (GLUCOPHAGE) 500 MG tablet Take 1 tablet by mouth 2 times daily (with meals) 60 tablet 3    POTASSIUM CHLORIDE PO Take 25 mg by mouth daily      benzonatate (TESSALON PERLES) 100 MG capsule Take 1 capsule by mouth every 8 hours as needed for Cough 20 capsule 0    hydrochlorothiazide (HYDRODIURIL) 25 MG tablet Take 25 mg by mouth daily      vitamin D (ERGOCALCIFEROL) 05534 UNITS CAPS capsule Take 1,000 Units by mouth daily       metoprolol (LOPRESSOR) 50 MG tablet Take 50 mg by mouth 2 times daily      loratadine (CLARITIN) 10 MG tablet Take 10 mg by mouth daily        No current facility-administered medications for this visit. Vital Signs:  /82 (Site: Right Upper Arm, Position: Sitting, Cuff Size: Large Adult)   Pulse 78   Ht 5' 11.5\" (1.816 m)   Wt (!) 575 lb (260.8 kg)   BMI 79.08 kg/m²     BMI/Height/Weight:  Body mass index is 79.08 kg/m². Review of Systems  Constitutional: Weight loss      Objective:      Physical Exam   Vital signs reviewed. General Appearance: Well-developed and well-nourished. No acute distress. Skin: Warm, dry. Head: Normocephalic. Pulmonary/Chest: Normal respiratory effort. Musculoskeletal: Movement x4. Neurological:  Alert and oriented. Individual goal for this encounter: To be able to get a knee replacement. I just learned when I had knee surgery as a kid that my knee didn't grow back where it's supposed to be. X ? Vital signs reviewed and discussed with patient. ? Labs/EKG reviewed and discussed with patient. ? Blood sugar log reviewed and discussed with patient. ? Physical activity discussed. ? Medication changes recommended. ? Smoking cessation discussed. X ?  Specific questions/concerns addressed. Specific group medical question(s) addressed in this encounter:  Discussion about blood clots and strokes. Group discussion topic for this encounter:     ? [place]   ? Be a Fat & Calorie    ? Healthy Eating   ? Move Those Muscles   ? Tip the Calorie Balance   ? Take charge of What's Around You   ? Problem Solving   ? Step Up Your Physical Activity Plan   ? Manage Slips and Self-Defeating Thoughts   ? [address]   ? Make Social Cues Work for Dalia Singleton   ? Ways to Stay Motivated   ? Preparing for Long Term Self-Management   ? Take Charge of Your Lifestyle  X ? Mindful Eating, Mindful Movement   ? Manage Your Stress   ? Sit Less for Health   ? More Volume, Fewer Calories   ? Stay Active   ? Balance Your Thoughts   ? Heart Health    ? Looking Back and Looking Forward    Total time:  90 minutes, greater than 50% of visit spent in group counseling/education. Assessment:       Diagnosis Orders   1. Type 2 diabetes mellitus without complication, without long-term current use of insulin ([address]     2. Hypertension, unspecified type     3. Obesity hypoventilation syndrome ([address]     4. BRYSON (obstructive sleep apnea)     5. Chronic bipolar disorder ([address]     6. Gastroesophageal reflux disease, unspecified whether esophagitis present     7. Hyperlipidemia, unspecified hyperlipidemia type     8. Anxiety and depression     9. Uncomplicated asthma, unspecified asthma severity, unspecified whether persistent     10. Pulmonary hypertension ([address]     11. Normocytic normochromic anemia     12. Cervical radiculopathy     13. Chronic obstructive pulmonary disease, unspecified COPD type ([address]     14. Morbid obesity with BMI of 70 and over, adult [facility])         Plan:      Track food and weight. Return to clinic as per group medical appointment schedule. Follow-up:  Return in about 1 month (around 2/4/2022).     Orders:  No orders of the defined types were

## 2022-01-11 ENCOUNTER — TELEPHONE (OUTPATIENT)
Dept: OBGYN | Age: 37
End: 2022-01-11

## 2022-01-12 ENCOUNTER — HOSPITAL ENCOUNTER (OUTPATIENT)
Age: 37
Setting detail: SPECIMEN
Discharge: HOME | End: 2022-01-12

## 2022-01-12 ENCOUNTER — OFFICE VISIT (OUTPATIENT)
Dept: OBGYN | Age: 37
End: 2022-01-12
Payer: COMMERCIAL

## 2022-01-12 VITALS
DIASTOLIC BLOOD PRESSURE: 81 MMHG | WEIGHT: 293 LBS | HEIGHT: 71 IN | SYSTOLIC BLOOD PRESSURE: 129 MMHG | BODY MASS INDEX: 41.02 KG/M2 | HEART RATE: 73 BPM

## 2022-01-12 DIAGNOSIS — R10.2 PELVIC PAIN IN FEMALE: ICD-10-CM

## 2022-01-12 DIAGNOSIS — N89.8 VAGINAL IRRITATION: ICD-10-CM

## 2022-01-12 DIAGNOSIS — N89.8 VAGINAL IRRITATION: Primary | ICD-10-CM

## 2022-01-12 PROCEDURE — 1036F TOBACCO NON-USER: CPT | Performed by: OBSTETRICS & GYNECOLOGY

## 2022-01-12 PROCEDURE — G8484 FLU IMMUNIZE NO ADMIN: HCPCS | Performed by: OBSTETRICS & GYNECOLOGY

## 2022-01-12 PROCEDURE — G8417 CALC BMI ABV UP PARAM F/U: HCPCS | Performed by: OBSTETRICS & GYNECOLOGY

## 2022-01-12 PROCEDURE — 99213 OFFICE O/P EST LOW 20 MIN: CPT | Performed by: OBSTETRICS & GYNECOLOGY

## 2022-01-12 PROCEDURE — 99211 OFF/OP EST MAY X REQ PHY/QHP: CPT | Performed by: OBSTETRICS & GYNECOLOGY

## 2022-01-12 PROCEDURE — G8427 DOCREV CUR MEDS BY ELIG CLIN: HCPCS | Performed by: OBSTETRICS & GYNECOLOGY

## 2022-01-12 RX ORDER — FLUCONAZOLE 150 MG/1
150 TABLET ORAL ONCE
Qty: 2 TABLET | Refills: 0 | Status: CANCELLED | OUTPATIENT
Start: 2022-01-12 | End: 2022-01-12

## 2022-01-12 RX ORDER — METRONIDAZOLE 500 MG/1
500 TABLET ORAL 2 TIMES DAILY
Qty: 14 TABLET | Refills: 0 | Status: SHIPPED | OUTPATIENT
Start: 2022-01-12 | End: 2022-01-19
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
## 2022-01-13 LAB
C TRACH DNA GENITAL QL NAA+PROBE: NEGATIVE
CANDIDA SPECIES, DNA PROBE: POSITIVE
GARDNERELLA VAGINALIS, DNA PROBE: NEGATIVE
N. GONORRHOEAE DNA: NEGATIVE
SOURCE: ABNORMAL
SPECIMEN DESCRIPTION: NORMAL
TRICHOMONAS VAGINALIS DNA: NEGATIVE

## 2022-01-13 RX ORDER — FLUCONAZOLE 150 MG/1
150 TABLET ORAL ONCE
Qty: 2 TABLET | Refills: 0 | Status: SHIPPED | OUTPATIENT
Start: 2022-01-13 | End: 2022-01-13

## 2022-01-31 ENCOUNTER — TELEPHONE (OUTPATIENT)
Dept: OBGYN | Age: 37
End: 2022-01-31

## 2022-02-04 RX ORDER — SODIUM CHLORIDE, SODIUM LACTATE, POTASSIUM CHLORIDE, CALCIUM CHLORIDE 600; 310; 30; 20 MG/100ML; MG/100ML; MG/100ML; MG/100ML
1000 INJECTION, SOLUTION INTRAVENOUS CONTINUOUS
Status: CANCELLED | OUTPATIENT
Start: 2022-02-04

## 2022-02-07 ENCOUNTER — HOSPITAL ENCOUNTER (OUTPATIENT)
Dept: PREADMISSION TESTING | Age: 37
Discharge: HOME | End: 2022-02-11
Payer: COMMERCIAL

## 2022-02-07 ENCOUNTER — HOSPITAL ENCOUNTER (OUTPATIENT)
Dept: GENERAL RADIOLOGY | Age: 37
Discharge: HOME | End: 2022-02-09
Payer: COMMERCIAL

## 2022-02-07 VITALS
DIASTOLIC BLOOD PRESSURE: 95 MMHG | HEART RATE: 71 BPM | TEMPERATURE: 95.9 F | BODY MASS INDEX: 41.02 KG/M2 | RESPIRATION RATE: 24 BRPM | HEIGHT: 71 IN | WEIGHT: 293 LBS | OXYGEN SATURATION: 97 % | SYSTOLIC BLOOD PRESSURE: 148 MMHG

## 2022-02-07 LAB
ABO/RH: NORMAL
ANTIBODY SCREEN: NEGATIVE
ARM BAND NUMBER: NORMAL
EXPIRATION DATE: NORMAL
HCG(URINE) PREGNANCY TEST: NEGATIVE

## 2022-02-07 PROCEDURE — 86900 BLOOD TYPING SEROLOGIC ABO: CPT

## 2022-02-07 PROCEDURE — 71046 X-RAY EXAM CHEST 2 VIEWS: CPT

## 2022-02-07 PROCEDURE — 86901 BLOOD TYPING SEROLOGIC RH(D): CPT

## 2022-02-07 PROCEDURE — 81025 URINE PREGNANCY TEST: CPT

## 2022-02-07 PROCEDURE — 86850 RBC ANTIBODY SCREEN: CPT

## 2022-02-07 PROCEDURE — 93005 ELECTROCARDIOGRAM TRACING: CPT | Performed by: OBSTETRICS & GYNECOLOGY

## 2022-02-07 RX ORDER — LANCETS 33 GAUGE
EACH MISCELLANEOUS
COMMUNITY
Start: 2021-12-28

## 2022-02-07 RX ORDER — SEMAGLUTIDE 1.34 MG/ML
1 INJECTION, SOLUTION SUBCUTANEOUS WEEKLY
COMMUNITY
Start: 2022-01-25

## 2022-02-07 RX ORDER — BLOOD SUGAR DIAGNOSTIC
STRIP MISCELLANEOUS
COMMUNITY
Start: 2022-01-31

## 2022-02-07 ASSESSMENT — PAIN DESCRIPTION - FREQUENCY: FREQUENCY: CONTINUOUS

## 2022-02-07 ASSESSMENT — PAIN DESCRIPTION - PAIN TYPE: TYPE: CHRONIC PAIN

## 2022-02-07 ASSESSMENT — PAIN DESCRIPTION - DESCRIPTORS: DESCRIPTORS: ACHING;CONSTANT

## 2022-02-07 ASSESSMENT — PAIN SCALES - GENERAL: PAINLEVEL_OUTOF10: 2

## 2022-02-07 ASSESSMENT — PAIN DESCRIPTION - LOCATION: LOCATION: KNEE

## 2022-02-07 ASSESSMENT — PAIN DESCRIPTION - ORIENTATION: ORIENTATION: RIGHT

## 2022-02-07 NOTE — PROGRESS NOTES
Anesthesia Focused Assessment    Hx of anesthesia complications:  Prolonged emergence  Family hx of anesthesia complications:  no      Prior + Covid-19 test? no  Patient vaccinated: no    ---------------------------------------------------------------------------------------------------------------------  BRYSON:                                             yes  If yes, machine?:                          Cpap, with 3 liters O2    Type 1 DM:                                   no  T2DM:                                           Yes, managed by PCP    Coronary Artery Disease:             no  Hypertension:                               Yes  Defib / AICD / Pacemaker:           no    Renal Failure:                               no  If yes, on dialysis? :                           Active smoker:                              no  Drinks Alcohol:                              rare  Illicit drugs:                                    Weed, daily    Dentition:                                      Benign     Past Medical History:   Diagnosis Date    Acquired hallux rigidus 4/19/2019    Acute on chronic respiratory failure with hypoxia and hypercapnia (HCC) 11/6/2018    Arthritis     rheumatoid arthritis, see's ortho for right knee and has had injections but has never seen rheumatology    ASCUS with positive high risk HPV cervical     Asthma     Bipolar disorder ([facility] 04/19/2019    does not see any one for this    Breast discharge 4/9/2015    Cellulitis 4/19/2019    Chest pain     saw cardiology for clearance Dr. Norris Haile at [facility] on 9/23/21, notes indicate wanting pt to have echo but has not been done    Chlamydia ?     Reported hx    Chronic cor pulmonale (HCC)     Chronic cough     COPD (chronic obstructive pulmonary disease) ([facility]     Dr. Karolina Beckman, last seen 12/14/21    D-dimer, elevated 11/18/2018    Dysfunctional uterine bleeding 1/27/2014    Dysplasia of cervix, low grade (ISAIAS 1) 3/30/2017    Noted on colposcopy 3/7/17    Ectopic pregnancy - Right 2/9/2014    Pt was initiallly dx with a left ovarian ectopic. After Laproscopic eval she was noted to have a Right Tubal ectopic. She underwent a RSO on 2/9/14.  Elevated brain natriuretic peptide (BNP) level     Gastroesophageal reflux disease 4/19/2019    GERD (gastroesophageal reflux disease)     managed per PCP,  states has had a EGD with Dr. Buddy Alvarado H/O abnormal cervical Papanicolaou smear 2/3/2017    LSIL - 2/2015 Pap collected 2/3/2017. Patient is very difficult SSE as she is morbidly obese. Required ringed forceps and snowman speculum.       Heartburn     History of trichomonal vaginitis 1/20106    tx'd    Hypertension     per PCP    Hypoalbuminemia due to protein-calorie malnutrition ([address] 11/6/2018    Hypocalcemia 11/21/2018    Migraine 4/19/2019    Morbid obesity with BMI of 70 and over, adult ([address]     BMI 74, in non surgical treatment [place], states goes monthly    MRSA (methicillin resistant Staphylococcus aureus) 7/2010    Left leg    Muscle weakness     Normocytic normochromic anemia 11/6/2018    Obesity hypoventilation syndrome ([address] 11/4/2018    BRYSON (obstructive sleep apnea)     BRYSON on CPAP with 3 L/ O2    Ovarian ectopic pregnancy 2/9/2014    Right tubal ectopic tx'd with MTX AND LS RSO(initially dx as Left ovarian By USN 2/8/14)    Pain     right knee and left shoulder    Pelvic adhesive disease 2/9/14    C/S to bladder, as well as reactive to ectopic; also hx PID    Prolonged emergence from general anesthesia     Pulmonary hypertension ([address]     Sprain of ankle 4/19/2019    Type 2 diabetes mellitus without complication, without long-term current use of insulin ([address] 11/4/2018    per PCP    Viral illness 01/04/2022    fever, chills, diarrhea, sinus congestion x 1 day, fatigue x 4 days, + covid exposure but was never tested    Vitamin D deficiency 4/19/2019     Patient was evaluated in PAT & anesthesia guidelines were applied. NPO guidelines, medication instructions and scheduled arrival time were reviewed with patient. Medical or cardiac clearance ordered: had recent (1/25/22 CMP, HgB A1C, CBC w/diff, PT/INR per PCP. Medical clearance granted and on file from Dr. King Bass. Pt met with Dr. Sumaya Anand cardiology 9/2021, notes on file. \"Patient has low to intermediate risk for cardiac event for low risk procedure\" per cardio note. Per cardiology notes from [facility], an echo was also ordered. As written by cardiology: \"Dyspnea on exertion, chest discomfort: atypical symptoms and most likely related to her morbid obesity. We will check an echocardiogram especially given her risk factors and her hypertension. \"     Pt reports echo was never done d/t insurance issues per the patient. Anesthesia contacted: yes. I spoke with Dr. Fallon Garcia. Patient history and pertinent findings reviewed (as documented above). No further orders.       JUANY Tomas CNP  Electronically signed 2/7/2022 at 2:17 PM

## 2022-02-08 LAB
EKG ATRIAL RATE: 75 BPM
EKG P AXIS: 57 DEGREES
EKG P-R INTERVAL: 220 MS
EKG Q-T INTERVAL: 400 MS
EKG QRS DURATION: 110 MS
EKG QTC CALCULATION (BAZETT): 446 MS
EKG R AXIS: 21 DEGREES
EKG T AXIS: 48 DEGREES
EKG VENTRICULAR RATE: 75 BPM

## 2022-02-10 ENCOUNTER — HOSPITAL ENCOUNTER (OUTPATIENT)
Dept: LAB | Age: 37
Setting detail: SPECIMEN
Discharge: HOME | End: 2022-02-10
Payer: COMMERCIAL

## 2022-02-10 DIAGNOSIS — Z01.818 PREOP TESTING: Primary | ICD-10-CM

## 2022-02-10 PROCEDURE — U0005 INFEC AGEN DETEC AMPLI PROBE: HCPCS

## 2022-02-10 PROCEDURE — U0003 INFECTIOUS AGENT DETECTION BY NUCLEIC ACID (DNA OR RNA); SEVERE ACUTE RESPIRATORY SYNDROME CORONAVIRUS 2 (SARS-COV-2) (CORONAVIRUS DISEASE [COVID-19]), AMPLIFIED PROBE TECHNIQUE, MAKING USE OF HIGH THROUGHPUT TECHNOLOGIES AS DESCRIBED BY CMS-2020-01-R: HCPCS

## 2022-02-11 LAB
SARS-COV-2: NORMAL
SARS-COV-2: NOT DETECTED
SOURCE: NORMAL

## 2022-02-17 ENCOUNTER — TELEPHONE (OUTPATIENT)
Dept: OBGYN | Age: 37
End: 2022-02-17

## 2022-02-17 NOTE — TELEPHONE ENCOUNTER
Ryann Taylor from [facility]op [facility] notified me that patient had Covid test on 2/10/22 which was negative. Her surgery is 2/21/22. She will need to repeat her Covid test because it was done to early. Patient is scheduled at [facility] at 1:30 pm.      Unable to leave a voice message for patient. I did send a [employer] message with above information and asked her to call me when she receives the message. A Rapid Covid test could be done prior to surgery too.

## 2022-02-21 ENCOUNTER — HOSPITAL ENCOUNTER (OUTPATIENT)
Age: 37
Setting detail: OUTPATIENT SURGERY
Discharge: HOME HEALTH | End: 2022-02-21
Attending: OBSTETRICS & GYNECOLOGY | Admitting: OBSTETRICS & GYNECOLOGY
Payer: COMMERCIAL

## 2022-02-21 ENCOUNTER — ANESTHESIA EVENT (OUTPATIENT)
Dept: OPERATING ROOM | Age: 37
End: 2022-02-21
Payer: COMMERCIAL

## 2022-02-21 ENCOUNTER — ANESTHESIA (OUTPATIENT)
Dept: OPERATING ROOM | Age: 37
End: 2022-02-21
Payer: COMMERCIAL

## 2022-02-21 VITALS
BODY MASS INDEX: 41.02 KG/M2 | SYSTOLIC BLOOD PRESSURE: 138 MMHG | WEIGHT: 293 LBS | TEMPERATURE: 96.6 F | OXYGEN SATURATION: 95 % | DIASTOLIC BLOOD PRESSURE: 89 MMHG | RESPIRATION RATE: 18 BRPM | HEIGHT: 71 IN | HEART RATE: 70 BPM

## 2022-02-21 VITALS — TEMPERATURE: 96.9 F | OXYGEN SATURATION: 98 % | SYSTOLIC BLOOD PRESSURE: 128 MMHG | DIASTOLIC BLOOD PRESSURE: 79 MMHG

## 2022-02-21 PROBLEM — Z98.890 POSTOPERATIVE STATE: Status: ACTIVE | Noted: 2022-02-21

## 2022-02-21 PROBLEM — Z97.5 IUD (INTRAUTERINE DEVICE) IN PLACE: Status: ACTIVE | Noted: 2022-02-21

## 2022-02-21 LAB
GLUCOSE BLD-MCNC: 104 MG/DL (ref 65–105)
GLUCOSE BLD-MCNC: 104 MG/DL (ref 74–100)
POC POTASSIUM: 3.4 MMOL/L (ref 3.5–4.5)
SARS-COV-2, RAPID: NOT DETECTED
SPECIMEN DESCRIPTION: NORMAL

## 2022-02-21 PROCEDURE — 7100000041 HC SPAR PHASE II RECOVERY - ADDTL 15 MIN: Performed by: OBSTETRICS & GYNECOLOGY

## 2022-02-21 PROCEDURE — 84132 ASSAY OF SERUM POTASSIUM: CPT

## 2022-02-21 PROCEDURE — 88305 TISSUE EXAM BY PATHOLOGIST: CPT

## 2022-02-21 PROCEDURE — 7100000040 HC SPAR PHASE II RECOVERY - FIRST 15 MIN: Performed by: OBSTETRICS & GYNECOLOGY

## 2022-02-21 PROCEDURE — 2580000003 HC RX 258: Performed by: NURSE ANESTHETIST, CERTIFIED REGISTERED

## 2022-02-21 PROCEDURE — A4216 STERILE WATER/SALINE, 10 ML: HCPCS | Performed by: NURSE ANESTHETIST, CERTIFIED REGISTERED

## 2022-02-21 PROCEDURE — 6360000002 HC RX W HCPCS: Performed by: ANESTHESIOLOGY

## 2022-02-21 PROCEDURE — 81025 URINE PREGNANCY TEST: CPT

## 2022-02-21 PROCEDURE — 7100000000 HC PACU RECOVERY - FIRST 15 MIN: Performed by: OBSTETRICS & GYNECOLOGY

## 2022-02-21 PROCEDURE — 3600000004 HC SURGERY LEVEL 4 BASE: Performed by: OBSTETRICS & GYNECOLOGY

## 2022-02-21 PROCEDURE — 6360000002 HC RX W HCPCS: Performed by: OBSTETRICS & GYNECOLOGY

## 2022-02-21 PROCEDURE — 3700000001 HC ADD 15 MINUTES (ANESTHESIA): Performed by: OBSTETRICS & GYNECOLOGY

## 2022-02-21 PROCEDURE — 58558 HYSTEROSCOPY BIOPSY: CPT | Performed by: OBSTETRICS & GYNECOLOGY

## 2022-02-21 PROCEDURE — 3700000000 HC ANESTHESIA ATTENDED CARE: Performed by: OBSTETRICS & GYNECOLOGY

## 2022-02-21 PROCEDURE — 6360000002 HC RX W HCPCS: Performed by: NURSE ANESTHETIST, CERTIFIED REGISTERED

## 2022-02-21 PROCEDURE — 2500000003 HC RX 250 WO HCPCS: Performed by: NURSE ANESTHETIST, CERTIFIED REGISTERED

## 2022-02-21 PROCEDURE — 58300 INSERT INTRAUTERINE DEVICE: CPT | Performed by: OBSTETRICS & GYNECOLOGY

## 2022-02-21 PROCEDURE — 7100000001 HC PACU RECOVERY - ADDTL 15 MIN: Performed by: OBSTETRICS & GYNECOLOGY

## 2022-02-21 PROCEDURE — 2709999900 HC NON-CHARGEABLE SUPPLY: Performed by: OBSTETRICS & GYNECOLOGY

## 2022-02-21 PROCEDURE — 87635 SARS-COV-2 COVID-19 AMP PRB: CPT

## 2022-02-21 PROCEDURE — 2580000003 HC RX 258: Performed by: ANESTHESIOLOGY

## 2022-02-21 PROCEDURE — 2720000010 HC SURG SUPPLY STERILE: Performed by: OBSTETRICS & GYNECOLOGY

## 2022-02-21 PROCEDURE — 82947 ASSAY GLUCOSE BLOOD QUANT: CPT

## 2022-02-21 PROCEDURE — 3600000014 HC SURGERY LEVEL 4 ADDTL 15MIN: Performed by: OBSTETRICS & GYNECOLOGY

## 2022-02-21 RX ORDER — ONDANSETRON 2 MG/ML
4 INJECTION INTRAMUSCULAR; INTRAVENOUS
Status: DISCONTINUED | OUTPATIENT
Start: 2022-02-21 | End: 2022-02-21 | Stop reason: HOSPADM

## 2022-02-21 RX ORDER — FENTANYL CITRATE 50 UG/ML
INJECTION, SOLUTION INTRAMUSCULAR; INTRAVENOUS PRN
Status: DISCONTINUED | OUTPATIENT
Start: 2022-02-21 | End: 2022-02-21 | Stop reason: SDUPTHER

## 2022-02-21 RX ORDER — SODIUM CHLORIDE 0.9 % (FLUSH) 0.9 %
5-40 SYRINGE (ML) INJECTION PRN
Status: DISCONTINUED | OUTPATIENT
Start: 2022-02-21 | End: 2022-02-21 | Stop reason: HOSPADM

## 2022-02-21 RX ORDER — SODIUM CHLORIDE 9 MG/ML
INJECTION INTRAVENOUS PRN
Status: DISCONTINUED | OUTPATIENT
Start: 2022-02-21 | End: 2022-02-21 | Stop reason: SDUPTHER

## 2022-02-21 RX ORDER — SUCCINYLCHOLINE CHLORIDE 20 MG/ML
INJECTION INTRAMUSCULAR; INTRAVENOUS PRN
Status: DISCONTINUED | OUTPATIENT
Start: 2022-02-21 | End: 2022-02-21 | Stop reason: SDUPTHER

## 2022-02-21 RX ORDER — LIDOCAINE HYDROCHLORIDE 10 MG/ML
1 INJECTION, SOLUTION EPIDURAL; INFILTRATION; INTRACAUDAL; PERINEURAL
Status: DISCONTINUED | OUTPATIENT
Start: 2022-02-21 | End: 2022-02-21 | Stop reason: HOSPADM

## 2022-02-21 RX ORDER — MIDAZOLAM HYDROCHLORIDE 1 MG/ML
INJECTION INTRAMUSCULAR; INTRAVENOUS PRN
Status: DISCONTINUED | OUTPATIENT
Start: 2022-02-21 | End: 2022-02-21 | Stop reason: SDUPTHER

## 2022-02-21 RX ORDER — PROPOFOL 10 MG/ML
INJECTION, EMULSION INTRAVENOUS PRN
Status: DISCONTINUED | OUTPATIENT
Start: 2022-02-21 | End: 2022-02-21 | Stop reason: SDUPTHER

## 2022-02-21 RX ORDER — DEXAMETHASONE SODIUM PHOSPHATE 10 MG/ML
INJECTION INTRAMUSCULAR; INTRAVENOUS PRN
Status: DISCONTINUED | OUTPATIENT
Start: 2022-02-21 | End: 2022-02-21 | Stop reason: SDUPTHER

## 2022-02-21 RX ORDER — FENTANYL CITRATE 50 UG/ML
25 INJECTION, SOLUTION INTRAMUSCULAR; INTRAVENOUS EVERY 5 MIN PRN
Status: DISCONTINUED | OUTPATIENT
Start: 2022-02-21 | End: 2022-02-21 | Stop reason: HOSPADM

## 2022-02-21 RX ORDER — FENTANYL CITRATE 50 UG/ML
50 INJECTION, SOLUTION INTRAMUSCULAR; INTRAVENOUS EVERY 5 MIN PRN
Status: DISCONTINUED | OUTPATIENT
Start: 2022-02-21 | End: 2022-02-21 | Stop reason: HOSPADM

## 2022-02-21 RX ORDER — LIDOCAINE HYDROCHLORIDE 10 MG/ML
INJECTION, SOLUTION EPIDURAL; INFILTRATION; INTRACAUDAL; PERINEURAL PRN
Status: DISCONTINUED | OUTPATIENT
Start: 2022-02-21 | End: 2022-02-21 | Stop reason: SDUPTHER

## 2022-02-21 RX ORDER — ROCURONIUM BROMIDE 10 MG/ML
INJECTION, SOLUTION INTRAVENOUS PRN
Status: DISCONTINUED | OUTPATIENT
Start: 2022-02-21 | End: 2022-02-21 | Stop reason: SDUPTHER

## 2022-02-21 RX ORDER — MIDAZOLAM HYDROCHLORIDE 2 MG/2ML
1 INJECTION, SOLUTION INTRAMUSCULAR; INTRAVENOUS EVERY 10 MIN PRN
Status: DISCONTINUED | OUTPATIENT
Start: 2022-02-21 | End: 2022-02-21 | Stop reason: HOSPADM

## 2022-02-21 RX ORDER — DIPHENHYDRAMINE HYDROCHLORIDE 50 MG/ML
12.5 INJECTION INTRAMUSCULAR; INTRAVENOUS
Status: DISCONTINUED | OUTPATIENT
Start: 2022-02-21 | End: 2022-02-21 | Stop reason: HOSPADM

## 2022-02-21 RX ORDER — DOCUSATE SODIUM 100 MG/1
100 CAPSULE, LIQUID FILLED ORAL 2 TIMES DAILY PRN
Qty: 60 CAPSULE | Refills: 1 | Status: SHIPPED | OUTPATIENT
Start: 2022-02-21 | End: 2022-03-23

## 2022-02-21 RX ORDER — SODIUM CHLORIDE, SODIUM LACTATE, POTASSIUM CHLORIDE, CALCIUM CHLORIDE 600; 310; 30; 20 MG/100ML; MG/100ML; MG/100ML; MG/100ML
INJECTION, SOLUTION INTRAVENOUS CONTINUOUS
Status: DISCONTINUED | OUTPATIENT
Start: 2022-02-21 | End: 2022-02-21 | Stop reason: HOSPADM

## 2022-02-21 RX ORDER — SODIUM CHLORIDE 9 MG/ML
25 INJECTION, SOLUTION INTRAVENOUS PRN
Status: DISCONTINUED | OUTPATIENT
Start: 2022-02-21 | End: 2022-02-21 | Stop reason: HOSPADM

## 2022-02-21 RX ORDER — IBUPROFEN 800 MG/1
800 TABLET ORAL EVERY 8 HOURS PRN
Qty: 30 TABLET | Refills: 0 | Status: SHIPPED | OUTPATIENT
Start: 2022-02-21

## 2022-02-21 RX ORDER — SODIUM CHLORIDE 0.9 % (FLUSH) 0.9 %
5-40 SYRINGE (ML) INJECTION EVERY 12 HOURS SCHEDULED
Status: DISCONTINUED | OUTPATIENT
Start: 2022-02-21 | End: 2022-02-21 | Stop reason: HOSPADM

## 2022-02-21 RX ORDER — SODIUM CHLORIDE, SODIUM LACTATE, POTASSIUM CHLORIDE, CALCIUM CHLORIDE 600; 310; 30; 20 MG/100ML; MG/100ML; MG/100ML; MG/100ML
1000 INJECTION, SOLUTION INTRAVENOUS CONTINUOUS
Status: DISCONTINUED | OUTPATIENT
Start: 2022-02-21 | End: 2022-02-21 | Stop reason: HOSPADM

## 2022-02-21 RX ADMIN — FENTANYL CITRATE 25 MCG: 50 INJECTION, SOLUTION INTRAMUSCULAR; INTRAVENOUS at 12:47

## 2022-02-21 RX ADMIN — ROCURONIUM BROMIDE 10 MG: 10 INJECTION INTRAVENOUS at 11:11

## 2022-02-21 RX ADMIN — FENTANYL CITRATE 100 MCG: 50 INJECTION, SOLUTION INTRAMUSCULAR; INTRAVENOUS at 11:18

## 2022-02-21 RX ADMIN — SODIUM CHLORIDE, POTASSIUM CHLORIDE, SODIUM LACTATE AND CALCIUM CHLORIDE: 600; 310; 30; 20 INJECTION, SOLUTION INTRAVENOUS at 09:52

## 2022-02-21 RX ADMIN — SODIUM CHLORIDE 9 ML: 9 INJECTION, SOLUTION INTRAMUSCULAR; INTRAVENOUS; SUBCUTANEOUS at 11:29

## 2022-02-21 RX ADMIN — PHENYLEPHRINE HYDROCHLORIDE 100 MCG: 10 INJECTION INTRAVENOUS at 11:29

## 2022-02-21 RX ADMIN — MIDAZOLAM HYDROCHLORIDE 2 MG: 1 INJECTION, SOLUTION INTRAMUSCULAR; INTRAVENOUS at 11:10

## 2022-02-21 RX ADMIN — PROPOFOL 300 MG: 10 INJECTION, EMULSION INTRAVENOUS at 11:12

## 2022-02-21 RX ADMIN — Medication 200 MG: at 11:13

## 2022-02-21 RX ADMIN — DEXAMETHASONE SODIUM PHOSPHATE 10 MG: 10 INJECTION INTRAMUSCULAR; INTRAVENOUS at 11:18

## 2022-02-21 RX ADMIN — FENTANYL CITRATE 25 MCG: 50 INJECTION, SOLUTION INTRAMUSCULAR; INTRAVENOUS at 12:52

## 2022-02-21 RX ADMIN — LIDOCAINE HYDROCHLORIDE 50 MG: 10 INJECTION, SOLUTION EPIDURAL; INFILTRATION; INTRACAUDAL at 11:12

## 2022-02-21 RX ADMIN — ROCURONIUM BROMIDE 20 MG: 10 INJECTION INTRAVENOUS at 11:28

## 2022-02-21 RX ADMIN — PROPOFOL 100 MG: 10 INJECTION, EMULSION INTRAVENOUS at 11:18

## 2022-02-21 ASSESSMENT — ENCOUNTER SYMPTOMS: SHORTNESS OF BREATH: 1

## 2022-02-21 ASSESSMENT — PULMONARY FUNCTION TESTS
PIF_VALUE: 22
PIF_VALUE: 34
PIF_VALUE: 33
PIF_VALUE: 5
PIF_VALUE: 32
PIF_VALUE: 21
PIF_VALUE: 5
PIF_VALUE: 33
PIF_VALUE: 33
PIF_VALUE: 34
PIF_VALUE: 1
PIF_VALUE: 34
PIF_VALUE: 27
PIF_VALUE: 34
PIF_VALUE: 1
PIF_VALUE: 22
PIF_VALUE: 19
PIF_VALUE: 27
PIF_VALUE: 24
PIF_VALUE: 35
PIF_VALUE: 34
PIF_VALUE: 34
PIF_VALUE: 36
PIF_VALUE: 34
PIF_VALUE: 25
PIF_VALUE: 0
PIF_VALUE: 36
PIF_VALUE: 23
PIF_VALUE: 34
PIF_VALUE: 26
PIF_VALUE: 13
PIF_VALUE: 32
PIF_VALUE: 36
PIF_VALUE: 34
PIF_VALUE: 33
PIF_VALUE: 26
PIF_VALUE: 33
PIF_VALUE: 35
PIF_VALUE: 31
PIF_VALUE: 33
PIF_VALUE: 22
PIF_VALUE: 19
PIF_VALUE: 33
PIF_VALUE: 33
PIF_VALUE: 21
PIF_VALUE: 26
PIF_VALUE: 27
PIF_VALUE: 1
PIF_VALUE: 33

## 2022-02-21 ASSESSMENT — PAIN DESCRIPTION - DESCRIPTORS
DESCRIPTORS: CRAMPING
DESCRIPTORS: ACHING;CONSTANT
DESCRIPTORS: PRESSURE

## 2022-02-21 ASSESSMENT — PAIN DESCRIPTION - PAIN TYPE
TYPE: SURGICAL PAIN
TYPE: ACUTE PAIN

## 2022-02-21 ASSESSMENT — PAIN DESCRIPTION - PROGRESSION
CLINICAL_PROGRESSION: GRADUALLY IMPROVING
CLINICAL_PROGRESSION: GRADUALLY IMPROVING
CLINICAL_PROGRESSION: NOT CHANGED

## 2022-02-21 ASSESSMENT — PAIN SCALES - GENERAL
PAINLEVEL_OUTOF10: 2
PAINLEVEL_OUTOF10: 2
PAINLEVEL_OUTOF10: 10
PAINLEVEL_OUTOF10: 0
PAINLEVEL_OUTOF10: 2
PAINLEVEL_OUTOF10: 10

## 2022-02-21 ASSESSMENT — PAIN DESCRIPTION - FREQUENCY: FREQUENCY: CONTINUOUS

## 2022-02-21 ASSESSMENT — PAIN DESCRIPTION - LOCATION
LOCATION: ABDOMEN
LOCATION: ABDOMEN;VAGINA

## 2022-02-21 ASSESSMENT — PAIN - FUNCTIONAL ASSESSMENT: PAIN_FUNCTIONAL_ASSESSMENT: 0-10

## 2022-02-21 ASSESSMENT — PAIN DESCRIPTION - ORIENTATION: ORIENTATION: RIGHT;LEFT
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
## 2022-02-21 NOTE — OP NOTE
Operative Note  Department of Obstetrics and Gynecology  [facility]       Patient: Zach De Los Santos   : 1985  MRN: [id number]       Acct: [de-identified]   PCP: Palmer Pérez  Date of Procedure: 22    Pre-operative Diagnosis: 29 y.o. female [id number]  Abnormal Uterine Bleeding  Obesity      Post-operative Diagnosis: same as above, endometrial polyp    Procedure: Hysteroscopy, Dilation and Curettage with Myosure, Mirena IUD insertion    Surgeon: Dr. Mumtaz Brown,    Assistants: Carrie Valerio, PGY3; Bailey Cutler, PGY2    Anesthesia: general    Indications: Zach De Los Santos is a 29 y.o. [id number] who presented with abnormal uterine bleeding over multiple years. Pelvic US performed 11/10/20 showed a uterus measuring 11.9 x 5.4 x 3.0 cm with endometrial stripe measuring 8mm and normal endometrial and myometrial echotexture. She underwent EMB on 21 but sample was unable to be obtained with limited exam due to body habitus. EMB was planned to be performed while patient underwent bariatric surgery however this was cancelled due to inadequate weight loss prior to surgery. Patient requested to proceed with dilatation and curettage with hysteroscopy to rule out endometrial neoplasia as an etiology for her AUB. Patient desires Mirena IUD insertion at time of procedure for treatment of her symptoms. Procedure Details: The patient was seen in the pre-op room. The risks, benefits, complications, treatment options, and expected outcomes were discussed with the patient. The patient concurred with the proposed plan, giving informed consent. The patient was taken to the Operating Room and identified as Colonel Rm A Hair and the procedure was verified. A Time Out was held and the above information confirmed. After administration of general anesthesia, the patient was placed in the dorsolithotomy position with Yellofin stirrups. Care was taken during positioning to avoid injury.  Examination under general anesthesia performed with findings as noted below. The patient was prepped and draped in the usual sterile fashion. The bladder was drained with a straight catheter, and a weighted speculum was placed into the vagina to visualize the cervix. The cervix was grasped with an Allis clamp. The uterus and the cervix were sounded and measured 11 cm. The cervix was dilated with Blaine dilators to size 17 [place]. A Myosure operative hysteroscope was inserted into the uterine cavity showing thick-appearing endocervical and endometrial cavity with a posterior endometrial polyp. Endometrial curettage was carried out under direct visualization yielding curettings which were collected and sent for pathology examination. A Mirena IUD was brought onto the sterile field and the IUD was loaded into the applicator. The device was passed through the endocervical canal and advanced to the fundus. The IUD was withdrawn slightly and the button was drawn to the first line. After 10 seconds the IUD was fully deployed and the applicator was removed. The strings were trimmed in standard fashion. All instruments were then removed. Hemostasis was visualized at the Allis clamp site on the cervix. Instrument, sponge, and needle counts were correct at the conclusion of the case. SCDs for DVT prophylaxis remain in place for the post operative period. Dr. Karla Lowe was present for the entire operation.     Findings: 11cm sized anteverted uterus, thick appearing endometrial and endocervical tissue, posterior endometrial polyp, patent tubal ostia  Estimated Blood Loss: 5 ml  Drains:  None  Total IV Fluids: 400ml  Urine output: 200 ml clear urine   Specimens:  Endometrial curettings  Instrument and Sponge Count: Correct  Complications: none  Condition: stable, transfer to post anesthesia recovery      Donna Diaz DO  Ob/Gyn Resident  2/21/2022, 12:12 PM

## 2022-02-21 NOTE — H&P
OB/GYN Pre-Op H&P  [address] [facility]    Patient Name: Tamara Morales     Patient : 1985  [facility]  Admission Date/Time: 2022  8:23 AM  Primary Care Physician: Zbigniew Garrido  MRN: [id number]    Date: 2022  Time: 10:29 AM    The patient was seen in pre-op holding. She is here for dilatation and curettage with hysteroscopy. Rian August is a 29 y.o. [id number] who presented with abnormal uterine bleeding over multiple years. Pelvic US performed 11/10/20 showed a uterus measuring 11.9 x 5.4 x 3.0 cm with endometrial stripe measuring 8mm and normal endometrial and myometrial echotexture. She underwent EMB on 21 but sample was unable to be obtained with limited exam due to body habitus. EMB was planned to be performed while patient underwent bariatric surgery however this was cancelled due to inadequate weight loss prior to surgery. Patient requested to proceed with dilatation and curettage with hysteroscopy to rule out endometrial neoplasia as an etiology for her AUB. Patient desires Mirena IUD insertion at time of procedure for treatment of her symptoms. The procedure risks and complications were reviewed. The labs, Consent, and H&P were reviewed and updated. The patient was counseled on the possibility of  the need of a second surgery. The patient voiced understanding and had all of her questions answered. The possibility of incomplete removal of abnormal tissue was discussed. OBSTETRICAL HISTORY:   OB History    Para Term  AB Living   4 1 1 0 3 1   SAB IAB Ectopic Molar Multiple Live Births   2 0 1 0 0 0      # Outcome Date GA Lbr Mario/2nd Weight Sex Delivery Anes PTL Lv   4 Ectopic            3 Term 2005     CS-Unspec      2 SAB               Birth Comments: System Generated. Please review and update pregnancy details.    1 SAB                PAST MEDICAL HISTORY:   has a past medical history of Acquired hallux rigidus, Acute on chronic respiratory failure with hypoxia and hypercapnia (HCC), Arthritis, ASCUS with positive high risk HPV cervical, Asthma, Bipolar disorder ([facility] [address] Breast discharge, Cellulitis, Chest pain, Chlamydia, Chronic cor pulmonale (HCC), Chronic cough, COPD (chronic obstructive pulmonary disease) ([facility] [address] D-dimer, elevated, Dysfunctional uterine bleeding, Dysplasia of cervix, low grade (ISAIAS 1), Ectopic pregnancy - Right, Elevated brain natriuretic peptide (BNP) level, Gastroesophageal reflux disease, GERD (gastroesophageal reflux disease), H/O abnormal cervical Papanicolaou smear, Heartburn, History of trichomonal vaginitis, Hypertension, Hypoalbuminemia due to protein-calorie malnutrition ([facility] [address] Hypocalcemia, Migraine, Morbid obesity with BMI of 70 and over, adult ([facility] [address] MRSA (methicillin resistant Staphylococcus aureus), Muscle weakness, Normocytic normochromic anemia, Obesity hypoventilation syndrome ([facility] [address] BRYSON (obstructive sleep apnea), Ovarian ectopic pregnancy, Pain, Pelvic adhesive disease, Prolonged emergence from general anesthesia, Pulmonary hypertension ([facility] [address] Sprain of ankle, Type 2 diabetes mellitus without complication, without long-term current use of insulin ([facility] [address] Viral illness, and Vitamin D deficiency. PAST SURGICAL HISTORY:   has a past surgical history that includes  section (); Cholecystectomy, laparoscopic; Salpingo-oophorectomy (15); Leg Surgery; Tonsillectomy; and Upper gastrointestinal endoscopy (N/A, 7/10/2020).     ALLERGIES:  Allergies as of 2022    (No Known Allergies)       MEDICATIONS:  Current Facility-Administered Medications   Medication Dose Route Frequency Provider Last Rate Last Admin    lactated ringers infusion 1,000 mL  1,000 mL IntraVENous Continuous Manuel Greenwood MD        lidocaine PF 1 % injection 1 mL  1 mL IntraDERmal Once PRN Joy Rodriguez MD        lactated ringers infusion   IntraVENous Continuous Joy Rodriguez  mL/hr at [address] at 02/21/22 0952    midazolam PF (VERSED) injection 1 mg  1 mg IntraVENous Q10 Min PRN Jose R Foote MD           FAMILY HISTORY:  family history includes Breast Cancer in her maternal aunt; Cancer in her father, maternal aunt, and paternal aunt; Diabetes in her maternal aunt; Heart Disease in her mother; High Blood Pressure in her mother; Lung Cancer in her father and paternal aunt; Thyroid Disease in her sister. SOCIAL HISTORY:   reports that she quit smoking about 4 years ago. Her smoking use included cigarettes. She started smoking about 25 years ago. She has a 21.00 pack-year smoking history. She has never used smokeless tobacco. She reports current alcohol use. She reports current drug use. Frequency: 2.00 times per week. Drugs:  and Marijuana Glorymirian Rogers).     VITALS:  Vitals:    02/21/22 0937   BP: (!) 130/91   Pulse: 81   Resp: 20   Temp: 97.3 °F (36.3 °C)   SpO2: 98%                                                                                                                               PHYSICAL EXAM:     Unchanged from Prior H&P  CONSTITUTIONAL:  Alert and oriented, no acute distress  HEAD: normocephalic, atraumatic  EYES: Pupils equal and reactive to light, Extraocular muscles intact, sclera non icteric  ENT: Mucus membranes moist, No otorrhea, no rhinorrhea  NECK:  supple, symmetrical, trachea midline   LUNGS:  Good air movement bilaterally, unlabored respirations, no wheezes or rhonchi  CARDIOVASCULAR: Regular rate and rhythm, no murmurs rubs or gallops  ABDOMEN: soft, non tender, non distended, no rebound or guarding, no hernias, no hepatomegaly, no splenomegly  MUSCULOSKELETAL:  Equal strength bilaterally, normal muscle tone  SKIN: No abscess or rash  NEUROLOGIC:  Cranial nerves 2-12 grossly intact, no focal deficits  PSYCH: affect appropriate  Pelvic Exam: deferred to OR      LAB RESULTS:  Admission on 02/21/2022   Component Date Value Ref Range Status    Specimen Description 02/21/2022 . NASOPHARYNGEAL SWAB   Final    SARS-CoV-2, Rapid 02/21/2022 Not Detected  Not Detected Final    Comment:       Rapid NAAT:  The specimen is NEGATIVE for SARS-CoV-2, the novel coronavirus associated with   COVID-19. The ID NOW COVID-19 assay is designed to detect the virus that causes COVID-19 in patients   with signs and symptoms of infection who are suspected of COVID-19. An individual without symptoms of COVID-19 and who is not shedding SARS-CoV-2 virus would   expect to have a negative (not detected) result in this assay. Negative results should be treated as presumptive and, if inconsistent with clinical signs   and symptoms or necessary for patient management,  should be tested with an alternative molecular assay. Negative results do not preclude   SARS-CoV-2 infection and   should not be used as the sole basis for patient management decisions.          Fact sheet for Healthcare Providers: Mickey.elise  Fact sheet for Patients: Mickey.es          Methodology: Isothermal Nucleic Acid Amplification      POC Potassium 02/21/2022 3.4* 3.5 - 4.5 mmol/L Final    POC Glucose 02/21/2022 104* 74 - 100 mg/dL Final   Hospital Outpatient Visit on 02/07/2022   Component Date Value Ref Range Status    Ventricular Rate 02/07/2022 75  BPM Final    Atrial Rate 02/07/2022 75  BPM Final    P-R Interval 02/07/2022 220  ms Final    QRS Duration 02/07/2022 110  ms Final    Q-T Interval 02/07/2022 400  ms Final    QTc Calculation (Bazett) 02/07/2022 446  ms Final    P Axis 02/07/2022 57  degrees Final    R Axis 02/07/2022 21  degrees Final    T Axis 02/07/2022 48  degrees Final    Expiration Date 02/07/2022 02/24/2022,2359   Final    Arm Band Number 02/07/2022 BE [id number]   Final    ABO/Rh 02/07/2022 B POSITIVE   Final    Antibody Screen 02/07/2022 NEGATIVE   Final   Hospital Outpatient Visit on 02/10/2022   Component Date Value Ref Range Status    SARS-CoV-2 02/10/2022        Final    Source 02/10/2022 . NASOPHARYNGEAL SWAB   Final    SARS-CoV-2 02/10/2022 Not Detected  Not Detected Final    Comment:       The specimen is NEGATIVE for SARS-CoV-2, the novel coronavirus associated with COVID-19. A negative result does not rule out COVID-19. North SARS-CoV-2 for use on the [employer] is a real-time RT-PCR test intended   for the qualitative detection of nucleic acids from SARS-CoV-2  in clinician-collected nasal, nasopharyngeal, and oropharyngeal swab specimens from   individuals who meet COVID-19 clinical and/or epidemiological criteria. North SARS-CoV-2 is for use only under Emergency Use Authorization (EUA) in laboratories   certified under [address] (CLIA), 42 U. S.C. §772W,   that meet requirements to perform high or moderate complexity tests. An individual without symptoms of COVID-19 and who is not shedding SARS-CoV-2 virus would   expect to have a negative (not detected) result in this assay. Fact sheet for Healthcare Providers: BuildHer.es  Fact sheet for Patients: https://www.                           fda.gov/media/223872/download        METHODOLOGY: RT-PCR     Hospital Outpatient Visit on 02/07/2022   Component Date Value Ref Range Status    HCG(Urine) Pregnancy Test 02/07/2022 NEGATIVE  NEGATIVE Final    Comment: Specimens with hCG levels near the threshold of the test (25 mIU/mL) may give a negative or   indeterminate result. In such cases, another test should be performed with a new specimen   in 48-72 hours. If early pregnancy is suspected clinically in this setting, correlation   with quantitative serum b-hCG level is suggested.          DIAGNOSTICS:  Narrative   EXAMINATION:   PELVIC ULTRASOUND       11/10/2020       TECHNIQUE:   Transabdominal and transvaginal pelvic ultrasound was performed.       COMPARISON:   02/15/2017     HISTORY:   ORDERING SYSTEM PROVIDED HISTORY: Abnormal uterine bleeding       FINDINGS:   Examination is limited due to body habitus.           Measurements:       Uterus:  11.9 x 5.4 x 3.0 cm       Endometrial stripe:  8 mm       Right Ovary:  Surgically absent       Left Ovary:  Not measured           Ultrasound Findings:       Uterus: Uterus demonstrates normal myometrial echotexture.       Endometrial stripe: Endometrial stripe is within normal limits.       Right Ovary: Surgically absent       Left Ovary:  The left ovary is not confidently identified.  There is a 4.6 x   5.0 x 3.8 cm simple appearing cyst within the left adnexa.       Free Fluid: No evidence of free fluid.           Impression   1. Within the limitations described above, normal sonographic appearance of   the uterus. 2. Prior right oophorectomy. 3. 5 cm simple appearing left adnexal cyst.  Left ovary is not confidently   identified separate from this lesion.       RECOMMENDATIONS:   Management of Asymptomatic Ovarian and Other Adnexal Cysts Imaged at US:       Simple Cyst: Recommendations for f/u of ovarian anechoic simple cyst, simple   cyst with single thin <3 mm septation, or focal calcification in wall of cyst.       Pre-menopause:>5 cm - <7 cm US f/u annually       Reference: Radiology. 2010      XR CHEST (2 VW)    Result Date: 2/7/2022  EXAMINATION: TWO XRAY VIEWS OF THE CHEST 2/7/2022 2:16 pm COMPARISON: None. HISTORY: ORDERING SYSTEM PROVIDED HISTORY: preop, AUB FINDINGS: The lungs are without acute focal process. No effusion or pneumothorax. The cardiomediastinal silhouette is normal.  The osseous structures are intact without acute process. Negative chest.       DIAGNOSIS & PLAN:  1. Abnormal Uterine Bleeding    - Proceed with planned procedure: Dilatation and curettage, Hysteroscopy, Mirena IUD insertion   - Consent signed, on chart. - The patient is ready for transport to the operative suite. Counseling:   The patient was counseled on all options both medical and surgical, conservative as well as definitive. She has elected to proceed with the procedure as stated above. The patient was counseled on the procedure. Risks and complications were reviewed in detail. The patients orders, labs, consents have been completed. The history and physical as well as all supporting surgical documentation will be forwarded to the pre-operative holding area. The patient is aware that this procedure may not alleviate her symptoms. That there may be a necessity for a second surgery and that there may be an incomplete removal of abnormal tissue.     Caty Eid DO  Ob/Gyn Resident  Pager: [phone]  [facility], [place]  2/21/2022, 10:29 AM

## 2022-02-21 NOTE — PROGRESS NOTES
RN called and verified pts ride home. Sister- Valente Stephneson.  Stated she is about to leave to head up here to the hospital. Number is [phone]

## 2022-02-22 LAB
HCG, PREGNANCY URINE (POC): NEGATIVE
SURGICAL PATHOLOGY REPORT: NORMAL

## 2022-03-09 ENCOUNTER — OFFICE VISIT (OUTPATIENT)
Dept: OBGYN | Age: 37
End: 2022-03-09
Payer: COMMERCIAL

## 2022-03-09 VITALS
BODY MASS INDEX: 79.75 KG/M2 | HEART RATE: 77 BPM | SYSTOLIC BLOOD PRESSURE: 120 MMHG | DIASTOLIC BLOOD PRESSURE: 73 MMHG | WEIGHT: 293 LBS

## 2022-03-09 DIAGNOSIS — Z98.890 POST-OPERATIVE STATE: Primary | ICD-10-CM

## 2022-03-09 PROCEDURE — G8427 DOCREV CUR MEDS BY ELIG CLIN: HCPCS | Performed by: STUDENT IN AN ORGANIZED HEALTH CARE EDUCATION/TRAINING PROGRAM

## 2022-03-09 PROCEDURE — G8417 CALC BMI ABV UP PARAM F/U: HCPCS | Performed by: STUDENT IN AN ORGANIZED HEALTH CARE EDUCATION/TRAINING PROGRAM

## 2022-03-09 PROCEDURE — 99213 OFFICE O/P EST LOW 20 MIN: CPT | Performed by: STUDENT IN AN ORGANIZED HEALTH CARE EDUCATION/TRAINING PROGRAM

## 2022-03-09 PROCEDURE — 1036F TOBACCO NON-USER: CPT | Performed by: STUDENT IN AN ORGANIZED HEALTH CARE EDUCATION/TRAINING PROGRAM

## 2022-03-09 PROCEDURE — G8484 FLU IMMUNIZE NO ADMIN: HCPCS | Performed by: STUDENT IN AN ORGANIZED HEALTH CARE EDUCATION/TRAINING PROGRAM
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
## 2022-03-16 NOTE — PROGRESS NOTES
Attending Physician Statement  I have discussed the care of Yadi Morales, including pertinent history and exam findings,  with the resident. I have reviewed the key elements of all parts of the encounter with the resident. I agree with the assessment, plan and orders as documented by the resident.   (GE Modifier)    Batsheva Craig, DO

## 2022-03-29 ENCOUNTER — SCHEDULED TELEPHONE ENCOUNTER (OUTPATIENT)
Dept: PULMONOLOGY | Age: 37
End: 2022-03-29
Payer: COMMERCIAL

## 2022-03-29 DIAGNOSIS — F41.9 ANXIETY AND DEPRESSION: ICD-10-CM

## 2022-03-29 DIAGNOSIS — E66.2 OBESITY HYPOVENTILATION SYNDROME (HCC): ICD-10-CM

## 2022-03-29 DIAGNOSIS — E11.9 TYPE 2 DIABETES MELLITUS WITHOUT COMPLICATION, WITHOUT LONG-TERM CURRENT USE OF INSULIN (HCC): ICD-10-CM

## 2022-03-29 DIAGNOSIS — F17.200 SMOKING: ICD-10-CM

## 2022-03-29 DIAGNOSIS — F32.A ANXIETY AND DEPRESSION: ICD-10-CM

## 2022-03-29 DIAGNOSIS — J44.9 CHRONIC OBSTRUCTIVE PULMONARY DISEASE, UNSPECIFIED COPD TYPE (HCC): ICD-10-CM

## 2022-03-29 DIAGNOSIS — E66.01 MORBID OBESITY WITH BMI OF 70 AND OVER, ADULT (HCC): Primary | ICD-10-CM

## 2022-03-29 DIAGNOSIS — G47.33 OSA (OBSTRUCTIVE SLEEP APNEA): ICD-10-CM

## 2022-03-29 PROCEDURE — 99214 OFFICE O/P EST MOD 30 MIN: CPT | Performed by: INTERNAL MEDICINE

## 2022-03-29 RX ORDER — ACETAMINOPHEN AND CODEINE PHOSPHATE 300; 30 MG/1; MG/1
TABLET ORAL
COMMUNITY
Start: 2022-02-22

## 2022-03-29 ASSESSMENT — PATIENT HEALTH QUESTIONNAIRE - PHQ9
SUM OF ALL RESPONSES TO PHQ QUESTIONS 1-9: 2
SUM OF ALL RESPONSES TO PHQ9 QUESTIONS 1 & 2: 2
1. LITTLE INTEREST OR PLEASURE IN DOING THINGS: 1
2. FEELING DOWN, DEPRESSED OR HOPELESS: 1
SUM OF ALL RESPONSES TO PHQ QUESTIONS 1-9: 2

## 2022-03-29 ASSESSMENT — SLEEP AND FATIGUE QUESTIONNAIRES
HOW LIKELY ARE YOU TO NOD OFF OR FALL ASLEEP IN A CAR, WHILE STOPPED FOR A FEW MINUTES IN TRAFFIC: 0
HOW LIKELY ARE YOU TO NOD OFF OR FALL ASLEEP WHEN YOU ARE A PASSENGER IN A CAR FOR AN HOUR WITHOUT A BREAK: 0
HOW LIKELY ARE YOU TO NOD OFF OR FALL ASLEEP WHILE SITTING AND READING: 0
ESS TOTAL SCORE: 0
HOW LIKELY ARE YOU TO NOD OFF OR FALL ASLEEP WHILE SITTING QUIETLY AFTER LUNCH WITHOUT ALCOHOL: 0
HOW LIKELY ARE YOU TO NOD OFF OR FALL ASLEEP WHILE SITTING INACTIVE IN A PUBLIC PLACE: 0
HOW LIKELY ARE YOU TO NOD OFF OR FALL ASLEEP WHILE LYING DOWN TO REST IN THE AFTERNOON WHEN CIRCUMSTANCES PERMIT: 0
HOW LIKELY ARE YOU TO NOD OFF OR FALL ASLEEP WHILE SITTING AND TALKING TO SOMEONE: 0
HOW LIKELY ARE YOU TO NOD OFF OR FALL ASLEEP WHILE WATCHING TV: 0

## 2022-03-29 NOTE — PROGRESS NOTES
Viridiana Morales   3/29/2022    TELEHEALTH EVALUATION -- Audio/Visual (During ZGATX-02 public health emergency)    Patient and physician are located in their individual locations. This is visit is completed via [employer] application [x]/ Doxy. me[] / Telephone [x]     HPI:    Lexus Samaniego (:  1985) has requested an audio/video evaluation for the following concern(s):  Mrs. Neelam Gaspar is known to have chronic obstructive lung disease due to chronic bronchitis with emphysema secondary to prior smoking. Fortunately she has given up smoking. Pulmonary status is stable. Last night she did have an episode of wheezing which seems to be more related to oxygen reflux disease rather than bronchospasm. She did not take the rescue inhaler. He continues to be morbidly obese has sleep apnea refuses to use her CPAP. She does have occasional pedal edema she does have evidence of cor pulmonale secondary to obesity hypoventilation COPD and BRYSON. No DVT. He is taking her medications regularly. Patient has not taken flu vaccine and does not plan to take the Covid vaccine as well. Review of Systems    Prior to Visit Medications    Medication Sig Taking?  Authorizing Provider   acetaminophen-codeine (TYLENOL #3) 300-30 MG per tablet TAKE 1 TABLET BY MOUTH EVERY 4 TO 6 HOURS AS NEEDED Yes Historical Provider, MD   ibuprofen (ADVIL;MOTRIN) 800 MG tablet Take 1 tablet by mouth every 8 hours as needed for Pain Yes Chad Echols,    fluticasone-salmeterol (ADVAIR) 250-50 MCG/DOSE AEPB Inhale 2 puffs into the lungs daily Nelda Kanner Yes Historical Provider, MD   OZEMPIC, 1 MG/DOSE, 4 MG/3ML SOPN Inject 1 mg into the skin once a week  Yes Historical Provider, MD   VITAMIN D PO Take by mouth daily Unknown dose Yes Historical Provider, MD   [facility] ULTRA strip  Yes Historical Provider, MD   Lancets (Lina Badillo) [address] [facility]  Yes Historical Provider, MD   gabapentin (NEURONTIN) 600 MG tablet Take 600 mg by mouth 3 times daily. Has only been taking 1 time per day Yes Historical Provider, MD   topiramate (TOPAMAX) 100 MG tablet Take 100 mg by mouth 2 times daily  Yes Historical Provider, MD   diclofenac (VOLTAREN) 50 MG EC tablet Take 50 mg by mouth 2 times daily Instructed to stop 5 days before surgery Yes Historical Provider, MD   diclofenac sodium (VOLTAREN) 1 % GEL Apply 2 g topically 4 times daily as needed for Pain (right knee and left shoulder)  Yes Historical Provider, MD   famotidine (PEPCID) 20 MG tablet Take 1 tablet by mouth nightly Yes JUANY Parker CNP   pantoprazole (PROTONIX) 40 MG tablet Take 1 tablet by mouth daily Yes JUANY Parker CNP   sucralfate (CARAFATE) 1 GM tablet Take 1 tablet by mouth 4 times daily Yes JUANY Parker CNP   tiotropium (SPIRIVA HANDIHALER) 18 MCG inhalation capsule Spiriva Respimat 2.5 mcg/actuation solution for inhalation   Inhale 2 puffs every day by inhalation route.  Yes Historical Provider, MD   aspirin 81 MG chewable tablet Take 1 tablet by mouth daily Yes Hernán Perry DO   butalbital-acetaminophen-caffeine (FIORICET, ESGIC) -40 MG per tablet Take 1 tablet by mouth every 4 hours as needed for Headaches Yes Hernán Perry DO   albuterol sulfate  (90 Base) MCG/ACT inhaler Inhale 2 puffs into the lungs 4 times daily Yes Hernán Perry DO   ipratropium (ATROVENT HFA) 17 MCG/ACT inhaler Inhale 2 puffs into the lungs 4 times daily Yes Hernán Perry DO   lisinopril (PRINIVIL;ZESTRIL) 20 MG tablet Take 1 tablet by mouth daily Yes Hernán Perry DO   POTASSIUM CHLORIDE PO Take 25 mg by mouth daily Yes Historical Provider, MD   benzonatate (TESSALON PERLES) 100 MG capsule Take 1 capsule by mouth every 8 hours as needed for Cough Yes Becky Logan MD   hydrochlorothiazide (HYDRODIURIL) 25 MG tablet Take 25 mg by mouth daily Yes Historical Provider, MD   metoprolol (LOPRESSOR) 50 MG tablet Take 50 mg by mouth 2 times daily Yes Historical Provider, MD   loratadine (CLARITIN) 10 MG tablet Take 10 mg by mouth daily  Yes Historical Provider, MD       Social History     Tobacco Use    Smoking status: Former Smoker     Packs/day: 1.00     Years: 21.00     Pack years: 21.00     Types: Cigarettes     Start date: 1997     Quit date: 2018     Years since quittin.1    Smokeless tobacco: Never Used   Vaping Use    Vaping Use: Never used   Substance Use Topics    Alcohol use: Yes     Alcohol/week: 0.0 standard drinks     Comment: rare, but drinks 1/2 bottle when drinks    Drug use: Yes     Frequency: 2.0 times per week     Types: Marijuana Veldon Bunting)     Comment: daily            RECORD REVIEW: Previous medical records were reviewed at today's visit. Wt Readings from Last 3 Encounters:   22 (!) 571 lb 12.8 oz (259.4 kg)   22 (!) 577 lb (261.7 kg)   22 (!) 586 lb (265.8 kg)       Results for orders placed or performed during the hospital encounter of 22   COVID-19, Rapid    Specimen: Nasopharyngeal Swab   Result Value Ref Range    Specimen Description . NASOPHARYNGEAL SWAB     SARS-CoV-2, Rapid Not Detected Not Detected   POTASSIUM (POC)   Result Value Ref Range    POC Potassium 3.4 (L) 3.5 - 4.5 mmol/L   SURGICAL PATHOLOGY REPORT   Result Value Ref Range    Surgical Pathology Report       -- Diagnosis --  ENDOMETRIUM, CURETTINGS:  -PROLIFERATIVE ENDOMETRIUM WITH SEPARATE PIECES OF BENIGN ENDOMETRIAL  POLYP.  -SEPARATE BENIGN ENDOCERVICAL GLANDULAR MUCOSA.       Ashlie Noriega M.D.  **Electronically Signed Out**                Clinical Information  Pre-op Diagnosis:  ABNORMAL UTERINE BLEEDING    Operative Findings:  ENDOMETRIAL CURETTINGS  Operation Performed:  DILATATION AND CURETTAGE HYSTEROSCOPY WITH  MIRENA IUD INSERTION    Source of Specimen  A: ENDOMETRIAL CURETTINGS    Gross Description  \"PIPO HAIR, ENDOMETRIAL CURETTINGS\" Rubbery pink-tan fragments,  2.8 x 2.5 x 0.3 cm in aggregate. Entirely 1cs. tm       Microscopic Description  Microscopic examination performed. SURGICAL PATHOLOGY CONSULTATION       Patient Name: Arielle Wong Med Rec: [id number]  Path Number: [id number]    [address],  [address]. [place], [address]  [phone]  Fax: [phone]     POCT Glucose   Result Value Ref Range    POC Glucose 104 (H) 74 - 100 mg/dL   POC Glucose Fingerstick   Result Value Ref Range    POC Glucose 104 65 - 105 mg/dL   POCT urine pregnancy   Result Value Ref Range    HCG, Pregnancy Urine (POC) NEGATIVE NEGATIVE       No results found. PHYSICAL EXAMINATION:  Due to this being a TeleHealth encounter, evaluation of the following organ systems is limited: Vitals/Constitutional/EENT/Resp/CV/GI//MS/Neuro/Skin/Heme-Lymph-Imm. Constitutional: [x] Appears well-developed and well-nourished. [x] Abnormal  Mental status  [x] Alert and awake  [x] Oriented to person/place/time [x]Able to follow commands    [x] No apparent distress      Eyes:  EOM    [x]  Normal  [] Abnormal-  Sclera  [x]  Normal  [] Abnormal -         Discharge [x]  None visible  [] Abnormal -    HENT:   [x] Normocephalic, atraumatic. [] Abnormal shaped head   [x] Mouth/Throat: Mucous membranes are moist.     Ears [x] Normal  [] Abnormal-    Neck: [x] Normal range of motion [x] Supple [x] No visualized mass. Pulmonary/Chest: [x] Respiratory effort normal.  [x] No visualized signs of difficulty breathing or respiratory distress        [] Abnormal      Musculoskeletal:   [x] Normal range of motion. [x] Normal gait with no signs of ataxia. [x]  No signs of cyanosis of the peripheral portions of extremities.          [] Abnormal       Neurological:        [x] Normal cranial nerve (limited exam to video visit) [x] No focal weakness observed       [] Abnormal          Speech       [x] Normal   [] Abnormal     Skin:        [x] No rash on visible skin  [x] Normal  [] Abnormal     Psychiatric:       [x] Normal  [] Abnormal        [x] Normal Mood  [] Anxious appearing        Other Pertinent Exam Findings:       ASSESSMENT:  1. Morbid obesity with BMI of 70 and over, adult ([facility]    2. Obesity hypoventilation syndrome ([facility]    3. Type 2 diabetes mellitus without complication, without long-term current use of insulin (HCC)    4. Smoking    5. Chronic obstructive pulmonary disease, unspecified COPD type ([facility]    6. BRYSON (obstructive sleep apnea)    7. Anxiety and depression          Plan:   1. Patient advised to take the bronchodilators as before  2.   3. He was advised to take the rescue inhaler when she has the bronchospasm. 4.   5. She was encouraged to use CPAP but I am afraid she does not want to do that. 6.   7. Her diabetes is under good control  8.   9. He will continue same treatment  10. 11. Pulmonary hypertension is stable  12.   13. Her blood pressure is also stable he will continue same treatment. 14.   15. Greatly help her to lose weight. 16.   17. Depression and anxiety are under good control  18. 19. She has given up smoking  20.   21. Patient advised to get influenza vaccine also take the Covid vaccine but I am afraid she is not going to do so we plan  22. Patient is very much interested in bariatric surgery. I encouraged her to undergo the surgery. We will see her in follow-up few weeks before the surgery. 23.   24. An  electronic signature was used to authenticate this note. --Shiloh Pugh MD on 3/29/2022 at 1:50 PM    9}    Pursuant to the emergency declaration under the [facility] waiver authority and the [employer] Act, this Virtual  Visit was conducted, with patient's consent, to reduce the patient's risk of exposure to COVID-19 and provide continuity of care for an established patient.     Services were provided through a video synchronous discussion virtually to substitute for in-person clinic visit.     _______________________________________________________________________________________________________________________________________________  FOR TELEPHONE VISITS PLEASE COMPLETE THE FOLLOWING      Consent:  She and/or health care decision maker is aware that that she may receive a bill for this telephone service, depending on her insurance coverage, and has provided verbal consent to proceed: Yes      I affirm this is a Patient Initiated Episode with an Established Patient who has not had a related appointment within my department in the past 7 days or scheduled within the next 24 hours. Total Time: minutes: 21-30 minutes    Note: not billable if this call serves to triage the patient into an appointment for the relevant concern  3/29/2022     BRYSON. Obesity hypoventilation syndrome. Chronic respiratory failure. Home oxygen   Albert Bower is known to morbid obesity. He is participating in a weight reduction program but is not surgically acceptable at this time because of the large weight. He been advised to lose some weight before she will be considered for surgery. He does have COPD because of prior smoking or other drug abuse. The pulmonary status is stable and no evidence of an acute exacerbation of COPD. She does have pedal edema which is very likely related to obesity hypoventilation COPD and cor pulmonale. History of deep vein thrombosis. She has had no Covid vaccine no flu vaccine no Pneumovax she does not believe in any way. An [place] Sleepiness Scale revealed a score of 4 indicating lack of sleepiness.     Sleep Medicine 3/29/2022 4/19/2021 1/4/2021 1/30/2020 1/10/2020 10/4/2019 4/26/2019   Sitting and reading 0 1 0 - 1 0 2   Watching TV 0 0 0 - 1 1 2   Sitting, inactive in a public place (e.g. a theatre or a meeting) 0 2 0 - 0 3 2   As a passenger in a car for an hour without a break 0 0 0 - 0 0 0   Lying down to rest in the afternoon when circumstances permit 0 1 0 - 0 3 3   Sitting and talking to someone 0 0 0 - 0 1 0   Sitting quietly after a lunch without alcohol 0 0 0 - 0 0 0   In a car, while stopped for a few minutes in traffic 0 0 0 - 0 0 0   Total score 0 4 0 - 2 8 9   Neck circumference (Inches) - - 0 20 - - -   . An [place] Sleepiness Scale given to the patient in the office revealed a score of 2 indicating lack of daytime sleepiness. Review of Systems -stable conductor for all other system including upper and lower extremities. She is a morbidly obese. No other abnormality was detected. General ROS: negative for - chills, fatigue, fever or weight loss  ENT ROS: negative for - headaches, oral lesions or sore throat  Cardiovascular ROS: no chest pain , orthopnea or pnd   Gastrointestinal ROS: no abdominal pain, change in bowel habits, or black or bloody stools  Skin - no rash   Neuro - no blurry vision , no loc . No focal weakness   msk - no jt tenderness or swelling    Vascular - no claudication , rest completed and negative   Lymphatic - complete and negative   Hematology - oncology - complete and negative   Allergy immunology - complete and negative    no burning or hematuria       LUNG CANCER SCREENING     1. CRITERIA MET    []     CT ORDERED  []      2. CRITERIA NOT MET   [x]      3. REFUSED                    []        REASON CRITERIA NOT MET     1. SMOKING LESS THAN 30 PY  []      2. AGE LESS THAN 55 or GREATER 77 YEARS  [x]      3. QUIT SMOKING 15 YEARS OR GREATER   []      4. RECENT CT WITH IN 11 MONTHS    []      5. LIFE EXPECTANCY < 5 YEARS   []      6.  SIGNS  AND SYMPTOMS OF LUNG CANCER   []           Immunization   Immunization History   Administered Date(s) Administered    Influenza Virus Vaccine 11/01/2021    MMR 11/25/2013    Tdap (Boostrix, Adacel) 11/25/2013        Pneumococcal Vaccine     [] Up to date    [] Indicated   [x] Refused  [] Contraindicated       Influenza Vaccine   [] Up to date    [] Indicated   [x] Refused  [] Contraindicated   Few days of Covid vaccine  Refuses any vaccination including Covid  PAST MEDICAL HISTORY:         Diagnosis Date    Acquired hallux rigidus 4/19/2019    Acute on chronic respiratory failure with hypoxia and hypercapnia ([address] 11/6/2018    Arthritis     rheumatoid arthritis, see's ortho for right knee and has had injections but has never seen rheumatology    ASCUS with positive high risk HPV cervical     Asthma     Bipolar disorder ([address] 04/19/2019    does not see any one for this    Breast discharge 4/9/2015    Cellulitis 4/19/2019    Chest pain     saw cardiology for clearance [facility]na [facility], notes indicate wanting pt to have echo but has not been done    Chlamydia ? Reported hx    Chronic cor pulmonale (HCC)     Chronic cough     COPD (chronic obstructive pulmonary disease) ([address]     Dr. Leanna Rae, last seen 12/14/21    D-dimer, elevated 11/18/2018    Dysfunctional uterine bleeding 1/27/2014    Dysplasia of cervix, low grade (ISAIAS 1) 3/30/2017    Noted on colposcopy 3/7/17    Ectopic pregnancy - Right 2/9/2014    Pt was initiallly dx with a left ovarian ectopic. After Laproscopic eval she was noted to have a Right Tubal ectopic. She underwent a RSO on 2/9/14.  Elevated brain natriuretic peptide (BNP) level     Gastroesophageal reflux disease 4/19/2019    GERD (gastroesophageal reflux disease)     managed per PCP,  states has had a EGD with Dr. Lane Hernandez H/O abnormal cervical Papanicolaou smear 2/3/2017    LSIL - 2/2015 Pap collected 2/3/2017. Patient is very difficult SSE as she is morbidly obese. Required ringed forceps and snowman speculum.       Heartburn     History of trichomonal vaginitis 1/20106    tx'd    Hypertension     per PCP    Hypoalbuminemia due to protein-calorie malnutrition ([address] 11/6/2018    Hypocalcemia 11/21/2018    Migraine 4/19/2019  Morbid obesity with BMI of 70 and over, adult ([facility]     BMI 74, in non surgical treatment [facility], states goes monthly    MRSA (methicillin resistant Staphylococcus aureus) 2010    Left leg    Muscle weakness     Normocytic normochromic anemia 2018    Obesity hypoventilation syndrome ([facility] 2018    BRYSON (obstructive sleep apnea)     BRYSON on CPAP with 3 L/ O2    Ovarian ectopic pregnancy 2014    Right tubal ectopic tx'd with MTX AND LS RSO(initially dx as Left ovarian By USN 14)    Pain     right knee and left shoulder    Pelvic adhesive disease 14    C/S to bladder, as well as reactive to ectopic; also hx PID    Prolonged emergence from general anesthesia     Pulmonary hypertension ([facility]     Sprain of ankle 2019    Type 2 diabetes mellitus without complication, without long-term current use of insulin ([facility] 2018    per PCP    Viral illness 2022    fever, chills, diarrhea, sinus congestion x 1 day, fatigue x 4 days, + covid exposure but was never tested    Vitamin D deficiency 2019       Family History:       Problem Relation Age of Onset    High Blood Pressure Mother     Heart Disease Mother     Cancer Father         lung    Lung Cancer Father     Cancer Maternal Aunt         breast    Diabetes Maternal Aunt     Breast Cancer Maternal Aunt     Cancer Paternal Aunt         lung    Lung Cancer Paternal Aunt     Thyroid Disease Sister     Colon Cancer Neg Hx     Eclampsia Neg Hx     Hypertension Neg Hx     Ovarian Cancer Neg Hx      Labor Neg Hx     Spont Abortions Neg Hx     Stroke Neg Hx        SURGICAL HISTORY:   Past Surgical History:   Procedure Laterality Date     SECTION      CHOLECYSTECTOMY, LAPAROSCOPIC      DILATION AND CURETTAGE OF UTERUS  2022    DILATATION AND CURETTAGE HYSTEROSCOPY WITH MIRENA IUD INSERTION (N/A )    DILATION AND CURETTAGE OF UTERUS N/A 2022    DILATATION AND CURETTAGE HYSTEROSCOPY WITH MIRENA IUD INSERTION performed by Promise Dos Santos DO at [address] [facility] HYSTEROSCOPY  2022    DILATATION AND CURETTAGE HYSTEROSCOPY WITH MIRENA IUD INSERTION     LEG SURGERY      right leg age 7 for growth plate    SALPINGO-OOPHORECTOMY  14    Right; ectopic pregnancy    TONSILLECTOMY      age 12   [facility] UPPER GASTROINTESTINAL ENDOSCOPY N/A 7/10/2020    [facility] [facility]              Not in a hospital admission. Allergies   Allergen Reactions    Seasonal Other (See Comments)     Allergic rhinnitis    Grass and cats     Social History     Tobacco Use   Smoking Status Former Smoker    Packs/day: 1.00    Years: 21.00    Pack years: 21.00    Types: Cigarettes    Start date: 1997   [facility] Quit date: 2018    Years since quittin.1   Smokeless Tobacco Never Used     Prior to Admission medications    Medication Sig Start Date End Date Taking? Authorizing Provider   acetaminophen-codeine (TYLENOL #3) 300-30 MG per tablet TAKE 1 TABLET BY MOUTH EVERY 4 TO 6 HOURS AS NEEDED 22  Yes Historical Provider, MD   ibuprofen (ADVIL;MOTRIN) 800 MG tablet Take 1 tablet by mouth every 8 hours as needed for Pain 22  Yes Srikanth Mao DO   fluticasone-salmeterol (ADVAIR) 250-50 MCG/DOSE AEPB Inhale 2 puffs into the lungs daily [place]   Yes Historical Provider, MD   OZEMPIC, 1 MG/DOSE, 4 MG/3ML SOPN Inject 1 mg into the skin once a week   Yes Historical Provider, MD   VITAMIN D PO Take by mouth daily Unknown dose   Yes Historical Provider, MD   ONETOUCH ULTRA strip  22  Yes Historical Provider, MD   Lancets ([address]) [address] [facility]  21  Yes Historical Provider, MD   gabapentin (NEURONTIN) 600 MG tablet Take 600 mg by mouth 3 times daily.  Has only been taking 1 time per day 3/22/21  Yes Historical Provider, MD   topiramate (TOPAMAX) 100 MG tablet Take 100 mg by mouth 2 times daily  3/22/21  Yes Historical Provider, MD   diclofenac (VOLTAREN) 50 MG EC tablet Take 50 mg by mouth 2 times daily Instructed to stop 5 days before surgery 3/22/21  Yes Historical Provider, MD   diclofenac sodium (VOLTAREN) 1 % GEL Apply 2 g topically 4 times daily as needed for Pain (right knee and left shoulder)  1/20/21  Yes Historical Provider, MD   famotidine (PEPCID) 20 MG tablet Take 1 tablet by mouth nightly 12/14/20  Yes JUANY Elliott CNP   pantoprazole (PROTONIX) 40 MG tablet Take 1 tablet by mouth daily 12/14/20  Yes JUANY Elliott CNP   sucralfate (CARAFATE) 1 GM tablet Take 1 tablet by mouth 4 times daily 12/14/20  Yes JUANY Elliott CNP   tiotropium (SPIRIVA HANDIHALER) 18 MCG inhalation capsule Spiriva Respimat 2.5 mcg/actuation solution for inhalation   Inhale 2 puffs every day by inhalation route.    Yes Historical Provider, MD   aspirin 81 MG chewable tablet Take 1 tablet by mouth daily 11/22/18  Yes Ayesha Decker, DO   butalbital-acetaminophen-caffeine (FIORICET, ESGIC) -05 MG per tablet Take 1 tablet by mouth every 4 hours as needed for Headaches 11/21/18  Yes Ayesha Decker, DO   albuterol sulfate  (90 Base) MCG/ACT inhaler Inhale 2 puffs into the lungs 4 times daily 11/21/18  Yes Ayesha Decker, DO   ipratropium (ATROVENT HFA) 17 MCG/ACT inhaler Inhale 2 puffs into the lungs 4 times daily 11/21/18  Yes Ayesha Decker, DO   lisinopril (PRINIVIL;ZESTRIL) 20 MG tablet Take 1 tablet by mouth daily 11/7/18  Yes Ayesha Decker, DO   POTASSIUM CHLORIDE PO Take 25 mg by mouth daily   Yes Historical Provider, MD   benzonatate (TESSALON PERLES) 100 MG capsule Take 1 capsule by mouth every 8 hours as needed for Cough 10/25/18  Yes Enrique Bradley MD   hydrochlorothiazide (HYDRODIURIL) 25 MG tablet Take 25 mg by mouth daily   Yes Historical Provider, MD   metoprolol (LOPRESSOR) 50 MG tablet Take 50 mg by mouth 2 times daily 8/10/15  Yes Historical Provider, MD loratadine (CLARITIN) 10 MG tablet Take 10 mg by mouth daily    Yes Historical Provider, MD         Physical Exam  General Appearance:    Alert, cooperative, no distress, appears stated age, morbid obesity, no distress, mild pedal edema. Not using any accessory muscles no respiratory distress   Head:    Normocephalic, without obvious abnormality, atraumatic   Eye examination reveal no jaundice. No Claudia syndrome. Neck is short and fat. Nose revealed no polyps. No sinus tenderness noted. Throat examination is unremarkable, oropharyngeal orifice is compromised. Nose examination revealed no polyps. No sinus tenderness noted. Ear examination normal.                   :    Neck:   Supple, symmetrical, trachea midline, no adenopathy;     thyroid:  no enlargement/tenderness/nodules; no carotid    bruit or JVD. Neck is short and fat    Back:     Symmetric, no curvature, ROM normal, no CVA tenderness   Lungs:       Poor air entry on both sides due to obesity. Percussion note is impaired because of obesity and all sites. Breathing vesicular. No rales, rhonchi are audible. No pleural friction rub is audible. Chest Wall:    No tenderness or deformity      Heart:    Regular rate and rhythm, S1 and S2 normal, no murmur, rub        or gallop no rvh                           Abdomen:                                                 Pulses:                                            Lymph nodes:                    Neurologic:                  Soft, non-tender, bowel sounds active all four quadrants,     no masses, no organomegaly         2+ and symmetric all extremities            Cervical, supraclavicular not enlarged or matted or tender      CNII-XII intact, normal strength 5/5 .   Sensation grossly normal  and reflexes normal 2+  throughout     Clubbing No  Lower ext edema Yes1+   [x] , 2 +  [] , 3+   []  Upper ext edema No       Musculoskeletal - no joint swelling or tenderness or synovitis LMP  (LMP Unknown)     CXR  No recent chest x-ray      CT Scans  No recent CT scan    Echo  No recent echo        Assessment   Diagnosis Orders   1. Morbid obesity with BMI of 70 and over, adult ([address]     2. Obesity hypoventilation syndrome ([address]     3. Type 2 diabetes mellitus without complication, without long-term current use of insulin (HCC)     4. Smoking     5. Chronic obstructive pulmonary disease, unspecified COPD type ([address]     6. BRYSON (obstructive sleep apnea)     7. Anxiety and depression         Plan:   Patient is morbidly obese. He has a sleep apnea syndrome and also. COPD. Most important thing in her case is due to his loss of weight. He has participated and weight reduction program.  She would like to have surgery but because of her huge weight she had a very large risk for the surgery. She was advised to participate in the program to lose some weight before she will consider for surgery. She has chronic obstructive lung disease with was advised to continue the use of bronchodilators as before. She has cor pulmonale due to COPD and and obesity hypoventilation syndrome. She has pulmonary hypertension as part of the cor pulmonale. Patient was given an DME order for supplies.

## 2022-03-29 NOTE — PROGRESS NOTES
Smita Epperson is a 29 y.o. female evaluated via telephone on 3/29/2022 for Follow-up (6 month) and Sleep Apnea  . Assessment & Plan     Subjective     HPI    Review of Systems  Completed change    Objective   Patient-Reported Vitals  No data recorded      3/29/2022    TELEHEALTH EVALUATION -- Audio/Visual (During DJUKW-15 public health emergency)    Patient and physician are located in their individual locations. This is visit is completed via [employer] application [x]/ Doxy. me[] / Telephone []     HPI: This patient is known to have chronic obstructive lung disease due to chronic bronchitis and emphysema. She is also morbidly obese she is trying to lose weight to undergo bariatric surgery. She is taking Advair and and Spiriva the pulmonary status seems to be stable she does not have to use the rescue inhaler very frequently. She is also on Protonix chronic gastritis. She has systemic hypertension which is well controlled now with the present treatment with beta-blockers. Patient apparently is going going through a lot of domestic and social problems. She she is not in a position to afford rent for her house and may be homeless. Which is causing significant degree of depression however pulmonary status continues to be stable and she is taking her medication regularly she did not require any prescription. Unfortunately she is still smoking marijuana he has given up smoking cigarettes however. She does not drink excessively. Review of systems was completed for all 10 systems no additional information was obtained. Her social history is unchanged. Did not have any recent chest x-rays or labs. She is attending the weight reduction classes. \He also has sleep apnea syndrome and is being treated with CPAP he uses CPAP regularly every night for 6 to 8 hours.   Social History     Tobacco Use    Smoking status: Former Smoker     Packs/day: 1.00     Years: 21.00     Pack years: 21.00     Types: Cigarettes     Start date: 1997     Quit date: 2018     Years since quittin.1    Smokeless tobacco: Never Used   Vaping Use    Vaping Use: Never used   Substance Use Topics    Alcohol use: Yes     Alcohol/week: 0.0 standard drinks     Comment: rare, but drinks 1/2 bottle when drinks    Drug use: Yes     Frequency: 2.0 times per week     Types: Marijuana Aparna Peals)     Comment: daily            RECORD REVIEW: Previous medical records were reviewed at today's visit. Wt Readings from Last 3 Encounters:   22 (!) 571 lb 12.8 oz (259.4 kg)   22 (!) 577 lb (261.7 kg)   22 (!) 586 lb (265.8 kg)       Results for orders placed or performed during the hospital encounter of 22   COVID-19, Rapid    Specimen: Nasopharyngeal Swab   Result Value Ref Range    Specimen Description . NASOPHARYNGEAL SWAB     SARS-CoV-2, Rapid Not Detected Not Detected   POTASSIUM (POC)   Result Value Ref Range    POC Potassium 3.4 (L) 3.5 - 4.5 mmol/L   SURGICAL PATHOLOGY REPORT   Result Value Ref Range    Surgical Pathology Report       -- Diagnosis --  ENDOMETRIUM, CURETTINGS:  -PROLIFERATIVE ENDOMETRIUM WITH SEPARATE PIECES OF BENIGN ENDOMETRIAL  POLYP.  -SEPARATE BENIGN ENDOCERVICAL GLANDULAR MUCOSA. Eulalio Hughes M.D.  **Electronically Signed Out**                Clinical Information  Pre-op Diagnosis:  ABNORMAL UTERINE BLEEDING    Operative Findings:  ENDOMETRIAL CURETTINGS  Operation Performed:  DILATATION AND CURETTAGE HYSTEROSCOPY WITH  MIRENA IUD INSERTION    Source of Specimen  A: ENDOMETRIAL CURETTINGS    Gross Description  \"PIPO HAIR, ENDOMETRIAL CURETTINGS\" Rubbery pink-tan fragments,  2.8 x 2.5 x 0.3 cm in aggregate. Entirely 1cs. tm       Microscopic Description  Microscopic examination performed. SURGICAL PATHOLOGY CONSULTATION       Patient Name: Juan F Molina   Med Rec: [id number]  Path Number: [id number]    [facility]  [facility]  CONSULTING PATHOLOGISTS [address] [facility], [address]  [phone]  Fax: [phone]     POCT Glucose   Result Value Ref Range    POC Glucose 104 (H) 74 - 100 mg/dL   POC Glucose Fingerstick   Result Value Ref Range    POC Glucose 104 65 - 105 mg/dL   POCT urine pregnancy   Result Value Ref Range    HCG, Pregnancy Urine (POC) NEGATIVE NEGATIVE       No results found. PHYSICAL EXAMINATION:  Due to this being a TeleHealth encounter, evaluation of the following organ systems is limited: Vitals/Constitutional/EENT/Resp/CV/GI//MS/Neuro/Skin/Heme-Lymph-Imm. Constitutional: [x] Appears well-developed and well-nourished. [x] Abnormal morbid obesity no respiratory distress mental status  [x] Alert and awake  [x] Oriented to person/place/time [x]Able to follow commands    [x] No apparent distress      Eyes:  EOM    [x]  Normal  [] Abnormal-  Sclera  [x]  Normal  [] Abnormal -         Discharge [x]  None visible  [] Abnormal -    HENT:   [x] Normocephalic, atraumatic. [] Abnormal shaped head   [x] Mouth/Throat: Mucous membranes are moist.     Ears [x] Normal  [] Abnormal-    Neck: [x] Normal range of motion [x] Supple [x] No visualized mass. Pulmonary/Chest: [x] Respiratory effort normal.  [x] No visualized signs of difficulty breathing or respiratory distress        [] Abnormal      Musculoskeletal:   [x] Normal range of motion. [x] Normal gait with no signs of ataxia. [x]  No signs of cyanosis of the peripheral portions of extremities.          [] Abnormal       Neurological:        [x] Normal cranial nerve (limited exam to video visit) [x] No focal weakness observed       [] Abnormal          Speech       [x] Normal   [] Abnormal     Skin:        [x] No rash on visible skin  [x] Normal  [] Abnormal     Psychiatric:       [x] Normal  [] Abnormal        [x] Normal Mood  [] Anxious appearing        Other Pertinent Exam Findings:       ASSESSMENT:  Morbid obesity    Sleep apnea syndrome on CPAP    Mild persistent asthmatic bronchitis    Chronic respiratory failure due to hypoventilation syndrome    Diabetes type 2    Hypertension    Dyspnea due to obesity and lung disease. Plan:  Patient is morbidly obese she will have encouraged to continue her effort to lose weight. He will continue follow-up with the weight reduction surgery program.    She will continue treatment of hypertension which is well controlled new pressure continue treatment for blood sugar control as well. She is using the CPAP regularly did not require any supplies for the CPAP new pressure continue use of Spiriva and Advair and use albuterol on as-needed basis pulmonary status seems to be stable. Does not require supplemental oxygen at home. She has taken the COVID vaccine. I plan to see her follow-up in about 6 months      Pursuant to the emergency declaration under the [address] [place] [address], [facility] waiver authority and the [employer] and [employer] Act, this Virtual  Visit was conducted, with patient's consent, to reduce the patient's risk of exposure to COVID-19 and provide continuity of care for an established patient.     Services were provided through a video synchronous discussion virtually to substitute for in-person clinic visit.     _________________________________________________________________________________________________________________ triage the patient into an appointment for the relevant concern  Total Time: 30 minutes    Herve Peterson MD

## 2022-03-31 ENCOUNTER — OFFICE VISIT (OUTPATIENT)
Dept: BARIATRICS/WEIGHT MGMT | Age: 37
End: 2022-03-31
Payer: COMMERCIAL

## 2022-03-31 VITALS
DIASTOLIC BLOOD PRESSURE: 71 MMHG | RESPIRATION RATE: 20 BRPM | WEIGHT: 293 LBS | SYSTOLIC BLOOD PRESSURE: 109 MMHG | BODY MASS INDEX: 41.02 KG/M2 | HEIGHT: 71 IN | HEART RATE: 88 BPM

## 2022-03-31 DIAGNOSIS — E11.9 TYPE 2 DIABETES MELLITUS WITHOUT COMPLICATION, WITHOUT LONG-TERM CURRENT USE OF INSULIN (HCC): ICD-10-CM

## 2022-03-31 DIAGNOSIS — E66.2 OBESITY HYPOVENTILATION SYNDROME (HCC): Primary | ICD-10-CM

## 2022-03-31 DIAGNOSIS — E66.01 MORBID OBESITY WITH BMI OF 70 AND OVER, ADULT (HCC): ICD-10-CM

## 2022-03-31 PROCEDURE — G8427 DOCREV CUR MEDS BY ELIG CLIN: HCPCS | Performed by: SURGERY

## 2022-03-31 PROCEDURE — 3046F HEMOGLOBIN A1C LEVEL >9.0%: CPT | Performed by: SURGERY

## 2022-03-31 PROCEDURE — G8484 FLU IMMUNIZE NO ADMIN: HCPCS | Performed by: SURGERY

## 2022-03-31 PROCEDURE — 1036F TOBACCO NON-USER: CPT | Performed by: SURGERY

## 2022-03-31 PROCEDURE — 2022F DILAT RTA XM EVC RTNOPTHY: CPT | Performed by: SURGERY

## 2022-03-31 PROCEDURE — 99213 OFFICE O/P EST LOW 20 MIN: CPT | Performed by: SURGERY

## 2022-03-31 PROCEDURE — G8417 CALC BMI ABV UP PARAM F/U: HCPCS | Performed by: SURGERY

## 2022-03-31 NOTE — PROGRESS NOTES
Medical Nutrition Therapy   Metabolic and Bariatric Surgery         Supervised diet and exercise preparation  Visit 1 out of 3 restart  Pt reports:  Completed GLB; provided printed nutrition section of education binder    Changes in eating patterns to promote health are noted below on the goals number 19-22    Vitals: Wt Readings from Last 3 Encounters:   03/31/22 (!) 569 lb (258.1 kg)   03/09/22 (!) 571 lb 12.8 oz (259.4 kg)   02/21/22 (!) 577 lb (261.7 kg)         Nutrition Assessment:   PES: Knowledge deficit related to healthy behaviors that support weight management post weight loss surgery as evidenced by Body mass index is 79.36 kg/m². Nutrition Assessment of Goal Attainment:  TREATMENT GOALS:    1. Pt  Completed  out of  goals. 2.TREATMENT GOALS FOR UPCOMING WEEK: continue all previous goals and add: #     All goals were planned with and agreed on by the patient. I want to improve my health because   appt # NA G What is your next step? C 1 2 3 4 5 6 7 8 9     1  1 I will read the education binder provided to me and the                   2 I will make my pschological evaluation appoinment. x              1  3 I will bring this goal card to every appointment. x 4 I will eliminate all tobacco/nicotine. x 5 I will limit alcoholic beverages to 1-8RZ per week. 6 I will limit dining out to 3 times per week or less. x 7 I will eliminate sugary beverages. x 8 I will eliminate carbonated beverages. 9 I will eliminate drinking with a straw. 10 I will limit caffeinated beverages to 16oz daily. 11 I will limit log my exercise daily  Not collecting x                12 I will determine my calcium and mvi plan. 13 I will have 1-2 servings of lean protein present at each meal and minimeal.                 14 I will eat every 3-5 hours.                  15 I will drink 64oz of fluid daily. 16 I will eat slowly during meals and snacks. 17 I will limit fluids 4oz before after and during meals. 18 I will eat protein first at all meals followed by vegetables,  Fruit and lastly whole grains. 23 My first weight neutral approach is:                 20 My second weight neutral approach is:                 21  My Thirds weight neutral approach is:                 22                  23                  24                  25                    Questions asked for goal understanding:                                                  Do you understand your goals? Do you have the information you need to achieve your goals? Do you have any questions  right now? []  Consistent goal achievement in the program thus far and further success with goals is expected. []  Unable to consistently make progress in goal achievement. At this time patient is not moving forward  in developing the skills needed for success after surgery. Plan:    Continue to follow monthly and review goals.          [x]  Nutrition visits complete    []

## 2022-04-06 NOTE — PROGRESS NOTES
[facility] [address] [facility] INVASIVE BARIATRIC SURG  [address] [place] [address] [address] [place]  [address]  [address] [place] [id number]  Dept: [phone]    3/31/2022    CC: Weight Loss    History:  29year old female with weight of 569 lbs and BMI of 79 kg/m2. She returns for supervised diet and exercise. Weight is still over 500 lbs. She has stopped smoking. She would like to continue with a surgical weight loss program.  She is aware she will need to get down to a weight near 500 lbs to be considered safe for surgery. She has lost weight since starting the program.  She completed EGD, sleep study and psychology visit. No new complaints.     Review of Systems:  General  Negative for: [] Weight Change   [x] Fatigue   [x] Fevers & Chills    [] Appetite change [] Other:    Positive for: [x] Weight Change   [] Fatigue   [] Fevers & Chills    [] Appetite change [] Other:   Cardiac  Negative for: [x] Chest Pain   [x] Difficulty Breathing   [] Leg Cramps [x] Edema of Lower Extremeties    [] Left   [] Right      Positive for: [] Chest Pain   [] Difficulty Breathing   [] Leg Cramps [] Edema of Lower Extremeties    [] Left   [] Right   Pulmonary  Negative for: [x] Shortness of Breath [] Wheeze [x] Cough  [] Calf Pain     Positive for: [] Shortness of Breath [] Wheeze [] Cough  [] Calf Pain   Gastro-Intestinal Negative for: [] Heartburn   [] Reflux   [] Dysphagia   [] Melena   [] BRBPR  [x] Vomiting   [x] Abdominal Pain   [] Diarrhea     [] Constipation  [] Other:     Positive for: [] Heartburn   [] Reflux   [] Dysphagia   [] Melena   [] BRBPR  [] Vomiting   [] Abdominal Pain   [] Diarrhea     [] Constipation  [] Other:    Muskuloskeletal Negative for: [] Joint pain   [] Back pain   [] Knee Pain   [x] Muscle weakness [x] Hernia   [] Edema [] Other:     Positive for: [] Joint pain   [] Back pain   [] Knee Pain   [] Muscle weakness [] Hernia   [] Edema [] Other:    Neurologic Negative for: [x] Syncope   [x] Insomnia   [] Being treated for depression  [] Other:     Positive for: [] Syncope   [] Insomnia   [] Being treated for depression  [] Other:    Skin Negative for: [] Wound:   [] Open   [] Draining   [] Incisional     [x] Rash   [x] Hair Loss  [] Other:     Positive for: [] Wound:   [] Open   [] Draining    [] Incisional  [] Rash   [] Hair Loss  [] Other:          Physical Exam:  /71 (Site: Right Lower Arm, Position: Sitting, Cuff Size: Large Adult)   Pulse 88   Resp 20   Ht 5' 11\" (1.803 m)   Wt (!) 569 lb (258.1 kg)   LMP 03/29/2022 (Approximate)   BMI 79.36 kg/m²   Constitutional:  Vital signs are normal. The patient appears well-developed and well-nourished. HEENT:   Head: Normocephalic. Atraumatic  Eyes: pupils are equal and reactive. No scleral icterus is present. Neck: No mass and no thyromegaly present. Cardiovascular: Normal rate, regular rhythm, S1 normal and S2 normal.  Radial pulses present   Pulmonary/Chest: Effort normal and breath sounds normal. No retractions  Abdominal: Soft. Normal appearance. There is no organomegaly. No tenderness. There is no rigidity, no rebound, no guarding and no Hawk's sign. Musculoskeletal:        Right lower leg: Normal. No tenderness and no edema. Left lower leg: Normal. No tenderness and no edema. Neurological: The patient is alert and oriented. Moving all 4 extremities, sensation grossly intact bilateral  Skin: Skin is warm, dry and intact. Psychiatric: The patient has a normal mood and affect.  Speech is normal and behavior is normal. Judgment and thought content normal. Cognition and memory are normal.       Assessment:  Obstructive sleep apnea/Obesity hypoventilation syndrome  Need for further psychotherapy  Type 2 diabetes  Morbid obesity, BMI 79 kg/m2    Plan:  Psychology visits to be completed  Cardiology clearnace  Pulmonary clearance  Will Need CPAP compliance  Complete cardiology visit  Dietician vitis  Diet and exercise goals discussed  Strongly stressed need for compliance to be a candidate for surgery. Aware she is higher risk for any surgery given her weight.

## 2022-04-18 RX ORDER — DOCUSATE SODIUM 100 MG/1
CAPSULE, LIQUID FILLED ORAL
Qty: 60 CAPSULE | Refills: 1 | Status: SHIPPED | OUTPATIENT
Start: 2022-04-18 | End: 2022-06-13

## 2022-04-29 ENCOUNTER — TELEPHONE (OUTPATIENT)
Dept: BARIATRICS/WEIGHT MGMT | Age: 37
End: 2022-04-29

## 2022-05-17 ENCOUNTER — HOSPITAL ENCOUNTER (OUTPATIENT)
Age: 37
Setting detail: SPECIMEN
Discharge: HOME | End: 2022-05-17

## 2022-05-17 ENCOUNTER — PROCEDURE VISIT (OUTPATIENT)
Dept: OBGYN | Age: 37
End: 2022-05-17
Payer: COMMERCIAL

## 2022-05-17 VITALS
WEIGHT: 293 LBS | BODY MASS INDEX: 79.36 KG/M2 | HEART RATE: 81 BPM | SYSTOLIC BLOOD PRESSURE: 129 MMHG | DIASTOLIC BLOOD PRESSURE: 83 MMHG

## 2022-05-17 DIAGNOSIS — Z01.419 WELL WOMAN EXAM: Primary | ICD-10-CM

## 2022-05-17 DIAGNOSIS — Z98.890 POSTOPERATIVE STATE: ICD-10-CM

## 2022-05-17 DIAGNOSIS — Z01.419 WELL WOMAN EXAM: ICD-10-CM

## 2022-05-17 LAB
CANDIDA SPECIES, DNA PROBE: NEGATIVE
GARDNERELLA VAGINALIS, DNA PROBE: NEGATIVE
SOURCE: NORMAL
TRICHOMONAS VAGINALIS DNA: NEGATIVE

## 2022-05-17 PROCEDURE — G8427 DOCREV CUR MEDS BY ELIG CLIN: HCPCS | Performed by: STUDENT IN AN ORGANIZED HEALTH CARE EDUCATION/TRAINING PROGRAM

## 2022-05-17 PROCEDURE — G0101 CA SCREEN;PELVIC/BREAST EXAM: HCPCS | Performed by: STUDENT IN AN ORGANIZED HEALTH CARE EDUCATION/TRAINING PROGRAM

## 2022-05-17 PROCEDURE — G8417 CALC BMI ABV UP PARAM F/U: HCPCS | Performed by: STUDENT IN AN ORGANIZED HEALTH CARE EDUCATION/TRAINING PROGRAM

## 2022-05-17 PROCEDURE — 99211 OFF/OP EST MAY X REQ PHY/QHP: CPT | Performed by: OBSTETRICS & GYNECOLOGY

## 2022-05-17 PROCEDURE — 1036F TOBACCO NON-USER: CPT | Performed by: STUDENT IN AN ORGANIZED HEALTH CARE EDUCATION/TRAINING PROGRAM
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
## 2022-06-13 RX ORDER — DOCUSATE SODIUM 100 MG/1
CAPSULE, LIQUID FILLED ORAL
Qty: 60 CAPSULE | Refills: 1 | Status: SHIPPED | OUTPATIENT
Start: 2022-06-13 | End: 2022-08-15

## 2022-08-15 RX ORDER — DOCUSATE SODIUM 100 MG/1
CAPSULE, LIQUID FILLED ORAL
Qty: 60 CAPSULE | Refills: 1 | Status: SHIPPED | OUTPATIENT
Start: 2022-08-15 | End: 2022-10-07

## 2022-10-07 RX ORDER — DOCUSATE SODIUM 100 MG/1
CAPSULE, LIQUID FILLED ORAL
Qty: 60 CAPSULE | Refills: 1 | Status: SHIPPED | OUTPATIENT
Start: 2022-10-07

## 2022-12-21 NOTE — PROGRESS NOTES
Refill request    LOV 12/1/22 Preop (follow up about RX was not in note)  Next no future appointment scheduled    Filled : bupropion  mg 11/9/22 # 30 x 0    I called pt and she said she is doing well on this and isn't having any side effects.  
the start of the visit: Yes    Total Time: minutes: 5-10 minutes    The visit was conducted pursuant to the emergency declaration under the [address] [place] [address] and the [employer] Act. Patient identification was verified, and a caregiver was present when appropriate. The patient was located in a state where the provider was credentialed to provide care. Note: not billable if this call serves to triage the patient into an appointment for the relevant concern      Mone Hansen DO     Date: 2022  Time: 12:08 PM      Patient Name: Adam Braun  Patient : 1985  Room/Bed: Room/bed info not found  Admission Date/Time: No admission date for patient encounter. Attending Physician Statement  I have discussed the care of Yadi Morales, including pertinent history and exam findings with the resident. I have reviewed and edited their note in the electronic medical record. The key elements of all parts of the encounter have been performed/reviewed by me . I agree with the assessment, plan and orders as documented by the resident. The level of care submitted represents to the best of my ability the care documented in the medical record today. GC Modifier. This service has been performed in part by a resident under the direction of a teaching physician.         Attending's Name:  Tha Salgado DO

## 2023-07-29 ENCOUNTER — HOSPITAL ENCOUNTER (EMERGENCY)
Age: 38
Discharge: HOME | End: 2023-07-29
Attending: EMERGENCY MEDICINE
Payer: COMMERCIAL

## 2023-07-29 VITALS
RESPIRATION RATE: 18 BRPM | HEART RATE: 82 BPM | SYSTOLIC BLOOD PRESSURE: 134 MMHG | WEIGHT: 293 LBS | DIASTOLIC BLOOD PRESSURE: 94 MMHG | BODY MASS INDEX: 39.68 KG/M2 | HEIGHT: 72 IN | OXYGEN SATURATION: 99 % | TEMPERATURE: 97.9 F

## 2023-07-29 DIAGNOSIS — R10.13 EPIGASTRIC PAIN: Primary | ICD-10-CM

## 2023-07-29 DIAGNOSIS — N76.0 VAGINITIS AND VULVOVAGINITIS: ICD-10-CM

## 2023-07-29 LAB
ALBUMIN SERPL-MCNC: 3.6 G/DL (ref 3.5–5.2)
ALP SERPL-CCNC: 65 U/L (ref 35–104)
ALT SERPL-CCNC: 13 U/L (ref 5–33)
ANION GAP SERPL CALCULATED.3IONS-SCNC: 9 MMOL/L (ref 9–17)
AST SERPL-CCNC: 11 U/L
BASOPHILS # BLD: 0.04 K/UL (ref 0–0.2)
BASOPHILS NFR BLD: 1 % (ref 0–2)
BILIRUB SERPL-MCNC: 0.2 MG/DL (ref 0.3–1.2)
BUN SERPL-MCNC: 11 MG/DL (ref 6–20)
BUN/CREAT SERPL: 14 (ref 9–20)
CALCIUM SERPL-MCNC: 9.1 MG/DL (ref 8.6–10.4)
CANDIDA SPECIES: POSITIVE
CHLORIDE SERPL-SCNC: 106 MMOL/L (ref 98–107)
CO2 SERPL-SCNC: 24 MMOL/L (ref 20–31)
CREAT SERPL-MCNC: 0.8 MG/DL (ref 0.5–0.9)
EOSINOPHIL # BLD: 0.11 K/UL (ref 0–0.44)
EOSINOPHILS RELATIVE PERCENT: 1 % (ref 1–4)
ERYTHROCYTE [DISTWIDTH] IN BLOOD BY AUTOMATED COUNT: 17.6 % (ref 11.8–14.4)
GARDNERELLA VAGINALIS: NEGATIVE
GFR SERPL CREATININE-BSD FRML MDRD: >60 ML/MIN/1.73M2
GLUCOSE SERPL-MCNC: 92 MG/DL (ref 70–99)
HCT VFR BLD AUTO: 44.2 % (ref 36.3–47.1)
HGB BLD-MCNC: 13.2 G/DL (ref 11.9–15.1)
IMM GRANULOCYTES # BLD AUTO: 0.02 K/UL (ref 0–0.3)
IMM GRANULOCYTES NFR BLD: 0 %
LIPASE SERPL-CCNC: 27 U/L (ref 13–60)
LYMPHOCYTES NFR BLD: 2.42 K/UL (ref 1.1–3.7)
LYMPHOCYTES RELATIVE PERCENT: 29 % (ref 24–43)
MCH RBC QN AUTO: 23.7 PG (ref 25.2–33.5)
MCHC RBC AUTO-ENTMCNC: 29.9 G/DL (ref 28.4–34.8)
MCV RBC AUTO: 79.4 FL (ref 82.6–102.9)
MONOCYTES NFR BLD: 0.57 K/UL (ref 0.1–1.2)
MONOCYTES NFR BLD: 7 % (ref 3–12)
NEUTROPHILS NFR BLD: 62 % (ref 36–65)
NEUTS SEG NFR BLD: 5.29 K/UL (ref 1.5–8.1)
NRBC BLD-RTO: 0 PER 100 WBC
PLATELET # BLD AUTO: 321 K/UL (ref 138–453)
PMV BLD AUTO: 9.6 FL (ref 8.1–13.5)
POTASSIUM SERPL-SCNC: 3.6 MMOL/L (ref 3.7–5.3)
PROT SERPL-MCNC: 7.7 G/DL (ref 6.4–8.3)
RBC # BLD AUTO: 5.57 M/UL (ref 3.95–5.11)
RBC # BLD: ABNORMAL 10*6/UL
SODIUM SERPL-SCNC: 139 MMOL/L (ref 135–144)
SOURCE: ABNORMAL
TRICHOMONAS: NEGATIVE
WBC OTHER # BLD: 8.5 K/UL (ref 3.5–11.3)

## 2023-07-29 PROCEDURE — 87591 N.GONORRHOEAE DNA AMP PROB: CPT

## 2023-07-29 PROCEDURE — 87491 CHLMYD TRACH DNA AMP PROBE: CPT

## 2023-07-29 PROCEDURE — 87660 TRICHOMONAS VAGIN DIR PROBE: CPT

## 2023-07-29 PROCEDURE — 85027 COMPLETE CBC AUTOMATED: CPT

## 2023-07-29 PROCEDURE — 83690 ASSAY OF LIPASE: CPT

## 2023-07-29 PROCEDURE — 80053 COMPREHEN METABOLIC PANEL: CPT

## 2023-07-29 PROCEDURE — 87480 CANDIDA DNA DIR PROBE: CPT

## 2023-07-29 PROCEDURE — 87510 GARDNER VAG DNA DIR PROBE: CPT

## 2023-07-29 PROCEDURE — 6370000000 HC RX 637 (ALT 250 FOR IP): Performed by: EMERGENCY MEDICINE

## 2023-07-29 PROCEDURE — 36415 COLL VENOUS BLD VENIPUNCTURE: CPT

## 2023-07-29 PROCEDURE — 99283 EMERGENCY DEPT VISIT LOW MDM: CPT

## 2023-07-29 RX ORDER — FLUCONAZOLE 100 MG/1
200 TABLET ORAL ONCE
Status: COMPLETED | OUTPATIENT
Start: 2023-07-29 | End: 2023-07-29

## 2023-07-29 RX ORDER — FLUCONAZOLE 150 MG/1
150 TABLET ORAL ONCE
Qty: 1 TABLET | Refills: 0 | Status: SHIPPED | OUTPATIENT
Start: 2023-08-05 | End: 2023-08-05

## 2023-07-29 RX ORDER — FAMOTIDINE 20 MG/1
20 TABLET, FILM COATED ORAL ONCE
Status: COMPLETED | OUTPATIENT
Start: 2023-07-29 | End: 2023-07-29

## 2023-07-29 RX ORDER — MAGNESIUM HYDROXIDE/ALUMINUM HYDROXICE/SIMETHICONE 120; 1200; 1200 MG/30ML; MG/30ML; MG/30ML
30 SUSPENSION ORAL ONCE
Status: COMPLETED | OUTPATIENT
Start: 2023-07-29 | End: 2023-07-29

## 2023-07-29 RX ORDER — OMEPRAZOLE 20 MG/1
20 CAPSULE, DELAYED RELEASE ORAL
Qty: 15 CAPSULE | Refills: 0 | Status: SHIPPED | OUTPATIENT
Start: 2023-07-29 | End: 2023-08-13

## 2023-07-29 RX ADMIN — ALUMINUM HYDROXIDE, MAGNESIUM HYDROXIDE, AND SIMETHICONE 30 ML: 200; 200; 20 SUSPENSION ORAL at 10:31

## 2023-07-29 RX ADMIN — FLUCONAZOLE 200 MG: 100 TABLET ORAL at 12:11

## 2023-07-29 RX ADMIN — FAMOTIDINE 20 MG: 20 TABLET, FILM COATED ORAL at 10:31

## 2023-07-29 ASSESSMENT — ENCOUNTER SYMPTOMS
EYES NEGATIVE: 1
SINUS PAIN: 0
SHORTNESS OF BREATH: 0
ABDOMINAL PAIN: 1
CONSTIPATION: 1
CHEST TIGHTNESS: 0
COLOR CHANGE: 0
VOMITING: 0
ABDOMINAL DISTENTION: 0
APNEA: 0
DIARRHEA: 0

## 2023-07-29 ASSESSMENT — PAIN SCALES - GENERAL
PAINLEVEL_OUTOF10: 8
PAINLEVEL_OUTOF10: 8

## 2023-07-29 ASSESSMENT — PAIN - FUNCTIONAL ASSESSMENT: PAIN_FUNCTIONAL_ASSESSMENT: 0-10

## 2023-07-29 ASSESSMENT — PAIN DESCRIPTION - LOCATION: LOCATION: ABDOMEN;BACK

## 2023-07-29 NOTE — DISCHARGE INSTRUCTIONS
Start omeprazole. I recommend diet changes that we discussed. Avoid eating within 1 hour before bed. Take fluconazole as prescribed. Your first dose was given today. If symptoms persist take second tablet in 7 days.

## 2023-07-29 NOTE — ED PROVIDER NOTES
EMERGENCY DEPARTMENT ENCOUNTER    Pt Name: Hany Gallagher  MRN: [id number]  [address] [place] 1985  Date of evaluation: 7/29/23  CHIEF COMPLAINT       Chief Complaint   Patient presents with    Abdominal Pain     Upper abd pain    Back Pain     Sts has \"knot\" on rt side of back pcp told her to go to ed if pain increased     HISTORY OF PRESENT ILLNESS   67-year-old female presents emergency room for upper abdominal discomfort. She reports she has chronic issues with constipation. Her constipation episodes usually are right before her menstrual cycle. This is an ongoing issue. Over the last couple of days she has had some upper abdominal discomfort. She has been taking stool softener and MiraLAX. No vomiting. Pain is mostly to the epigastric area. Patient has a separate complaint of vaginal irritation. She reports over the last couple of days she has had an increase in discharge and irritation to the vagina. No STI concerns. Patient has a third complaint relating to a cyst on her back. Patient has a subcutaneous cyst to her right mid to lower back. She noticed this several months ago. Her primary care doctor had told her if she had any increase in pain to go to the emergency room. She states it does hurt somewhat more than usual.           REVIEW OF SYSTEMS     Review of Systems   Constitutional:  Negative for activity change, chills and diaphoresis. HENT:  Negative for congestion, sinus pain and tinnitus. Eyes: Negative. Respiratory:  Negative for apnea, chest tightness and shortness of breath. Gastrointestinal:  Positive for abdominal pain and constipation. Negative for abdominal distention, diarrhea and vomiting. Genitourinary:  Positive for vaginal discharge. Negative for difficulty urinating and frequency. Musculoskeletal:  Negative for arthralgias and myalgias. Skin:  Negative for color change and rash. Neurological:  Negative for dizziness. Hematological: Negative. DAWHistorical Med      Lancets (ONETOUCH DELICA PLUS [id number]) MISC Historical Med      gabapentin (NEURONTIN) 600 MG tablet Take 1 tablet by mouth 3 times daily. Has only been taking 1 time per dayHistorical Med      topiramate (TOPAMAX) 100 MG tablet Take 1 tablet by mouth 2 times dailyHistorical Med      diclofenac (VOLTAREN) 50 MG EC tablet Take 1 tablet by mouth 2 times daily Instructed to stop 5 days before surgeryHistorical Med      diclofenac sodium (VOLTAREN) 1 % GEL Apply 2 g topically 4 times daily as needed for Pain (right knee and left shoulder), Topical, 4 TIMES DAILY PRN Starting Wed 1/20/2021, Historical Med      sucralfate (CARAFATE) 1 GM tablet Take 1 tablet by mouth 4 times daily, Disp-120 tablet, R-3Normal      tiotropium (SPIRIVA HANDIHALER) 18 MCG inhalation capsule Spiriva Respimat 2.5 mcg/actuation solution for inhalation   Inhale 2 puffs every day by inhalation route. Historical Med      aspirin 81 MG chewable tablet Take 1 tablet by mouth daily, Disp-30 tablet, R-3Normal      butalbital-acetaminophen-caffeine (FIORICET, ESGIC) -40 MG per tablet Take 1 tablet by mouth every 4 hours as needed for Headaches, Disp-20 tablet, R-0Normal      albuterol sulfate  (90 Base) MCG/ACT inhaler Inhale 2 puffs into the lungs 4 times daily, Disp-1 Inhaler, R-3Normal      ipratropium (ATROVENT HFA) 17 MCG/ACT inhaler Inhale 2 puffs into the lungs 4 times daily, Disp-1 Inhaler, R-3Normal      lisinopril (PRINIVIL;ZESTRIL) 20 MG tablet Take 1 tablet by mouth daily, Disp-30 tablet, R-3Normal      POTASSIUM CHLORIDE PO Take 25 mg by mouth dailyHistorical Med      benzonatate (TESSALON PERLES) 100 MG capsule Take 1 capsule by mouth every 8 hours as needed for Cough, Disp-20 capsule, R-0Print      hydrochlorothiazide (HYDRODIURIL) 25 MG tablet Take 1 tablet by mouth dailyHistorical Med      metoprolol (LOPRESSOR) 50 MG tablet Take 1 tablet by mouth 2 times dailyHistorical Med      loratadine

## 2023-07-31 LAB
C TRACH DNA SPEC QL PROBE+SIG AMP: NEGATIVE
N GONORRHOEA DNA SPEC QL PROBE+SIG AMP: NEGATIVE
SPECIMEN DESCRIPTION: NORMAL

## 2023-08-03 ENCOUNTER — TELEPHONE (OUTPATIENT)
Dept: GASTROENTEROLOGY | Age: 38
End: 2023-08-03

## 2023-08-03 NOTE — TELEPHONE ENCOUNTER
Writer called patient to set up NP appt with Dr. Reyes Blamer for Abdominal pain. Patient didn't answer and vm box isnt set up yet.

## 2023-08-23 ENCOUNTER — OFFICE VISIT (OUTPATIENT)
Dept: BARIATRICS/WEIGHT MGMT | Age: 38
End: 2023-08-23
Payer: COMMERCIAL

## 2023-08-23 VITALS
BODY MASS INDEX: 39.68 KG/M2 | HEART RATE: 72 BPM | DIASTOLIC BLOOD PRESSURE: 70 MMHG | SYSTOLIC BLOOD PRESSURE: 122 MMHG | WEIGHT: 293 LBS | OXYGEN SATURATION: 99 % | HEIGHT: 72 IN

## 2023-08-23 DIAGNOSIS — E11.69 DIABETES MELLITUS TYPE 2 IN OBESE (HCC): ICD-10-CM

## 2023-08-23 DIAGNOSIS — K21.9 GERD WITHOUT ESOPHAGITIS: ICD-10-CM

## 2023-08-23 DIAGNOSIS — E66.01 MORBID OBESITY WITH BMI OF 70 AND OVER, ADULT (HCC): ICD-10-CM

## 2023-08-23 DIAGNOSIS — E66.9 DIABETES MELLITUS TYPE 2 IN OBESE (HCC): ICD-10-CM

## 2023-08-23 DIAGNOSIS — I27.20 PULMONARY HYPERTENSION (HCC): Primary | ICD-10-CM

## 2023-08-23 PROCEDURE — 3078F DIAST BP <80 MM HG: CPT | Performed by: SURGERY

## 2023-08-23 PROCEDURE — 4004F PT TOBACCO SCREEN RCVD TLK: CPT | Performed by: SURGERY

## 2023-08-23 PROCEDURE — G8427 DOCREV CUR MEDS BY ELIG CLIN: HCPCS | Performed by: SURGERY

## 2023-08-23 PROCEDURE — 3046F HEMOGLOBIN A1C LEVEL >9.0%: CPT | Performed by: SURGERY

## 2023-08-23 PROCEDURE — G8417 CALC BMI ABV UP PARAM F/U: HCPCS | Performed by: SURGERY

## 2023-08-23 PROCEDURE — 3074F SYST BP LT 130 MM HG: CPT | Performed by: SURGERY

## 2023-08-23 PROCEDURE — 2022F DILAT RTA XM EVC RTNOPTHY: CPT | Performed by: SURGERY

## 2023-08-23 PROCEDURE — 99215 OFFICE O/P EST HI 40 MIN: CPT | Performed by: SURGERY

## 2023-09-19 ENCOUNTER — OFFICE VISIT (OUTPATIENT)
Dept: BARIATRICS/WEIGHT MGMT | Age: 38
End: 2023-09-19
Payer: COMMERCIAL

## 2023-09-19 VITALS
SYSTOLIC BLOOD PRESSURE: 110 MMHG | BODY MASS INDEX: 39.68 KG/M2 | HEART RATE: 72 BPM | WEIGHT: 293 LBS | RESPIRATION RATE: 16 BRPM | HEIGHT: 72 IN | DIASTOLIC BLOOD PRESSURE: 82 MMHG

## 2023-09-19 DIAGNOSIS — F31.9 CHRONIC BIPOLAR DISORDER (HCC): ICD-10-CM

## 2023-09-19 DIAGNOSIS — E11.9 TYPE 2 DIABETES MELLITUS WITHOUT COMPLICATION, WITHOUT LONG-TERM CURRENT USE OF INSULIN (HCC): Primary | ICD-10-CM

## 2023-09-19 DIAGNOSIS — F41.9 ANXIETY AND DEPRESSION: ICD-10-CM

## 2023-09-19 DIAGNOSIS — D64.9 NORMOCYTIC NORMOCHROMIC ANEMIA: ICD-10-CM

## 2023-09-19 DIAGNOSIS — E66.2 OBESITY HYPOVENTILATION SYNDROME (HCC): ICD-10-CM

## 2023-09-19 DIAGNOSIS — K21.9 GASTROESOPHAGEAL REFLUX DISEASE, UNSPECIFIED WHETHER ESOPHAGITIS PRESENT: ICD-10-CM

## 2023-09-19 DIAGNOSIS — F12.91 HISTORY OF MARIJUANA USE: ICD-10-CM

## 2023-09-19 DIAGNOSIS — G47.33 OSA (OBSTRUCTIVE SLEEP APNEA): ICD-10-CM

## 2023-09-19 DIAGNOSIS — E78.5 HYPERLIPIDEMIA, UNSPECIFIED HYPERLIPIDEMIA TYPE: ICD-10-CM

## 2023-09-19 DIAGNOSIS — I27.20 PULMONARY HYPERTENSION (HCC): ICD-10-CM

## 2023-09-19 DIAGNOSIS — E66.01 MORBID OBESITY WITH BMI OF 60.0-69.9, ADULT (HCC): ICD-10-CM

## 2023-09-19 DIAGNOSIS — F32.A ANXIETY AND DEPRESSION: ICD-10-CM

## 2023-09-19 PROCEDURE — 2022F DILAT RTA XM EVC RTNOPTHY: CPT | Performed by: NURSE PRACTITIONER

## 2023-09-19 PROCEDURE — 3079F DIAST BP 80-89 MM HG: CPT | Performed by: NURSE PRACTITIONER

## 2023-09-19 PROCEDURE — G8417 CALC BMI ABV UP PARAM F/U: HCPCS | Performed by: NURSE PRACTITIONER

## 2023-09-19 PROCEDURE — 3074F SYST BP LT 130 MM HG: CPT | Performed by: NURSE PRACTITIONER

## 2023-09-19 PROCEDURE — 4004F PT TOBACCO SCREEN RCVD TLK: CPT | Performed by: NURSE PRACTITIONER

## 2023-09-19 PROCEDURE — G8427 DOCREV CUR MEDS BY ELIG CLIN: HCPCS | Performed by: NURSE PRACTITIONER

## 2023-09-19 PROCEDURE — 3046F HEMOGLOBIN A1C LEVEL >9.0%: CPT | Performed by: NURSE PRACTITIONER

## 2023-09-19 PROCEDURE — 99213 OFFICE O/P EST LOW 20 MIN: CPT | Performed by: NURSE PRACTITIONER

## 2023-09-19 NOTE — PROGRESS NOTES
Medical Weight Management Progress Note    Subjective     Patient being seen for medically supervised weight loss for the chronic conditions of DM Type 2, Asthma, Pulmonary HTN, BRYSON, Obesity Hypoventilation Syndrome, Anemia, GERD, Anxiety/Depression, Bipolar, HLD, Chronic Migraine, Arthritis, COPD. She is a restart in the surgical program.   She is working on the behavior changes discussed at the initial appointment. Patient continues on diet plan. Physical activity includes yoga. Weight today is 514 lbs. Using CPAP. Psych eval scheduled 10/2/23. EGD scheduled 9/29/23. Cardiac clearance scheduled 10/24/23. Pulmonary clearance scheduled 11/2/23. No current issues. Working toward bariatric surgery:    [x] Sleeve Gastrectomy                                                           [] Ragini-en-Y Gastric Bypass    Allergies: Allergies   Allergen Reactions    Seasonal Other (See Comments)     Allergic rhinnitis    Grass and cats       Past Medical History:     Past Medical History:   Diagnosis Date    Acquired hallux rigidus 4/19/2019    Acute on chronic respiratory failure with hypoxia and hypercapnia (HCC) 11/6/2018    Arthritis     rheumatoid arthritis, see's ortho for right knee and has had injections but has never seen rheumatology    ASCUS with positive high risk HPV cervical     Asthma     Bipolar disorder ([address]) 04/19/2019    does not see any one for this    Breast discharge 4/9/2015    Cellulitis 4/19/2019    Chest pain     saw cardiology for clearance Dr. Mackenzie Craig at [facility] on 9/23/21, notes indicate wanting pt to have echo but has not been done    Chlamydia ?     Reported hx    Chronic cor pulmonale (HCC)     Chronic cough     COPD (chronic obstructive pulmonary disease) ([address])     Dr. Nathalia Ramesh, last seen 12/14/21    D-dimer, elevated 11/18/2018    Dysfunctional uterine bleeding 1/27/2014    Dysplasia of cervix, low grade (ISAIAS 1) 3/30/2017    Noted on colposcopy 3/7/17    Ectopic pregnancy - Right
I will limit dining out to 3 times per week or less.   [] []    [] 7 I will eliminate sugary beverages. [] [x]    [] 8 I will eliminate carbonated beverages. [] [x]    [] 9 I will eliminate drinking with a straw.   [] [x]    [] 10 I will limit caffeinated beverages to 16oz daily. [] [x]    [x] 11 I will log my exercise daily. [] [] Yoga daily. [] 12 I will determine my calcium and mvi plan.   [] []    [x] 13 I will have 1-2 servings of lean protein present at each meal and snack. [] []    [x] 14 I will eat every 3-5 hours.   [] []    [] 15 I will drink 64oz of fluid daily. [] [x]    [] 16 I will eat slowly during meals and snacks.   [] []    [] 17 I will limit fluids to 4oz before, after, and during meals. [] []    [x] 18 I will eat protein first at all meals followed by vegetables, fruit, and lastly whole grains. [] []    [] 19 My weight neutral approach is:   [] []          [x] Consistent goal achievement in the program thus far and further success with goals is expected. [] Unable to consistently make progress in goal achievement. At this time patient is not moving forward in developing the skills needed for success after surgery. Monitoring and Evaluation:    [x] Continue to follow monthly and review goals set above.   [] Nutrition visits complete     Yvonne Carrero RD, [address] [place] [address],[place] [address] Weight Management & Surgical Specialist  [phone]

## 2023-09-22 ENCOUNTER — TELEPHONE (OUTPATIENT)
Dept: BARIATRICS/WEIGHT MGMT | Age: 38
End: 2023-09-22

## 2023-09-27 ENCOUNTER — OFFICE VISIT (OUTPATIENT)
Dept: GASTROENTEROLOGY | Age: 38
End: 2023-09-27
Payer: COMMERCIAL

## 2023-09-27 VITALS
DIASTOLIC BLOOD PRESSURE: 68 MMHG | WEIGHT: 293 LBS | TEMPERATURE: 97.3 F | BODY MASS INDEX: 69.71 KG/M2 | SYSTOLIC BLOOD PRESSURE: 109 MMHG

## 2023-09-27 DIAGNOSIS — K21.9 GASTROESOPHAGEAL REFLUX DISEASE, UNSPECIFIED WHETHER ESOPHAGITIS PRESENT: ICD-10-CM

## 2023-09-27 DIAGNOSIS — K59.01 SLOW TRANSIT CONSTIPATION: ICD-10-CM

## 2023-09-27 DIAGNOSIS — E66.01 MORBID OBESITY WITH BMI OF 60.0-69.9, ADULT (HCC): ICD-10-CM

## 2023-09-27 DIAGNOSIS — R10.13 EPIGASTRIC PAIN: Primary | ICD-10-CM

## 2023-09-27 PROCEDURE — G8427 DOCREV CUR MEDS BY ELIG CLIN: HCPCS | Performed by: INTERNAL MEDICINE

## 2023-09-27 PROCEDURE — G8417 CALC BMI ABV UP PARAM F/U: HCPCS | Performed by: INTERNAL MEDICINE

## 2023-09-27 PROCEDURE — 3078F DIAST BP <80 MM HG: CPT | Performed by: INTERNAL MEDICINE

## 2023-09-27 PROCEDURE — 3074F SYST BP LT 130 MM HG: CPT | Performed by: INTERNAL MEDICINE

## 2023-09-27 PROCEDURE — 4004F PT TOBACCO SCREEN RCVD TLK: CPT | Performed by: INTERNAL MEDICINE

## 2023-09-27 PROCEDURE — 99204 OFFICE O/P NEW MOD 45 MIN: CPT | Performed by: INTERNAL MEDICINE

## 2023-09-27 ASSESSMENT — ENCOUNTER SYMPTOMS
VOICE CHANGE: 0
WHEEZING: 0
DIARRHEA: 1
CONSTIPATION: 1
BLOOD IN STOOL: 0
SHORTNESS OF BREATH: 0
NAUSEA: 1
RECTAL PAIN: 0
CHOKING: 0
COUGH: 0
TROUBLE SWALLOWING: 0
ANAL BLEEDING: 0
SORE THROAT: 0
VOMITING: 0
ABDOMINAL PAIN: 1
ABDOMINAL DISTENTION: 0

## 2023-09-27 NOTE — PROGRESS NOTES
GI NEW PATIENT OFFICE VISIT    INTERVAL HISTORY:   Jarrett Chawla MD  [address] [facility]  [place],  [address] [facility]    Chief Complaint   Patient presents with    Abdominal Pain     Pt is here today as a NP due to epigastric abd pain. 1. Epigastric pain    2. Morbid obesity with BMI of 60.0-69.9, adult ([address] )    3. Gastroesophageal reflux disease, unspecified whether esophagitis present    4. Slow transit constipation        Patient evaluated my office for the first time  She is a pleasant 52-year-old Afro-American female history significant of multiple medical issues she is history for morbid obesity    She has been seen and followed by Dr. Juan Corbin for possible gastric bypass surgery    She has been scheduled for an upper endoscopy done in preparation for her bypass surgery By Dr. Juan Corbin    Patient having findings of significant gastric pain and discomfort associate with some of her nausea  Patient has been complaining of some abdominal pains, off and on cramping  Also complains of abdominal bloating and gas  Has off and on nausea without any sig vomiting  Has some alternating constipation and diarrhea    She is some weight loss started on Ozempic but she stopped it because because because constipation she has stopped taking all of it her constipation is better she having multiple bowel movements every day without any blood or bleeding    Has some anxiety issues    Normal exam history for colon cancer      HISTORY OF PRESENT ILLNESS: Ms.Gabrielle MATA Morales is a 41 y.o. female with a past history remarkable for , referred for evaluation of   Chief Complaint   Patient presents with    Abdominal Pain     Pt is here today as a NP due to epigastric abd pain. .    Past Medical,Family, and Social History reviewed and does contribute to the patient presenting condition. Patient's PMH/PSH,SH,PSYCH Hx, MEDs, ALLERGIES, and ROS were all reviewed and updated in the appropriate sections.     PAST MEDICAL

## 2023-10-02 ENCOUNTER — HOSPITAL ENCOUNTER (OUTPATIENT)
Age: 38
Setting detail: SPECIMEN
Discharge: HOME | End: 2023-10-02

## 2023-10-02 ENCOUNTER — INITIAL CONSULT (OUTPATIENT)
Dept: BEHAVIORAL/MENTAL HEALTH CLINIC | Age: 38
End: 2023-10-02

## 2023-10-02 DIAGNOSIS — F31.9 CHRONIC BIPOLAR DISORDER (HCC): ICD-10-CM

## 2023-10-02 DIAGNOSIS — K21.9 GASTROESOPHAGEAL REFLUX DISEASE, UNSPECIFIED WHETHER ESOPHAGITIS PRESENT: ICD-10-CM

## 2023-10-02 DIAGNOSIS — E66.2 OBESITY HYPOVENTILATION SYNDROME (HCC): ICD-10-CM

## 2023-10-02 DIAGNOSIS — F32.A ANXIETY AND DEPRESSION: ICD-10-CM

## 2023-10-02 DIAGNOSIS — F12.91 HISTORY OF MARIJUANA USE: ICD-10-CM

## 2023-10-02 DIAGNOSIS — I27.20 PULMONARY HYPERTENSION (HCC): ICD-10-CM

## 2023-10-02 DIAGNOSIS — F41.9 ANXIETY AND DEPRESSION: ICD-10-CM

## 2023-10-02 DIAGNOSIS — G47.33 OSA (OBSTRUCTIVE SLEEP APNEA): ICD-10-CM

## 2023-10-02 DIAGNOSIS — E66.01 MORBID OBESITY WITH BMI OF 60.0-69.9, ADULT (HCC): ICD-10-CM

## 2023-10-02 DIAGNOSIS — E78.5 HYPERLIPIDEMIA, UNSPECIFIED HYPERLIPIDEMIA TYPE: ICD-10-CM

## 2023-10-02 DIAGNOSIS — D64.9 NORMOCYTIC NORMOCHROMIC ANEMIA: ICD-10-CM

## 2023-10-02 DIAGNOSIS — E66.01 MORBID OBESITY WITH BMI OF 60.0-69.9, ADULT (HCC): Primary | ICD-10-CM

## 2023-10-02 DIAGNOSIS — F34.1 PERSISTENT DEPRESSIVE DISORDER: ICD-10-CM

## 2023-10-02 DIAGNOSIS — E11.9 TYPE 2 DIABETES MELLITUS WITHOUT COMPLICATION, WITHOUT LONG-TERM CURRENT USE OF INSULIN (HCC): ICD-10-CM

## 2023-10-02 LAB
25(OH)D3 SERPL-MCNC: 26.4 NG/ML
ALBUMIN SERPL-MCNC: 3.9 G/DL (ref 3.5–5.2)
ALBUMIN/GLOB SERPL: 1.1 {RATIO} (ref 1–2.5)
ALP SERPL-CCNC: 65 U/L (ref 35–104)
ALT SERPL-CCNC: 10 U/L (ref 5–33)
ANION GAP SERPL CALCULATED.3IONS-SCNC: 15 MMOL/L (ref 9–17)
AST SERPL-CCNC: 12 U/L
BASOPHILS # BLD: 0.04 K/UL (ref 0–0.2)
BASOPHILS NFR BLD: 0 % (ref 0–2)
BILIRUB SERPL-MCNC: 0.5 MG/DL (ref 0.3–1.2)
BUN SERPL-MCNC: 10 MG/DL (ref 6–20)
CALCIUM SERPL-MCNC: 9.2 MG/DL (ref 8.6–10.4)
CHLORIDE SERPL-SCNC: 106 MMOL/L (ref 98–107)
CHOLEST SERPL-MCNC: 115 MG/DL
CHOLESTEROL/HDL RATIO: 3.4
CO2 SERPL-SCNC: 21 MMOL/L (ref 20–31)
CREAT SERPL-MCNC: 0.7 MG/DL (ref 0.5–0.9)
EOSINOPHIL # BLD: 0.09 K/UL (ref 0–0.44)
EOSINOPHILS RELATIVE PERCENT: 1 % (ref 1–4)
ERYTHROCYTE [DISTWIDTH] IN BLOOD BY AUTOMATED COUNT: 18.5 % (ref 11.8–14.4)
EST. AVERAGE GLUCOSE BLD GHB EST-MCNC: 85 MG/DL
FERRITIN SERPL-MCNC: 8 NG/ML (ref 13–150)
FOLATE SERPL-MCNC: 7.8 NG/ML
GFR SERPL CREATININE-BSD FRML MDRD: >60 ML/MIN/1.73M2
GLUCOSE SERPL-MCNC: 88 MG/DL (ref 70–99)
HBA1C MFR BLD: 4.6 % (ref 4–6)
HCT VFR BLD AUTO: 44.2 % (ref 36.3–47.1)
HDLC SERPL-MCNC: 34 MG/DL
HGB BLD-MCNC: 12.9 G/DL (ref 11.9–15.1)
IMM GRANULOCYTES # BLD AUTO: <0.03 K/UL (ref 0–0.3)
IMM GRANULOCYTES NFR BLD: 0 %
IRON SATN MFR SERPL: 9 % (ref 20–55)
IRON SERPL-MCNC: 33 UG/DL (ref 37–145)
LDLC SERPL CALC-MCNC: 68 MG/DL (ref 0–130)
LYMPHOCYTES NFR BLD: 3.13 K/UL (ref 1.1–3.7)
LYMPHOCYTES RELATIVE PERCENT: 34 % (ref 24–43)
MAGNESIUM SERPL-MCNC: 2.2 MG/DL (ref 1.6–2.6)
MCH RBC QN AUTO: 23.3 PG (ref 25.2–33.5)
MCHC RBC AUTO-ENTMCNC: 29.2 G/DL (ref 28.4–34.8)
MCV RBC AUTO: 79.9 FL (ref 82.6–102.9)
MONOCYTES NFR BLD: 0.61 K/UL (ref 0.1–1.2)
MONOCYTES NFR BLD: 7 % (ref 3–12)
NEUTROPHILS NFR BLD: 58 % (ref 36–65)
NEUTS SEG NFR BLD: 5.41 K/UL (ref 1.5–8.1)
NRBC BLD-RTO: 0 PER 100 WBC
PLATELET # BLD AUTO: 365 K/UL (ref 138–453)
PMV BLD AUTO: 10.4 FL (ref 8.1–13.5)
POTASSIUM SERPL-SCNC: 3.9 MMOL/L (ref 3.7–5.3)
PROT SERPL-MCNC: 7.3 G/DL (ref 6.4–8.3)
PTH-INTACT SERPL-MCNC: 66.5 PG/ML (ref 14–72)
RBC # BLD AUTO: 5.53 M/UL (ref 3.95–5.11)
RBC # BLD: ABNORMAL 10*6/UL
SODIUM SERPL-SCNC: 142 MMOL/L (ref 135–144)
T4 FREE SERPL-MCNC: 1.3 NG/DL (ref 0.9–1.7)
TIBC SERPL-MCNC: 381 UG/DL (ref 250–450)
TRIGL SERPL-MCNC: 66 MG/DL
TSH SERPL DL<=0.05 MIU/L-ACNC: 1.92 UIU/ML (ref 0.3–5)
UNSATURATED IRON BINDING CAPACITY: 348 UG/DL (ref 112–347)
VIT B12 SERPL-MCNC: 423 PG/ML (ref 232–1245)
WBC OTHER # BLD: 9.3 K/UL (ref 3.5–11.3)

## 2023-10-02 NOTE — PROGRESS NOTES
Bariatric Pre-Surgical Psychology Initial Evaluation  Ajay Fonseca M.A. Clinical Psychology Doctoral Student  Supervising Licensed Clinical Psychologists:  Andie Sahu, Ph.D. ([id number], [address] [place] [address] [id number])  Tyler Morley, Ph.D. ([id number])    Visit Date: 10/2/2023   Time of appointment:  2:00- 4:35pm  Time spent with patient: 2hr 35 minutes  Additional time spent testin minutes  Time spent report writing, clinical interviewing/interpretation, consultation with supervisor, supervisor review, supervisor feedback, interpretive feedback: 180 minutes  Interactive feedback provided on: 10/16/2023    Breakdown of charges:   85198- first 60 minute assessment   51127 - 30 minutes assessment (1 half-hour unit)  86156 - 103 minutes additional assessment and scoring (3 half-hour units)   - first 60 minute report writing (1 one hour unit)  430 1770 - 120 minutes additional report writing, clinical interviewing/interpretation, consultation with supervisor, supervisor review, supervisor feedback, interpretive feedback (2 one hour units)    Pt provided informed consent for the behavioral health evaluation. Discussed with patient the limits of confidentiality (i.e. abuse reporting, suicide intervention, etc.) and purpose of evaluation. Also discussed with patient that the service provider is a supervised clinician and in particular is being supervised by Dr. Dakotah Miranda and/or Dr. Everett Markham. Pt indicated understanding and signed consent form agreeing to be seen by a supervised clinician. Identifying Data and Reason for Referral:  Salud Connolly is a 64-year-old female, who was seen for a pre-surgical psychological evaluation. As part of this evaluation, a clinical interview was conducted.   Additionally, the patient was administered the [address] [place] [address] (MMPI-3), Patient Health Questionnaire -9 (PHQ-9), Generalized Anxiety Disorder - 7 (SIENNA-7), Alcohol Use Disorders Identification Test (AUDIT),

## 2023-10-04 DIAGNOSIS — E61.1 LOW IRON: ICD-10-CM

## 2023-10-04 DIAGNOSIS — E55.9 VITAMIN D DEFICIENCY: Primary | ICD-10-CM

## 2023-10-04 LAB
RETINYL PALMITATE: <0.02 MG/L (ref 0–0.1)
VITAMIN A LEVEL: 0.46 MG/L (ref 0.3–1.2)
VITAMIN A, INTERP: NORMAL
ZINC SERPL-MCNC: 65.9 UG/DL (ref 60–120)

## 2023-10-04 RX ORDER — FERROUS SULFATE 325(65) MG
325 TABLET ORAL
Qty: 90 TABLET | Refills: 1 | Status: SHIPPED | OUTPATIENT
Start: 2023-10-04

## 2023-10-04 RX ORDER — ERGOCALCIFEROL 1.25 MG/1
50000 CAPSULE ORAL WEEKLY
Qty: 8 CAPSULE | Refills: 0 | Status: SHIPPED | OUTPATIENT
Start: 2023-10-04 | End: 2023-11-23

## 2023-10-05 LAB — VIT B1 PYROPHOSHATE BLD-SCNC: 139 NMOL/L (ref 70–180)

## 2023-10-16 ENCOUNTER — TELEPHONE (OUTPATIENT)
Dept: BEHAVIORAL/MENTAL HEALTH CLINIC | Age: 38
End: 2023-10-16

## 2023-10-19 ENCOUNTER — OFFICE VISIT (OUTPATIENT)
Dept: BARIATRICS/WEIGHT MGMT | Age: 38
End: 2023-10-19
Payer: COMMERCIAL

## 2023-10-19 VITALS
DIASTOLIC BLOOD PRESSURE: 78 MMHG | HEIGHT: 70 IN | HEART RATE: 76 BPM | SYSTOLIC BLOOD PRESSURE: 120 MMHG | WEIGHT: 293 LBS | BODY MASS INDEX: 41.95 KG/M2 | RESPIRATION RATE: 16 BRPM

## 2023-10-19 DIAGNOSIS — D64.9 NORMOCYTIC NORMOCHROMIC ANEMIA: ICD-10-CM

## 2023-10-19 DIAGNOSIS — K21.9 GASTROESOPHAGEAL REFLUX DISEASE, UNSPECIFIED WHETHER ESOPHAGITIS PRESENT: ICD-10-CM

## 2023-10-19 DIAGNOSIS — I27.20 PULMONARY HYPERTENSION (HCC): ICD-10-CM

## 2023-10-19 DIAGNOSIS — E66.2 OBESITY HYPOVENTILATION SYNDROME (HCC): ICD-10-CM

## 2023-10-19 DIAGNOSIS — E66.01 MORBID OBESITY WITH BMI OF 70 AND OVER, ADULT (HCC): ICD-10-CM

## 2023-10-19 DIAGNOSIS — J44.9 CHRONIC OBSTRUCTIVE PULMONARY DISEASE, UNSPECIFIED COPD TYPE (HCC): ICD-10-CM

## 2023-10-19 DIAGNOSIS — F41.9 ANXIETY AND DEPRESSION: ICD-10-CM

## 2023-10-19 DIAGNOSIS — F12.91 HISTORY OF MARIJUANA USE: ICD-10-CM

## 2023-10-19 DIAGNOSIS — F31.9 CHRONIC BIPOLAR DISORDER (HCC): ICD-10-CM

## 2023-10-19 DIAGNOSIS — I10 HYPERTENSION, UNSPECIFIED TYPE: ICD-10-CM

## 2023-10-19 DIAGNOSIS — M54.12 CERVICAL RADICULOPATHY: ICD-10-CM

## 2023-10-19 DIAGNOSIS — G47.33 OSA (OBSTRUCTIVE SLEEP APNEA): ICD-10-CM

## 2023-10-19 DIAGNOSIS — E11.9 TYPE 2 DIABETES MELLITUS WITHOUT COMPLICATION, WITHOUT LONG-TERM CURRENT USE OF INSULIN (HCC): Primary | ICD-10-CM

## 2023-10-19 DIAGNOSIS — E78.5 HYPERLIPIDEMIA, UNSPECIFIED HYPERLIPIDEMIA TYPE: ICD-10-CM

## 2023-10-19 DIAGNOSIS — F32.A ANXIETY AND DEPRESSION: ICD-10-CM

## 2023-10-19 PROCEDURE — 99213 OFFICE O/P EST LOW 20 MIN: CPT | Performed by: NURSE PRACTITIONER

## 2023-10-19 PROCEDURE — 4004F PT TOBACCO SCREEN RCVD TLK: CPT | Performed by: NURSE PRACTITIONER

## 2023-10-19 PROCEDURE — 2022F DILAT RTA XM EVC RTNOPTHY: CPT | Performed by: NURSE PRACTITIONER

## 2023-10-19 PROCEDURE — G8484 FLU IMMUNIZE NO ADMIN: HCPCS | Performed by: NURSE PRACTITIONER

## 2023-10-19 PROCEDURE — 3044F HG A1C LEVEL LT 7.0%: CPT | Performed by: NURSE PRACTITIONER

## 2023-10-19 PROCEDURE — 3074F SYST BP LT 130 MM HG: CPT | Performed by: NURSE PRACTITIONER

## 2023-10-19 PROCEDURE — 3078F DIAST BP <80 MM HG: CPT | Performed by: NURSE PRACTITIONER

## 2023-10-19 PROCEDURE — G8417 CALC BMI ABV UP PARAM F/U: HCPCS | Performed by: NURSE PRACTITIONER

## 2023-10-19 PROCEDURE — G8427 DOCREV CUR MEDS BY ELIG CLIN: HCPCS | Performed by: NURSE PRACTITIONER

## 2023-10-19 PROCEDURE — 3023F SPIROM DOC REV: CPT | Performed by: NURSE PRACTITIONER

## 2023-10-20 ENCOUNTER — TELEPHONE (OUTPATIENT)
Dept: BARIATRICS/WEIGHT MGMT | Age: 38
End: 2023-10-20

## 2023-10-24 ENCOUNTER — OFFICE VISIT (OUTPATIENT)
Dept: BEHAVIORAL/MENTAL HEALTH CLINIC | Age: 38
End: 2023-10-24
Payer: COMMERCIAL

## 2023-10-24 DIAGNOSIS — F43.23 ADJUSTMENT DISORDER WITH MIXED ANXIETY AND DEPRESSED MOOD: Primary | ICD-10-CM

## 2023-10-24 PROCEDURE — 90834 PSYTX W PT 45 MINUTES: CPT | Performed by: PSYCHOLOGIST

## 2023-10-24 PROCEDURE — 4004F PT TOBACCO SCREEN RCVD TLK: CPT | Performed by: PSYCHOLOGIST

## 2023-10-24 NOTE — PROGRESS NOTES
managing overall health      PLAN  Meet Nov 7, 2023. Dannymon Courtney will engage in behavioral goals set in session. INTERACTIVE COMPLEXITY  Is interactive complexity present?   No  Reason:   n/a  Additional Supporting Information:  N/A     Electronically signed by Darlis Fothergill on 10/24/2023 at 1:02 PM

## 2023-10-26 ENCOUNTER — ANESTHESIA EVENT (OUTPATIENT)
Dept: OPERATING ROOM | Age: 38
End: 2023-10-26
Payer: COMMERCIAL

## 2023-10-27 ENCOUNTER — HOSPITAL ENCOUNTER (OUTPATIENT)
Age: 38
Setting detail: OUTPATIENT SURGERY
Discharge: HOME | End: 2023-10-27
Attending: SURGERY | Admitting: SURGERY
Payer: COMMERCIAL

## 2023-10-27 ENCOUNTER — ANESTHESIA (OUTPATIENT)
Dept: OPERATING ROOM | Age: 38
End: 2023-10-27
Payer: COMMERCIAL

## 2023-10-27 VITALS
WEIGHT: 293 LBS | OXYGEN SATURATION: 97 % | HEIGHT: 71 IN | BODY MASS INDEX: 41.02 KG/M2 | RESPIRATION RATE: 18 BRPM | DIASTOLIC BLOOD PRESSURE: 67 MMHG | TEMPERATURE: 98 F | HEART RATE: 76 BPM | SYSTOLIC BLOOD PRESSURE: 127 MMHG

## 2023-10-27 DIAGNOSIS — K21.9 GASTROESOPHAGEAL REFLUX DISEASE, UNSPECIFIED WHETHER ESOPHAGITIS PRESENT: ICD-10-CM

## 2023-10-27 DIAGNOSIS — E66.9 OBESITY, UNSPECIFIED CLASSIFICATION, UNSPECIFIED OBESITY TYPE, UNSPECIFIED WHETHER SERIOUS COMORBIDITY PRESENT: ICD-10-CM

## 2023-10-27 LAB
ALP SERPL-CCNC: 68 U/L (ref 35–104)
ALT SERPL-CCNC: 13 U/L (ref 5–33)
EKG ATRIAL RATE: 75 BPM
EKG P AXIS: 68 DEGREES
EKG P-R INTERVAL: 252 MS
EKG Q-T INTERVAL: 410 MS
EKG QRS DURATION: 110 MS
EKG QTC CALCULATION (BAZETT): 457 MS
EKG R AXIS: 42 DEGREES
EKG T AXIS: 50 DEGREES
EKG VENTRICULAR RATE: 75 BPM
GLUCOSE BLD-MCNC: 103 MG/DL (ref 74–100)
GLUCOSE BLD-MCNC: 90 MG/DL (ref 65–105)
HCG, PREGNANCY URINE (POC): NEGATIVE
POTASSIUM BLD-SCNC: 3.6 MMOL/L (ref 3.5–4.5)

## 2023-10-27 PROCEDURE — 3609012400 HC EGD TRANSORAL BIOPSY SINGLE/MULTIPLE: Performed by: SURGERY

## 2023-10-27 PROCEDURE — 2580000003 HC RX 258: Performed by: STUDENT IN AN ORGANIZED HEALTH CARE EDUCATION/TRAINING PROGRAM

## 2023-10-27 PROCEDURE — 7100000010 HC PHASE II RECOVERY - FIRST 15 MIN: Performed by: SURGERY

## 2023-10-27 PROCEDURE — 93010 ELECTROCARDIOGRAM REPORT: CPT | Performed by: INTERNAL MEDICINE

## 2023-10-27 PROCEDURE — 6360000002 HC RX W HCPCS

## 2023-10-27 PROCEDURE — 3700000000 HC ANESTHESIA ATTENDED CARE: Performed by: SURGERY

## 2023-10-27 PROCEDURE — 84460 ALANINE AMINO (ALT) (SGPT): CPT

## 2023-10-27 PROCEDURE — 43239 EGD BIOPSY SINGLE/MULTIPLE: CPT | Performed by: SURGERY

## 2023-10-27 PROCEDURE — 81025 URINE PREGNANCY TEST: CPT

## 2023-10-27 PROCEDURE — 3700000001 HC ADD 15 MINUTES (ANESTHESIA): Performed by: SURGERY

## 2023-10-27 PROCEDURE — 84075 ASSAY ALKALINE PHOSPHATASE: CPT

## 2023-10-27 PROCEDURE — 2500000003 HC RX 250 WO HCPCS

## 2023-10-27 PROCEDURE — 88342 IMHCHEM/IMCYTCHM 1ST ANTB: CPT

## 2023-10-27 PROCEDURE — 93005 ELECTROCARDIOGRAM TRACING: CPT | Performed by: ANESTHESIOLOGY

## 2023-10-27 PROCEDURE — 84132 ASSAY OF SERUM POTASSIUM: CPT

## 2023-10-27 PROCEDURE — 7100000011 HC PHASE II RECOVERY - ADDTL 15 MIN: Performed by: SURGERY

## 2023-10-27 PROCEDURE — 82947 ASSAY GLUCOSE BLOOD QUANT: CPT

## 2023-10-27 PROCEDURE — 88305 TISSUE EXAM BY PATHOLOGIST: CPT

## 2023-10-27 PROCEDURE — 2709999900 HC NON-CHARGEABLE SUPPLY: Performed by: SURGERY

## 2023-10-27 RX ORDER — LIDOCAINE HYDROCHLORIDE 10 MG/ML
INJECTION, SOLUTION EPIDURAL; INFILTRATION; INTRACAUDAL; PERINEURAL PRN
Status: DISCONTINUED | OUTPATIENT
Start: 2023-10-27 | End: 2023-10-27 | Stop reason: SDUPTHER

## 2023-10-27 RX ORDER — DEXMEDETOMIDINE HYDROCHLORIDE 100 UG/ML
INJECTION, SOLUTION INTRAVENOUS PRN
Status: DISCONTINUED | OUTPATIENT
Start: 2023-10-27 | End: 2023-10-27 | Stop reason: SDUPTHER

## 2023-10-27 RX ORDER — MIDAZOLAM HYDROCHLORIDE 1 MG/ML
INJECTION INTRAMUSCULAR; INTRAVENOUS PRN
Status: DISCONTINUED | OUTPATIENT
Start: 2023-10-27 | End: 2023-10-27 | Stop reason: SDUPTHER

## 2023-10-27 RX ORDER — ONDANSETRON 2 MG/ML
4 INJECTION INTRAMUSCULAR; INTRAVENOUS
Status: DISCONTINUED | OUTPATIENT
Start: 2023-10-27 | End: 2023-10-27 | Stop reason: HOSPADM

## 2023-10-27 RX ORDER — GLYCOPYRROLATE 0.2 MG/ML
INJECTION INTRAMUSCULAR; INTRAVENOUS PRN
Status: DISCONTINUED | OUTPATIENT
Start: 2023-10-27 | End: 2023-10-27 | Stop reason: SDUPTHER

## 2023-10-27 RX ORDER — HYDRALAZINE HYDROCHLORIDE 20 MG/ML
10 INJECTION INTRAMUSCULAR; INTRAVENOUS
Status: DISCONTINUED | OUTPATIENT
Start: 2023-10-27 | End: 2023-10-27 | Stop reason: HOSPADM

## 2023-10-27 RX ORDER — KETAMINE HCL IN NACL, ISO-OSM 100MG/10ML
SYRINGE (ML) INJECTION PRN
Status: DISCONTINUED | OUTPATIENT
Start: 2023-10-27 | End: 2023-10-27 | Stop reason: SDUPTHER

## 2023-10-27 RX ORDER — SODIUM CHLORIDE 0.9 % (FLUSH) 0.9 %
5-40 SYRINGE (ML) INJECTION EVERY 12 HOURS SCHEDULED
Status: DISCONTINUED | OUTPATIENT
Start: 2023-10-27 | End: 2023-10-27 | Stop reason: HOSPADM

## 2023-10-27 RX ORDER — SODIUM CHLORIDE 0.9 % (FLUSH) 0.9 %
5-40 SYRINGE (ML) INJECTION PRN
Status: DISCONTINUED | OUTPATIENT
Start: 2023-10-27 | End: 2023-10-27 | Stop reason: HOSPADM

## 2023-10-27 RX ORDER — PROPOFOL 10 MG/ML
INJECTION, EMULSION INTRAVENOUS PRN
Status: DISCONTINUED | OUTPATIENT
Start: 2023-10-27 | End: 2023-10-27 | Stop reason: SDUPTHER

## 2023-10-27 RX ORDER — FENTANYL CITRATE 50 UG/ML
50 INJECTION, SOLUTION INTRAMUSCULAR; INTRAVENOUS EVERY 5 MIN PRN
Status: DISCONTINUED | OUTPATIENT
Start: 2023-10-27 | End: 2023-10-27 | Stop reason: HOSPADM

## 2023-10-27 RX ORDER — PANTOPRAZOLE SODIUM 40 MG/1
40 TABLET, DELAYED RELEASE ORAL
Qty: 90 TABLET | Refills: 0 | Status: SHIPPED | OUTPATIENT
Start: 2023-10-27

## 2023-10-27 RX ORDER — FENTANYL CITRATE 50 UG/ML
25 INJECTION, SOLUTION INTRAMUSCULAR; INTRAVENOUS EVERY 5 MIN PRN
Status: DISCONTINUED | OUTPATIENT
Start: 2023-10-27 | End: 2023-10-27 | Stop reason: HOSPADM

## 2023-10-27 RX ORDER — SODIUM CHLORIDE 9 MG/ML
INJECTION, SOLUTION INTRAVENOUS PRN
Status: DISCONTINUED | OUTPATIENT
Start: 2023-10-27 | End: 2023-10-27 | Stop reason: HOSPADM

## 2023-10-27 RX ORDER — SODIUM CHLORIDE, SODIUM LACTATE, POTASSIUM CHLORIDE, CALCIUM CHLORIDE 600; 310; 30; 20 MG/100ML; MG/100ML; MG/100ML; MG/100ML
INJECTION, SOLUTION INTRAVENOUS CONTINUOUS
Status: DISCONTINUED | OUTPATIENT
Start: 2023-10-27 | End: 2023-10-27 | Stop reason: HOSPADM

## 2023-10-27 RX ADMIN — DEXMEDETOMIDINE 8 MCG: 100 INJECTION, SOLUTION INTRAVENOUS at 07:40

## 2023-10-27 RX ADMIN — DEXMEDETOMIDINE 8 MCG: 100 INJECTION, SOLUTION INTRAVENOUS at 07:37

## 2023-10-27 RX ADMIN — LIDOCAINE HYDROCHLORIDE 100 MG: 10 INJECTION, SOLUTION EPIDURAL; INFILTRATION; INTRACAUDAL; PERINEURAL at 07:37

## 2023-10-27 RX ADMIN — SODIUM CHLORIDE, POTASSIUM CHLORIDE, SODIUM LACTATE AND CALCIUM CHLORIDE: 600; 310; 30; 20 INJECTION, SOLUTION INTRAVENOUS at 06:47

## 2023-10-27 RX ADMIN — MIDAZOLAM 2 MG: 1 INJECTION INTRAMUSCULAR; INTRAVENOUS at 07:35

## 2023-10-27 RX ADMIN — GLYCOPYRROLATE 0.2 MG: 0.2 INJECTION INTRAMUSCULAR; INTRAVENOUS at 07:34

## 2023-10-27 RX ADMIN — PROPOFOL 30 MG: 10 INJECTION, EMULSION INTRAVENOUS at 07:45

## 2023-10-27 RX ADMIN — Medication 50 MG: at 07:41

## 2023-10-27 RX ADMIN — PROPOFOL 30 MG: 10 INJECTION, EMULSION INTRAVENOUS at 07:44

## 2023-10-27 ASSESSMENT — PAIN - FUNCTIONAL ASSESSMENT: PAIN_FUNCTIONAL_ASSESSMENT: NONE - DENIES PAIN

## 2023-10-27 ASSESSMENT — ENCOUNTER SYMPTOMS: SHORTNESS OF BREATH: 1

## 2023-10-27 NOTE — ANESTHESIA PRE PROCEDURE
in the last 72 hours. Coags:   Lab Results   Component Value Date/Time    PROTIME 9.7 11/18/2018 01:12 PM    INR 0.9 11/18/2018 01:12 PM    APTT 44.4 11/19/2018 11:03 AM       HCG (If Applicable):   Lab Results   Component Value Date    PREGTESTUR NEGATIVE 02/07/2022    HCG NEGATIVE 02/21/2022    HCGQUANT <1 01/23/2015        ABGs: No results found for: \"PHART\", \"PO2ART\", \"DWD7SKV\", \"COP3LMF\", \"BEART\", \"L0DECZLV\"     Type & Screen (If Applicable):  No results found for: \"LABABO\", \"LABRH\"    Drug/Infectious Status (If Applicable):  Lab Results   Component Value Date/Time    HEPCAB NONREACTIVE 07/13/2017 11:19 AM       COVID-19 Screening (If Applicable):   Lab Results   Component Value Date/Time    COVID19 Not Detected 02/21/2022 08:40 AM    COVID19 Not Detected 02/10/2022 01:00 PM         Anesthesia Evaluation  Patient summary reviewed and Nursing notes reviewed no history of anesthetic complications:   Airway: Mallampati: III  TM distance: >3 FB   Neck ROM: full  Mouth opening: > = 3 FB   Dental:      Comment: -MISSING SOME LOWER TEETH BILATERALLY    Pulmonary:normal exam    (+) COPD:  shortness of breath:  sleep apnea: on CPAP,  asthma:           Patient did not smoke on day of surgery. ROS comment: -QUIT SMOKING 2018 - 21 PACK YEARS  -HX OF HYPOXIA, HYPOVENTILATION   Cardiovascular:    (+) hypertension:, CHF:, KIRKPATRICK:, pulmonary hypertension:,       ECG reviewed                     ROS comment: -COR PULMONALE     Neuro/Psych:   (+) neuromuscular disease:, headaches:, psychiatric history:            GI/Hepatic/Renal:   (+) GERD:, morbid obesity          Endo/Other:    (+) DiabetesType II DM, , .                  ROS comment: -PROLONGED EMERGENCE  -NPO AFTER MIDNIGHT  -NKDA Abdominal:             Vascular:           ROS comment: -ANEMIA. Other Findings:             Anesthesia Plan      MAC     ASA 4       Induction: intravenous. Anesthetic plan and risks discussed with patient.       Plan

## 2023-10-27 NOTE — DISCHARGE INSTRUCTIONS
No alcoholic beverages, no driving or operating machinery, no making important decisions for 24 hours. You may have a normal diet but should eat lightly day of surgery. Drink plenty of fluids. Urinate within 8 hours after surgery, if unable to urinate call your doctor POST-ENDOSCOPY INSTRUCTIONS    1. ACTIVITY   No driving, operating machinery, or making important decisions for 24 hours. Resume normal activity after 24 hours. You may return to work after 24 hours. 2. DIET    EGD: Resume your usual diet unless specified below. Diet Modification: Regular    3. MEDICATIONS  (Do not consume alcohol, tranquilizers, or sleeping medications for 24 hours unless advised by your physician)                 Resume your usual medications    4. PHYSICIAN FOLLOW-UP                 Please continue with your previously scheduled appointments                 See your primary care physician as planned. 6. NORMAL CHANGES YOU MAY EXPERIENCE AFTER ENDOSCOPY:      EGD:  Sore throat, some abdominal pain and cramping. 7. CALL YOUR PHYSICIAN IF YOU EXPERIENCE ANY OF THE FOLLOWING      A. Passing blood rectally or vomiting blood (color may be red or black)      B. Severe abdominal pain or tenderness (that is not relieved by passing air)      C.   Fever, chills, or excessive sweating      D.  Persistent nausea or vomiting      E.  Redness or swelling at the IV site    If you have additional questions, PLEASE call your doctor or the [facility] at [phone]

## 2023-10-27 NOTE — PROGRESS NOTES
Discharge instructions and prescription reviewed with patient and son. Both acknowledge understanding.

## 2023-10-30 DIAGNOSIS — E55.9 VITAMIN D DEFICIENCY: ICD-10-CM

## 2023-10-30 PROBLEM — F34.1 PERSISTENT DEPRESSIVE DISORDER: Status: ACTIVE | Noted: 2023-10-30

## 2023-10-30 RX ORDER — ERGOCALCIFEROL 1.25 MG/1
CAPSULE ORAL
Qty: 4 CAPSULE | Refills: 0 | Status: SHIPPED | OUTPATIENT
Start: 2023-10-30

## 2023-10-31 LAB — SURGICAL PATHOLOGY REPORT: NORMAL

## 2023-11-02 ENCOUNTER — OFFICE VISIT (OUTPATIENT)
Dept: PULMONOLOGY | Age: 38
End: 2023-11-02
Payer: COMMERCIAL

## 2023-11-02 VITALS
DIASTOLIC BLOOD PRESSURE: 74 MMHG | WEIGHT: 293 LBS | OXYGEN SATURATION: 98 % | SYSTOLIC BLOOD PRESSURE: 127 MMHG | RESPIRATION RATE: 20 BRPM | BODY MASS INDEX: 41.02 KG/M2 | HEIGHT: 71 IN | HEART RATE: 69 BPM

## 2023-11-02 DIAGNOSIS — E66.2 OBESITY HYPOVENTILATION SYNDROME (HCC): Primary | ICD-10-CM

## 2023-11-02 PROCEDURE — 3074F SYST BP LT 130 MM HG: CPT | Performed by: INTERNAL MEDICINE

## 2023-11-02 PROCEDURE — G8417 CALC BMI ABV UP PARAM F/U: HCPCS | Performed by: INTERNAL MEDICINE

## 2023-11-02 PROCEDURE — G8427 DOCREV CUR MEDS BY ELIG CLIN: HCPCS | Performed by: INTERNAL MEDICINE

## 2023-11-02 PROCEDURE — 3078F DIAST BP <80 MM HG: CPT | Performed by: INTERNAL MEDICINE

## 2023-11-02 PROCEDURE — 1036F TOBACCO NON-USER: CPT | Performed by: INTERNAL MEDICINE

## 2023-11-02 PROCEDURE — 99214 OFFICE O/P EST MOD 30 MIN: CPT | Performed by: INTERNAL MEDICINE

## 2023-11-02 PROCEDURE — G8484 FLU IMMUNIZE NO ADMIN: HCPCS | Performed by: INTERNAL MEDICINE

## 2023-11-07 ENCOUNTER — OFFICE VISIT (OUTPATIENT)
Dept: BEHAVIORAL/MENTAL HEALTH CLINIC | Age: 38
End: 2023-11-07
Payer: COMMERCIAL

## 2023-11-07 DIAGNOSIS — F43.23 ADJUSTMENT DISORDER WITH MIXED ANXIETY AND DEPRESSED MOOD: Primary | ICD-10-CM

## 2023-11-07 DIAGNOSIS — E66.01 MORBID OBESITY WITH BMI OF 70 AND OVER, ADULT (HCC): ICD-10-CM

## 2023-11-07 PROCEDURE — 1036F TOBACCO NON-USER: CPT | Performed by: PSYCHOLOGIST

## 2023-11-07 PROCEDURE — 90832 PSYTX W PT 30 MINUTES: CPT | Performed by: PSYCHOLOGIST

## 2023-11-07 NOTE — PROGRESS NOTES
Mounika Caputo M.A. Psychology Doctoral Trainee    Supervising Clinical Psychologists:  Phan Rincon, Ph.D. Viky Payne, [place] License [id number]  Edith Washington,  Ph.D. Gage Shirley          Visit Date: 2023   Time of appointment: 8:50am-9:20am  Time spent with Patient: 30 minutes. This is patient's third appointment. Reason for Consult:  Anxiety and Depression     Referring Provider/PCP:    No ref. provider found  Lorraine Stover      Pt provided informed consent for the behavioral health program. Discussed with patient model of service to include the limits of confidentiality (i.e. abuse reporting, suicide intervention, etc.) and short-term intervention focused approach. Also discussed with patient that the service provider is a supervised clinician and in particular is being supervised by Dr. Lynda Zuniga and/or Dr. Aundrea Oconnor. Pt indicated understanding and signed consent form agreeing to be seen by a supervised clinician. Pt indicated understanding. Nilsa Flores is a 41 y.o. female who presents for follow up for [facility] sessions for bariatric surgery clearance. The patient reported they did not want to come today because they \"didn't want to talk. \" She reported that she was able to implement the SMART goals set last session, which was doing more yoga for self-care/exercise and having less than three drinks on Halloween. Chelsea Farley reported she engaged in yoga several times in the last two weeks and has not drank alcohol since her last [facility] appointment and in total has not had alcohol for approximately 2 months. Chelsea Farley did report she smoked marijuana before this appointment and stated she was high during the appointment. 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 She also reported she uses marijuana to cope with annoyance of other people and improve her concentration.  [facility]

## 2023-11-14 ENCOUNTER — TELEMEDICINE (OUTPATIENT)
Dept: BEHAVIORAL/MENTAL HEALTH CLINIC | Age: 38
End: 2023-11-14
Payer: COMMERCIAL

## 2023-11-14 DIAGNOSIS — E66.01 MORBID OBESITY WITH BMI OF 70 AND OVER, ADULT (HCC): ICD-10-CM

## 2023-11-14 DIAGNOSIS — F43.23 ADJUSTMENT DISORDER WITH MIXED ANXIETY AND DEPRESSED MOOD: Primary | ICD-10-CM

## 2023-11-14 PROCEDURE — 90834 PSYTX W PT 45 MINUTES: CPT | Performed by: PSYCHOLOGIST

## 2023-11-14 PROCEDURE — 1036F TOBACCO NON-USER: CPT | Performed by: PSYCHOLOGIST

## 2023-11-14 NOTE — PROGRESS NOTES
Xiomara Silva M.A. Psychology Doctoral Trainee    Supervising Clinical Psychologists:  Solo Chandler, Ph.D. Noemi Jessica, [place] License [id number]  Levi Juarez,  Ph.D. Jocelyne Wood          Visit Date: 11/14/2023   Time of appointment: 9:15-9:55am  Time spent with Patient: 40 minutes. This is patient's fourth appointment. Reason for Consult:  Anxiety, Depression, and Follow-up     Referring Provider/PCP:    No ref. provider found  Khadar Rodríguez      Pt provided informed consent for the behavioral health program. Discussed with patient model of service to include the limits of confidentiality (i.e. abuse reporting, suicide intervention, etc.) and short-term intervention focused approach. Also discussed with patient that the service provider is a supervised clinician and in particular is being supervised by Dr. Bernard Argueta and/or Dr. Stella Dorado. Pt indicated understanding and signed consent form agreeing to be seen by a supervised clinician. Pt indicated understanding. Yadi MATA Morales, was evaluated through a synchronous (real-time) audio-video encounter. The patient (or guardian if applicable) is aware that this is a billable service, which includes applicable co-pays. This Virtual Visit was conducted with patient's (and/or legal guardian's) consent. Patient identification was verified, and a caregiver was present when appropriate. The patient was located at Home: [address] [place]  Provider was located at [facility] ([address]): [address]  [address] [facility] [address]  [place],  [address] [facility]       Total time spent for this encounter:  40 minutes    --Ramses Goddard on 11/14/2023 at 10:54 AM    An electronic signature was used to authenticate this note. Buddy Arvizu is a 41 y.o. female who presents for follow up for [facility] sessions for bariatric surgery clearance.  The patient reported she is still stressed from the training she has to

## 2023-11-21 ENCOUNTER — OFFICE VISIT (OUTPATIENT)
Dept: BARIATRICS/WEIGHT MGMT | Age: 38
End: 2023-11-21
Payer: COMMERCIAL

## 2023-11-21 VITALS
WEIGHT: 293 LBS | DIASTOLIC BLOOD PRESSURE: 82 MMHG | SYSTOLIC BLOOD PRESSURE: 112 MMHG | BODY MASS INDEX: 41.02 KG/M2 | HEIGHT: 71 IN | HEART RATE: 78 BPM | OXYGEN SATURATION: 98 %

## 2023-11-21 DIAGNOSIS — F41.9 ANXIETY AND DEPRESSION: ICD-10-CM

## 2023-11-21 DIAGNOSIS — E66.2 OBESITY HYPOVENTILATION SYNDROME (HCC): ICD-10-CM

## 2023-11-21 DIAGNOSIS — F12.90 MARIJUANA USE: ICD-10-CM

## 2023-11-21 DIAGNOSIS — K21.9 GASTROESOPHAGEAL REFLUX DISEASE, UNSPECIFIED WHETHER ESOPHAGITIS PRESENT: ICD-10-CM

## 2023-11-21 DIAGNOSIS — G47.33 OSA (OBSTRUCTIVE SLEEP APNEA): ICD-10-CM

## 2023-11-21 DIAGNOSIS — E66.01 MORBID OBESITY WITH BMI OF 70 AND OVER, ADULT (HCC): ICD-10-CM

## 2023-11-21 DIAGNOSIS — G89.29 BILATERAL CHRONIC KNEE PAIN: ICD-10-CM

## 2023-11-21 DIAGNOSIS — F31.9 CHRONIC BIPOLAR DISORDER (HCC): ICD-10-CM

## 2023-11-21 DIAGNOSIS — I27.20 PULMONARY HYPERTENSION (HCC): ICD-10-CM

## 2023-11-21 DIAGNOSIS — M25.561 BILATERAL CHRONIC KNEE PAIN: ICD-10-CM

## 2023-11-21 DIAGNOSIS — M54.12 CERVICAL RADICULOPATHY: ICD-10-CM

## 2023-11-21 DIAGNOSIS — E11.9 TYPE 2 DIABETES MELLITUS WITHOUT COMPLICATION, WITHOUT LONG-TERM CURRENT USE OF INSULIN (HCC): Primary | ICD-10-CM

## 2023-11-21 DIAGNOSIS — F32.A ANXIETY AND DEPRESSION: ICD-10-CM

## 2023-11-21 DIAGNOSIS — M25.562 BILATERAL CHRONIC KNEE PAIN: ICD-10-CM

## 2023-11-21 PROCEDURE — 2022F DILAT RTA XM EVC RTNOPTHY: CPT | Performed by: NURSE PRACTITIONER

## 2023-11-21 PROCEDURE — G8484 FLU IMMUNIZE NO ADMIN: HCPCS | Performed by: NURSE PRACTITIONER

## 2023-11-21 PROCEDURE — 1036F TOBACCO NON-USER: CPT | Performed by: NURSE PRACTITIONER

## 2023-11-21 PROCEDURE — G8427 DOCREV CUR MEDS BY ELIG CLIN: HCPCS | Performed by: NURSE PRACTITIONER

## 2023-11-21 PROCEDURE — 3074F SYST BP LT 130 MM HG: CPT | Performed by: NURSE PRACTITIONER

## 2023-11-21 PROCEDURE — 3044F HG A1C LEVEL LT 7.0%: CPT | Performed by: NURSE PRACTITIONER

## 2023-11-21 PROCEDURE — G8417 CALC BMI ABV UP PARAM F/U: HCPCS | Performed by: NURSE PRACTITIONER

## 2023-11-21 PROCEDURE — 3079F DIAST BP 80-89 MM HG: CPT | Performed by: NURSE PRACTITIONER

## 2023-11-21 PROCEDURE — 99213 OFFICE O/P EST LOW 20 MIN: CPT | Performed by: NURSE PRACTITIONER

## 2023-11-21 RX ORDER — CELECOXIB 200 MG/1
CAPSULE ORAL
COMMUNITY
Start: 2023-11-03

## 2023-11-27 DIAGNOSIS — E55.9 VITAMIN D DEFICIENCY: ICD-10-CM

## 2023-11-27 RX ORDER — ERGOCALCIFEROL 1.25 MG/1
CAPSULE ORAL
Qty: 4 CAPSULE | Refills: 0 | Status: SHIPPED | OUTPATIENT
Start: 2023-11-27

## 2024-01-16 ENCOUNTER — TELEMEDICINE (OUTPATIENT)
Dept: BEHAVIORAL/MENTAL HEALTH CLINIC | Age: 39
End: 2024-01-16
Payer: COMMERCIAL

## 2024-01-16 DIAGNOSIS — F43.23 ADJUSTMENT DISORDER WITH MIXED ANXIETY AND DEPRESSED MOOD: Primary | ICD-10-CM

## 2024-01-16 DIAGNOSIS — E66.01 MORBID OBESITY WITH BMI OF 70 AND OVER, ADULT (HCC): ICD-10-CM

## 2024-01-16 PROCEDURE — 90832 PSYTX W PT 30 MINUTES: CPT | Performed by: PSYCHOLOGIST

## 2024-01-16 PROCEDURE — 1036F TOBACCO NON-USER: CPT | Performed by: PSYCHOLOGIST

## 2024-01-16 NOTE — PROGRESS NOTES
ADULT BEHAVIORAL HEALTH FOLLOW UP  Reyes Miles M.A.  Psychology Doctoral Trainee    Supervising Clinical Psychologists:  Catherine Donovan, Ph.D. [place] License [id number], [place] License [id number]  Yolanda Carvalho,  Ph.D. [place] License [id number]          Visit Date: 1/16/2024   Time of appointment: 10:12-10:49am  Time spent with Patient: 37 minutes.   This is patient's fifth appointment.    Reason for Consult:  Anxiety and Depression     Referring Provider/PCP:    No ref. provider found  Jeremie Duarte      Pt provided informed consent for the behavioral health program. Discussed with patient model of service to include the limits of confidentiality (i.e. abuse reporting, suicide intervention, etc.) and short-term intervention focused approach. Also discussed with patient that the service provider is a supervised clinician and in particular is being supervised by Dr. Donovan and/or Dr. Carvalho. Pt indicated understanding and signed consent form agreeing to be seen by a supervised clinician.  Pt indicated understanding.    Yadi Morales, was evaluated through a synchronous (real-time) audio-video encounter. The patient (or guardian if applicable) is aware that this is a billable service, which includes applicable co-pays. This Virtual Visit was conducted with patient's (and/or legal guardian's) consent. Patient identification was verified, and a caregiver was present when appropriate.   The patient was located at Home: [address] [place] [address] [address]  [facility] [address]  Provider was located at Facility (Appt Dept): [address] [place] [address] [address]  [place] [address]       Total time spent for this encounter:  37 minutes    --Reyes Miles on 1/16/2024 at 10:51 AM    An electronic signature was used to authenticate this note.      LALA Grullon is a 38 y.o. female who presents for follow up for [facility] sessions for bariatric surgery clearance. The patient reported she lost a close friend and is looking for a new job, which has been

## 2024-01-19 DIAGNOSIS — E55.9 VITAMIN D DEFICIENCY: ICD-10-CM

## 2024-01-19 RX ORDER — ERGOCALCIFEROL 1.25 MG/1
CAPSULE ORAL
Qty: 4 CAPSULE | Refills: 0 | Status: SHIPPED | OUTPATIENT
Start: 2024-01-19

## 2024-01-30 ENCOUNTER — CLINICAL DOCUMENTATION (OUTPATIENT)
Dept: BEHAVIORAL/MENTAL HEALTH CLINIC | Age: 39
End: 2024-01-30

## 2024-01-30 NOTE — PROGRESS NOTES
Logged on with pt for virtual appt. Appt ended after 2 min due to pt being ill and not wanting to continue appt. Pt said she would call the  to reschedule when she was feeling better.

## 2024-02-02 ENCOUNTER — TELEMEDICINE (OUTPATIENT)
Dept: BARIATRICS/WEIGHT MGMT | Age: 39
End: 2024-02-02
Payer: COMMERCIAL

## 2024-02-02 DIAGNOSIS — E66.01 MORBID OBESITY WITH BMI OF 70 AND OVER, ADULT (HCC): ICD-10-CM

## 2024-02-02 DIAGNOSIS — K21.9 GERD WITHOUT ESOPHAGITIS: ICD-10-CM

## 2024-02-02 DIAGNOSIS — E11.69 DIABETES MELLITUS TYPE 2 IN OBESE (HCC): ICD-10-CM

## 2024-02-02 DIAGNOSIS — F12.10 MILD TETRAHYDROCANNABINOL (THC) ABUSE: ICD-10-CM

## 2024-02-02 DIAGNOSIS — E66.9 DIABETES MELLITUS TYPE 2 IN OBESE (HCC): ICD-10-CM

## 2024-02-02 DIAGNOSIS — I27.20 PULMONARY HYPERTENSION (HCC): Primary | ICD-10-CM

## 2024-02-02 PROCEDURE — G8427 DOCREV CUR MEDS BY ELIG CLIN: HCPCS | Performed by: SURGERY

## 2024-02-02 PROCEDURE — 3046F HEMOGLOBIN A1C LEVEL >9.0%: CPT | Performed by: SURGERY

## 2024-02-02 PROCEDURE — 99213 OFFICE O/P EST LOW 20 MIN: CPT | Performed by: SURGERY

## 2024-02-02 PROCEDURE — 2022F DILAT RTA XM EVC RTNOPTHY: CPT | Performed by: SURGERY

## 2024-02-02 PROCEDURE — G8417 CALC BMI ABV UP PARAM F/U: HCPCS | Performed by: SURGERY

## 2024-02-02 PROCEDURE — 1036F TOBACCO NON-USER: CPT | Performed by: SURGERY

## 2024-02-02 PROCEDURE — G8484 FLU IMMUNIZE NO ADMIN: HCPCS | Performed by: SURGERY

## 2024-02-02 NOTE — PROGRESS NOTES
[facility] INVASIVE BARIATRIC SURG  [address]  [facility] [address]  Dept: [phone]    SURGICAL WEIGHT MANAGEMENT PROGRAM  PROGRESS NOTE EVALUATION     Patient: Yadi Morales        Service Date: 2/2/2024    Yadi Morales, was evaluated through a synchronous (real-time) audio-video encounter. The patient (or guardian if applicable) is aware that this is a billable service, which includes applicable co-pays. This Virtual Visit was conducted with patient's (and/or legal guardian's) consent. Patient identification was verified, and a caregiver was present when appropriate.   The patient was located at Home: [facility]  [facility] [address]  Provider was located at Facility (Appt Dept): [address]  [place] [id number]      HPI:     Chief Complaint   Patient presents with    Weight Loss     Visit 6 of 3        The patient is a pleasant 38 y.o. year old female  with morbid obesity, who stands   tall with a weight of   , resulting in a BMI of There is no height or weight on file to calculate BMI.. The patient suffers from multiple co-morbidities as a result of morbid obesity, including: Type 2 Diabetes Mellitus, Hypertension, Hyperlipidemia, Asthma, Obstructive Sleep Apnea, GERD, Depression, Anxiety, and Bipolar Disorder. She has suffered from obesity for many years.    Patient states she is currently ill and is experiencing fevers along with chills. Patient reports that she has not smoked marijuana for a week due to her being sick. She states she previously smoked everyday, but now she only smokes once a week or every other day. She reports she is trying to discontinue smoking marijuana.     The patient denies  a history of myocardial infarction, deep vein thrombosis, pulmonary embolism, renal failure, hepatic failure, and stroke.        Medical History:  Past Medical History:   Diagnosis Date    Acquired

## 2024-02-09 ENCOUNTER — TELEPHONE (OUTPATIENT)
Dept: BARIATRICS/WEIGHT MGMT | Age: 39
End: 2024-02-09

## 2024-02-09 NOTE — TELEPHONE ENCOUNTER
Patient called office this morning asking if there are any programs that can be recommended to help her stop using marijuana so she can continue the program with success.

## 2024-02-15 DIAGNOSIS — E55.9 VITAMIN D DEFICIENCY: ICD-10-CM

## 2024-02-15 RX ORDER — ERGOCALCIFEROL 1.25 MG/1
CAPSULE ORAL
Qty: 4 CAPSULE | Refills: 0 | Status: SHIPPED | OUTPATIENT
Start: 2024-02-15

## 2024-03-13 DIAGNOSIS — E61.1 LOW IRON: ICD-10-CM

## 2024-03-13 DIAGNOSIS — E55.9 VITAMIN D DEFICIENCY: ICD-10-CM

## 2024-03-14 ENCOUNTER — TELEPHONE (OUTPATIENT)
Dept: BARIATRICS/WEIGHT MGMT | Age: 39
End: 2024-03-14

## 2024-03-14 RX ORDER — ERGOCALCIFEROL 1.25 MG/1
CAPSULE ORAL
Qty: 4 CAPSULE | Refills: 0 | Status: SHIPPED | OUTPATIENT
Start: 2024-03-14

## 2024-03-14 RX ORDER — FERROUS SULFATE 325(65) MG
1 TABLET ORAL
Qty: 28 TABLET | Refills: 1 | Status: SHIPPED | OUTPATIENT
Start: 2024-03-14

## 2024-03-15 NOTE — TELEPHONE ENCOUNTER
I spoke with  patient.  She will come in every other month and do virtual on the months she does not come in person.  She was reminded once check list is complete and we are ready to submit, she must come in person for that last visit to get a final weight.

## 2024-03-19 ENCOUNTER — TELEMEDICINE (OUTPATIENT)
Dept: BARIATRICS/WEIGHT MGMT | Age: 39
End: 2024-03-19
Payer: COMMERCIAL

## 2024-03-19 DIAGNOSIS — F32.A ANXIETY AND DEPRESSION: ICD-10-CM

## 2024-03-19 DIAGNOSIS — E66.2 OBESITY HYPOVENTILATION SYNDROME (HCC): ICD-10-CM

## 2024-03-19 DIAGNOSIS — I10 HYPERTENSION, UNSPECIFIED TYPE: ICD-10-CM

## 2024-03-19 DIAGNOSIS — F31.9 CHRONIC BIPOLAR DISORDER (HCC): ICD-10-CM

## 2024-03-19 DIAGNOSIS — K21.9 GERD WITHOUT ESOPHAGITIS: ICD-10-CM

## 2024-03-19 DIAGNOSIS — F12.10 MILD TETRAHYDROCANNABINOL (THC) ABUSE: ICD-10-CM

## 2024-03-19 DIAGNOSIS — I27.20 PULMONARY HYPERTENSION (HCC): ICD-10-CM

## 2024-03-19 DIAGNOSIS — E61.1 LOW IRON: ICD-10-CM

## 2024-03-19 DIAGNOSIS — Z51.81 ENCOUNTER FOR THERAPEUTIC DRUG LEVEL MONITORING: ICD-10-CM

## 2024-03-19 DIAGNOSIS — E78.5 HYPERLIPIDEMIA, UNSPECIFIED HYPERLIPIDEMIA TYPE: ICD-10-CM

## 2024-03-19 DIAGNOSIS — E11.69 DIABETES MELLITUS TYPE 2 IN OBESE (HCC): Primary | ICD-10-CM

## 2024-03-19 DIAGNOSIS — E66.9 DIABETES MELLITUS TYPE 2 IN OBESE (HCC): Primary | ICD-10-CM

## 2024-03-19 DIAGNOSIS — E66.01 MORBID OBESITY WITH BMI OF 70 AND OVER, ADULT (HCC): ICD-10-CM

## 2024-03-19 DIAGNOSIS — G47.33 OSA (OBSTRUCTIVE SLEEP APNEA): ICD-10-CM

## 2024-03-19 DIAGNOSIS — E55.9 VITAMIN D DEFICIENCY: ICD-10-CM

## 2024-03-19 DIAGNOSIS — F41.9 ANXIETY AND DEPRESSION: ICD-10-CM

## 2024-03-19 PROCEDURE — 1036F TOBACCO NON-USER: CPT | Performed by: NURSE PRACTITIONER

## 2024-03-19 PROCEDURE — 3046F HEMOGLOBIN A1C LEVEL >9.0%: CPT | Performed by: NURSE PRACTITIONER

## 2024-03-19 PROCEDURE — 2022F DILAT RTA XM EVC RTNOPTHY: CPT | Performed by: NURSE PRACTITIONER

## 2024-03-19 PROCEDURE — 99214 OFFICE O/P EST MOD 30 MIN: CPT | Performed by: NURSE PRACTITIONER

## 2024-03-19 PROCEDURE — G8484 FLU IMMUNIZE NO ADMIN: HCPCS | Performed by: NURSE PRACTITIONER

## 2024-03-19 PROCEDURE — G8427 DOCREV CUR MEDS BY ELIG CLIN: HCPCS | Performed by: NURSE PRACTITIONER

## 2024-03-19 PROCEDURE — G8417 CALC BMI ABV UP PARAM F/U: HCPCS | Performed by: NURSE PRACTITIONER

## 2024-03-19 NOTE — PROGRESS NOTES
Medical Nutrition Therapy  Supervised Diet & Exercise Pre-Op   Metabolic and Bariatric Surgery  Visit 5 out of 3 - PHONE VISIT    Nutrition Assessment:  Patient's start weight is 605 lbs and current body weight is ? lbs. Patient is working toward bariatric surgery for Gastric Sleeve. Will plan to discuss post-OP vitamins at next in-person visit.    Vitals:   Wt Readings from Last 3 Encounters:   01/09/24 (!) 235.9 kg (520 lb)   12/21/23 (!) 237.2 kg (523 lb)   11/21/23 (!) 238.6 kg (526 lb)       Nutrition Diagnosis:  Knowledge deficit related to healthy behaviors that support weight management after weight loss surgery as evidenced by There is no height or weight on file to calculate BMI.    Nutrition Intervention:  Nutrition Prescription:  Bariatric Pre-op Diet    Nutrition Counseling:  Reviewed patients current behaviors and habits and discussed barriers for change. Utilized motivational interviewing to set patient specific goals that promote bariatric behaviors. Patient displays understanding of the goals discussed.     Nutrition Education:   Reviewed and available in the Bariatric Education Binder.                                                Goals: The patient has a list and explanation of every goal in their \"Bariatric Education Binder\". Changes in eating patterns to promote health are noted below on the goals number 19-22. See below for goals that patient is continuing to work on and completed. All goals were planned with and agreed on by the patient.    Patient completed  2 out of 7 goals.  Treatment goals for upcoming month: Continue all previous goals.      Goal C N/A Notes:   [x] 1 I will read the education binder provided to me.    [] [] Reports hasn't read binder. Has been reminded every visit since start to do so.   [] 2 I will make my psychological evaluation appoinment. [x] []    [x] 3 I will bring my binder to every appointment.   [] []    [] 4 I will eliminate all tobacco/nicotine & 
Hyperlipidemia, unspecified hyperlipidemia type  Urine Drug Screen      12. Chronic bipolar disorder (HCC)  Urine Drug Screen      13. Anxiety and depression  Urine Drug Screen      14. Encounter for therapeutic drug level monitoring  Urine Drug Screen          Plan:    Dietitian visit today.  Patient was encouraged to journal all food intake. Keep calorie level at approximately 4566-0706. Protein intake is to be a minimum of 60-80 grams per day. Water drinking was encouraged with a goal of 64oz-128oz daily. Beverages to be calorie free except for milk. Every other beverage should be water. Avoid soda.   Continue to increase level of physical activity.  Encouraged use of exercise log.    Patients will undergo thorough nutritional evaluation and counseling with our registered dietician.  Our program provides long term nutritional counseling with unlimited consults with the dietician.  All female patients have been educated on the importance of using reliable birth control and avoiding pregnancy the first 18 months - 2 years following bariatric surgery.  All female patient will have a pregnancy test done with the pre-admission testing.  All patients are nicotine tested and require a negative nicotine level prior to surgery.  Patient has been educated about the risks associated with substance use, as well as the risks of using nicotine and alcohol after surgery.  Patient has been educated that theses substances need to be avoided lifelong after surgery to reduce the risk of complications and sub-optimal weight loss.      Letter of medical necessity -  completed    EGD - completed    Labs - completed. Continue with monthly drug screenings    Psych eval - abstain from THC and continue with monthly screenings.     Pulmonary clearance - completed    Cardiology clearance - completed    Intro class - completed    Support group - encouraged to schedule      Follow-up  Return in about 4 weeks (around 4/16/2024) for 8 of

## 2024-04-16 DIAGNOSIS — E55.9 VITAMIN D DEFICIENCY: ICD-10-CM

## 2024-04-16 RX ORDER — ERGOCALCIFEROL 1.25 MG/1
CAPSULE ORAL
Qty: 4 CAPSULE | Refills: 0 | OUTPATIENT
Start: 2024-04-16

## 2024-04-24 DIAGNOSIS — E55.9 VITAMIN D DEFICIENCY: ICD-10-CM

## 2024-04-25 RX ORDER — ERGOCALCIFEROL 1.25 MG/1
CAPSULE ORAL
Qty: 4 CAPSULE | Refills: 0 | OUTPATIENT
Start: 2024-04-25

## 2024-05-11 DIAGNOSIS — E61.1 LOW IRON: ICD-10-CM

## 2024-05-14 RX ORDER — FERROUS SULFATE 325(65) MG
1 TABLET ORAL
Qty: 28 TABLET | Refills: 1 | Status: SHIPPED | OUTPATIENT
Start: 2024-05-14

## 2024-05-17 ENCOUNTER — OFFICE VISIT (OUTPATIENT)
Dept: PULMONOLOGY | Age: 39
End: 2024-05-17

## 2024-05-17 VITALS
WEIGHT: 293 LBS | OXYGEN SATURATION: 98 % | HEART RATE: 70 BPM | DIASTOLIC BLOOD PRESSURE: 80 MMHG | BODY MASS INDEX: 41.02 KG/M2 | HEIGHT: 71 IN | SYSTOLIC BLOOD PRESSURE: 126 MMHG

## 2024-05-17 DIAGNOSIS — I27.20 PULMONARY HYPERTENSION (HCC): ICD-10-CM

## 2024-05-17 DIAGNOSIS — E66.2 OBESITY HYPOVENTILATION SYNDROME (HCC): ICD-10-CM

## 2024-05-17 DIAGNOSIS — M17.10 PRIMARY OSTEOARTHRITIS OF KNEE, UNSPECIFIED LATERALITY: ICD-10-CM

## 2024-05-17 DIAGNOSIS — E66.01 MORBID OBESITY WITH BMI OF 70 AND OVER, ADULT (HCC): ICD-10-CM

## 2024-05-17 DIAGNOSIS — I10 PRIMARY HYPERTENSION: Primary | ICD-10-CM

## 2024-05-17 DIAGNOSIS — G47.33 OSA (OBSTRUCTIVE SLEEP APNEA): ICD-10-CM

## 2024-05-17 ASSESSMENT — SLEEP AND FATIGUE QUESTIONNAIRES
HOW LIKELY ARE YOU TO NOD OFF OR FALL ASLEEP IN A CAR, WHILE STOPPED FOR A FEW MINUTES IN TRAFFIC: WOULD NEVER DOZE
HOW LIKELY ARE YOU TO NOD OFF OR FALL ASLEEP WHILE SITTING QUIETLY AFTER LUNCH WITHOUT ALCOHOL: HIGH CHANCE OF DOZING
HOW LIKELY ARE YOU TO NOD OFF OR FALL ASLEEP WHILE SITTING INACTIVE IN A PUBLIC PLACE: WOULD NEVER DOZE
HOW LIKELY ARE YOU TO NOD OFF OR FALL ASLEEP WHILE LYING DOWN TO REST IN THE AFTERNOON WHEN CIRCUMSTANCES PERMIT: HIGH CHANCE OF DOZING
HOW LIKELY ARE YOU TO NOD OFF OR FALL ASLEEP WHILE SITTING AND TALKING TO SOMEONE: WOULD NEVER DOZE
HOW LIKELY ARE YOU TO NOD OFF OR FALL ASLEEP WHEN YOU ARE A PASSENGER IN A CAR FOR AN HOUR WITHOUT A BREAK: WOULD NEVER DOZE
ESS TOTAL SCORE: 12
HOW LIKELY ARE YOU TO NOD OFF OR FALL ASLEEP WHILE WATCHING TV: HIGH CHANCE OF DOZING
HOW LIKELY ARE YOU TO NOD OFF OR FALL ASLEEP WHILE SITTING AND READING: HIGH CHANCE OF DOZING

## 2024-05-17 NOTE — PROGRESS NOTES
Yadi Morales is a 38 y.o. female evaluated via telephone on 5/17/2024 for Sleep Apnea (Patient states she just started wearing CPAP again because \"I didn't have water\" ), COPD (Difficulty breathing ), and Obesity hyperventilatino syndrome  .  Obstructive sleep apnea syndrome  Morbid obesity  Hypertension  Diabetes  Respiratory failure secondary to obesity hypoventilation syndrome      HPI:   The patient has morbid morbid obesity with obesity hypoventilation syndrome.  She was advised to use CPAP for the treatment of sleep apnea.  However she has not been using the CPAP lately because the humidification chamber has been broken.  She has got a new chamber now and promises me that she will start using CPAP regularly for 6 to 8 hours every night.  CPAP used to help her considerably.    She has not been able to lose weight on the contrary she has gained some weight.  1 time she was referred to bariatric surgery but she could not participate in the program.    She is not on home oxygen.  She has COPD secondary to smoking including marijuana.  She is on Spiriva and Advair.  She tells me that she does rinse her mouth and throat after using the Advair.    She has hypertension which is well-controlled.  She also has diabetes which is also under control.    She has no send history of thromboembolic process.                      Social History     Tobacco Use    Smoking status: Former     Types: Cigarettes     Passive exposure: Never    Smokeless tobacco: Never   Vaping Use    Vaping Use: Never used   Substance Use Topics    Alcohol use: Yes     Comment: rare    Drug use: Yes     Types: Marijuana (Weed)            RECORD REVIEW: Previous medical records were reviewed at today's visit.    Wt Readings from Last 3 Encounters:   05/17/24 (!) 244.9 kg (540 lb)   01/09/24 (!) 235.9 kg (520 lb)   12/21/23 (!) 237.2 kg (523 lb)       Results for orders placed or performed during the hospital encounter of 12/21/23   Nicotine, Blood

## 2024-05-17 NOTE — PROGRESS NOTES
Yadi Morales is a 38 y.o. female evaluated via telephone on 5/17/2024 for Sleep Apnea (Patient states she just started wearing CPAP again because \"I didn't have water\" ), COPD (Difficulty breathing ), and Obesity hyperventilatino syndrome  .  Obstructive sleep apnea syndrome  Morbid obesity  Hypertension  Diabetes  Respiratory failure secondary to obesity hypoventilation syndrome      HPI:   Young lady is known to have morbid obesity with hypoventilation syndrome.  She obstructive sleep apnea syndrome which is being treated with CPAP excessive Pap successfully he uses a CPAP for 6 hours every night.  He has been very successful in losing weight.    He also has with COPD and using Spiriva and Advair.  No evidence of acute exacerbation of COPD    He does not require supplemental oxygen.    He also has hypertension and diabetes which are well controlled.  He been successful in losing weight.  She is waiting to undergo bariatric surgery.    The CPAP has been very effective in relieving the apnea improving her daytime symptoms.  She has chronic pedal edema due to chronic pulmonale.    Social History     Tobacco Use    Smoking status: Former     Types: Cigarettes     Passive exposure: Never    Smokeless tobacco: Never   Vaping Use    Vaping Use: Never used   Substance Use Topics    Alcohol use: Yes     Comment: rare    Drug use: Yes     Types: Marijuana (Weed)            RECORD REVIEW: Previous medical records were reviewed at today's visit.    Wt Readings from Last 3 Encounters:   05/17/24 (!) 244.9 kg (540 lb)   01/09/24 (!) 235.9 kg (520 lb)   12/21/23 (!) 237.2 kg (523 lb)       Results for orders placed or performed during the hospital encounter of 12/21/23   Nicotine, Blood   Result Value Ref Range    Nicotine <5 ng/mL    Cotinine <5 ng/mL   Urine Drug Screen   Result Value Ref Range    Amphetamine Screen, Ur NEGATIVE NEGATIVE    Barbiturate Screen, Ur NEGATIVE NEGATIVE    Benzodiazepine Screen, Urine NEGATIVE

## 2024-07-05 DIAGNOSIS — E61.1 LOW IRON: ICD-10-CM

## 2024-07-09 RX ORDER — FERROUS SULFATE 325(65) MG
1 TABLET ORAL
Qty: 28 TABLET | Refills: 1 | OUTPATIENT
Start: 2024-07-09

## 2024-07-27 ENCOUNTER — APPOINTMENT (OUTPATIENT)
Dept: GENERAL RADIOLOGY | Age: 39
End: 2024-07-27
Payer: COMMERCIAL

## 2024-07-27 ENCOUNTER — HOSPITAL ENCOUNTER (EMERGENCY)
Age: 39
Discharge: HOME | End: 2024-07-27
Attending: EMERGENCY MEDICINE
Payer: COMMERCIAL

## 2024-07-27 VITALS
SYSTOLIC BLOOD PRESSURE: 128 MMHG | WEIGHT: 293 LBS | HEIGHT: 72 IN | TEMPERATURE: 98 F | BODY MASS INDEX: 39.68 KG/M2 | OXYGEN SATURATION: 97 % | RESPIRATION RATE: 20 BRPM | HEART RATE: 83 BPM | DIASTOLIC BLOOD PRESSURE: 76 MMHG

## 2024-07-27 DIAGNOSIS — A59.9 TRICHOMONAS VAGINALIS INFECTION: ICD-10-CM

## 2024-07-27 DIAGNOSIS — G43.909 MIGRAINE WITHOUT STATUS MIGRAINOSUS, NOT INTRACTABLE, UNSPECIFIED MIGRAINE TYPE: ICD-10-CM

## 2024-07-27 DIAGNOSIS — S46.912A SHOULDER STRAIN, LEFT, INITIAL ENCOUNTER: ICD-10-CM

## 2024-07-27 DIAGNOSIS — L03.114 CELLULITIS OF LEFT FOREARM: ICD-10-CM

## 2024-07-27 DIAGNOSIS — N39.0 URINARY TRACT INFECTION WITHOUT HEMATURIA, SITE UNSPECIFIED: Primary | ICD-10-CM

## 2024-07-27 DIAGNOSIS — S63.502A SPRAIN OF LEFT WRIST, INITIAL ENCOUNTER: ICD-10-CM

## 2024-07-27 DIAGNOSIS — B37.2 SKIN YEAST INFECTION: ICD-10-CM

## 2024-07-27 LAB
BACTERIA URNS QL MICRO: ABNORMAL
BILIRUB UR QL STRIP: NEGATIVE
C TRACH DNA SPEC QL PROBE+SIG AMP: NORMAL
CANDIDA SPECIES: NEGATIVE
CASTS #/AREA URNS LPF: ABNORMAL /LPF
CASTS #/AREA URNS LPF: ABNORMAL /LPF
CLARITY UR: ABNORMAL
COLOR UR: YELLOW
EPI CELLS #/AREA URNS HPF: ABNORMAL /HPF (ref 0–5)
GARDNERELLA VAGINALIS: POSITIVE
GLUCOSE UR STRIP-MCNC: NEGATIVE MG/DL
HCG UR QL: NEGATIVE
HGB UR QL STRIP.AUTO: ABNORMAL
KETONES UR STRIP-MCNC: NEGATIVE MG/DL
LEUKOCYTE ESTERASE UR QL STRIP: ABNORMAL
MUCOUS THREADS URNS QL MICRO: ABNORMAL
N GONORRHOEA DNA SPEC QL PROBE+SIG AMP: NORMAL
NITRITE UR QL STRIP: NEGATIVE
PH UR STRIP: 6 [PH] (ref 5–8)
PROT UR STRIP-MCNC: NEGATIVE MG/DL
RBC #/AREA URNS HPF: ABNORMAL /HPF (ref 0–2)
SOURCE: ABNORMAL
SP GR UR STRIP: 1.02 (ref 1–1.03)
SPECIMEN DESCRIPTION: NORMAL
TRICHOMONAS #/AREA URNS HPF: POSITIVE /[HPF]
TRICHOMONAS: POSITIVE
UROBILINOGEN UR STRIP-ACNC: NORMAL EU/DL (ref 0–1)
WBC #/AREA URNS HPF: ABNORMAL /HPF (ref 0–5)

## 2024-07-27 PROCEDURE — 87086 URINE CULTURE/COLONY COUNT: CPT

## 2024-07-27 PROCEDURE — 73090 X-RAY EXAM OF FOREARM: CPT

## 2024-07-27 PROCEDURE — 73030 X-RAY EXAM OF SHOULDER: CPT

## 2024-07-27 PROCEDURE — 87591 N.GONORRHOEAE DNA AMP PROB: CPT

## 2024-07-27 PROCEDURE — 87660 TRICHOMONAS VAGIN DIR PROBE: CPT

## 2024-07-27 PROCEDURE — 81025 URINE PREGNANCY TEST: CPT

## 2024-07-27 PROCEDURE — 6370000000 HC RX 637 (ALT 250 FOR IP): Performed by: NURSE PRACTITIONER

## 2024-07-27 PROCEDURE — 73110 X-RAY EXAM OF WRIST: CPT

## 2024-07-27 PROCEDURE — 99284 EMERGENCY DEPT VISIT MOD MDM: CPT

## 2024-07-27 PROCEDURE — 87491 CHLMYD TRACH DNA AMP PROBE: CPT

## 2024-07-27 PROCEDURE — 87510 GARDNER VAG DNA DIR PROBE: CPT

## 2024-07-27 PROCEDURE — 81001 URINALYSIS AUTO W/SCOPE: CPT

## 2024-07-27 PROCEDURE — 87480 CANDIDA DNA DIR PROBE: CPT

## 2024-07-27 PROCEDURE — 96374 THER/PROPH/DIAG INJ IV PUSH: CPT

## 2024-07-27 RX ORDER — CEPHALEXIN 500 MG/1
500 CAPSULE ORAL 4 TIMES DAILY
Qty: 40 CAPSULE | Refills: 0 | Status: SHIPPED | OUTPATIENT
Start: 2024-07-27 | End: 2024-08-06

## 2024-07-27 RX ORDER — IBUPROFEN 800 MG/1
800 TABLET ORAL EVERY 8 HOURS PRN
Qty: 20 TABLET | Refills: 0 | Status: SHIPPED | OUTPATIENT
Start: 2024-07-27

## 2024-07-27 RX ORDER — BUTALBITAL, ACETAMINOPHEN AND CAFFEINE 50; 325; 40 MG/1; MG/1; MG/1
1 TABLET ORAL EVERY 6 HOURS PRN
Qty: 12 TABLET | Refills: 0 | Status: SHIPPED | OUTPATIENT
Start: 2024-07-27

## 2024-07-27 RX ORDER — BUTALBITAL, ACETAMINOPHEN AND CAFFEINE 50; 325; 40 MG/1; MG/1; MG/1
1 TABLET ORAL ONCE
Status: COMPLETED | OUTPATIENT
Start: 2024-07-27 | End: 2024-07-27

## 2024-07-27 RX ORDER — CEPHALEXIN 500 MG/1
500 CAPSULE ORAL ONCE
Status: COMPLETED | OUTPATIENT
Start: 2024-07-27 | End: 2024-07-27

## 2024-07-27 RX ORDER — IBUPROFEN 800 MG/1
800 TABLET ORAL ONCE
Status: COMPLETED | OUTPATIENT
Start: 2024-07-27 | End: 2024-07-27

## 2024-07-27 RX ORDER — FLUCONAZOLE 150 MG/1
150 TABLET ORAL DAILY
Qty: 2 TABLET | Refills: 0 | Status: SHIPPED | OUTPATIENT
Start: 2024-07-27 | End: 2024-07-29

## 2024-07-27 RX ORDER — METRONIDAZOLE 500 MG/1
500 TABLET ORAL 2 TIMES DAILY
Qty: 14 TABLET | Refills: 0 | Status: SHIPPED | OUTPATIENT
Start: 2024-07-27 | End: 2024-08-03

## 2024-07-27 RX ORDER — METRONIDAZOLE 500 MG/1
500 TABLET ORAL ONCE
Status: COMPLETED | OUTPATIENT
Start: 2024-07-27 | End: 2024-07-27

## 2024-07-27 RX ORDER — CLOTRIMAZOLE 1 %
CREAM (GRAM) TOPICAL
Qty: 45 G | Refills: 1 | Status: SHIPPED | OUTPATIENT
Start: 2024-07-27 | End: 2024-08-03

## 2024-07-27 RX ADMIN — CEPHALEXIN 500 MG: 500 CAPSULE ORAL at 22:34

## 2024-07-27 RX ADMIN — IBUPROFEN 800 MG: 800 TABLET ORAL at 22:34

## 2024-07-27 RX ADMIN — BUTALBITAL, ACETAMINOPHEN, AND CAFFEINE 1 TABLET: 325; 50; 40 TABLET ORAL at 22:34

## 2024-07-27 RX ADMIN — METRONIDAZOLE 500 MG: 500 TABLET ORAL at 22:34

## 2024-07-27 ASSESSMENT — VISUAL ACUITY: OU: 1

## 2024-07-27 ASSESSMENT — ENCOUNTER SYMPTOMS
SHORTNESS OF BREATH: 0
PHOTOPHOBIA: 1
ABDOMINAL PAIN: 0
BACK PAIN: 0
COLOR CHANGE: 1

## 2024-07-29 LAB
C TRACH DNA SPEC QL PROBE+SIG AMP: NEGATIVE
MICROORGANISM SPEC CULT: ABNORMAL
N GONORRHOEA DNA SPEC QL PROBE+SIG AMP: NEGATIVE
SPECIMEN DESCRIPTION: ABNORMAL
SPECIMEN DESCRIPTION: NORMAL
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
## 2024-08-13 PROBLEM — E66.01 MORBID OBESITY WITH BMI OF 60.0-69.9, ADULT (HCC): Status: RESOLVED | Noted: 2023-09-19 | Resolved: 2024-08-13

## 2024-08-13 PROBLEM — R10.13 EPIGASTRIC PAIN: Status: RESOLVED | Noted: 2020-12-14 | Resolved: 2024-08-13

## 2024-08-13 PROBLEM — M54.2 NECK PAIN: Status: RESOLVED | Noted: 2019-04-19 | Resolved: 2024-08-13

## 2024-09-25 ENCOUNTER — HOSPITAL ENCOUNTER (EMERGENCY)
Age: 39
Discharge: HOME | End: 2024-09-25
Payer: MEDICARE

## 2024-09-25 ENCOUNTER — APPOINTMENT (OUTPATIENT)
Dept: GENERAL RADIOLOGY | Age: 39
End: 2024-09-25
Payer: MEDICARE

## 2024-09-25 VITALS
HEART RATE: 62 BPM | DIASTOLIC BLOOD PRESSURE: 98 MMHG | RESPIRATION RATE: 20 BRPM | OXYGEN SATURATION: 97 % | WEIGHT: 293 LBS | TEMPERATURE: 97.8 F | BODY MASS INDEX: 76.19 KG/M2 | SYSTOLIC BLOOD PRESSURE: 158 MMHG

## 2024-09-25 DIAGNOSIS — S50.02XA CONTUSION OF LEFT ELBOW, INITIAL ENCOUNTER: ICD-10-CM

## 2024-09-25 DIAGNOSIS — S40.012A CONTUSION OF MULTIPLE SITES OF LEFT SHOULDER AND UPPER ARM, INITIAL ENCOUNTER: ICD-10-CM

## 2024-09-25 DIAGNOSIS — N39.0 URINARY TRACT INFECTION WITHOUT HEMATURIA, SITE UNSPECIFIED: Primary | ICD-10-CM

## 2024-09-25 DIAGNOSIS — S40.022A CONTUSION OF MULTIPLE SITES OF LEFT SHOULDER AND UPPER ARM, INITIAL ENCOUNTER: ICD-10-CM

## 2024-09-25 DIAGNOSIS — S39.012A BACK STRAIN, INITIAL ENCOUNTER: ICD-10-CM

## 2024-09-25 LAB
BILIRUB UR QL STRIP: NEGATIVE
CLARITY UR: ABNORMAL
COLOR UR: YELLOW
EPI CELLS #/AREA URNS HPF: NORMAL /HPF (ref 0–5)
GLUCOSE UR STRIP-MCNC: NEGATIVE MG/DL
HCG UR QL: NEGATIVE
HGB UR QL STRIP.AUTO: ABNORMAL
KETONES UR STRIP-MCNC: NEGATIVE MG/DL
LEUKOCYTE ESTERASE UR QL STRIP: NEGATIVE
NITRITE UR QL STRIP: NEGATIVE
PH UR STRIP: 7 [PH] (ref 5–8)
PROT UR STRIP-MCNC: ABNORMAL MG/DL
RBC #/AREA URNS HPF: NORMAL /HPF (ref 0–2)
SP GR UR STRIP: 1.02 (ref 1–1.03)
UROBILINOGEN UR STRIP-ACNC: NORMAL EU/DL (ref 0–1)
WBC #/AREA URNS HPF: NORMAL /HPF (ref 0–5)

## 2024-09-25 PROCEDURE — 99284 EMERGENCY DEPT VISIT MOD MDM: CPT

## 2024-09-25 PROCEDURE — 72072 X-RAY EXAM THORAC SPINE 3VWS: CPT

## 2024-09-25 PROCEDURE — 81001 URINALYSIS AUTO W/SCOPE: CPT

## 2024-09-25 PROCEDURE — 73080 X-RAY EXAM OF ELBOW: CPT

## 2024-09-25 PROCEDURE — 72100 X-RAY EXAM L-S SPINE 2/3 VWS: CPT

## 2024-09-25 PROCEDURE — 73030 X-RAY EXAM OF SHOULDER: CPT

## 2024-09-25 PROCEDURE — 81025 URINE PREGNANCY TEST: CPT

## 2024-09-25 RX ORDER — METHOCARBAMOL 750 MG/1
750 TABLET, FILM COATED ORAL 4 TIMES DAILY
Qty: 40 TABLET | Refills: 0 | Status: SHIPPED | OUTPATIENT
Start: 2024-09-25 | End: 2024-10-05

## 2024-09-25 RX ORDER — CEPHALEXIN 500 MG/1
500 CAPSULE ORAL 2 TIMES DAILY
Qty: 14 CAPSULE | Refills: 0 | Status: SHIPPED | OUTPATIENT
Start: 2024-09-25 | End: 2024-10-02

## 2024-09-25 RX ORDER — NAPROXEN 500 MG/1
500 TABLET ORAL 2 TIMES DAILY WITH MEALS
Qty: 20 TABLET | Refills: 0 | Status: SHIPPED | OUTPATIENT
Start: 2024-09-25

## 2024-09-25 ASSESSMENT — PAIN DESCRIPTION - FREQUENCY: FREQUENCY: CONTINUOUS

## 2024-09-25 ASSESSMENT — PAIN - FUNCTIONAL ASSESSMENT: PAIN_FUNCTIONAL_ASSESSMENT: 0-10

## 2024-09-25 ASSESSMENT — PAIN DESCRIPTION - DESCRIPTORS: DESCRIPTORS: SHARP;ACHING

## 2024-09-25 ASSESSMENT — PAIN SCALES - GENERAL: PAINLEVEL_OUTOF10: 8

## 2024-12-09 ENCOUNTER — HOSPITAL ENCOUNTER (OUTPATIENT)
Age: 39
Setting detail: SPECIMEN
Discharge: HOME | End: 2024-12-09

## 2024-12-09 ENCOUNTER — OFFICE VISIT (OUTPATIENT)
Dept: OBGYN | Age: 39
End: 2024-12-09
Payer: MEDICARE

## 2024-12-09 VITALS
HEART RATE: 67 BPM | WEIGHT: 293 LBS | DIASTOLIC BLOOD PRESSURE: 93 MMHG | SYSTOLIC BLOOD PRESSURE: 136 MMHG | BODY MASS INDEX: 74.82 KG/M2

## 2024-12-09 DIAGNOSIS — N89.8 VAGINAL ODOR: ICD-10-CM

## 2024-12-09 DIAGNOSIS — N93.9 ABNORMAL UTERINE BLEEDING (AUB): ICD-10-CM

## 2024-12-09 DIAGNOSIS — Z01.419 WELL WOMAN EXAM WITH ROUTINE GYNECOLOGICAL EXAM: Primary | ICD-10-CM

## 2024-12-09 DIAGNOSIS — N83.202 LEFT OVARIAN CYST: ICD-10-CM

## 2024-12-09 DIAGNOSIS — N76.0 BV (BACTERIAL VAGINOSIS): ICD-10-CM

## 2024-12-09 DIAGNOSIS — Z87.42 HISTORY OF ABNORMAL CERVICAL PAP SMEAR: ICD-10-CM

## 2024-12-09 DIAGNOSIS — B96.89 BV (BACTERIAL VAGINOSIS): ICD-10-CM

## 2024-12-09 PROBLEM — Z97.5 IUD (INTRAUTERINE DEVICE) IN PLACE: Status: RESOLVED | Noted: 2022-02-21 | Resolved: 2024-12-09

## 2024-12-09 PROCEDURE — 3080F DIAST BP >= 90 MM HG: CPT | Performed by: STUDENT IN AN ORGANIZED HEALTH CARE EDUCATION/TRAINING PROGRAM

## 2024-12-09 PROCEDURE — 99395 PREV VISIT EST AGE 18-39: CPT | Performed by: STUDENT IN AN ORGANIZED HEALTH CARE EDUCATION/TRAINING PROGRAM

## 2024-12-09 PROCEDURE — 3075F SYST BP GE 130 - 139MM HG: CPT | Performed by: STUDENT IN AN ORGANIZED HEALTH CARE EDUCATION/TRAINING PROGRAM

## 2024-12-09 NOTE — PROGRESS NOTES
hypoventilation syndrome 11/04/2018    Type 2 diabetes mellitus without complication, without long-term current use of insulin (HCC) 11/04/2018    Dysplasia of cervix, low grade (ISAIAS 1) 03/30/2017     Noted on colposcopy 3/7/17      ASCUS with positive high risk HPV cervical 02/14/2017     Colposcopy 3/7/17. ISAIAS 1. Plan: repeat Co-testing in 12 months. Due 3/2018.  Pap negative HPV negative 10/2020      H/O abnormal cervical Papanicolaou smear 02/03/2017     LSIL - 2/2015  Pap collected 2/3/2017. Patient is very difficult SSE as she is morbidly obese. Required ringed forceps and snowman speculum. ASCUS; HPV+  Colpo 3/7/17: CIN1-recommend cotesting in 1 year        History of trichomonal vaginitis      2015          DO [facility]  12/9/2024, 10:39 AM

## 2024-12-10 DIAGNOSIS — N89.8 VAGINAL ODOR: ICD-10-CM

## 2024-12-10 DIAGNOSIS — B96.89 BV (BACTERIAL VAGINOSIS): ICD-10-CM

## 2024-12-10 DIAGNOSIS — Z01.419 WELL WOMAN EXAM WITH ROUTINE GYNECOLOGICAL EXAM: ICD-10-CM

## 2024-12-10 DIAGNOSIS — N76.0 BV (BACTERIAL VAGINOSIS): ICD-10-CM

## 2024-12-10 LAB
C TRACH DNA SPEC QL PROBE+SIG AMP: NEGATIVE
CANDIDA SPECIES: NEGATIVE
GARDNERELLA VAGINALIS: POSITIVE
N GONORRHOEA DNA SPEC QL PROBE+SIG AMP: NEGATIVE
SOURCE: ABNORMAL
SPECIMEN DESCRIPTION: NORMAL
TRICHOMONAS: NEGATIVE

## 2024-12-10 RX ORDER — METRONIDAZOLE 500 MG/1
500 TABLET ORAL 2 TIMES DAILY
Qty: 14 TABLET | Refills: 0 | Status: SHIPPED | OUTPATIENT
Start: 2024-12-10 | End: 2024-12-17

## 2024-12-11 LAB
HPV I/H RISK 4 DNA CVX QL NAA+PROBE: DETECTED
HPV SAMPLE: ABNORMAL
HPV, INTERPRETATION: ABNORMAL
HPV16 DNA CVX QL NAA+PROBE: NOT DETECTED
HPV18 DNA CVX QL NAA+PROBE: NOT DETECTED
SPECIMEN DESCRIPTION: ABNORMAL

## 2024-12-13 LAB — CYTOLOGY REPORT: NORMAL

## 2025-01-24 ENCOUNTER — TELEPHONE (OUTPATIENT)
Dept: OBGYN | Age: 40
End: 2025-01-24

## 2025-01-24 NOTE — TELEPHONE ENCOUNTER
Called patient and left message to call our office back. Patient no showed ultrasound appointment and the appointment on 1/27/2025 was to discuss ultrasound results. US needs rescheduled and followup appointment to be scheduled after.

## 2025-01-27 ENCOUNTER — PATIENT MESSAGE (OUTPATIENT)
Dept: OBGYN | Age: 40
End: 2025-01-27

## 2025-01-27 NOTE — TELEPHONE ENCOUNTER
Patient called this morning to reschedule appt.  She said she didn't care about scheduling the ultrasound.  She requesting appointment for vaginal bleeding.  Offered patient the soonest available appt and she said \"I will get another doctor\"

## 2025-01-27 NOTE — TELEPHONE ENCOUNTER
Patient had an appointment today that she canceled due to transportation.  I offered her my next soonest appointment and she informed me that she would find another doctor.

## 2025-05-28 ENCOUNTER — TELEPHONE (OUTPATIENT)
Age: 40
End: 2025-05-28

## 2025-05-28 NOTE — TELEPHONE ENCOUNTER
Received call from patient requesting to schedule OV for heavy bleeding. Pt cancelled appt on 1/27/2025 and no showed to US on 1/2/2025 that was to be done prior to OV appt.    Pt also stated she is experiencing cramping in her groin area on the left side when she is laying down - stated it wakes her up out of her sleep.     Please advise.    Call back #: [phone]

## 2025-06-24 ENCOUNTER — HOSPITAL ENCOUNTER (OUTPATIENT)
Age: 40
Setting detail: SPECIMEN
Discharge: HOME | End: 2025-06-24

## 2025-06-24 DIAGNOSIS — L75.0 URINARY BODY ODOR: Primary | ICD-10-CM

## 2025-06-27 ENCOUNTER — RESULTS FOLLOW-UP (OUTPATIENT)
Dept: OBGYN | Age: 40
End: 2025-06-27

## 2025-07-01 ENCOUNTER — HOSPITAL ENCOUNTER (OUTPATIENT)
Age: 40
Setting detail: SPECIMEN
Discharge: HOME | End: 2025-07-01
Payer: MEDICARE

## 2025-07-01 DIAGNOSIS — L75.0 URINARY BODY ODOR: ICD-10-CM

## 2025-07-01 LAB
BACTERIA URNS QL MICRO: ABNORMAL
BILIRUB UR QL STRIP: NEGATIVE
CASTS #/AREA URNS LPF: ABNORMAL /LPF (ref 0–8)
CLARITY UR: ABNORMAL
COLOR UR: ABNORMAL
EPI CELLS #/AREA URNS HPF: ABNORMAL /HPF (ref 0–5)
GLUCOSE UR STRIP-MCNC: NEGATIVE MG/DL
HGB UR QL STRIP.AUTO: NEGATIVE
KETONES UR STRIP-MCNC: ABNORMAL MG/DL
LEUKOCYTE ESTERASE UR QL STRIP: NEGATIVE
NITRITE UR QL STRIP: NEGATIVE
PH UR STRIP: 5.5 [PH] (ref 5–8)
PROT UR STRIP-MCNC: ABNORMAL MG/DL
RBC #/AREA URNS HPF: ABNORMAL /HPF (ref 0–4)
SP GR UR STRIP: 1.03 (ref 1–1.03)
UROBILINOGEN UR STRIP-ACNC: NORMAL EU/DL (ref 0–1)
WBC #/AREA URNS HPF: ABNORMAL /HPF (ref 0–5)

## 2025-07-01 PROCEDURE — 87086 URINE CULTURE/COLONY COUNT: CPT

## 2025-07-01 PROCEDURE — 81001 URINALYSIS AUTO W/SCOPE: CPT

## 2025-07-02 LAB
MICROORGANISM SPEC CULT: NORMAL
SPECIMEN DESCRIPTION: NORMAL

## 2025-07-07 ENCOUNTER — RESULTS FOLLOW-UP (OUTPATIENT)
Dept: OBGYN CLINIC | Age: 40
End: 2025-07-07

## (undated) DEVICE — SVMMC GYN MIN PK

## (undated) DEVICE — GLOVE SURG SZ 6 THK91MIL LTX FREE SYN POLYISOPRENE ANTI

## (undated) DEVICE — TUBING, SUCTION, 3/16" X 10', STRAIGHT: Brand: MEDLINE

## (undated) DEVICE — SIMPLICITY FLUFF UNDERPAD 23X36, MODERATE: Brand: SIMPLICITY

## (undated) DEVICE — COVER LT HNDL BLU PLAS

## (undated) DEVICE — SINGLE-USE BIOPSY FORCEPS: Brand: RADIAL JAW 4

## (undated) DEVICE — FORCEPS BX L240CM JAW DIA2.8MM L CAP W/ NDL MIC MESH TOOTH

## (undated) DEVICE — FLUID MGMT SYS FLUENT KIT 6/PK

## (undated) DEVICE — GLOVE ORANGE PI 7   MSG9070

## (undated) DEVICE — GLOVE ORANGE PI 8   MSG9080

## (undated) DEVICE — ADAPTER,CATHETER/SYRINGE/LUER,STERILE: Brand: MEDLINE

## (undated) DEVICE — GLOVE SURG SZ 65 THK91MIL LTX FREE SYN POLYISOPRENE

## (undated) DEVICE — STRAP,POSITIONING,KNEE/BODY,FOAM,4X60": Brand: MEDLINE

## (undated) DEVICE — BASIN EMSIS 700ML GRAPHITE PLAS KID SHP GRAD

## (undated) DEVICE — PROTECTOR ULN NRV PUR FOAM HK LOOP STRP ANATOMICALLY

## (undated) DEVICE — YANKAUER,FLEXIBLE HANDLE,REGLR CAPACITY: Brand: MEDLINE INDUSTRIES, INC.

## (undated) DEVICE — 4-PORT MANIFOLD: Brand: NEPTUNE 2

## (undated) DEVICE — SYSTEM TISS RETEN TRS

## (undated) DEVICE — DEVICE INTRAUTERNE CONTRACEPTIVE MIRENA

## (undated) DEVICE — SYRINGE MED 50ML LUERLOCK TIP

## (undated) DEVICE — CYSTO/BLADDER IRRIGATION SET, REGULATING CLAMP

## (undated) DEVICE — SET ENDOSCP SEAL HYSTEROSCOPE RIG OUTFLO CHN DISP MYOSURE

## (undated) DEVICE — 1016 S-DRAPE IRRIG POUCH 10/BOX: Brand: STERI-DRAPE™

## (undated) DEVICE — DRAPE,REIN 53X77,STERILE: Brand: MEDLINE

## (undated) DEVICE — GOWN,AURORA,NONREINFORCED,LARGE: Brand: MEDLINE

## (undated) DEVICE — Z DISCONTINUED BY MEDLINE USE 2711682 TRAY SKIN PREP DRY W/ PREM GLV

## (undated) DEVICE — SYRINGE MED 10ML TRNSLUC BRL PLUNG BLK MRK POLYPR CTRL

## (undated) DEVICE — Device: Brand: DEFENDO VALVE AND CONNECTOR KIT

## (undated) DEVICE — JELLY,LUBE,STERILE,FLIP TOP,TUBE,2-OZ: Brand: MEDLINE

## (undated) DEVICE — DEVICE TISS REM IU CANSTR VAC TB FT PEDAL DISPOSABLE MYOSURE

## (undated) DEVICE — GLOVE ORANGE PI 7 1/2   MSG9075

## (undated) DEVICE — BITEBLOCK 54FR W/ DENT RIM BLOX

## (undated) DEVICE — CANNULA IV 18GA L15IN BLNT FILL LUERLOCK HUB MJCT

## (undated) DEVICE — CONNECTOR TBNG AUX H2O JET DISP FOR OLY 160/180 SER